# Patient Record
Sex: FEMALE | Race: WHITE | Employment: OTHER | URBAN - METROPOLITAN AREA
[De-identification: names, ages, dates, MRNs, and addresses within clinical notes are randomized per-mention and may not be internally consistent; named-entity substitution may affect disease eponyms.]

---

## 2017-01-23 DIAGNOSIS — E03.9 UNSPECIFIED HYPOTHYROIDISM: ICD-10-CM

## 2017-01-23 DIAGNOSIS — I10 UNSPECIFIED ESSENTIAL HYPERTENSION: ICD-10-CM

## 2017-01-23 RX ORDER — LISINOPRIL 10 MG/1
TABLET ORAL
Qty: 30 TABLET | Refills: 0 | Status: SHIPPED | OUTPATIENT
Start: 2017-01-23 | End: 2017-01-27 | Stop reason: SDUPTHER

## 2017-01-23 RX ORDER — LEVOTHYROXINE SODIUM 0.1 MG/1
100 TABLET ORAL DAILY
Qty: 30 TABLET | Refills: 0 | Status: SHIPPED | OUTPATIENT
Start: 2017-01-23 | End: 2017-01-27 | Stop reason: SDUPTHER

## 2017-01-24 ENCOUNTER — TELEPHONE (OUTPATIENT)
Dept: FAMILY MEDICINE CLINIC | Age: 77
End: 2017-01-24

## 2017-01-25 DIAGNOSIS — E03.9 HYPOTHYROIDISM, UNSPECIFIED TYPE: ICD-10-CM

## 2017-01-25 DIAGNOSIS — E78.2 MIXED HYPERLIPIDEMIA: ICD-10-CM

## 2017-01-25 DIAGNOSIS — E10.49 TYPE 1 DIABETES MELLITUS WITH OTHER NEUROLOGIC COMPLICATION (HCC): ICD-10-CM

## 2017-01-25 LAB
A/G RATIO: 1.8 (ref 1.1–2.2)
ALBUMIN SERPL-MCNC: 4 G/DL (ref 3.4–5)
ALP BLD-CCNC: 62 U/L (ref 40–129)
ALT SERPL-CCNC: 12 U/L (ref 10–40)
ANION GAP SERPL CALCULATED.3IONS-SCNC: 18 MMOL/L (ref 3–16)
AST SERPL-CCNC: 22 U/L (ref 15–37)
BILIRUB SERPL-MCNC: 0.4 MG/DL (ref 0–1)
BUN BLDV-MCNC: 24 MG/DL (ref 7–20)
CALCIUM SERPL-MCNC: 9.4 MG/DL (ref 8.3–10.6)
CHLORIDE BLD-SCNC: 96 MMOL/L (ref 99–110)
CHOLESTEROL, TOTAL: 143 MG/DL (ref 0–199)
CO2: 26 MMOL/L (ref 21–32)
CREAT SERPL-MCNC: 1.1 MG/DL (ref 0.6–1.2)
GFR AFRICAN AMERICAN: 58
GFR NON-AFRICAN AMERICAN: 48
GLOBULIN: 2.2 G/DL
GLUCOSE BLD-MCNC: 144 MG/DL (ref 70–99)
HDLC SERPL-MCNC: 69 MG/DL (ref 40–60)
LDL CHOLESTEROL CALCULATED: 60 MG/DL
POTASSIUM SERPL-SCNC: 5 MMOL/L (ref 3.5–5.1)
SODIUM BLD-SCNC: 140 MMOL/L (ref 136–145)
TOTAL PROTEIN: 6.2 G/DL (ref 6.4–8.2)
TRIGL SERPL-MCNC: 72 MG/DL (ref 0–150)
TSH SERPL DL<=0.05 MIU/L-ACNC: 2.07 UIU/ML (ref 0.27–4.2)
VLDLC SERPL CALC-MCNC: 14 MG/DL

## 2017-01-26 ENCOUNTER — PATIENT MESSAGE (OUTPATIENT)
Dept: FAMILY MEDICINE CLINIC | Age: 77
End: 2017-01-26

## 2017-01-26 LAB
ESTIMATED AVERAGE GLUCOSE: 211.6 MG/DL
HBA1C MFR BLD: 9 %

## 2017-01-27 ENCOUNTER — OFFICE VISIT (OUTPATIENT)
Dept: FAMILY MEDICINE CLINIC | Age: 77
End: 2017-01-27

## 2017-01-27 VITALS
WEIGHT: 136 LBS | SYSTOLIC BLOOD PRESSURE: 114 MMHG | BODY MASS INDEX: 25.03 KG/M2 | OXYGEN SATURATION: 98 % | DIASTOLIC BLOOD PRESSURE: 66 MMHG | HEIGHT: 62 IN | RESPIRATION RATE: 14 BRPM | HEART RATE: 74 BPM

## 2017-01-27 DIAGNOSIS — E03.9 HYPOTHYROIDISM, UNSPECIFIED TYPE: ICD-10-CM

## 2017-01-27 DIAGNOSIS — E10.42 DIABETIC POLYNEUROPATHY ASSOCIATED WITH TYPE 1 DIABETES MELLITUS (HCC): ICD-10-CM

## 2017-01-27 DIAGNOSIS — I10 UNSPECIFIED ESSENTIAL HYPERTENSION: ICD-10-CM

## 2017-01-27 DIAGNOSIS — M81.0 OSTEOPOROSIS: ICD-10-CM

## 2017-01-27 DIAGNOSIS — N28.9 RENAL INSUFFICIENCY: ICD-10-CM

## 2017-01-27 DIAGNOSIS — E03.9 UNSPECIFIED HYPOTHYROIDISM: ICD-10-CM

## 2017-01-27 DIAGNOSIS — E10.49 TYPE 1 DIABETES MELLITUS WITH OTHER NEUROLOGIC COMPLICATION (HCC): ICD-10-CM

## 2017-01-27 DIAGNOSIS — I25.10 CORONARY ARTERY DISEASE INVOLVING NATIVE CORONARY ARTERY OF NATIVE HEART WITHOUT ANGINA PECTORIS: Primary | ICD-10-CM

## 2017-01-27 PROCEDURE — G0439 PPPS, SUBSEQ VISIT: HCPCS | Performed by: FAMILY MEDICINE

## 2017-01-27 RX ORDER — LEVOTHYROXINE SODIUM 0.1 MG/1
100 TABLET ORAL DAILY
Qty: 90 TABLET | Refills: 3 | Status: SHIPPED | OUTPATIENT
Start: 2017-01-27 | End: 2019-01-31 | Stop reason: SDUPTHER

## 2017-01-27 RX ORDER — LISINOPRIL 10 MG/1
TABLET ORAL
Qty: 90 TABLET | Refills: 3 | Status: SHIPPED | OUTPATIENT
Start: 2017-01-27 | End: 2018-06-18

## 2017-01-27 RX ORDER — SIMVASTATIN 20 MG
TABLET ORAL
Qty: 90 TABLET | Refills: 3 | Status: SHIPPED | OUTPATIENT
Start: 2017-01-27 | End: 2017-05-19

## 2017-01-27 ASSESSMENT — LIFESTYLE VARIABLES
HOW OFTEN DO YOU HAVE SIX OR MORE DRINKS ON ONE OCCASION: 0
HOW OFTEN DO YOU HAVE A DRINK CONTAINING ALCOHOL: 3

## 2017-01-27 ASSESSMENT — ANXIETY QUESTIONNAIRES
GAD7 TOTAL SCORE: 1
GAD7 TOTAL SCORE: 2

## 2017-01-27 ASSESSMENT — PATIENT HEALTH QUESTIONNAIRE - PHQ9: SUM OF ALL RESPONSES TO PHQ QUESTIONS 1-9: 0

## 2017-02-02 ENCOUNTER — HOSPITAL ENCOUNTER (OUTPATIENT)
Dept: GENERAL RADIOLOGY | Age: 77
Discharge: HOME | End: 2017-02-02
Attending: INTERNAL MEDICINE | Admitting: INTERNAL MEDICINE

## 2017-02-02 DIAGNOSIS — M81.0 AGE-RELATED OSTEOPOROSIS WITHOUT CURRENT PATHOLOGICAL FRACTURE: ICD-10-CM

## 2017-02-02 DIAGNOSIS — M81.0 OSTEOPOROSIS: ICD-10-CM

## 2017-02-27 ENCOUNTER — OFFICE VISIT (OUTPATIENT)
Dept: FAMILY MEDICINE CLINIC | Age: 77
End: 2017-02-27

## 2017-02-27 VITALS
HEART RATE: 70 BPM | OXYGEN SATURATION: 99 % | DIASTOLIC BLOOD PRESSURE: 70 MMHG | BODY MASS INDEX: 24.51 KG/M2 | SYSTOLIC BLOOD PRESSURE: 130 MMHG | RESPIRATION RATE: 14 BRPM | WEIGHT: 133.2 LBS | TEMPERATURE: 97.8 F | HEIGHT: 62 IN

## 2017-02-27 DIAGNOSIS — R05.3 CHRONIC COUGH: Primary | ICD-10-CM

## 2017-02-27 PROCEDURE — G8484 FLU IMMUNIZE NO ADMIN: HCPCS | Performed by: FAMILY MEDICINE

## 2017-02-27 PROCEDURE — G8598 ASA/ANTIPLAT THER USED: HCPCS | Performed by: FAMILY MEDICINE

## 2017-02-27 PROCEDURE — 4040F PNEUMOC VAC/ADMIN/RCVD: CPT | Performed by: FAMILY MEDICINE

## 2017-02-27 PROCEDURE — 1036F TOBACCO NON-USER: CPT | Performed by: FAMILY MEDICINE

## 2017-02-27 PROCEDURE — G8420 CALC BMI NORM PARAMETERS: HCPCS | Performed by: FAMILY MEDICINE

## 2017-02-27 PROCEDURE — G8427 DOCREV CUR MEDS BY ELIG CLIN: HCPCS | Performed by: FAMILY MEDICINE

## 2017-02-27 PROCEDURE — 1090F PRES/ABSN URINE INCON ASSESS: CPT | Performed by: FAMILY MEDICINE

## 2017-02-27 PROCEDURE — 99213 OFFICE O/P EST LOW 20 MIN: CPT | Performed by: FAMILY MEDICINE

## 2017-02-27 PROCEDURE — 1123F ACP DISCUSS/DSCN MKR DOCD: CPT | Performed by: FAMILY MEDICINE

## 2017-02-27 PROCEDURE — G8399 PT W/DXA RESULTS DOCUMENT: HCPCS | Performed by: FAMILY MEDICINE

## 2017-02-27 RX ORDER — DOXYCYCLINE HYCLATE 100 MG
100 TABLET ORAL 2 TIMES DAILY
Qty: 20 TABLET | Refills: 0 | Status: SHIPPED | OUTPATIENT
Start: 2017-02-27 | End: 2017-03-09

## 2017-04-13 ENCOUNTER — OFFICE VISIT (OUTPATIENT)
Dept: ENDOCRINOLOGY | Age: 77
End: 2017-04-13

## 2017-04-13 VITALS
SYSTOLIC BLOOD PRESSURE: 120 MMHG | RESPIRATION RATE: 12 BRPM | OXYGEN SATURATION: 96 % | WEIGHT: 134.2 LBS | HEIGHT: 63 IN | BODY MASS INDEX: 23.78 KG/M2 | DIASTOLIC BLOOD PRESSURE: 67 MMHG | HEART RATE: 68 BPM

## 2017-04-13 DIAGNOSIS — E10.49 TYPE 1 DIABETES MELLITUS WITH OTHER NEUROLOGIC COMPLICATION (HCC): Primary | ICD-10-CM

## 2017-04-13 DIAGNOSIS — E03.9 HYPOTHYROIDISM, UNSPECIFIED TYPE: ICD-10-CM

## 2017-04-13 DIAGNOSIS — M81.0 OSTEOPOROSIS: ICD-10-CM

## 2017-04-13 LAB — HBA1C MFR BLD: 7.1 %

## 2017-04-13 PROCEDURE — 1090F PRES/ABSN URINE INCON ASSESS: CPT | Performed by: INTERNAL MEDICINE

## 2017-04-13 PROCEDURE — G8428 CUR MEDS NOT DOCUMENT: HCPCS | Performed by: INTERNAL MEDICINE

## 2017-04-13 PROCEDURE — G8420 CALC BMI NORM PARAMETERS: HCPCS | Performed by: INTERNAL MEDICINE

## 2017-04-13 PROCEDURE — 99215 OFFICE O/P EST HI 40 MIN: CPT | Performed by: INTERNAL MEDICINE

## 2017-04-13 PROCEDURE — 1123F ACP DISCUSS/DSCN MKR DOCD: CPT | Performed by: INTERNAL MEDICINE

## 2017-04-13 PROCEDURE — 4040F PNEUMOC VAC/ADMIN/RCVD: CPT | Performed by: INTERNAL MEDICINE

## 2017-04-13 PROCEDURE — 1036F TOBACCO NON-USER: CPT | Performed by: INTERNAL MEDICINE

## 2017-04-13 PROCEDURE — G8399 PT W/DXA RESULTS DOCUMENT: HCPCS | Performed by: INTERNAL MEDICINE

## 2017-04-13 PROCEDURE — G8598 ASA/ANTIPLAT THER USED: HCPCS | Performed by: INTERNAL MEDICINE

## 2017-04-13 PROCEDURE — 83036 HEMOGLOBIN GLYCOSYLATED A1C: CPT | Performed by: INTERNAL MEDICINE

## 2017-04-13 PROCEDURE — 4005F PHARM THX FOR OP RXD: CPT | Performed by: INTERNAL MEDICINE

## 2017-05-19 RX ORDER — SIMVASTATIN 20 MG
TABLET ORAL
Qty: 30 TABLET | Refills: 0 | Status: SHIPPED | OUTPATIENT
Start: 2017-05-19 | End: 2017-05-25 | Stop reason: SDUPTHER

## 2017-05-25 ENCOUNTER — OFFICE VISIT (OUTPATIENT)
Dept: CARDIOLOGY CLINIC | Age: 77
End: 2017-05-25

## 2017-05-25 VITALS
WEIGHT: 134.3 LBS | BODY MASS INDEX: 24.71 KG/M2 | DIASTOLIC BLOOD PRESSURE: 62 MMHG | HEIGHT: 62 IN | SYSTOLIC BLOOD PRESSURE: 116 MMHG | HEART RATE: 64 BPM

## 2017-05-25 DIAGNOSIS — I25.10 ATHEROSCLEROSIS OF NATIVE CORONARY ARTERY OF NATIVE HEART WITHOUT ANGINA PECTORIS: Primary | ICD-10-CM

## 2017-05-25 DIAGNOSIS — R00.2 PALPITATIONS: ICD-10-CM

## 2017-05-25 DIAGNOSIS — E78.2 MIXED HYPERLIPIDEMIA: ICD-10-CM

## 2017-05-25 PROCEDURE — G8598 ASA/ANTIPLAT THER USED: HCPCS | Performed by: NURSE PRACTITIONER

## 2017-05-25 PROCEDURE — G8399 PT W/DXA RESULTS DOCUMENT: HCPCS | Performed by: NURSE PRACTITIONER

## 2017-05-25 PROCEDURE — 1090F PRES/ABSN URINE INCON ASSESS: CPT | Performed by: NURSE PRACTITIONER

## 2017-05-25 PROCEDURE — 99214 OFFICE O/P EST MOD 30 MIN: CPT | Performed by: NURSE PRACTITIONER

## 2017-05-25 PROCEDURE — G8427 DOCREV CUR MEDS BY ELIG CLIN: HCPCS | Performed by: NURSE PRACTITIONER

## 2017-05-25 PROCEDURE — 4040F PNEUMOC VAC/ADMIN/RCVD: CPT | Performed by: NURSE PRACTITIONER

## 2017-05-25 PROCEDURE — 1123F ACP DISCUSS/DSCN MKR DOCD: CPT | Performed by: NURSE PRACTITIONER

## 2017-05-25 PROCEDURE — 1036F TOBACCO NON-USER: CPT | Performed by: NURSE PRACTITIONER

## 2017-05-25 PROCEDURE — G8420 CALC BMI NORM PARAMETERS: HCPCS | Performed by: NURSE PRACTITIONER

## 2017-05-25 RX ORDER — SIMVASTATIN 20 MG
TABLET ORAL
Qty: 30 TABLET | Refills: 11 | Status: SHIPPED | OUTPATIENT
Start: 2017-05-25 | End: 2017-08-21

## 2017-05-25 RX ORDER — SIMVASTATIN 20 MG
TABLET ORAL
Qty: 30 TABLET | Refills: 0 | Status: CANCELLED | OUTPATIENT
Start: 2017-05-25

## 2017-05-25 RX ORDER — ACETAMINOPHEN 160 MG
1 TABLET,DISINTEGRATING ORAL DAILY
COMMUNITY

## 2017-06-26 ENCOUNTER — TELEPHONE (OUTPATIENT)
Dept: FAMILY MEDICINE CLINIC | Age: 77
End: 2017-06-26

## 2017-06-27 ENCOUNTER — OFFICE VISIT (OUTPATIENT)
Dept: DERMATOLOGY | Age: 77
End: 2017-06-27

## 2017-06-27 DIAGNOSIS — Z12.83 SCREENING EXAM FOR SKIN CANCER: ICD-10-CM

## 2017-06-27 DIAGNOSIS — L82.1 SEBORRHEIC KERATOSES: Primary | ICD-10-CM

## 2017-06-27 DIAGNOSIS — L81.4 SOLAR LENTIGINOSIS: ICD-10-CM

## 2017-06-27 PROCEDURE — G8598 ASA/ANTIPLAT THER USED: HCPCS | Performed by: DERMATOLOGY

## 2017-06-27 PROCEDURE — G8420 CALC BMI NORM PARAMETERS: HCPCS | Performed by: DERMATOLOGY

## 2017-06-27 PROCEDURE — G8399 PT W/DXA RESULTS DOCUMENT: HCPCS | Performed by: DERMATOLOGY

## 2017-06-27 PROCEDURE — 4040F PNEUMOC VAC/ADMIN/RCVD: CPT | Performed by: DERMATOLOGY

## 2017-06-27 PROCEDURE — G8427 DOCREV CUR MEDS BY ELIG CLIN: HCPCS | Performed by: DERMATOLOGY

## 2017-06-27 PROCEDURE — 1123F ACP DISCUSS/DSCN MKR DOCD: CPT | Performed by: DERMATOLOGY

## 2017-06-27 PROCEDURE — 1036F TOBACCO NON-USER: CPT | Performed by: DERMATOLOGY

## 2017-06-27 PROCEDURE — 99213 OFFICE O/P EST LOW 20 MIN: CPT | Performed by: DERMATOLOGY

## 2017-06-27 PROCEDURE — 1090F PRES/ABSN URINE INCON ASSESS: CPT | Performed by: DERMATOLOGY

## 2017-07-05 ENCOUNTER — OFFICE VISIT (OUTPATIENT)
Dept: FAMILY MEDICINE CLINIC | Age: 77
End: 2017-07-05

## 2017-07-05 VITALS
HEART RATE: 73 BPM | OXYGEN SATURATION: 96 % | SYSTOLIC BLOOD PRESSURE: 114 MMHG | HEIGHT: 62 IN | BODY MASS INDEX: 24.66 KG/M2 | DIASTOLIC BLOOD PRESSURE: 62 MMHG | WEIGHT: 134 LBS | RESPIRATION RATE: 16 BRPM

## 2017-07-05 DIAGNOSIS — E03.9 HYPOTHYROIDISM, UNSPECIFIED TYPE: ICD-10-CM

## 2017-07-05 DIAGNOSIS — I25.10 CORONARY ARTERY DISEASE INVOLVING NATIVE CORONARY ARTERY OF NATIVE HEART WITHOUT ANGINA PECTORIS: ICD-10-CM

## 2017-07-05 DIAGNOSIS — E10.49 TYPE 1 DIABETES MELLITUS WITH OTHER NEUROLOGIC COMPLICATION (HCC): ICD-10-CM

## 2017-07-05 DIAGNOSIS — L08.9 WOUND INFECTION: ICD-10-CM

## 2017-07-05 DIAGNOSIS — T14.8XXA WOUND INFECTION: ICD-10-CM

## 2017-07-05 DIAGNOSIS — S81.812D LEG LACERATION, LEFT, SUBSEQUENT ENCOUNTER: Primary | ICD-10-CM

## 2017-07-05 PROCEDURE — G8427 DOCREV CUR MEDS BY ELIG CLIN: HCPCS | Performed by: FAMILY MEDICINE

## 2017-07-05 PROCEDURE — 1123F ACP DISCUSS/DSCN MKR DOCD: CPT | Performed by: FAMILY MEDICINE

## 2017-07-05 PROCEDURE — G8598 ASA/ANTIPLAT THER USED: HCPCS | Performed by: FAMILY MEDICINE

## 2017-07-05 PROCEDURE — 4040F PNEUMOC VAC/ADMIN/RCVD: CPT | Performed by: FAMILY MEDICINE

## 2017-07-05 PROCEDURE — 1090F PRES/ABSN URINE INCON ASSESS: CPT | Performed by: FAMILY MEDICINE

## 2017-07-05 PROCEDURE — 1036F TOBACCO NON-USER: CPT | Performed by: FAMILY MEDICINE

## 2017-07-05 PROCEDURE — 99213 OFFICE O/P EST LOW 20 MIN: CPT | Performed by: FAMILY MEDICINE

## 2017-07-05 PROCEDURE — G8420 CALC BMI NORM PARAMETERS: HCPCS | Performed by: FAMILY MEDICINE

## 2017-07-05 PROCEDURE — G8399 PT W/DXA RESULTS DOCUMENT: HCPCS | Performed by: FAMILY MEDICINE

## 2017-07-05 RX ORDER — CEPHALEXIN 500 MG/1
500 CAPSULE ORAL 3 TIMES DAILY
Qty: 21 CAPSULE | Refills: 0 | Status: SHIPPED | OUTPATIENT
Start: 2017-07-05 | End: 2017-12-06 | Stop reason: ALTCHOICE

## 2017-07-12 ENCOUNTER — OFFICE VISIT (OUTPATIENT)
Dept: FAMILY MEDICINE CLINIC | Age: 77
End: 2017-07-12

## 2017-07-12 VITALS
BODY MASS INDEX: 24.51 KG/M2 | HEIGHT: 62 IN | DIASTOLIC BLOOD PRESSURE: 78 MMHG | HEART RATE: 58 BPM | WEIGHT: 133.2 LBS | OXYGEN SATURATION: 99 % | RESPIRATION RATE: 16 BRPM | SYSTOLIC BLOOD PRESSURE: 114 MMHG

## 2017-07-12 LAB
CREATININE URINE POCT: 200
MICROALBUMIN/CREAT 24H UR: 10 MG/G{CREAT}
MICROALBUMIN/CREAT UR-RTO: NORMAL

## 2017-07-12 PROCEDURE — 1123F ACP DISCUSS/DSCN MKR DOCD: CPT | Performed by: FAMILY MEDICINE

## 2017-07-12 PROCEDURE — G8598 ASA/ANTIPLAT THER USED: HCPCS | Performed by: FAMILY MEDICINE

## 2017-07-12 PROCEDURE — G8427 DOCREV CUR MEDS BY ELIG CLIN: HCPCS | Performed by: FAMILY MEDICINE

## 2017-07-12 PROCEDURE — G8420 CALC BMI NORM PARAMETERS: HCPCS | Performed by: FAMILY MEDICINE

## 2017-07-12 PROCEDURE — 4040F PNEUMOC VAC/ADMIN/RCVD: CPT | Performed by: FAMILY MEDICINE

## 2017-07-12 PROCEDURE — 1090F PRES/ABSN URINE INCON ASSESS: CPT | Performed by: FAMILY MEDICINE

## 2017-07-12 PROCEDURE — 99213 OFFICE O/P EST LOW 20 MIN: CPT | Performed by: FAMILY MEDICINE

## 2017-07-12 PROCEDURE — 82044 UR ALBUMIN SEMIQUANTITATIVE: CPT | Performed by: FAMILY MEDICINE

## 2017-07-12 PROCEDURE — 1036F TOBACCO NON-USER: CPT | Performed by: FAMILY MEDICINE

## 2017-07-12 PROCEDURE — G8399 PT W/DXA RESULTS DOCUMENT: HCPCS | Performed by: FAMILY MEDICINE

## 2017-07-18 ENCOUNTER — TELEPHONE (OUTPATIENT)
Dept: FAMILY MEDICINE CLINIC | Age: 77
End: 2017-07-18

## 2017-07-18 ENCOUNTER — OFFICE VISIT (OUTPATIENT)
Dept: FAMILY MEDICINE CLINIC | Age: 77
End: 2017-07-18

## 2017-07-18 VITALS
WEIGHT: 131 LBS | RESPIRATION RATE: 14 BRPM | SYSTOLIC BLOOD PRESSURE: 110 MMHG | HEART RATE: 64 BPM | OXYGEN SATURATION: 99 % | HEIGHT: 62 IN | DIASTOLIC BLOOD PRESSURE: 60 MMHG | BODY MASS INDEX: 24.11 KG/M2

## 2017-07-18 PROCEDURE — G8420 CALC BMI NORM PARAMETERS: HCPCS | Performed by: FAMILY MEDICINE

## 2017-07-18 PROCEDURE — 4040F PNEUMOC VAC/ADMIN/RCVD: CPT | Performed by: FAMILY MEDICINE

## 2017-07-18 PROCEDURE — G8427 DOCREV CUR MEDS BY ELIG CLIN: HCPCS | Performed by: FAMILY MEDICINE

## 2017-07-18 PROCEDURE — 99211 OFF/OP EST MAY X REQ PHY/QHP: CPT | Performed by: FAMILY MEDICINE

## 2017-07-18 PROCEDURE — G8598 ASA/ANTIPLAT THER USED: HCPCS | Performed by: FAMILY MEDICINE

## 2017-07-18 NOTE — TELEPHONE ENCOUNTER
Pt  informed, pt not at home right now.  She may come in at the end of the day or somewhere this evening

## 2017-07-18 NOTE — TELEPHONE ENCOUNTER
Patient was told to call if she found the other stitch that Dr. Luis Armando Baird could not see when he removed the other stiches on 7/12/17. She is requesting to come in tomorrow as she is leaving on vacation Thursday 7/20. Please call patient and advise asap.

## 2017-07-19 ENCOUNTER — HOSPITAL ENCOUNTER (OUTPATIENT)
Dept: NON INVASIVE DIAGNOSTICS | Age: 77
Discharge: HOME | End: 2017-07-19
Attending: NURSE PRACTITIONER | Admitting: NURSE PRACTITIONER

## 2017-07-19 DIAGNOSIS — I25.10 ATHEROSCLEROTIC HEART DISEASE OF NATIVE CORONARY ARTERY WITHOUT ANGINA PECTORIS: ICD-10-CM

## 2017-07-19 LAB
LV EF: 60 %
LVEF MODALITY: NORMAL

## 2017-07-28 ENCOUNTER — TELEPHONE (OUTPATIENT)
Dept: CARDIOLOGY CLINIC | Age: 77
End: 2017-07-28

## 2017-08-17 ENCOUNTER — OFFICE VISIT (OUTPATIENT)
Dept: ENDOCRINOLOGY | Age: 77
End: 2017-08-17

## 2017-08-17 VITALS
HEART RATE: 71 BPM | DIASTOLIC BLOOD PRESSURE: 64 MMHG | WEIGHT: 131 LBS | SYSTOLIC BLOOD PRESSURE: 108 MMHG | RESPIRATION RATE: 14 BRPM | HEIGHT: 62 IN | OXYGEN SATURATION: 97 % | BODY MASS INDEX: 24.11 KG/M2

## 2017-08-17 DIAGNOSIS — E06.3 HASHIMOTO'S DISEASE: ICD-10-CM

## 2017-08-17 DIAGNOSIS — I10 ESSENTIAL HYPERTENSION: ICD-10-CM

## 2017-08-17 DIAGNOSIS — E78.2 MIXED HYPERLIPIDEMIA: ICD-10-CM

## 2017-08-17 DIAGNOSIS — N28.9 RENAL INSUFFICIENCY: ICD-10-CM

## 2017-08-17 DIAGNOSIS — E03.9 HYPOTHYROIDISM, UNSPECIFIED TYPE: ICD-10-CM

## 2017-08-17 DIAGNOSIS — M81.0 OSTEOPOROSIS, UNSPECIFIED OSTEOPOROSIS TYPE, UNSPECIFIED PATHOLOGICAL FRACTURE PRESENCE: ICD-10-CM

## 2017-08-17 DIAGNOSIS — E10.40 TYPE 1 DIABETES MELLITUS WITH DIABETIC NEUROPATHY (HCC): Primary | ICD-10-CM

## 2017-08-17 LAB — HBA1C MFR BLD: 7.5 %

## 2017-08-17 PROCEDURE — 83036 HEMOGLOBIN GLYCOSYLATED A1C: CPT | Performed by: INTERNAL MEDICINE

## 2017-08-17 PROCEDURE — 99215 OFFICE O/P EST HI 40 MIN: CPT | Performed by: INTERNAL MEDICINE

## 2017-08-17 PROCEDURE — 1123F ACP DISCUSS/DSCN MKR DOCD: CPT | Performed by: INTERNAL MEDICINE

## 2017-08-17 PROCEDURE — 1090F PRES/ABSN URINE INCON ASSESS: CPT | Performed by: INTERNAL MEDICINE

## 2017-08-17 PROCEDURE — G8420 CALC BMI NORM PARAMETERS: HCPCS | Performed by: INTERNAL MEDICINE

## 2017-08-17 PROCEDURE — G8399 PT W/DXA RESULTS DOCUMENT: HCPCS | Performed by: INTERNAL MEDICINE

## 2017-08-17 PROCEDURE — G8598 ASA/ANTIPLAT THER USED: HCPCS | Performed by: INTERNAL MEDICINE

## 2017-08-17 PROCEDURE — 4040F PNEUMOC VAC/ADMIN/RCVD: CPT | Performed by: INTERNAL MEDICINE

## 2017-08-17 PROCEDURE — 1036F TOBACCO NON-USER: CPT | Performed by: INTERNAL MEDICINE

## 2017-08-17 PROCEDURE — G8427 DOCREV CUR MEDS BY ELIG CLIN: HCPCS | Performed by: INTERNAL MEDICINE

## 2017-08-17 PROCEDURE — 4005F PHARM THX FOR OP RXD: CPT | Performed by: INTERNAL MEDICINE

## 2017-08-21 RX ORDER — SIMVASTATIN 20 MG
TABLET ORAL
Qty: 90 TABLET | Refills: 1 | Status: SHIPPED | OUTPATIENT
Start: 2017-08-21 | End: 2018-06-18 | Stop reason: SDUPTHER

## 2017-10-26 NOTE — TELEPHONE ENCOUNTER
Pt called back also would like a script sent to [facility] sent for 1 vial of the novalog

## 2017-11-08 DIAGNOSIS — I25.10 ATHEROSCLEROSIS OF NATIVE CORONARY ARTERY WITHOUT ANGINA PECTORIS, UNSPECIFIED WHETHER NATIVE OR TRANSPLANTED HEART: ICD-10-CM

## 2017-11-08 RX ORDER — CLOPIDOGREL BISULFATE 75 MG/1
75 TABLET ORAL DAILY
Qty: 90 TABLET | Refills: 3 | Status: SHIPPED | OUTPATIENT
Start: 2017-11-08 | End: 2018-04-19 | Stop reason: ALTCHOICE

## 2017-11-08 NOTE — TELEPHONE ENCOUNTER
Medication and Quantity requested: Plavix Tab 75 MG Qty 90    Last Visit  7/18/17    Pharmacy and phone number updated in [facility]:  yes

## 2017-11-10 ENCOUNTER — PATIENT MESSAGE (OUTPATIENT)
Dept: FAMILY MEDICINE CLINIC | Age: 77
End: 2017-11-10

## 2017-11-10 NOTE — TELEPHONE ENCOUNTER
From: Marley Langford  To: Olvin Phelps MD  Sent: 11/10/2017 12:34 PM EST  Subject: Non-Urgent Medical Question    This is NOT a question. Just wanted you to know that I got my flu shot at [facility] on 2017. Please have someone update my records. Thank you.     Marley Langford   : [birth year]

## 2017-12-01 DIAGNOSIS — E10.40 TYPE 1 DIABETES MELLITUS WITH DIABETIC NEUROPATHY (HCC): ICD-10-CM

## 2017-12-01 DIAGNOSIS — E78.2 MIXED HYPERLIPIDEMIA: ICD-10-CM

## 2017-12-01 DIAGNOSIS — E03.9 HYPOTHYROIDISM, UNSPECIFIED TYPE: ICD-10-CM

## 2017-12-01 LAB
A/G RATIO: 1.8 (ref 1.1–2.2)
ALBUMIN SERPL-MCNC: 4.2 G/DL (ref 3.4–5)
ALP BLD-CCNC: 55 U/L (ref 40–129)
ALT SERPL-CCNC: 15 U/L (ref 10–40)
ANION GAP SERPL CALCULATED.3IONS-SCNC: 13 MMOL/L (ref 3–16)
AST SERPL-CCNC: 24 U/L (ref 15–37)
BILIRUB SERPL-MCNC: 0.6 MG/DL (ref 0–1)
BUN BLDV-MCNC: 20 MG/DL (ref 7–20)
CALCIUM SERPL-MCNC: 9.5 MG/DL (ref 8.3–10.6)
CHLORIDE BLD-SCNC: 100 MMOL/L (ref 99–110)
CHOLESTEROL, TOTAL: 154 MG/DL (ref 0–199)
CO2: 28 MMOL/L (ref 21–32)
CREAT SERPL-MCNC: 1 MG/DL (ref 0.6–1.2)
GFR AFRICAN AMERICAN: >60
GFR NON-AFRICAN AMERICAN: 54
GLOBULIN: 2.3 G/DL
GLUCOSE BLD-MCNC: 130 MG/DL (ref 70–99)
HDLC SERPL-MCNC: 80 MG/DL (ref 40–60)
LDL CHOLESTEROL CALCULATED: 59 MG/DL
POTASSIUM SERPL-SCNC: 4.5 MMOL/L (ref 3.5–5.1)
SODIUM BLD-SCNC: 141 MMOL/L (ref 136–145)
TOTAL PROTEIN: 6.5 G/DL (ref 6.4–8.2)
TRIGL SERPL-MCNC: 77 MG/DL (ref 0–150)
TSH SERPL DL<=0.05 MIU/L-ACNC: 3.3 UIU/ML (ref 0.27–4.2)
VLDLC SERPL CALC-MCNC: 15 MG/DL

## 2017-12-02 LAB
ESTIMATED AVERAGE GLUCOSE: 171.4 MG/DL
HBA1C MFR BLD: 7.6 %

## 2017-12-06 ENCOUNTER — OFFICE VISIT (OUTPATIENT)
Dept: CARDIOLOGY CLINIC | Age: 77
End: 2017-12-06

## 2017-12-06 VITALS
HEIGHT: 63 IN | SYSTOLIC BLOOD PRESSURE: 120 MMHG | WEIGHT: 128 LBS | HEART RATE: 72 BPM | DIASTOLIC BLOOD PRESSURE: 60 MMHG | BODY MASS INDEX: 22.68 KG/M2

## 2017-12-06 DIAGNOSIS — I25.10 CORONARY ARTERY DISEASE INVOLVING NATIVE CORONARY ARTERY OF NATIVE HEART WITHOUT ANGINA PECTORIS: Primary | ICD-10-CM

## 2017-12-06 DIAGNOSIS — E78.49 OTHER HYPERLIPIDEMIA: ICD-10-CM

## 2017-12-06 PROCEDURE — G8420 CALC BMI NORM PARAMETERS: HCPCS | Performed by: INTERNAL MEDICINE

## 2017-12-06 PROCEDURE — 1123F ACP DISCUSS/DSCN MKR DOCD: CPT | Performed by: INTERNAL MEDICINE

## 2017-12-06 PROCEDURE — G8598 ASA/ANTIPLAT THER USED: HCPCS | Performed by: INTERNAL MEDICINE

## 2017-12-06 PROCEDURE — 1036F TOBACCO NON-USER: CPT | Performed by: INTERNAL MEDICINE

## 2017-12-06 PROCEDURE — 4040F PNEUMOC VAC/ADMIN/RCVD: CPT | Performed by: INTERNAL MEDICINE

## 2017-12-06 PROCEDURE — 99214 OFFICE O/P EST MOD 30 MIN: CPT | Performed by: INTERNAL MEDICINE

## 2017-12-06 PROCEDURE — G8427 DOCREV CUR MEDS BY ELIG CLIN: HCPCS | Performed by: INTERNAL MEDICINE

## 2017-12-06 PROCEDURE — G8484 FLU IMMUNIZE NO ADMIN: HCPCS | Performed by: INTERNAL MEDICINE

## 2017-12-06 PROCEDURE — G8399 PT W/DXA RESULTS DOCUMENT: HCPCS | Performed by: INTERNAL MEDICINE

## 2017-12-06 PROCEDURE — 1090F PRES/ABSN URINE INCON ASSESS: CPT | Performed by: INTERNAL MEDICINE

## 2017-12-06 NOTE — LETTER
drinks about 0.6 oz of alcohol per week . She reports that she does not use drugs. Family History:  family history includes Cancer in her father and another family member; Heart Disease in her brother, father, and mother. Home Medications:  Prior to Admission medications    Medication Sig Start Date End Date Taking? Authorizing Provider   clopidogrel (PLAVIX) 75 MG tablet Take 1 tablet by mouth daily 11/8/17 11/8/18 Yes Summer Stokes MD   insulin aspart (NOVOLOG) 100 UNIT/ML injection vial INJECT TOTAL DAILY DOSE 25 TO 30 UNITS DAILY WITH INSULIN PUMP Dx ode E 10.49 10/30/17  Yes Neeru Irwin MD   simvastatin (ZOCOR) 20 MG tablet TAKE 1 TABLET NIGHTLY 8/21/17  Yes Phil Cuevas NP   Cholecalciferol (VITAMIN D3) 2000 UNITS CAPS Take 1 capsule by mouth daily   Yes Historical Provider, MD   levothyroxine (SYNTHROID) 100 MCG tablet Take 1 tablet by mouth daily 1/27/17  Yes Summer Stokes MD   lisinopril (ZESTRIL) 10 MG tablet TAKE ONE TABLET BY MOUTH EVERY DAY  Patient taking differently: 5 mg TAKE ONE TABLET BY MOUTH EVERY DAY 1/27/17  Yes Summer Stokes MD   metoprolol tartrate (LOPRESSOR) 25 MG tablet Take 0.5 tablets by mouth 2 times daily 1/27/17  Yes Summer Stokes MD   glucose blood VI test strips (TIANA CONTOUR NEXT TEST) strip Dx Code E10.49 & E 10.65. Patient is testing 6-7 times a day due to retinopathy, neuropathy, hypoglycemia, hyperglycemia.  12/8/16  Yes Neeru Irwin MD   nitroGLYCERIN (NITROSTAT) 0.4 MG SL tablet Place 1 tablet under the tongue every 5 minutes as needed for Chest pain 6/14/16  Yes Phil Cuevas NP   TIANA MICROLET LANCETS MISC 1 each by Does not apply route 8 times daily Brittle Diabetes Dx Code 250.61  Patient taking differently: 1 each by Does not apply route 8 times daily Retinopathy DX Code E10.49 & Peripheal Neuropathy DX Code E10.65 8/19/15  Yes Neeru Irwin MD pramipexole (MIRAPEX) 0.25 MG tablet Take 0.5 mg by mouth 3 times daily    Yes Historical Provider, MD   rasagiline (AZILECT) 0.5 MG TABS Take 0.5 mg by mouth daily. Yes Historical Provider, MD   carbidopa-levodopa (SINEMET)  MG per tablet Take 1 tablet by mouth 4 times daily. Yes Historical Provider, MD   Insulin Infusion Pump (PARADIGM INSULIN PUMP) by Does not apply route. Yes Historical Provider, MD   aspirin 81 MG EC tablet Take 1 tablet by mouth daily. 4/19/10  Yes Jeb Whittaker MD   Calcium Carbonate (CALTRATE 600 PO) Take 600 mg by mouth daily. 9/1/10  Yes Historical Provider, MD   cephALEXin (KEFLEX) 500 MG capsule Take 1 capsule by mouth 3 times daily 7/5/17   Alex Munoz MD   Multiple Vitamins-Minerals (CENTRUM SILVER PO) Take 1 tablet by mouth daily. 4/13/10   Historical Provider, MD        Allergies:  Review of patient's allergies indicates no known allergies. Review of Systems:   · Constitutional: there has been no unanticipated weight loss. There's been no change in energy level, sleep pattern, or activity level. · Eyes: No visual changes or diplopia. No scleral icterus. · ENT: No Headaches, hearing loss or vertigo. No mouth sores or sore throat. · Cardiovascular: Reviewed in HPI  · Respiratory: No cough or wheezing, no sputum production. No hematemesis. · Gastrointestinal: No abdominal pain, appetite loss, blood in stools. No change in bowel or bladder habits. · Genitourinary: No dysuria, trouble voiding, or hematuria. · Musculoskeletal:  No gait disturbance, weakness or joint complaints. · Integumentary: No rash or pruritis. · Neurological: No headache, diplopia, change in muscle strength, numbness or tingling. No change in gait, balance, coordination, mood, affect, memory, mentation, behavior. · Psychiatric: No anxiety, no depression. · Endocrine: No malaise, fatigue or temperature intolerance. No excessive thirst, fluid intake, or urination. No tremor.

## 2017-12-06 NOTE — PROGRESS NOTES
Take 1 tablet by mouth daily. 4/19/10  Yes Tonya Ibrahim MD   Calcium Carbonate (CALTRATE 600 PO) Take 600 mg by mouth daily. 9/1/10  Yes Historical Provider, MD   cephALEXin (KEFLEX) 500 MG capsule Take 1 capsule by mouth 3 times daily 7/5/17   Carlee Hill MD   Multiple Vitamins-Minerals (CENTRUM SILVER PO) Take 1 tablet by mouth daily. 4/13/10   Historical Provider, MD        Allergies:  Review of patient's allergies indicates no known allergies. Review of Systems:   · Constitutional: there has been no unanticipated weight loss. There's been no change in energy level, sleep pattern, or activity level. · Eyes: No visual changes or diplopia. No scleral icterus. · ENT: No Headaches, hearing loss or vertigo. No mouth sores or sore throat. · Cardiovascular: Reviewed in HPI  · Respiratory: No cough or wheezing, no sputum production. No hematemesis. · Gastrointestinal: No abdominal pain, appetite loss, blood in stools. No change in bowel or bladder habits. · Genitourinary: No dysuria, trouble voiding, or hematuria. · Musculoskeletal:  No gait disturbance, weakness or joint complaints. · Integumentary: No rash or pruritis. · Neurological: No headache, diplopia, change in muscle strength, numbness or tingling. No change in gait, balance, coordination, mood, affect, memory, mentation, behavior. · Psychiatric: No anxiety, no depression. · Endocrine: No malaise, fatigue or temperature intolerance. No excessive thirst, fluid intake, or urination. No tremor. · Hematologic/Lymphatic: No abnormal bruising or bleeding, blood clots or swollen lymph nodes. · Allergic/Immunologic: No nasal congestion or hives.     Physical Examination:    Vitals:    12/06/17 0923   BP: 120/60   Pulse: 72        Constitutional and General Appearance: No acute distress  Skin:good turgor,intact without lesions  HEENT: EOMI ,normal  Neck:no JVD  Respiratory:  · Normal excursion and expansion without use of accessory There is mild aortic insufficiency. There is mild  tricuspid regurgitation. The right ventricular systolic pressure is  estimated to be 30 mmHg. No significant pericardial effusion is  present.     CONCLUSION-  1. Normal left ventricular systolic function. 2. Mild aortic insufficiency. 3. Mild tricuspid regurgitation. Assessment/Plan:    1. Coronary artery disease involving native coronary artery of native heart without angina pectoris - No anginal complaints. Non-obst CAD 40% LAD & OM-1 lesions by cath '12   2. Other hyperlipidemia - 12/1/17  HDL 80 LDL 59 Tg 77, taking Zocor 20 mg         Plan:  Take 1/2 a pill of Metoprolol once a day and then stop after 2 weeks  Discuss with Dr Nena Charles about possibly coming off of Plavix  Follow up in 6 months with me or sooner if needed      I appreciate the opportunity of cooperating in the care of this individual.    Donna Espinoza M.D., [facility]

## 2017-12-06 NOTE — PATIENT INSTRUCTIONS
Take 1/2 a pill of Metoprolol once a day and then stop after 2 weeks  Discuss with Dr Cortez Roles about possibly coming off of Plavix  Follow up in 6 months with me or sooner if needed

## 2017-12-12 NOTE — COMMUNICATION BODY
tablet Take 1 tablet by mouth daily 11/8/17 11/8/18 Yes Gallito Tapia MD   insulin aspart (NOVOLOG) 100 UNIT/ML injection vial INJECT TOTAL DAILY DOSE 25 TO 30 UNITS DAILY WITH INSULIN PUMP Dx ode E 10.49 10/30/17  Yes Aruna Lee MD   simvastatin (ZOCOR) 20 MG tablet TAKE 1 TABLET NIGHTLY 8/21/17  Yes Zabrina Nash NP   Cholecalciferol (VITAMIN D3) 2000 UNITS CAPS Take 1 capsule by mouth daily   Yes Historical Provider, MD   levothyroxine (SYNTHROID) 100 MCG tablet Take 1 tablet by mouth daily 1/27/17  Yes Gallito Tapia MD   lisinopril (ZESTRIL) 10 MG tablet TAKE ONE TABLET BY MOUTH EVERY DAY  Patient taking differently: 5 mg TAKE ONE TABLET BY MOUTH EVERY DAY 1/27/17  Yes Gallito Tapia MD   metoprolol tartrate (LOPRESSOR) 25 MG tablet Take 0.5 tablets by mouth 2 times daily 1/27/17  Yes Gallito Tapia MD   glucose blood VI test strips (TIANA CONTOUR NEXT TEST) strip Dx Code E10.49 & E 10.65. Patient is testing 6-7 times a day due to retinopathy, neuropathy, hypoglycemia, hyperglycemia. 12/8/16  Yes Aruna Lee MD   nitroGLYCERIN (NITROSTAT) 0.4 MG SL tablet Place 1 tablet under the tongue every 5 minutes as needed for Chest pain 6/14/16  Yes Zabrina Nash NP   TIANA MICROLET LANCETS MISC 1 each by Does not apply route 8 times daily Brittle Diabetes Dx Code 250.61  Patient taking differently: 1 each by Does not apply route 8 times daily Retinopathy DX Code E10.49 & Peripheal Neuropathy DX Code E10.65 8/19/15  Yes Aruna Lee MD   pramipexole (MIRAPEX) 0.25 MG tablet Take 0.5 mg by mouth 3 times daily    Yes Historical Provider, MD   rasagiline (AZILECT) 0.5 MG TABS Take 0.5 mg by mouth daily. Yes Historical Provider, MD   carbidopa-levodopa (SINEMET)  MG per tablet Take 1 tablet by mouth 4 times daily. Yes Historical Provider, MD   Insulin Infusion Pump (PARADIGM INSULIN PUMP) by Does not apply route.    Yes Historical Provider, MD   aspirin 81 MG EC tablet Take 1 tablet by mouth daily. 4/19/10  Yes Sushil Marroquin MD   Calcium Carbonate (CALTRATE 600 PO) Take 600 mg by mouth daily. 9/1/10  Yes Historical Provider, MD   cephALEXin (KEFLEX) 500 MG capsule Take 1 capsule by mouth 3 times daily 7/5/17   Summer Stokes MD   Multiple Vitamins-Minerals (CENTRUM SILVER PO) Take 1 tablet by mouth daily. 4/13/10   Historical Provider, MD        Allergies:  Review of patient's allergies indicates no known allergies. Review of Systems:   · Constitutional: there has been no unanticipated weight loss. There's been no change in energy level, sleep pattern, or activity level. · Eyes: No visual changes or diplopia. No scleral icterus. · ENT: No Headaches, hearing loss or vertigo. No mouth sores or sore throat. · Cardiovascular: Reviewed in HPI  · Respiratory: No cough or wheezing, no sputum production. No hematemesis. · Gastrointestinal: No abdominal pain, appetite loss, blood in stools. No change in bowel or bladder habits. · Genitourinary: No dysuria, trouble voiding, or hematuria. · Musculoskeletal:  No gait disturbance, weakness or joint complaints. · Integumentary: No rash or pruritis. · Neurological: No headache, diplopia, change in muscle strength, numbness or tingling. No change in gait, balance, coordination, mood, affect, memory, mentation, behavior. · Psychiatric: No anxiety, no depression. · Endocrine: No malaise, fatigue or temperature intolerance. No excessive thirst, fluid intake, or urination. No tremor. · Hematologic/Lymphatic: No abnormal bruising or bleeding, blood clots or swollen lymph nodes. · Allergic/Immunologic: No nasal congestion or hives.     Physical Examination:    Vitals:    12/06/17 0923   BP: 120/60   Pulse: 72        Constitutional and General Appearance: No acute distress  Skin:good turgor,intact without lesions  HEENT: EOMI ,normal  Neck:no JVD  Respiratory:  · Normal excursion and expansion without use of accessory muscles  · Resp Auscultation: Normal breath sounds without dullness  Cardiovascular:  · The apical impulses not displaced  · Heart tones are crisp and normal  · Cervical veins are not engorged  · The carotid upstroke is normal in amplitude and contour without delay or bruit  · Peripheral pulses are symmetrical and full  · There is no clubbing, cyanosis of the extremities. · No edema  · Femoral Arteries: 2+ and equal  · Pedal Pulses: 2+ and equal   Abdomen:  · No masses or tenderness  · Liver/Spleen: No Abnormalities Noted  Neurological/Psychiatric:  · Alert and oriented in all spheres  · Moves all extremities well  · Exhibits normal gait balance and coordination  · No abnormalities of mood, affect, memory, mentation, or behavior are noted    Last Holter: 2/9/12: NSR, brief episode of supraventricular ectopy, PVCs, PACs     Last  Angiogram: 1/23/12: Nonobstructive CAD , Normal LVEF >> Med mgmt    Last echo 7/19/17:   Normal left ventricle size, wall thickness and systolic function with an   estimated ejection fraction of 60%. No regional wall motion abnormalities   are seen.   E/e\"= 8.   Aortic valve appears sclerotic but opens adequately. No stenosis.   Mild aortic regurgitation is present. Pressure half-time is calculated at   656 msec.   Mild-moderate tricuspid regurgitation with RVSP estimated at 33 mmHg.   Mild-to-moderate pulmonic regurgitation.     Echo: 1/23/12:  Right Ventricular Dimension-  Left Ventricular Dimension- 4.5/2.0  Posterior Wall Thickness- 0.8  Septal Wall Thickness- 0.8  Aortic Root- 3.3  Aortic Cusps Separation- 2.3  Left Atrial Dimension- 2.7     FINDINGS-  Left ventricle is normal in size and systolic function. Ejection  fraction is estimated to be 60%. The left atrium is normal. The  right atrium is normal. The right ventricle is normal. The aortic  root is normal in size. The mitral valve is normal. The aortic  valve is mildly thickened but without significant gradient across  the valve.

## 2017-12-14 ENCOUNTER — OFFICE VISIT (OUTPATIENT)
Dept: ENDOCRINOLOGY | Age: 77
End: 2017-12-14

## 2017-12-14 VITALS
WEIGHT: 127.4 LBS | RESPIRATION RATE: 14 BRPM | DIASTOLIC BLOOD PRESSURE: 70 MMHG | HEIGHT: 62 IN | OXYGEN SATURATION: 99 % | SYSTOLIC BLOOD PRESSURE: 106 MMHG | BODY MASS INDEX: 23.45 KG/M2 | HEART RATE: 77 BPM

## 2017-12-14 DIAGNOSIS — E10.40 TYPE 1 DIABETES MELLITUS WITH DIABETIC NEUROPATHY (HCC): Primary | ICD-10-CM

## 2017-12-14 DIAGNOSIS — M81.0 OSTEOPOROSIS, UNSPECIFIED OSTEOPOROSIS TYPE, UNSPECIFIED PATHOLOGICAL FRACTURE PRESENCE: ICD-10-CM

## 2017-12-14 DIAGNOSIS — E78.49 OTHER HYPERLIPIDEMIA: ICD-10-CM

## 2017-12-14 DIAGNOSIS — E03.9 ACQUIRED HYPOTHYROIDISM: Chronic | ICD-10-CM

## 2017-12-14 PROCEDURE — G8484 FLU IMMUNIZE NO ADMIN: HCPCS | Performed by: INTERNAL MEDICINE

## 2017-12-14 PROCEDURE — 1123F ACP DISCUSS/DSCN MKR DOCD: CPT | Performed by: INTERNAL MEDICINE

## 2017-12-14 PROCEDURE — G8420 CALC BMI NORM PARAMETERS: HCPCS | Performed by: INTERNAL MEDICINE

## 2017-12-14 PROCEDURE — 1090F PRES/ABSN URINE INCON ASSESS: CPT | Performed by: INTERNAL MEDICINE

## 2017-12-14 PROCEDURE — G8399 PT W/DXA RESULTS DOCUMENT: HCPCS | Performed by: INTERNAL MEDICINE

## 2017-12-14 PROCEDURE — 1036F TOBACCO NON-USER: CPT | Performed by: INTERNAL MEDICINE

## 2017-12-14 PROCEDURE — 4040F PNEUMOC VAC/ADMIN/RCVD: CPT | Performed by: INTERNAL MEDICINE

## 2017-12-14 PROCEDURE — G8598 ASA/ANTIPLAT THER USED: HCPCS | Performed by: INTERNAL MEDICINE

## 2017-12-14 PROCEDURE — 4005F PHARM THX FOR OP RXD: CPT | Performed by: INTERNAL MEDICINE

## 2017-12-14 PROCEDURE — G8427 DOCREV CUR MEDS BY ELIG CLIN: HCPCS | Performed by: INTERNAL MEDICINE

## 2017-12-14 PROCEDURE — 99215 OFFICE O/P EST HI 40 MIN: CPT | Performed by: INTERNAL MEDICINE

## 2017-12-14 NOTE — PROGRESS NOTES
08/17), No nephropathy . Has  Peripheral neuropathy    Patient on Insulin Pump since 2011. Using novolog insulin in [employer]-523 pump. Basal Rates  0.00     0.30  4.00     0.40  9           0.35  11.00   0.325  17.00 0.500    carb ratio 0.00 15  12.00 15  17.00 15    Sensitivity : 60  Target 0.00 100-120         6.00     Active insulin time: 3 hrs. Blood glucose trend. Checks BS 7-8 times a day. fasting    Post breakfast    pre-lunch 100-200  Post lunch   pre-supper 100-150  post supper 100-200  bedtime 100-200    Hypoglycemia : in afternoon and evening. Diet: Eats 3 meals/day. Had nutrition education. Exercise: No planned exercise. Hyperlipidemia: Simvastatin 20 mg daily. Hypertension: Lisinopril and metoprolol. Hypothyroidism:  levothyroxine 100 mcg daily. Review of Systems   Constitutional: Positive for malaise/fatigue. Negative for weight loss. HENT: Negative for sore throat. Eyes: Negative for blurred vision. Respiratory: Negative for cough and shortness of breath. Cardiovascular: Negative for chest pain and palpitations. Gastrointestinal: Negative for heartburn, nausea, vomiting and abdominal pain. Genitourinary: Negative for urgency and frequency. Musculoskeletal: Positive for joint pain. Negative for myalgias and back pain. Skin: Negative for rash. Neurological: Positive for tingling and sensory change. Negative for headaches. Endo/Heme/Allergies: Negative for polydipsia. Psychiatric/Behavioral: Negative for depression. The patient is not nervous/anxious. Physical Exam   Constitutional: She is oriented to person, place, and time. She appears well-developed and well-nourished. HENT:   Head: Normocephalic. Mouth/Throat: Oropharynx is clear and moist.   Eyes: EOM are normal. Right eye exhibits no discharge. Neck: No thyromegaly present. Cardiovascular: Normal rate and normal heart sounds. Pulmonary/Chest: Effort normal and breath sounds normal.   Abdominal: Soft. She exhibits no distension. There is no tenderness. Musculoskeletal: She exhibits no tenderness. Neurological: She is alert and oriented to person, place, and time. Skin: Skin is warm. No rash noted. She is not diaphoretic. Psychiatric: Her behavior is normal.        Visual inspection:  Deformity/amputation: absent  Skin lesions/pre-ulcerative calluses: absent  Edema: right- negative, left- negative    Sensory exam:  Monofilament sensation: normal  (minimum of 5 random plantar locations tested, avoiding callused areas - > 1 area with absence of sensation is + for neuropathy)  Pulses: normal,         Orders Only on 12/01/2017   Component Date Value Ref Range Status    Hemoglobin A1C 12/02/2017 7.6  See comment % Final    Comment: Comment:  Diagnosis of Diabetes: > or = 6.5%  Increased risk of diabetes (Prediabetes): 5.7-6.4%  Glycemic Control: Nonpregnant Adults: <7.0%                    Pregnant: <6.0%        eAG 12/02/2017 171.4  mg/dL Final    Cholesterol, Total 12/01/2017 154  0 - 199 mg/dL Final    Triglycerides 12/01/2017 77  0 - 150 mg/dL Final    HDL 12/01/2017 80* 40 - 60 mg/dL Final    LDL Calculated 12/01/2017 59  <100 mg/dL Final    VLDL CHOLESTEROL CALCULATED 12/01/2017 15  Not Established mg/dL Final    Sodium 12/01/2017 141  136 - 145 mmol/L Final    Potassium 12/01/2017 4.5  3.5 - 5.1 mmol/L Final    Chloride 12/01/2017 100  99 - 110 mmol/L Final    CO2 12/01/2017 28  21 - 32 mmol/L Final    Anion Gap 12/01/2017 13  3 - 16 Final    Glucose 12/01/2017 130* 70 - 99 mg/dL Final    BUN 12/01/2017 20  7 - 20 mg/dL Final    CREATININE 12/01/2017 1.0  0.6 - 1.2 mg/dL Final    GFR Non- 12/01/2017 54* >60 Final    Comment: >60 mL/min/1.73m2 EGFR, calc. for ages 25 and older using the  MDRD formula (not corrected for weight), is valid for stable  renal function.       GFR

## 2017-12-20 ENCOUNTER — OFFICE VISIT (OUTPATIENT)
Dept: FAMILY MEDICINE CLINIC | Age: 77
End: 2017-12-20

## 2017-12-20 VITALS
WEIGHT: 126 LBS | DIASTOLIC BLOOD PRESSURE: 64 MMHG | SYSTOLIC BLOOD PRESSURE: 110 MMHG | HEIGHT: 62 IN | OXYGEN SATURATION: 98 % | HEART RATE: 75 BPM | BODY MASS INDEX: 23.19 KG/M2

## 2017-12-20 DIAGNOSIS — M79.604 RIGHT LEG PAIN: ICD-10-CM

## 2017-12-20 DIAGNOSIS — M54.50 CHRONIC LOW BACK PAIN WITHOUT SCIATICA, UNSPECIFIED BACK PAIN LATERALITY: Primary | ICD-10-CM

## 2017-12-20 DIAGNOSIS — I25.10 CORONARY ARTERY DISEASE INVOLVING NATIVE CORONARY ARTERY OF NATIVE HEART WITHOUT ANGINA PECTORIS: ICD-10-CM

## 2017-12-20 DIAGNOSIS — G89.29 CHRONIC LOW BACK PAIN WITHOUT SCIATICA, UNSPECIFIED BACK PAIN LATERALITY: Primary | ICD-10-CM

## 2017-12-20 PROCEDURE — G8427 DOCREV CUR MEDS BY ELIG CLIN: HCPCS | Performed by: FAMILY MEDICINE

## 2017-12-20 PROCEDURE — G8484 FLU IMMUNIZE NO ADMIN: HCPCS | Performed by: FAMILY MEDICINE

## 2017-12-20 PROCEDURE — 1123F ACP DISCUSS/DSCN MKR DOCD: CPT | Performed by: FAMILY MEDICINE

## 2017-12-20 PROCEDURE — 99213 OFFICE O/P EST LOW 20 MIN: CPT | Performed by: FAMILY MEDICINE

## 2017-12-20 PROCEDURE — 4040F PNEUMOC VAC/ADMIN/RCVD: CPT | Performed by: FAMILY MEDICINE

## 2017-12-20 PROCEDURE — G8420 CALC BMI NORM PARAMETERS: HCPCS | Performed by: FAMILY MEDICINE

## 2017-12-20 PROCEDURE — G8598 ASA/ANTIPLAT THER USED: HCPCS | Performed by: FAMILY MEDICINE

## 2017-12-20 PROCEDURE — G8399 PT W/DXA RESULTS DOCUMENT: HCPCS | Performed by: FAMILY MEDICINE

## 2017-12-20 PROCEDURE — 1036F TOBACCO NON-USER: CPT | Performed by: FAMILY MEDICINE

## 2017-12-20 PROCEDURE — 1090F PRES/ABSN URINE INCON ASSESS: CPT | Performed by: FAMILY MEDICINE

## 2017-12-20 RX ORDER — IBUPROFEN 400 MG/1
TABLET ORAL
Qty: 30 TABLET | Refills: 3 | Status: SHIPPED | OUTPATIENT
Start: 2017-12-20 | End: 2019-08-02

## 2017-12-20 NOTE — PROGRESS NOTES
Nhung Villavicencio is a 68 y.o. female. HPI: Here for proximal right posterior leg/hip/buttock pain after falling 4 weeks ago on a hard surface  No limp no bruising did not go to emergency room did not get an x-ray  Pain has been persistent X a getting a little worse so that it hurts when she sits down regular she has to lean to her left and raise up her right hip so as to not have any pain  No sciatic nerve irritation no radiating down the leg no numbness of the leg no bladder incontinence  Saw cardiologist recently he suggested stopping her Plavix because cardiac-wise she is stable and she has not had any type of TIA or stroke in years  She sees neurologist or for possible Parkinson's as well  Meds, vitamins and allergies reviewed with pt    ROS: No TIA's or unusual headaches, no dysphagia. No prolonged cough. No dyspnea or chest pain on exertion. No abdominal pain, change in bowel habits, black or bloody stools. No urinary tract symptoms. No new or unusual musculoskeletal symptoms. Prior to Visit Medications    Medication Sig Taking?  Authorizing Provider   ibuprofen (ADVIL;MOTRIN) 400 MG tablet 1 po tid with food Yes Arti Mabry MD   clopidogrel (PLAVIX) 75 MG tablet Take 1 tablet by mouth daily Yes Arti Mabry MD   insulin aspart (NOVOLOG) 100 UNIT/ML injection vial INJECT TOTAL DAILY DOSE 25 TO 30 UNITS DAILY WITH INSULIN PUMP Dx ode E 10.49 Yes Malik Rojas MD   simvastatin (ZOCOR) 20 MG tablet TAKE 1 TABLET NIGHTLY Yes Cal Knutson NP   Cholecalciferol (VITAMIN D3) 2000 UNITS CAPS Take 1 capsule by mouth daily Yes Historical Provider, MD   levothyroxine (SYNTHROID) 100 MCG tablet Take 1 tablet by mouth daily Yes Arti Mabry MD   lisinopril (ZESTRIL) 10 MG tablet TAKE ONE TABLET BY MOUTH EVERY DAY  Patient taking differently: 5 mg TAKE ONE TABLET BY MOUTH EVERY DAY Yes Arti Mabry MD   glucose blood VI test strips (TIANA CONTOUR NEXT TEST) strip Dx Code E10.49 & E 10.65. Patient is testing 6-7 times a day due to retinopathy, neuropathy, hypoglycemia, hyperglycemia. Yes Juan Carlos Broderick MD   nitroGLYCERIN (NITROSTAT) 0.4 MG SL tablet Place 1 tablet under the tongue every 5 minutes as needed for Chest pain Yes Trini Winn NP   TIANA MICROLET LANCETS MISC 1 each by Does not apply route 8 times daily Brittle Diabetes Dx Code 250.61  Patient taking differently: 1 each by Does not apply route 8 times daily Retinopathy DX Code E10.49 & Peripheal Neuropathy DX Code E10.65 Yes Juan Carlos Broderick MD   pramipexole (MIRAPEX) 0.25 MG tablet Take 0.5 mg by mouth 2 times daily Takes 2 dinner and 2 at bedtime Yes Historical Provider, MD   rasagiline (AZILECT) 0.5 MG TABS Take 0.5 mg by mouth daily. Yes Historical Provider, MD   carbidopa-levodopa (SINEMET)  MG per tablet Take 1 tablet by mouth 4 times daily. Yes Historical Provider, MD   Insulin Infusion Pump (PARADIGM INSULIN PUMP) by Does not apply route. Yes Historical Provider, MD   aspirin 81 MG EC tablet Take 1 tablet by mouth daily. Yes Sharlene Spence MD   Calcium Carbonate (CALTRATE 600 PO) Take 600 mg by mouth daily.  Yes Historical Provider, MD       Past Medical History:   Diagnosis Date    CAD (coronary artery disease) 1/2012    non obstructive    Diabetes mellitus with neurological manifestation ([facility] [address]     Hyperlipidemia     Hypertension     Hypothyroidism (acquired)     Insomnia     Neuropathy (HCC)     Parkinson disease ([facility] [address]     Renal insufficiency     Restless legs syndrome     Type I (juvenile type) diabetes mellitus without mention of complication, not stated as uncontrolled        Social History   Substance Use Topics    Smoking status: Former Smoker     Years: 4.00     Quit date: 1/1/1966    Smokeless tobacco: Never Used    Alcohol use 0.6 oz/week     1 Glasses of wine per week      Comment: social       Family History   Problem Relation Age of Onset    Cancer Father     Heart Disease Father

## 2018-01-23 ENCOUNTER — HOSPITAL ENCOUNTER (OUTPATIENT)
Dept: PHYSICAL THERAPY | Age: 78
Discharge: HOME | End: 2018-01-31
Admitting: FAMILY MEDICINE

## 2018-01-23 ASSESSMENT — PAIN DESCRIPTION - PAIN TYPE: TYPE: ACUTE PAIN

## 2018-01-23 ASSESSMENT — PAIN DESCRIPTION - LOCATION: LOCATION: BUTTOCKS

## 2018-01-23 ASSESSMENT — PAIN SCALES - GENERAL: PAINLEVEL_OUTOF10: 8

## 2018-01-23 ASSESSMENT — PAIN DESCRIPTION - ORIENTATION: ORIENTATION: RIGHT

## 2018-01-23 ASSESSMENT — PAIN DESCRIPTION - DESCRIPTORS: DESCRIPTORS: ACHING;DULL

## 2018-01-23 ASSESSMENT — PAIN DESCRIPTION - FREQUENCY: FREQUENCY: INTERMITTENT

## 2018-01-23 ASSESSMENT — PAIN DESCRIPTION - PROGRESSION: CLINICAL_PROGRESSION: NOT CHANGED

## 2018-01-23 NOTE — PLAN OF CARE
Outpatient Physical Therapy     Phone: [phone] Fax: [phone]     To: Referring Practitioner: Viral Eric MD      Patient: Rc Cespedes   : 1940   MRN: [id number]  Evaluation Date: 2018      Diagnosis Information:  · Diagnosis: Strain of right buttock (S76.011D)   · Treatment Diagnosis: Decreased R HS flexibility, decreased hip strength, R proximal HS tendon pain      Physical Therapy Certification/Re-Certification Form  Dear Dr. Abeba López  The following patient has been evaluated for physical therapy services. Please review the attached evaluation and/or summary of the patient's plan of care, and verify that you agree therapy should continue by signing the attached document and sending it back to our office. Plan of Care/Treatment to date:  [x] Therapeutic Exercise      [x] Modalities:  [x] Therapeutic Activity        [x] Ultrasound    [] Gait Training        [] Cervical Traction   [x] Neuromuscular Re-education      [x] Cold/hotpack    [x] Instruction in HEP        [] Lumbar Traction  [x] Manual Therapy        [x] Electrical Stimulation            [] Aquatic Therapy        [] Iontophoresis        ? [] Lymphedema management  [] Women's Health     Other:  [] Vestibular Rehab        []    []  Needed     Frequency/Duration:  # Days per week: [] 1 day # Weeks: [] 1 week [] 5 weeks     [x] 2 days? [] 2 weeks [] 6 weeks     [] 3 days   [x] 3 weeks [] 7 weeks     [] 4 days   [] 4 weeks [] 8 weeks    Rehab Potential: [] Excellent [x] Good [] Fair  [] Poor     Electronically signed by:  Jeremiah Stewart PT DPT    If you have any questions or concerns, please don't hesitate to call.   Thank you for your referral.      Physician Signature:________________________________Date:__________________  By signing above, therapists plan is approved by physician

## 2018-01-23 NOTE — FLOWSHEET NOTE
Physical Therapy Daily Treatment Note  Date:  2018    Patient Name:  Delta Shafer    :  1940  MRN: [id number]  Restrictions/Precautions:    Medical/Treatment Diagnosis Information:   · Diagnosis: Strain of right buttock (S76.011D)  · Treatment Diagnosis: Decreased R HS flexibility, decreased hip strength, R proximal HS tendon pain     Tracking Information:  Physician Information Referring Practitioner: Ryan Mujica MD     Plan of Care Sent Date:  Signed Received:    Visit Count / Total Visits      Insurance Approved Visits  /  Approved Dates:     Insurance Information PT Insurance Information: Medicare; Secondary [facility] AAR     Progress Note/G-codes   [x]  Yes  []  No Next Due: Visit 6     Pain level: 8/10 when sitting     Subjective:  SEE EVAL    Objective: SEE EVAL  Observation:   Test measurements:      Exercises:  Exercise/Equipment Resistance/Repetitions Other comments   TM      Stairs HS stretch    Strength Cables or TB                                                              Other Therapeutic Activities:  18 Pt was educated on PT POC, Diagnosis, Prognosis, pathomechanics as well as frequency and duration of scheduling future physical therapy appointments. Time was also taken on this day to answer all patient questions and participation in PT. Patient also educated on difference of using MHP vs CP. Patient educated on importance of sitting on pillow to allow pt to sit with equal weight bearing to avoid causing low back/pelvic malalignments. Home Exercise Program:  18 Patient was educated on home exercise program including distrubution of handout describing exercises, sets, repetitions, frequency and intensity.  Exercises/activities include: HS stretch, standing hip ext and abd      Manual Treatments:  : Cross friction to R proximal HS tendon with pt in supine and hip in 90 deg flex     Modalities:  : Sitting on pillow on CP x 10 min    Timed Code Treatment Minutes:  20    Total Treatment Minutes:  46    Treatment/Activity Tolerance:  [x] Patient tolerated treatment well [] Patient limited by fatigue  [] Patient limited by pain  [] Patient limited by other medical complications  [] Other:     Prognosis: [x] Good [] Fair  [] Poor    Patient Requires Follow-up: [x] Yes  [] No    Plan:   [] Continue per plan of care [] Alter current plan (see comments)  [x] Plan of care initiated [] Hold pending MD visit [] Discharge  Plan for Next Session: Manual stretching for HS, hip strength     Electronically signed by:  Jenelle Doll PT DPT

## 2018-02-01 ENCOUNTER — HOSPITAL ENCOUNTER (OUTPATIENT)
Dept: OTHER | Age: 78
Discharge: HOME | End: 2018-02-28
Attending: FAMILY MEDICINE | Admitting: FAMILY MEDICINE

## 2018-03-01 ENCOUNTER — HOSPITAL ENCOUNTER (OUTPATIENT)
Dept: OTHER | Age: 78
Discharge: HOME | End: 2018-03-31
Attending: FAMILY MEDICINE | Admitting: FAMILY MEDICINE

## 2018-04-10 ENCOUNTER — TELEPHONE (OUTPATIENT)
Dept: CARDIOLOGY CLINIC | Age: 78
End: 2018-04-10

## 2018-04-19 ENCOUNTER — OFFICE VISIT (OUTPATIENT)
Dept: ENDOCRINOLOGY | Age: 78
End: 2018-04-19

## 2018-04-19 VITALS
OXYGEN SATURATION: 99 % | WEIGHT: 129.4 LBS | BODY MASS INDEX: 23.81 KG/M2 | HEIGHT: 62 IN | SYSTOLIC BLOOD PRESSURE: 131 MMHG | DIASTOLIC BLOOD PRESSURE: 62 MMHG | RESPIRATION RATE: 14 BRPM | HEART RATE: 71 BPM

## 2018-04-19 DIAGNOSIS — M81.0 OSTEOPOROSIS, UNSPECIFIED OSTEOPOROSIS TYPE, UNSPECIFIED PATHOLOGICAL FRACTURE PRESENCE: ICD-10-CM

## 2018-04-19 DIAGNOSIS — E78.49 OTHER HYPERLIPIDEMIA: ICD-10-CM

## 2018-04-19 DIAGNOSIS — E03.9 ACQUIRED HYPOTHYROIDISM: Chronic | ICD-10-CM

## 2018-04-19 LAB — HBA1C MFR BLD: 7.1 %

## 2018-04-19 PROCEDURE — 83036 HEMOGLOBIN GLYCOSYLATED A1C: CPT | Performed by: INTERNAL MEDICINE

## 2018-04-19 PROCEDURE — G8420 CALC BMI NORM PARAMETERS: HCPCS | Performed by: INTERNAL MEDICINE

## 2018-04-19 PROCEDURE — 1090F PRES/ABSN URINE INCON ASSESS: CPT | Performed by: INTERNAL MEDICINE

## 2018-04-19 PROCEDURE — G8598 ASA/ANTIPLAT THER USED: HCPCS | Performed by: INTERNAL MEDICINE

## 2018-04-19 PROCEDURE — 1123F ACP DISCUSS/DSCN MKR DOCD: CPT | Performed by: INTERNAL MEDICINE

## 2018-04-19 PROCEDURE — G8399 PT W/DXA RESULTS DOCUMENT: HCPCS | Performed by: INTERNAL MEDICINE

## 2018-04-19 PROCEDURE — 4040F PNEUMOC VAC/ADMIN/RCVD: CPT | Performed by: INTERNAL MEDICINE

## 2018-04-19 PROCEDURE — G8427 DOCREV CUR MEDS BY ELIG CLIN: HCPCS | Performed by: INTERNAL MEDICINE

## 2018-04-19 PROCEDURE — 99215 OFFICE O/P EST HI 40 MIN: CPT | Performed by: INTERNAL MEDICINE

## 2018-04-19 PROCEDURE — 1036F TOBACCO NON-USER: CPT | Performed by: INTERNAL MEDICINE

## 2018-04-19 RX ORDER — SELEGILINE HYDROCHLORIDE 5 MG/1
5 TABLET ORAL 2 TIMES DAILY WITH MEALS
COMMUNITY
End: 2018-10-30 | Stop reason: ALTCHOICE

## 2018-05-10 ENCOUNTER — TELEPHONE (OUTPATIENT)
Dept: ENDOCRINOLOGY | Age: 78
End: 2018-05-10

## 2018-05-17 ENCOUNTER — TELEPHONE (OUTPATIENT)
Dept: ENDOCRINOLOGY | Age: 78
End: 2018-05-17

## 2018-06-18 ENCOUNTER — OFFICE VISIT (OUTPATIENT)
Dept: CARDIOLOGY CLINIC | Age: 78
End: 2018-06-18

## 2018-06-18 VITALS
BODY MASS INDEX: 22.34 KG/M2 | SYSTOLIC BLOOD PRESSURE: 106 MMHG | HEIGHT: 62 IN | DIASTOLIC BLOOD PRESSURE: 60 MMHG | OXYGEN SATURATION: 96 % | HEART RATE: 80 BPM | WEIGHT: 121.4 LBS

## 2018-06-18 DIAGNOSIS — E78.49 OTHER HYPERLIPIDEMIA: ICD-10-CM

## 2018-06-18 DIAGNOSIS — I25.10 CORONARY ARTERY DISEASE INVOLVING NATIVE CORONARY ARTERY OF NATIVE HEART WITHOUT ANGINA PECTORIS: Primary | ICD-10-CM

## 2018-06-18 DIAGNOSIS — N28.9 RENAL INSUFFICIENCY: ICD-10-CM

## 2018-06-18 PROCEDURE — G8420 CALC BMI NORM PARAMETERS: HCPCS | Performed by: INTERNAL MEDICINE

## 2018-06-18 PROCEDURE — 1036F TOBACCO NON-USER: CPT | Performed by: INTERNAL MEDICINE

## 2018-06-18 PROCEDURE — 1123F ACP DISCUSS/DSCN MKR DOCD: CPT | Performed by: INTERNAL MEDICINE

## 2018-06-18 PROCEDURE — G8399 PT W/DXA RESULTS DOCUMENT: HCPCS | Performed by: INTERNAL MEDICINE

## 2018-06-18 PROCEDURE — 4040F PNEUMOC VAC/ADMIN/RCVD: CPT | Performed by: INTERNAL MEDICINE

## 2018-06-18 PROCEDURE — 1090F PRES/ABSN URINE INCON ASSESS: CPT | Performed by: INTERNAL MEDICINE

## 2018-06-18 PROCEDURE — 99213 OFFICE O/P EST LOW 20 MIN: CPT | Performed by: INTERNAL MEDICINE

## 2018-06-18 PROCEDURE — G8598 ASA/ANTIPLAT THER USED: HCPCS | Performed by: INTERNAL MEDICINE

## 2018-06-18 PROCEDURE — G8427 DOCREV CUR MEDS BY ELIG CLIN: HCPCS | Performed by: INTERNAL MEDICINE

## 2018-06-18 RX ORDER — MULTIVIT WITH MINERALS/LUTEIN
1 TABLET ORAL DAILY
COMMUNITY

## 2018-06-20 RX ORDER — SIMVASTATIN 20 MG
TABLET ORAL
Qty: 90 TABLET | Refills: 1 | Status: SHIPPED | OUTPATIENT
Start: 2018-06-20 | End: 2018-07-05 | Stop reason: SDUPTHER

## 2018-07-03 ENCOUNTER — TELEPHONE (OUTPATIENT)
Dept: ENDOCRINOLOGY | Age: 78
End: 2018-07-03

## 2018-07-03 NOTE — TELEPHONE ENCOUNTER
Pt called with questions regarding getting her pump supplies when she is due for them and how to go about doing that. Was instructed to call doctor office first when she spoke to Diabetes management and supplies. Please call pt back.

## 2018-07-05 RX ORDER — SIMVASTATIN 20 MG
TABLET ORAL
Qty: 90 TABLET | Refills: 3 | Status: SHIPPED | OUTPATIENT
Start: 2018-07-05 | End: 2018-10-30 | Stop reason: SDUPTHER

## 2018-07-05 NOTE — TELEPHONE ENCOUNTER
Last refill: 06/20/2018, #90, 1 refills.      Lab Results   Component Value Date    CHOL 154 12/01/2017    CHOL 143 01/25/2017    CHOL 143 05/27/2016     Lab Results   Component Value Date    TRIG 77 12/01/2017    TRIG 72 01/25/2017    TRIG 57 05/27/2016     Lab Results   Component Value Date    HDL 80 (H) 12/01/2017    HDL 69 (H) 01/25/2017    HDL 81 (H) 05/27/2016     Lab Results   Component Value Date    LDLCALC 59 12/01/2017    LDLCALC 60 01/25/2017    LDLCALC 51 05/27/2016     Lab Results   Component Value Date    LABVLDL 15 12/01/2017    LABVLDL 14 01/25/2017    LABVLDL 11 05/27/2016     Lab Results   Component Value Date    CHOLHDLRATIO 2.1 09/12/2013    CHOLHDLRATIO 1.9 04/18/2012    CHOLHDLRATIO 2.0 02/22/2012

## 2018-07-05 NOTE — TELEPHONE ENCOUNTER
Medication and Quantity requested:  SIMVASTATIN TAB 20     Last Visit 02/01/2018    Pharmacy and phone number updated in EPIC:  yes

## 2018-07-23 ENCOUNTER — OFFICE VISIT (OUTPATIENT)
Dept: ENDOCRINOLOGY | Age: 78
End: 2018-07-23

## 2018-07-23 VITALS
BODY MASS INDEX: 22.01 KG/M2 | RESPIRATION RATE: 14 BRPM | SYSTOLIC BLOOD PRESSURE: 137 MMHG | DIASTOLIC BLOOD PRESSURE: 72 MMHG | HEART RATE: 71 BPM | OXYGEN SATURATION: 100 % | HEIGHT: 62 IN | WEIGHT: 119.6 LBS

## 2018-07-23 DIAGNOSIS — E03.9 ACQUIRED HYPOTHYROIDISM: Chronic | ICD-10-CM

## 2018-07-23 DIAGNOSIS — E10.40 TYPE 1 DIABETES MELLITUS WITH DIABETIC NEUROPATHY (HCC): Primary | ICD-10-CM

## 2018-07-23 DIAGNOSIS — M81.0 OSTEOPOROSIS, UNSPECIFIED OSTEOPOROSIS TYPE, UNSPECIFIED PATHOLOGICAL FRACTURE PRESENCE: ICD-10-CM

## 2018-07-23 DIAGNOSIS — E78.49 OTHER HYPERLIPIDEMIA: ICD-10-CM

## 2018-07-23 LAB — HBA1C MFR BLD: 7.2 %

## 2018-07-23 PROCEDURE — G8427 DOCREV CUR MEDS BY ELIG CLIN: HCPCS | Performed by: INTERNAL MEDICINE

## 2018-07-23 PROCEDURE — G8420 CALC BMI NORM PARAMETERS: HCPCS | Performed by: INTERNAL MEDICINE

## 2018-07-23 PROCEDURE — 1123F ACP DISCUSS/DSCN MKR DOCD: CPT | Performed by: INTERNAL MEDICINE

## 2018-07-23 PROCEDURE — 1036F TOBACCO NON-USER: CPT | Performed by: INTERNAL MEDICINE

## 2018-07-23 PROCEDURE — G8598 ASA/ANTIPLAT THER USED: HCPCS | Performed by: INTERNAL MEDICINE

## 2018-07-23 PROCEDURE — 1090F PRES/ABSN URINE INCON ASSESS: CPT | Performed by: INTERNAL MEDICINE

## 2018-07-23 PROCEDURE — 83036 HEMOGLOBIN GLYCOSYLATED A1C: CPT | Performed by: INTERNAL MEDICINE

## 2018-07-23 PROCEDURE — G8399 PT W/DXA RESULTS DOCUMENT: HCPCS | Performed by: INTERNAL MEDICINE

## 2018-07-23 PROCEDURE — 1101F PT FALLS ASSESS-DOCD LE1/YR: CPT | Performed by: INTERNAL MEDICINE

## 2018-07-23 PROCEDURE — 4040F PNEUMOC VAC/ADMIN/RCVD: CPT | Performed by: INTERNAL MEDICINE

## 2018-07-23 PROCEDURE — 99215 OFFICE O/P EST HI 40 MIN: CPT | Performed by: INTERNAL MEDICINE

## 2018-07-23 NOTE — PATIENT INSTRUCTIONS
Lab Results   Component Value Date    LABA1C 7.1 04/19/2018     Lab Results   Component Value Date    .4 12/01/2017     Lab Results   Component Value Date    LABA1C 7.2 07/23/2018     Lab Results   Component Value Date    .4 12/01/2017

## 2018-07-23 NOTE — PROGRESS NOTES
Endocrinology  Natalee Mosqueda M.D. Phone: [phone]   FAX: [phone]       Rasheed Rosenbaum   YOB: 1940    Date of Visit:  7/23/2018    No Known Allergies  Outpatient Prescriptions Marked as Taking for the 7/23/18 encounter (Office Visit) with Екатерина Tam MD   Medication Sig Dispense Refill    simvastatin (ZOCOR) 20 MG tablet TAKE 1 TABLET NIGHTLY 90 tablet 3    insulin aspart (NOVOLOG) 100 UNIT/ML injection vial INJECT TOTAL DAILY DOSE 25 TO 30 UNITS SUBCUTANEOUSLY WITH INSULIN PUMP. 90 mL 3    Multiple Vitamins-Minerals (CENTRUM SILVER) TABS Take by mouth daily      selegiline (ELDEPRYL) 5 MG tablet Take 5 mg by mouth 2 times daily (with meals)      Cholecalciferol (VITAMIN D3) 2000 UNITS CAPS Take 1 capsule by mouth daily      levothyroxine (SYNTHROID) 100 MCG tablet Take 1 tablet by mouth daily 90 tablet 3    glucose blood VI test strips (TIANA CONTOUR NEXT TEST) strip Dx Code E10.49 & E 10.65. Patient is testing 6-7 times a day due to retinopathy, neuropathy, hypoglycemia, hyperglycemia. 650 each 3    TIANA MICROLET LANCETS MISC 1 each by Does not apply route 8 times daily Brittle Diabetes Dx Code 250.61 (Patient taking differently: 1 each by Does not apply route 8 times daily Retinopathy DX Code E10.49 & Peripheal Neuropathy DX Code E10.65) 240 each 3    pramipexole (MIRAPEX) 0.25 MG tablet Take 0.5 mg by mouth 2 times daily Takes 2 dinner and 2 at bedtime      carbidopa-levodopa (SINEMET)  MG per tablet Take 1 tablet by mouth 4 times daily.  Insulin Infusion Pump (PARADIGM INSULIN PUMP) by Does not apply route.  aspirin 81 MG EC tablet Take 1 tablet by mouth daily.  Calcium Carbonate (CALTRATE 600 PO) Take 600 mg by mouth daily.            Vitals:    07/23/18 0830   BP: 137/72   Site: Left Arm   Position: Sitting   Cuff Size: Medium Adult   Pulse: 71   Resp: 14   SpO2: 100%   Weight: 119 lb 9.6 oz (54.3 kg)   Height: 5' 2.25\" (1.581 m)     Body normal and breath sounds normal.   Abdominal: Soft. She exhibits no distension. There is no tenderness. Musculoskeletal: She exhibits no tenderness. Neurological: She is alert and oriented to person, place, and time. Skin: Skin is warm. No rash noted. She is not diaphoretic. Psychiatric: Her behavior is normal.        Visual inspection:  Deformity/amputation: absent  Skin lesions/pre-ulcerative calluses: absent  Edema: right- negative, left- negative    Sensory exam:  Monofilament sensation: normal  (minimum of 5 random plantar locations tested, avoiding callused areas - > 1 area with absence of sensation is + for neuropathy)  Pulses: normal,         No visits with results within 3 Month(s) from this visit. Latest known visit with results is:   Office Visit on 04/19/2018   Component Date Value Ref Range Status    Hemoglobin A1C 04/19/2018 7.1  % Final        BONE DENSITOMETRY       T score of the lumbar spine is - 2.4 which is within the range of   osteopenia.           T score of the left femoral neck is - 1.7 which is within the   range of osteopenia.     T score of the left hip is - 1.5 which is within the range of   osteopenia.            T score of the right femoral neck is - 1.7 which is within the   range of osteopenia.     T score of the right hip is - 1.7 which is within the range of   osteopenia.                        Assessment/Plan       1. Type 1 DM     Destiney Pantoja is a 66 y.o. female who has Type 1 DM since 1998. Uncontrolled. Complicated by retinopathy, neuropathy and hypoglycemia. C-peptide < 0.1 with fasting glucose of 71 in 08/15. A1c 7.3 %--->7.6 %---> 6.9 % --->7.8 %---> 7.5 % ---> 7.6 %--->7.4 % ---> 7.9 % --->9 % --->  7.1 % ---> 7.5 % --> 7.6 % ---> 7.1 %     Reviewed her pump downloads in detail. Given brittle DM, needs to continue to monitor BS 7-8 times a day.      Has been experiencing low sugars in fasting      Also gets low in  the afternoon and

## 2018-09-14 ENCOUNTER — HOSPITAL ENCOUNTER (OUTPATIENT)
Dept: GENERAL RADIOLOGY | Age: 78
Discharge: HOME | End: 2018-09-14
Attending: INTERNAL MEDICINE | Admitting: INTERNAL MEDICINE

## 2018-09-14 ENCOUNTER — OFFICE VISIT (OUTPATIENT)
Dept: FAMILY MEDICINE CLINIC | Age: 78
End: 2018-09-14

## 2018-09-14 VITALS
OXYGEN SATURATION: 98 % | HEART RATE: 71 BPM | BODY MASS INDEX: 21.63 KG/M2 | DIASTOLIC BLOOD PRESSURE: 70 MMHG | SYSTOLIC BLOOD PRESSURE: 126 MMHG | WEIGHT: 119.2 LBS

## 2018-09-14 DIAGNOSIS — E78.49 OTHER HYPERLIPIDEMIA: ICD-10-CM

## 2018-09-14 DIAGNOSIS — N28.9 RENAL INSUFFICIENCY: ICD-10-CM

## 2018-09-14 DIAGNOSIS — E03.9 ACQUIRED HYPOTHYROIDISM: Chronic | ICD-10-CM

## 2018-09-14 DIAGNOSIS — E10.40 TYPE 1 DIABETES MELLITUS WITH DIABETIC NEUROPATHY (HCC): ICD-10-CM

## 2018-09-14 DIAGNOSIS — I25.10 CORONARY ARTERY DISEASE INVOLVING NATIVE CORONARY ARTERY OF NATIVE HEART WITHOUT ANGINA PECTORIS: ICD-10-CM

## 2018-09-14 DIAGNOSIS — M79.18 RIGHT BUTTOCK PAIN: Primary | ICD-10-CM

## 2018-09-14 DIAGNOSIS — M79.18 RIGHT BUTTOCK PAIN: ICD-10-CM

## 2018-09-14 DIAGNOSIS — R63.4 WEIGHT LOSS: ICD-10-CM

## 2018-09-14 LAB
A/G RATIO: 1.8 (ref 1.1–2.2)
ALBUMIN SERPL-MCNC: 4.4 G/DL (ref 3.4–5)
ALP BLD-CCNC: 60 U/L (ref 40–129)
ALT SERPL-CCNC: 8 U/L (ref 10–40)
ANION GAP SERPL CALCULATED.3IONS-SCNC: 15 MMOL/L (ref 3–16)
AST SERPL-CCNC: 24 U/L (ref 15–37)
BILIRUB SERPL-MCNC: 0.4 MG/DL (ref 0–1)
BUN BLDV-MCNC: 21 MG/DL (ref 7–20)
CALCIUM SERPL-MCNC: 10 MG/DL (ref 8.3–10.6)
CHLORIDE BLD-SCNC: 98 MMOL/L (ref 99–110)
CHOLESTEROL, TOTAL: 151 MG/DL (ref 0–199)
CO2: 28 MMOL/L (ref 21–32)
CREAT SERPL-MCNC: 1 MG/DL (ref 0.6–1.2)
CREATININE URINE: 137.4 MG/DL (ref 28–259)
GFR AFRICAN AMERICAN: >60
GFR NON-AFRICAN AMERICAN: 54
GLOBULIN: 2.4 G/DL
GLUCOSE BLD-MCNC: 85 MG/DL (ref 70–99)
HDLC SERPL-MCNC: 88 MG/DL (ref 40–60)
LDL CHOLESTEROL CALCULATED: 49 MG/DL
MICROALBUMIN UR-MCNC: 2.1 MG/DL
MICROALBUMIN/CREAT UR-RTO: 15.3 MG/G (ref 0–30)
POTASSIUM SERPL-SCNC: 4.6 MMOL/L (ref 3.5–5.1)
SODIUM BLD-SCNC: 141 MMOL/L (ref 136–145)
TOTAL PROTEIN: 6.8 G/DL (ref 6.4–8.2)
TRIGL SERPL-MCNC: 68 MG/DL (ref 0–150)
TSH SERPL DL<=0.05 MIU/L-ACNC: 3.89 UIU/ML (ref 0.27–4.2)
VLDLC SERPL CALC-MCNC: 14 MG/DL

## 2018-09-14 PROCEDURE — 3288F FALL RISK ASSESSMENT DOCD: CPT | Performed by: FAMILY MEDICINE

## 2018-09-14 PROCEDURE — G8420 CALC BMI NORM PARAMETERS: HCPCS | Performed by: FAMILY MEDICINE

## 2018-09-14 PROCEDURE — G8598 ASA/ANTIPLAT THER USED: HCPCS | Performed by: FAMILY MEDICINE

## 2018-09-14 PROCEDURE — 1090F PRES/ABSN URINE INCON ASSESS: CPT | Performed by: FAMILY MEDICINE

## 2018-09-14 PROCEDURE — 1123F ACP DISCUSS/DSCN MKR DOCD: CPT | Performed by: FAMILY MEDICINE

## 2018-09-14 PROCEDURE — 1101F PT FALLS ASSESS-DOCD LE1/YR: CPT | Performed by: FAMILY MEDICINE

## 2018-09-14 PROCEDURE — 1036F TOBACCO NON-USER: CPT | Performed by: FAMILY MEDICINE

## 2018-09-14 PROCEDURE — 99214 OFFICE O/P EST MOD 30 MIN: CPT | Performed by: FAMILY MEDICINE

## 2018-09-14 PROCEDURE — G8427 DOCREV CUR MEDS BY ELIG CLIN: HCPCS | Performed by: FAMILY MEDICINE

## 2018-09-14 PROCEDURE — G8510 SCR DEP NEG, NO PLAN REQD: HCPCS | Performed by: FAMILY MEDICINE

## 2018-09-14 PROCEDURE — G8399 PT W/DXA RESULTS DOCUMENT: HCPCS | Performed by: FAMILY MEDICINE

## 2018-09-14 PROCEDURE — 4040F PNEUMOC VAC/ADMIN/RCVD: CPT | Performed by: FAMILY MEDICINE

## 2018-09-14 ASSESSMENT — PATIENT HEALTH QUESTIONNAIRE - PHQ9
SUM OF ALL RESPONSES TO PHQ QUESTIONS 1-9: 0
1. LITTLE INTEREST OR PLEASURE IN DOING THINGS: 0
SUM OF ALL RESPONSES TO PHQ QUESTIONS 1-9: 0
2. FEELING DOWN, DEPRESSED OR HOPELESS: 0
SUM OF ALL RESPONSES TO PHQ9 QUESTIONS 1 & 2: 0

## 2018-09-14 NOTE — PROGRESS NOTES
S2 normal, no murmurs, clicks  PULM:  Chest is clear, no wheezing ,  symmetric air entry throughout both lung fields. ABD: Soft, NT  EXT: No rash or edema right hip slightly tender over the buttock area and not over the greater trochanteric bursa or anterior superior iliac spine full range of motion no pain with rotation  NEURO: nonfocal  Lab Results   Component Value Date     12/01/2017    K 4.5 12/01/2017     12/01/2017    CO2 28 12/01/2017    BUN 20 12/01/2017    CREATININE 1.0 12/01/2017    GLUCOSE 130 (H) 12/01/2017    CALCIUM 9.5 12/01/2017    PROT 6.5 12/01/2017    LABALBU 4.2 12/01/2017    BILITOT 0.6 12/01/2017    ALKPHOS 55 12/01/2017    AST 24 12/01/2017    ALT 15 12/01/2017    LABGLOM 54 (A) 12/01/2017    GFRAA >60 12/01/2017    AGRATIO 1.8 12/01/2017    GLOB 2.3 12/01/2017     Lab Results   Component Value Date    LABA1C 7.2 07/23/2018     Lab Results   Component Value Date    .4 12/01/2017     Lab Results   Component Value Date    CHOL 154 12/01/2017    CHOL 143 01/25/2017    CHOL 143 05/27/2016     Lab Results   Component Value Date    TRIG 77 12/01/2017    TRIG 72 01/25/2017    TRIG 57 05/27/2016     Lab Results   Component Value Date    HDL 80 (H) 12/01/2017    HDL 69 (H) 01/25/2017    HDL 81 (H) 05/27/2016     Lab Results   Component Value Date    LDLCALC 59 12/01/2017    LDLCALC 60 01/25/2017    LDLCALC 51 05/27/2016     Lab Results   Component Value Date    LABVLDL 15 12/01/2017    LABVLDL 14 01/25/2017    LABVLDL 11 05/27/2016     Lab Results   Component Value Date    CHOLHDLRATIO 2.1 09/12/2013    CHOLHDLRATIO 1.9 04/18/2012    CHOLHDLRATIO 2.0 02/22/2012     ASSESSMENT/PLAN:  1. 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 Type 1 diabetes mellitus with diabetic neuropathy (HCC)  Stable on insulin pump followed by endocrinology  Check A1c  See eye doctor yearly    3.

## 2018-09-15 LAB
ESTIMATED AVERAGE GLUCOSE: 174.3 MG/DL
HBA1C MFR BLD: 7.7 %

## 2018-09-18 RX ORDER — SIMVASTATIN 20 MG
TABLET ORAL
Qty: 90 TABLET | Refills: 3 | Status: SHIPPED | OUTPATIENT
Start: 2018-09-18 | End: 2018-12-20

## 2018-10-30 ENCOUNTER — OFFICE VISIT (OUTPATIENT)
Dept: ENDOCRINOLOGY | Age: 78
End: 2018-10-30
Payer: MEDICARE

## 2018-10-30 VITALS
HEIGHT: 62 IN | BODY MASS INDEX: 21.94 KG/M2 | SYSTOLIC BLOOD PRESSURE: 139 MMHG | OXYGEN SATURATION: 100 % | WEIGHT: 119.2 LBS | RESPIRATION RATE: 16 BRPM | DIASTOLIC BLOOD PRESSURE: 73 MMHG | HEART RATE: 84 BPM

## 2018-10-30 DIAGNOSIS — Z46.81 INSULIN PUMP TITRATION: ICD-10-CM

## 2018-10-30 DIAGNOSIS — M81.0 OSTEOPOROSIS, UNSPECIFIED OSTEOPOROSIS TYPE, UNSPECIFIED PATHOLOGICAL FRACTURE PRESENCE: ICD-10-CM

## 2018-10-30 DIAGNOSIS — E78.00 PURE HYPERCHOLESTEROLEMIA: ICD-10-CM

## 2018-10-30 DIAGNOSIS — E06.3 HASHIMOTO'S DISEASE: ICD-10-CM

## 2018-10-30 DIAGNOSIS — E03.9 ACQUIRED HYPOTHYROIDISM: Chronic | ICD-10-CM

## 2018-10-30 DIAGNOSIS — E10.40 TYPE 1 DIABETES MELLITUS WITH DIABETIC NEUROPATHY (HCC): Primary | ICD-10-CM

## 2018-10-30 PROCEDURE — 4040F PNEUMOC VAC/ADMIN/RCVD: CPT | Performed by: INTERNAL MEDICINE

## 2018-10-30 PROCEDURE — G8427 DOCREV CUR MEDS BY ELIG CLIN: HCPCS | Performed by: INTERNAL MEDICINE

## 2018-10-30 PROCEDURE — 1090F PRES/ABSN URINE INCON ASSESS: CPT | Performed by: INTERNAL MEDICINE

## 2018-10-30 PROCEDURE — 99215 OFFICE O/P EST HI 40 MIN: CPT | Performed by: INTERNAL MEDICINE

## 2018-10-30 PROCEDURE — G8399 PT W/DXA RESULTS DOCUMENT: HCPCS | Performed by: INTERNAL MEDICINE

## 2018-10-30 PROCEDURE — G8598 ASA/ANTIPLAT THER USED: HCPCS | Performed by: INTERNAL MEDICINE

## 2018-10-30 PROCEDURE — 1036F TOBACCO NON-USER: CPT | Performed by: INTERNAL MEDICINE

## 2018-10-30 PROCEDURE — 1123F ACP DISCUSS/DSCN MKR DOCD: CPT | Performed by: INTERNAL MEDICINE

## 2018-10-30 PROCEDURE — G8420 CALC BMI NORM PARAMETERS: HCPCS | Performed by: INTERNAL MEDICINE

## 2018-10-30 PROCEDURE — 1101F PT FALLS ASSESS-DOCD LE1/YR: CPT | Performed by: INTERNAL MEDICINE

## 2018-10-30 PROCEDURE — G8484 FLU IMMUNIZE NO ADMIN: HCPCS | Performed by: INTERNAL MEDICINE

## 2018-10-30 RX ORDER — PRAMIPEXOLE DIHYDROCHLORIDE 0.5 MG/1
0.5 TABLET ORAL 2 TIMES DAILY
COMMUNITY
End: 2022-01-10 | Stop reason: SDUPTHER

## 2018-11-19 ENCOUNTER — TELEPHONE (OUTPATIENT)
Dept: ENDOCRINOLOGY | Age: 78
End: 2018-11-19

## 2018-11-19 NOTE — TELEPHONE ENCOUNTER
I left a message that I mailed blood sugar log back to her last week and she should have already received it or should receive it soon.

## 2018-11-19 NOTE — TELEPHONE ENCOUNTER
Patient called and said that she sent in a 30 day file of her sugar numbers and was supposed to receive a copy back. Patient is just checking on when she will be receiving her copy of these files.

## 2018-11-30 ENCOUNTER — TELEPHONE (OUTPATIENT)
Dept: ENDOCRINOLOGY | Age: 78
End: 2018-11-30

## 2018-12-03 ENCOUNTER — TELEPHONE (OUTPATIENT)
Dept: ENDOCRINOLOGY | Age: 78
End: 2018-12-03

## 2018-12-04 ENCOUNTER — TELEPHONE (OUTPATIENT)
Dept: ENDOCRINOLOGY | Age: 78
End: 2018-12-04

## 2018-12-04 NOTE — TELEPHONE ENCOUNTER
Pt called because she wanted to schedule an appointment with Dr. Christine Weston but she has already established with Dr. Zehra Dickerson. Pt states that she is unhappy because forms aren't being completed and she never hears back from the [place] office. Pt states that she saw Dr. Christine Weston before she went to [place]. I informed the pt to reach out to the [place] office/Dr. Zehra Dickerson and see if it would be okay to switch. Pt agreed and will call back if she's been released.

## 2018-12-05 ENCOUNTER — TELEPHONE (OUTPATIENT)
Dept: ENDOCRINOLOGY | Age: 78
End: 2018-12-05

## 2018-12-05 NOTE — TELEPHONE ENCOUNTER
Pharmacy received scripts for her test strips but they were not signed or dated. She needs a script resent that is signed and dated between Oct 30 - Nov 9 or they wont take it.

## 2018-12-06 ENCOUNTER — TELEPHONE (OUTPATIENT)
Dept: ENDOCRINOLOGY | Age: 78
End: 2018-12-06

## 2018-12-13 ENCOUNTER — TELEPHONE (OUTPATIENT)
Dept: ENDOCRINOLOGY | Age: 78
End: 2018-12-13

## 2019-01-31 ENCOUNTER — OFFICE VISIT (OUTPATIENT)
Dept: ENDOCRINOLOGY | Age: 79
End: 2019-01-31
Payer: MEDICARE

## 2019-01-31 VITALS
HEART RATE: 80 BPM | DIASTOLIC BLOOD PRESSURE: 71 MMHG | WEIGHT: 115.4 LBS | SYSTOLIC BLOOD PRESSURE: 132 MMHG | BODY MASS INDEX: 21.23 KG/M2 | HEIGHT: 62 IN

## 2019-01-31 DIAGNOSIS — N18.30 CKD (CHRONIC KIDNEY DISEASE) STAGE 3, GFR 30-59 ML/MIN (HCC): ICD-10-CM

## 2019-01-31 DIAGNOSIS — I25.10 CORONARY ARTERY DISEASE INVOLVING NATIVE CORONARY ARTERY OF NATIVE HEART WITHOUT ANGINA PECTORIS: ICD-10-CM

## 2019-01-31 DIAGNOSIS — E78.2 MIXED HYPERLIPIDEMIA: ICD-10-CM

## 2019-01-31 DIAGNOSIS — E06.3 HASHIMOTO'S THYROIDITIS: ICD-10-CM

## 2019-01-31 DIAGNOSIS — M81.0 OSTEOPOROSIS, UNSPECIFIED OSTEOPOROSIS TYPE, UNSPECIFIED PATHOLOGICAL FRACTURE PRESENCE: ICD-10-CM

## 2019-01-31 DIAGNOSIS — R63.4 WEIGHT LOSS, ABNORMAL: ICD-10-CM

## 2019-01-31 DIAGNOSIS — E03.9 ACQUIRED HYPOTHYROIDISM: ICD-10-CM

## 2019-01-31 PROCEDURE — 1036F TOBACCO NON-USER: CPT | Performed by: INTERNAL MEDICINE

## 2019-01-31 PROCEDURE — G8399 PT W/DXA RESULTS DOCUMENT: HCPCS | Performed by: INTERNAL MEDICINE

## 2019-01-31 PROCEDURE — 99215 OFFICE O/P EST HI 40 MIN: CPT | Performed by: INTERNAL MEDICINE

## 2019-01-31 PROCEDURE — G8484 FLU IMMUNIZE NO ADMIN: HCPCS | Performed by: INTERNAL MEDICINE

## 2019-01-31 PROCEDURE — G8427 DOCREV CUR MEDS BY ELIG CLIN: HCPCS | Performed by: INTERNAL MEDICINE

## 2019-01-31 PROCEDURE — 1090F PRES/ABSN URINE INCON ASSESS: CPT | Performed by: INTERNAL MEDICINE

## 2019-01-31 PROCEDURE — 1101F PT FALLS ASSESS-DOCD LE1/YR: CPT | Performed by: INTERNAL MEDICINE

## 2019-01-31 PROCEDURE — 4040F PNEUMOC VAC/ADMIN/RCVD: CPT | Performed by: INTERNAL MEDICINE

## 2019-01-31 PROCEDURE — 1123F ACP DISCUSS/DSCN MKR DOCD: CPT | Performed by: INTERNAL MEDICINE

## 2019-01-31 PROCEDURE — G8598 ASA/ANTIPLAT THER USED: HCPCS | Performed by: INTERNAL MEDICINE

## 2019-01-31 PROCEDURE — G8420 CALC BMI NORM PARAMETERS: HCPCS | Performed by: INTERNAL MEDICINE

## 2019-01-31 RX ORDER — SIMVASTATIN 20 MG
TABLET ORAL
Qty: 90 TABLET | Refills: 1 | Status: SHIPPED | OUTPATIENT
Start: 2019-01-31 | End: 2019-10-16 | Stop reason: SDUPTHER

## 2019-01-31 RX ORDER — LEVOTHYROXINE SODIUM 0.1 MG/1
100 TABLET ORAL DAILY
Qty: 90 TABLET | Refills: 1 | Status: SHIPPED | OUTPATIENT
Start: 2019-01-31 | End: 2019-08-22 | Stop reason: SDUPTHER

## 2019-02-05 DIAGNOSIS — E06.3 HASHIMOTO'S THYROIDITIS: ICD-10-CM

## 2019-02-05 DIAGNOSIS — M81.0 OSTEOPOROSIS, UNSPECIFIED OSTEOPOROSIS TYPE, UNSPECIFIED PATHOLOGICAL FRACTURE PRESENCE: ICD-10-CM

## 2019-02-05 DIAGNOSIS — R63.4 WEIGHT LOSS, ABNORMAL: ICD-10-CM

## 2019-02-05 LAB
A/G RATIO: 2 (ref 1.1–2.2)
ALBUMIN SERPL-MCNC: 4.1 G/DL (ref 3.4–5)
ALP BLD-CCNC: 53 U/L (ref 40–129)
ALT SERPL-CCNC: 11 U/L (ref 10–40)
ANION GAP SERPL CALCULATED.3IONS-SCNC: 13 MMOL/L (ref 3–16)
AST SERPL-CCNC: 25 U/L (ref 15–37)
BILIRUB SERPL-MCNC: 0.9 MG/DL (ref 0–1)
BUN BLDV-MCNC: 14 MG/DL (ref 7–20)
CALCIUM SERPL-MCNC: 9.4 MG/DL (ref 8.3–10.6)
CHLORIDE BLD-SCNC: 100 MMOL/L (ref 99–110)
CHOLESTEROL, TOTAL: 138 MG/DL (ref 0–199)
CO2: 29 MMOL/L (ref 21–32)
CORTISOL - AM: 16.9 UG/DL (ref 4.3–22.4)
CREAT SERPL-MCNC: 0.8 MG/DL (ref 0.6–1.2)
ESTIMATED AVERAGE GLUCOSE: 171.4 MG/DL
GFR AFRICAN AMERICAN: >60
GFR NON-AFRICAN AMERICAN: >60
GLOBULIN: 2.1 G/DL
GLUCOSE BLD-MCNC: 67 MG/DL (ref 70–99)
HBA1C MFR BLD: 7.6 %
HDLC SERPL-MCNC: 84 MG/DL (ref 40–60)
LDL CHOLESTEROL CALCULATED: 41 MG/DL
POTASSIUM SERPL-SCNC: 4 MMOL/L (ref 3.5–5.1)
SODIUM BLD-SCNC: 142 MMOL/L (ref 136–145)
T4 FREE: 1.3 NG/DL (ref 0.9–1.8)
TOTAL PROTEIN: 6.2 G/DL (ref 6.4–8.2)
TRIGL SERPL-MCNC: 66 MG/DL (ref 0–150)
TSH SERPL DL<=0.05 MIU/L-ACNC: 4.52 UIU/ML (ref 0.27–4.2)
VITAMIN D 25-HYDROXY: 43.7 NG/ML
VLDLC SERPL CALC-MCNC: 13 MG/DL

## 2019-02-07 LAB — ADRENOCORTICOTROPIC HORMONE: 23 PG/ML (ref 6–58)

## 2019-02-15 ENCOUNTER — TELEPHONE (OUTPATIENT)
Dept: ENDOCRINOLOGY | Age: 79
End: 2019-02-15

## 2019-02-18 ENCOUNTER — OFFICE VISIT (OUTPATIENT)
Dept: FAMILY MEDICINE CLINIC | Age: 79
End: 2019-02-18
Payer: MEDICARE

## 2019-02-18 VITALS
OXYGEN SATURATION: 100 % | DIASTOLIC BLOOD PRESSURE: 72 MMHG | HEIGHT: 62 IN | TEMPERATURE: 97.3 F | SYSTOLIC BLOOD PRESSURE: 132 MMHG | BODY MASS INDEX: 21.05 KG/M2 | WEIGHT: 114.4 LBS | HEART RATE: 70 BPM

## 2019-02-18 DIAGNOSIS — N18.30 CKD (CHRONIC KIDNEY DISEASE) STAGE 3, GFR 30-59 ML/MIN (HCC): ICD-10-CM

## 2019-02-18 DIAGNOSIS — E03.9 ACQUIRED HYPOTHYROIDISM: ICD-10-CM

## 2019-02-18 DIAGNOSIS — M81.0 OSTEOPOROSIS, UNSPECIFIED OSTEOPOROSIS TYPE, UNSPECIFIED PATHOLOGICAL FRACTURE PRESENCE: ICD-10-CM

## 2019-02-18 DIAGNOSIS — R60.0 LOCALIZED EDEMA: ICD-10-CM

## 2019-02-18 DIAGNOSIS — R63.4 WEIGHT LOSS, ABNORMAL: ICD-10-CM

## 2019-02-18 DIAGNOSIS — E78.2 MIXED HYPERLIPIDEMIA: ICD-10-CM

## 2019-02-18 DIAGNOSIS — I25.10 CORONARY ARTERY DISEASE INVOLVING NATIVE CORONARY ARTERY OF NATIVE HEART WITHOUT ANGINA PECTORIS: ICD-10-CM

## 2019-02-18 PROCEDURE — 1123F ACP DISCUSS/DSCN MKR DOCD: CPT | Performed by: FAMILY MEDICINE

## 2019-02-18 PROCEDURE — G8427 DOCREV CUR MEDS BY ELIG CLIN: HCPCS | Performed by: FAMILY MEDICINE

## 2019-02-18 PROCEDURE — 99214 OFFICE O/P EST MOD 30 MIN: CPT | Performed by: FAMILY MEDICINE

## 2019-02-18 PROCEDURE — G8399 PT W/DXA RESULTS DOCUMENT: HCPCS | Performed by: FAMILY MEDICINE

## 2019-02-18 PROCEDURE — 1036F TOBACCO NON-USER: CPT | Performed by: FAMILY MEDICINE

## 2019-02-18 PROCEDURE — G8484 FLU IMMUNIZE NO ADMIN: HCPCS | Performed by: FAMILY MEDICINE

## 2019-02-18 PROCEDURE — 4040F PNEUMOC VAC/ADMIN/RCVD: CPT | Performed by: FAMILY MEDICINE

## 2019-02-18 PROCEDURE — 1101F PT FALLS ASSESS-DOCD LE1/YR: CPT | Performed by: FAMILY MEDICINE

## 2019-02-18 PROCEDURE — G8598 ASA/ANTIPLAT THER USED: HCPCS | Performed by: FAMILY MEDICINE

## 2019-02-18 PROCEDURE — 1090F PRES/ABSN URINE INCON ASSESS: CPT | Performed by: FAMILY MEDICINE

## 2019-02-18 PROCEDURE — G8420 CALC BMI NORM PARAMETERS: HCPCS | Performed by: FAMILY MEDICINE

## 2019-02-18 RX ORDER — FUROSEMIDE 20 MG/1
20 TABLET ORAL DAILY
Qty: 30 TABLET | Refills: 3 | Status: SHIPPED | OUTPATIENT
Start: 2019-02-18 | End: 2019-08-02

## 2019-02-18 ASSESSMENT — PATIENT HEALTH QUESTIONNAIRE - PHQ9
1. LITTLE INTEREST OR PLEASURE IN DOING THINGS: 0
SUM OF ALL RESPONSES TO PHQ QUESTIONS 1-9: 0
SUM OF ALL RESPONSES TO PHQ9 QUESTIONS 1 & 2: 0
SUM OF ALL RESPONSES TO PHQ QUESTIONS 1-9: 0
2. FEELING DOWN, DEPRESSED OR HOPELESS: 0

## 2019-04-24 ENCOUNTER — OFFICE VISIT (OUTPATIENT)
Dept: CARDIOLOGY CLINIC | Age: 79
End: 2019-04-24
Payer: MEDICARE

## 2019-04-24 VITALS
SYSTOLIC BLOOD PRESSURE: 138 MMHG | HEART RATE: 68 BPM | DIASTOLIC BLOOD PRESSURE: 78 MMHG | HEIGHT: 62 IN | BODY MASS INDEX: 21 KG/M2 | WEIGHT: 114.12 LBS

## 2019-04-24 DIAGNOSIS — E78.2 MIXED HYPERLIPIDEMIA: Primary | ICD-10-CM

## 2019-04-24 DIAGNOSIS — I36.1 NON-RHEUMATIC TRICUSPID VALVE INSUFFICIENCY: ICD-10-CM

## 2019-04-24 DIAGNOSIS — I25.10 CORONARY ARTERY DISEASE INVOLVING NATIVE CORONARY ARTERY OF NATIVE HEART WITHOUT ANGINA PECTORIS: ICD-10-CM

## 2019-04-24 PROCEDURE — 1036F TOBACCO NON-USER: CPT | Performed by: INTERNAL MEDICINE

## 2019-04-24 PROCEDURE — 4040F PNEUMOC VAC/ADMIN/RCVD: CPT | Performed by: INTERNAL MEDICINE

## 2019-04-24 PROCEDURE — 99212 OFFICE O/P EST SF 10 MIN: CPT | Performed by: INTERNAL MEDICINE

## 2019-04-24 PROCEDURE — G8427 DOCREV CUR MEDS BY ELIG CLIN: HCPCS | Performed by: INTERNAL MEDICINE

## 2019-04-24 PROCEDURE — G8420 CALC BMI NORM PARAMETERS: HCPCS | Performed by: INTERNAL MEDICINE

## 2019-04-24 PROCEDURE — 1090F PRES/ABSN URINE INCON ASSESS: CPT | Performed by: INTERNAL MEDICINE

## 2019-04-24 PROCEDURE — G8598 ASA/ANTIPLAT THER USED: HCPCS | Performed by: INTERNAL MEDICINE

## 2019-04-24 PROCEDURE — 1123F ACP DISCUSS/DSCN MKR DOCD: CPT | Performed by: INTERNAL MEDICINE

## 2019-04-24 PROCEDURE — G8399 PT W/DXA RESULTS DOCUMENT: HCPCS | Performed by: INTERNAL MEDICINE

## 2019-04-26 ENCOUNTER — TELEPHONE (OUTPATIENT)
Dept: ENDOCRINOLOGY | Age: 79
End: 2019-04-26

## 2019-04-29 NOTE — TELEPHONE ENCOUNTER
Pt is calling to see if the CMN form has been faxed yet. Spoke to [place] and informed pt that we are waiting for Dr. Rupert Ling to sign the form so we can fax it back.

## 2019-05-02 DIAGNOSIS — M81.0 OSTEOPOROSIS, UNSPECIFIED OSTEOPOROSIS TYPE, UNSPECIFIED PATHOLOGICAL FRACTURE PRESENCE: ICD-10-CM

## 2019-05-02 DIAGNOSIS — E06.3 HASHIMOTO'S THYROIDITIS: ICD-10-CM

## 2019-05-02 LAB
A/G RATIO: 1.7 (ref 1.1–2.2)
ALBUMIN SERPL-MCNC: 4 G/DL (ref 3.4–5)
ALP BLD-CCNC: 66 U/L (ref 40–129)
ALT SERPL-CCNC: 19 U/L (ref 10–40)
ANION GAP SERPL CALCULATED.3IONS-SCNC: 12 MMOL/L (ref 3–16)
AST SERPL-CCNC: 28 U/L (ref 15–37)
BILIRUB SERPL-MCNC: 0.6 MG/DL (ref 0–1)
BUN BLDV-MCNC: 20 MG/DL (ref 7–20)
CALCIUM SERPL-MCNC: 9.9 MG/DL (ref 8.3–10.6)
CHLORIDE BLD-SCNC: 101 MMOL/L (ref 99–110)
CHOLESTEROL, TOTAL: 142 MG/DL (ref 0–199)
CO2: 28 MMOL/L (ref 21–32)
CREAT SERPL-MCNC: 1.1 MG/DL (ref 0.6–1.2)
GFR AFRICAN AMERICAN: 58
GFR NON-AFRICAN AMERICAN: 48
GLOBULIN: 2.4 G/DL
GLUCOSE BLD-MCNC: 122 MG/DL (ref 70–99)
HDLC SERPL-MCNC: 86 MG/DL (ref 40–60)
LDL CHOLESTEROL CALCULATED: 45 MG/DL
POTASSIUM SERPL-SCNC: 4.8 MMOL/L (ref 3.5–5.1)
SODIUM BLD-SCNC: 141 MMOL/L (ref 136–145)
T4 FREE: 1.5 NG/DL (ref 0.9–1.8)
TOTAL PROTEIN: 6.4 G/DL (ref 6.4–8.2)
TRIGL SERPL-MCNC: 55 MG/DL (ref 0–150)
TSH SERPL DL<=0.05 MIU/L-ACNC: 2.53 UIU/ML (ref 0.27–4.2)
VITAMIN D 25-HYDROXY: 50.3 NG/ML
VLDLC SERPL CALC-MCNC: 11 MG/DL

## 2019-05-03 LAB
ESTIMATED AVERAGE GLUCOSE: 177.2 MG/DL
HBA1C MFR BLD: 7.8 %

## 2019-05-16 ENCOUNTER — OFFICE VISIT (OUTPATIENT)
Dept: ENDOCRINOLOGY | Age: 79
End: 2019-05-16
Payer: MEDICARE

## 2019-05-16 VITALS
BODY MASS INDEX: 20.76 KG/M2 | HEART RATE: 72 BPM | SYSTOLIC BLOOD PRESSURE: 130 MMHG | DIASTOLIC BLOOD PRESSURE: 67 MMHG | WEIGHT: 112.8 LBS | HEIGHT: 62 IN | OXYGEN SATURATION: 100 %

## 2019-05-16 DIAGNOSIS — M81.0 OSTEOPOROSIS, UNSPECIFIED OSTEOPOROSIS TYPE, UNSPECIFIED PATHOLOGICAL FRACTURE PRESENCE: ICD-10-CM

## 2019-05-16 DIAGNOSIS — E06.3 HASHIMOTO'S THYROIDITIS: ICD-10-CM

## 2019-05-16 DIAGNOSIS — I25.10 CORONARY ARTERY DISEASE INVOLVING NATIVE CORONARY ARTERY OF NATIVE HEART WITHOUT ANGINA PECTORIS: ICD-10-CM

## 2019-05-16 DIAGNOSIS — E03.9 ACQUIRED HYPOTHYROIDISM: ICD-10-CM

## 2019-05-16 DIAGNOSIS — N18.30 CKD (CHRONIC KIDNEY DISEASE) STAGE 3, GFR 30-59 ML/MIN (HCC): ICD-10-CM

## 2019-05-16 DIAGNOSIS — R63.4 WEIGHT LOSS, ABNORMAL: ICD-10-CM

## 2019-05-16 DIAGNOSIS — E78.2 MIXED HYPERLIPIDEMIA: ICD-10-CM

## 2019-05-16 PROCEDURE — G8420 CALC BMI NORM PARAMETERS: HCPCS | Performed by: INTERNAL MEDICINE

## 2019-05-16 PROCEDURE — 1036F TOBACCO NON-USER: CPT | Performed by: INTERNAL MEDICINE

## 2019-05-16 PROCEDURE — G8598 ASA/ANTIPLAT THER USED: HCPCS | Performed by: INTERNAL MEDICINE

## 2019-05-16 PROCEDURE — G8399 PT W/DXA RESULTS DOCUMENT: HCPCS | Performed by: INTERNAL MEDICINE

## 2019-05-16 PROCEDURE — G8427 DOCREV CUR MEDS BY ELIG CLIN: HCPCS | Performed by: INTERNAL MEDICINE

## 2019-05-16 PROCEDURE — 1123F ACP DISCUSS/DSCN MKR DOCD: CPT | Performed by: INTERNAL MEDICINE

## 2019-05-16 PROCEDURE — 99215 OFFICE O/P EST HI 40 MIN: CPT | Performed by: INTERNAL MEDICINE

## 2019-05-16 PROCEDURE — 4040F PNEUMOC VAC/ADMIN/RCVD: CPT | Performed by: INTERNAL MEDICINE

## 2019-05-16 PROCEDURE — 1090F PRES/ABSN URINE INCON ASSESS: CPT | Performed by: INTERNAL MEDICINE

## 2019-05-16 NOTE — PROGRESS NOTES
Richard Simmons is a 78 y.o. female who presents for Type 1 diabetes mellitus. Current symptoms/problems include fluctuating BG levels and show no change. 1.  Type 1 diabetes, uncontrolled, with neuropathy ([facility]) [E10.40, E10.65]    Diagnosed with Type 1 diabetes mellitus in 1999. Low BG at night. Comorbid conditions: hyperlipidemia, Neuropathy, Retinopathy, Chronic Kidney Disease and Coronary Artery Disease    Current diabetic medications include: Novolog    Intolerance to diabetes medications: No     Weight trend: stable  Prior visit with dietician: yes  Current diet: on average, 3 meals per day  Current exercise: walks     Current monitoring regimen: home blood tests - 8 times daily  Has brought blood glucose log/meter:  Yes  Home blood sugar records: fasting range:  and postprandial range:    Any episodes of hypoglycemia? Yes  Hypoglycemia frequency and time(s):  3-4 times weekly, last severe episode during the summer  Does patient have Glucagon emergency kit? No  Does patient have rapid acting carbohydrate? Yes  Does patient wear a medic alert bracelet or necklace? No    2. Osteoporosis, unspecified osteoporosis type, unspecified pathological fracture presence  Reclast 5 years. Not on any meds now. On vitamin D 2000 IU, calcium in food    3. Hashimoto's thyroiditis  Has fatigue. 4. Acquired hypothyroidism  On levothyroxine 0.1 mg qd. Has fatigue. 5. Coronary artery disease involving native coronary artery of native heart without angina pectoris  No chest pain. 6. Type 1 diabetes, uncontrolled, with retinopathy ([facility]  No recent vision changes. 7. Weight loss, abnormal  Lost 31 lbs not intentionally. No nausea, vomiting.     8. CKD, stage 3  No urination problems    Past Medical History:   Diagnosis Date    CAD (coronary artery disease) 1/2012    non obstructive    Diabetes mellitus with neurological manifestation ([facility])     Hyperlipidemia     Hypertension     Hypothyroidism (acquired)     Insomnia     Neuropathy     Parkinson disease ([facility]     Renal insufficiency     Restless legs syndrome     Type I (juvenile type) diabetes mellitus without mention of complication, not stated as uncontrolled       Patient Active Problem List   Diagnosis    Diabetic polyneuropathy ([facility]    Mixed hyperlipidemia    Hashimoto's thyroiditis    Acquired hypothyroidism    Type [facility] diabetes, uncontrolled, [facility]    Chest pain    CAD (coronary artery disease)    CKD (chronic kidney disease) stage 3, GFR 30-59 ml/min ([facility])    Rotator cuff tendonitis    Osteoporosis    Type 1 diabetes, uncontrolled, with retinopathy ([facility]    Weight loss, abnormal     Past Surgical History:   Procedure Laterality Date    CARDIAC CATHETERIZATION  2012    non obstructive CAD    COLONOSCOPY      DENTAL SURGERY      TONSILLECTOMY       Social History     Socioeconomic History    Marital status:      Spouse name: Not on file    Number of children: Not on file    Years of education: Not on file    Highest education level: Not on file   Occupational History    Not on file   Social Needs    Financial resource strain: Not on file    Food insecurity:     Worry: Not on file     Inability: Not on file    Transportation needs:     Medical: Not on file     Non-medical: Not on file   Tobacco Use    Smoking status: Former Smoker     Packs/day: 0.00     Years: 4.00     Pack years: 0.00     Last attempt to quit: 1966     Years since quittin.4    Smokeless tobacco: Never Used   Substance and Sexual Activity    Alcohol use:  Yes     Alcohol/week: 0.6 oz     Types: 1 Glasses of wine per week     Comment: social    Drug use: No    Sexual activity: Never     Partners: Male   Lifestyle    Physical activity:     Days per week: Not on file     Minutes per session: Not on file    Stress: Not on file   Relationships    Social connections:     Talks on phone: Not on file Gets together: Not on file     Attends Jain service: Not on file     Active member of club or organization: Not on file     Attends meetings of clubs or organizations: Not on file     Relationship status: Not on file    Intimate partner violence:     Fear of current or ex partner: Not on file     Emotionally abused: Not on file     Physically abused: Not on file     Forced sexual activity: Not on file   Other Topics Concern    Not on file   Social History Narrative    Not on file     Family History   Problem Relation Age of Onset    Cancer Father     Heart Disease Father     Heart Disease Mother     Cancer Other         sister's daughter - Melanoma    Heart Disease Brother     Arthritis Sister     Diabetes Neg Hx     Stroke Neg Hx     Osteoporosis Neg Hx     Thyroid Disease Neg Hx      Current Outpatient Medications   Medication Sig Dispense Refill    furosemide (LASIX) 20 MG tablet Take 1 tablet by mouth daily (Patient taking differently: Take 10 mg by mouth daily as needed ) 30 tablet 3    simvastatin (ZOCOR) 20 MG tablet TAKE 1 TABLET NIGHTLY 90 tablet 1    levothyroxine (SYNTHROID) 100 MCG tablet Take 1 tablet by mouth daily 90 tablet 1    pramipexole (MIRAPEX) 0.5 MG tablet Take 0.5 mg by mouth 2 times daily       insulin aspart (NOVOLOG) 100 UNIT/ML injection vial INJECT TOTAL DAILY DOSE 25 TO 30 UNITS SUBCUTANEOUSLY WITH INSULIN PUMP. 90 mL 3    Multiple Vitamins-Minerals (CENTRUM SILVER) TABS Take by mouth daily      ibuprofen (ADVIL;MOTRIN) 400 MG tablet 1 po tid with food 30 tablet 3    Cholecalciferol (VITAMIN D3) 2000 UNITS CAPS Take 1 capsule by mouth daily      glucose blood VI test strips (TIANA CONTOUR NEXT TEST) strip Dx Code E10.49 & E 10.65. Patient is testing 6-7 times a day due to retinopathy, neuropathy, hypoglycemia, hyperglycemia.  650 each 3    TIANA MICROLET LANCETS MISC 1 each by Does not apply route 8 times daily Brittle Diabetes Dx Code 250.61 (Patient taking Lab Results   Component Value Date    LABMICR 2.10 09/14/2018     Lab Results   Component Value Date    VITD25 50.3 05/02/2019        Review of Systems:  Constitutional: has fatigue, no fever, no recent weight gain, has recent weight loss, has changes in appetite  Eyes: no eye pain, no change in vision, no eye redness, no eye irritation, no double vision  Ears, nose, throat: no nasal congestion, no sore throat, no earache, no decrease in hearing, no hoarseness, no dry mouth, no sinus problems, no difficulty swallowing, no neck lumps, no dental problems, no mouth sores, no ringing in ears  Pulmonary: sometimes has shortness of breath, no wheezing, has dyspnea on exertion, no cough  Cardiovascular: no chest pain, has lower extremity edema, no orthopnea, no intermittent leg claudication, no palpitations  Gastrointestinal: no abdominal pain, no nausea, no vomiting, no diarrhea, no constipation, no dysphagia, no heartburn, no bloating  Genitourinary: no dysuria, has sometimes urinary incontinence, no urinary hesitancy, no urinary frequency, no feelings of urinary urgency, no nocturia  Musculoskeletal: no joint swelling, no joint stiffness, no joint pain, no muscle cramps, no muscle pain, no bone pain  Integument/Breast: no hair loss, no skin rashes, no skin lesions, no itching, no dry skin  Neurological: no numbness, no tingling, no weakness, no confusion, no headaches, no dizziness, no fainting, no tremors, no decrease in memory, has balance problems  Psychiatric: no anxiety, no depression, no insomnia  Hematologic/Lymphatic: no tendency for easy bleeding, no swollen lymph nodes, no tendency for easy bruising  Immunology: no seasonal allergies, no frequent infections, no frequent illnesses  Endocrine: no temperature intolerance    /67 (Site: Left Upper Arm, Position: Sitting, Cuff Size: Medium Adult)   Pulse 72   Ht 5' 2\" (1.575 m)   Wt 112 lb 12.8 oz (51.2 kg)   LMP  (Exact Date)   SpO2 100%   BMI 20.63 kg/m²    Wt Readings from Last 3 Encounters:   05/16/19 112 lb 12.8 oz (51.2 kg)   04/24/19 114 lb 1.9 oz (51.8 kg)   02/18/19 114 lb 6.4 oz (51.9 kg)     Body mass index is 20.63 kg/m². OBJECTIVE:  Constitutional: no acute distress, well appearing and well nourished  Psychiatric: oriented to person, place and time, judgement and insight and normal, recent and remote memory and intact and mood and affect are normal  Skin: skin and subcutaneous tissue is normal without mass, normal turgor  Head and Face: examination of head and face revealed no abnormalities  Eyes: no lid or conjunctival swelling, erythema or discharge, pupils are normal, equal, round, reactive to light  Ears/Nose: external inspection of ears and nose revealed no abnormalities, hearing is grossly normal  Oropharynx/Mouth/Face: lips, tongue and gums are normal with no lesions, the voice quality was normal  Neck: neck is supple and symmetric, with midline trachea and no masses, thyroid is normal  Lymphatics: normal cervical lymph nodes, normal supraclavicular nodes  Pulmonary: no increased work of breathing or signs of respiratory distress, lungs are clear to auscultation  Cardiovascular: normal heart rate and rhythm, normal S1 and S2, no murmurs and pedal pulses and 2+ bilaterally, 1+ edema  Abdomen: abdomen is soft, non-tender with no masses  Musculoskeletal: normal gait and station and exam of the digits and nails are normal  Neurological: normal coordination and normal general cortical function    Visual inspection:  Deformity/amputation: absent  Skin lesions/pre-ulcerative calluses: absent  Edema: right- 1+, left- 1+    Sensory exam:  Monofilament sensation: normal  (minimum of 5 random plantar locations tested, avoiding callused areas - > 1 area with absence of sensation is + for neuropathy)    Plus at least one of the following:  Pulses: normal  Proprioception: Intact  Vibration (128 Hz): Impaired    ASSESSMENT/PLAN:  1.  Type 1 diabetes, uncontrolled, with neuropathy ([facility]  Hemoglobin A1c 7.8  - Hemoglobin A1C; Future  - Comprehensive Metabolic Panel; Future  - Lipid Panel; Future  - Microalbumin / Creatinine Urine Ratio; Future    2. Osteoporosis, unspecified osteoporosis type, unspecified pathological fracture presence  DXA  25 hydroxy vitamin D 50  - Vitamin D 25 Hydroxy; Future    3. Hashimoto's thyroiditis    - T4, Free; Future  - TSH without Reflex; Future  - T4, Free; Future  - TSH without Reflex; Future    4. Acquired hypothyroidism  TSH 2.5  - levothyroxine (SYNTHROID) 100 MCG tablet; Take 1 tablet by mouth daily  Dispense: 90 tablet; Refill: 1    5. Coronary artery disease involving native coronary artery of native heart without angina pectoris  Follow with Cardiology    6. Type 1 diabetes, uncontrolled, with retinopathy ([facility]    - Hemoglobin A1C; Future  - Comprehensive Metabolic Panel; Future  - Lipid Panel; Future  - Microalbumin / Creatinine Urine Ratio; Future    7. Weight loss, abnormal  - ACTH; Future  - Cortisol AM, Total; Future    8. Mixed hyperlipidemia  LDL 45  Lipid panel  Simvastatin    8. CKD, stage 3  Follow GFR  GFR 48  Reviewed and/or ordered clinical lab results Yes  Reviewed and/or ordered radiology tests Yes  Reviewed and/or ordered other diagnostic tests No  Discussed test results with performing physician No  Independently reviewed image, tracing, or specimen Yes, insulin pump data and glucose tracing, total 6 pages. Same ratios. Decrease the basal rates: 00:00 AM 0.125, 4:00AM 0.200 u/h, continue Dexcom, adjusted alarm setting.   Made a decision to obtain old records No  Reviewed and summarized old records Yes  TSH 3.89, HbA1C 7.7, LDL 49  Obtained history from other than patient No    Selestino Moritz was counseled regarding symptoms of diabetes diagnosis, course and complications of disease if inadequately treated, side effects of medications, diagnosis, treatment options, and prognosis, risks, benefits, complications, and alternatives of treatment, labs, imaging and other studies and treatment targets and goals, insulin pump adjustments, hypoglycemia management. .  She understands instructions and counseling. Total visit time 40 min, >50% was spent in counseling    Return in about 3 months (around 8/16/2019) for diabetes, 40 min.

## 2019-05-24 ENCOUNTER — TELEPHONE (OUTPATIENT)
Dept: ENDOCRINOLOGY | Age: 79
End: 2019-05-24

## 2019-06-25 ENCOUNTER — PATIENT MESSAGE (OUTPATIENT)
Dept: FAMILY MEDICINE CLINIC | Age: 79
End: 2019-06-25

## 2019-06-25 DIAGNOSIS — M25.561 ACUTE PAIN OF RIGHT KNEE: Primary | ICD-10-CM

## 2019-06-26 ENCOUNTER — HOSPITAL ENCOUNTER (OUTPATIENT)
Dept: GENERAL RADIOLOGY | Age: 79
Discharge: HOME | End: 2019-06-26
Payer: MEDICARE

## 2019-06-26 ENCOUNTER — HOSPITAL ENCOUNTER (OUTPATIENT)
Age: 79
Discharge: HOME | End: 2019-06-26
Payer: MEDICARE

## 2019-06-26 DIAGNOSIS — M25.561 ACUTE PAIN OF RIGHT KNEE: ICD-10-CM

## 2019-06-26 PROCEDURE — 73560 X-RAY EXAM OF KNEE 1 OR 2: CPT

## 2019-08-02 ENCOUNTER — HOSPITAL ENCOUNTER (OUTPATIENT)
Dept: GENERAL RADIOLOGY | Age: 79
Discharge: HOME | End: 2019-08-02
Payer: MEDICARE

## 2019-08-02 ENCOUNTER — HOSPITAL ENCOUNTER (OUTPATIENT)
Age: 79
Discharge: HOME | End: 2019-08-02
Payer: MEDICARE

## 2019-08-02 ENCOUNTER — OFFICE VISIT (OUTPATIENT)
Dept: FAMILY MEDICINE CLINIC | Age: 79
End: 2019-08-02
Payer: MEDICARE

## 2019-08-02 VITALS
WEIGHT: 112 LBS | HEART RATE: 80 BPM | OXYGEN SATURATION: 98 % | SYSTOLIC BLOOD PRESSURE: 118 MMHG | BODY MASS INDEX: 20.49 KG/M2 | DIASTOLIC BLOOD PRESSURE: 78 MMHG

## 2019-08-02 DIAGNOSIS — S30.0XXA: ICD-10-CM

## 2019-08-02 DIAGNOSIS — S09.90XA MINOR HEAD INJURY, INITIAL ENCOUNTER: ICD-10-CM

## 2019-08-02 DIAGNOSIS — S30.0XXA: Primary | ICD-10-CM

## 2019-08-02 PROCEDURE — G8427 DOCREV CUR MEDS BY ELIG CLIN: HCPCS | Performed by: FAMILY MEDICINE

## 2019-08-02 PROCEDURE — 99213 OFFICE O/P EST LOW 20 MIN: CPT | Performed by: FAMILY MEDICINE

## 2019-08-02 PROCEDURE — 72220 X-RAY EXAM SACRUM TAILBONE: CPT

## 2019-08-02 PROCEDURE — G8420 CALC BMI NORM PARAMETERS: HCPCS | Performed by: FAMILY MEDICINE

## 2019-08-02 PROCEDURE — 4040F PNEUMOC VAC/ADMIN/RCVD: CPT | Performed by: FAMILY MEDICINE

## 2019-08-02 PROCEDURE — G8598 ASA/ANTIPLAT THER USED: HCPCS | Performed by: FAMILY MEDICINE

## 2019-08-02 PROCEDURE — 1090F PRES/ABSN URINE INCON ASSESS: CPT | Performed by: FAMILY MEDICINE

## 2019-08-02 PROCEDURE — 1036F TOBACCO NON-USER: CPT | Performed by: FAMILY MEDICINE

## 2019-08-02 PROCEDURE — G8399 PT W/DXA RESULTS DOCUMENT: HCPCS | Performed by: FAMILY MEDICINE

## 2019-08-02 PROCEDURE — 1123F ACP DISCUSS/DSCN MKR DOCD: CPT | Performed by: FAMILY MEDICINE

## 2019-08-15 ENCOUNTER — NURSE ONLY (OUTPATIENT)
Dept: FAMILY MEDICINE CLINIC | Age: 79
End: 2019-08-15
Payer: MEDICARE

## 2019-08-15 DIAGNOSIS — N18.30 CKD (CHRONIC KIDNEY DISEASE) STAGE 3, GFR 30-59 ML/MIN (HCC): ICD-10-CM

## 2019-08-15 DIAGNOSIS — Z01.89 ROUTINE LAB DRAW: Primary | ICD-10-CM

## 2019-08-15 DIAGNOSIS — E78.2 MIXED HYPERLIPIDEMIA: ICD-10-CM

## 2019-08-15 DIAGNOSIS — M81.0 OSTEOPOROSIS, UNSPECIFIED OSTEOPOROSIS TYPE, UNSPECIFIED PATHOLOGICAL FRACTURE PRESENCE: ICD-10-CM

## 2019-08-15 DIAGNOSIS — E03.9 ACQUIRED HYPOTHYROIDISM: ICD-10-CM

## 2019-08-15 LAB
A/G RATIO: 2 (ref 1.1–2.2)
ALBUMIN SERPL-MCNC: 4.4 G/DL (ref 3.4–5)
ALP BLD-CCNC: 82 U/L (ref 40–129)
ALT SERPL-CCNC: 20 U/L (ref 10–40)
ANION GAP SERPL CALCULATED.3IONS-SCNC: 13 MMOL/L (ref 3–16)
AST SERPL-CCNC: 31 U/L (ref 15–37)
BILIRUB SERPL-MCNC: 0.7 MG/DL (ref 0–1)
BUN BLDV-MCNC: 21 MG/DL (ref 7–20)
CALCIUM SERPL-MCNC: 10.1 MG/DL (ref 8.3–10.6)
CHLORIDE BLD-SCNC: 98 MMOL/L (ref 99–110)
CHOLESTEROL, TOTAL: 149 MG/DL (ref 0–199)
CO2: 30 MMOL/L (ref 21–32)
CREAT SERPL-MCNC: 1 MG/DL (ref 0.6–1.2)
CREATININE URINE: 78.3 MG/DL (ref 28–259)
GFR AFRICAN AMERICAN: >60
GFR NON-AFRICAN AMERICAN: 53
GLOBULIN: 2.2 G/DL
GLUCOSE BLD-MCNC: 118 MG/DL (ref 70–99)
HDLC SERPL-MCNC: 97 MG/DL (ref 40–60)
LDL CHOLESTEROL CALCULATED: 37 MG/DL
MICROALBUMIN UR-MCNC: <1.2 MG/DL
MICROALBUMIN/CREAT UR-RTO: NORMAL MG/G (ref 0–30)
POTASSIUM SERPL-SCNC: 4.2 MMOL/L (ref 3.5–5.1)
SODIUM BLD-SCNC: 141 MMOL/L (ref 136–145)
TOTAL PROTEIN: 6.6 G/DL (ref 6.4–8.2)
TRIGL SERPL-MCNC: 74 MG/DL (ref 0–150)
VITAMIN D 25-HYDROXY: 49.3 NG/ML
VLDLC SERPL CALC-MCNC: 15 MG/DL

## 2019-08-15 PROCEDURE — 36415 COLL VENOUS BLD VENIPUNCTURE: CPT | Performed by: FAMILY MEDICINE

## 2019-08-16 LAB
ESTIMATED AVERAGE GLUCOSE: 177.2 MG/DL
HBA1C MFR BLD: 7.8 %

## 2019-08-22 ENCOUNTER — OFFICE VISIT (OUTPATIENT)
Dept: ENDOCRINOLOGY | Age: 79
End: 2019-08-22
Payer: MEDICARE

## 2019-08-22 VITALS
OXYGEN SATURATION: 99 % | DIASTOLIC BLOOD PRESSURE: 74 MMHG | HEIGHT: 62 IN | BODY MASS INDEX: 19.91 KG/M2 | HEART RATE: 81 BPM | SYSTOLIC BLOOD PRESSURE: 121 MMHG | WEIGHT: 108.2 LBS

## 2019-08-22 DIAGNOSIS — N18.30 CKD (CHRONIC KIDNEY DISEASE) STAGE 3, GFR 30-59 ML/MIN (HCC): ICD-10-CM

## 2019-08-22 DIAGNOSIS — M81.0 OSTEOPOROSIS, UNSPECIFIED OSTEOPOROSIS TYPE, UNSPECIFIED PATHOLOGICAL FRACTURE PRESENCE: ICD-10-CM

## 2019-08-22 DIAGNOSIS — E06.3 HASHIMOTO'S THYROIDITIS: ICD-10-CM

## 2019-08-22 DIAGNOSIS — E78.2 MIXED HYPERLIPIDEMIA: ICD-10-CM

## 2019-08-22 DIAGNOSIS — I25.10 CORONARY ARTERY DISEASE INVOLVING NATIVE CORONARY ARTERY OF NATIVE HEART WITHOUT ANGINA PECTORIS: ICD-10-CM

## 2019-08-22 DIAGNOSIS — E03.9 ACQUIRED HYPOTHYROIDISM: ICD-10-CM

## 2019-08-22 DIAGNOSIS — R63.4 WEIGHT LOSS, ABNORMAL: ICD-10-CM

## 2019-08-22 PROCEDURE — 4040F PNEUMOC VAC/ADMIN/RCVD: CPT | Performed by: INTERNAL MEDICINE

## 2019-08-22 PROCEDURE — 1036F TOBACCO NON-USER: CPT | Performed by: INTERNAL MEDICINE

## 2019-08-22 PROCEDURE — G8427 DOCREV CUR MEDS BY ELIG CLIN: HCPCS | Performed by: INTERNAL MEDICINE

## 2019-08-22 PROCEDURE — G8598 ASA/ANTIPLAT THER USED: HCPCS | Performed by: INTERNAL MEDICINE

## 2019-08-22 PROCEDURE — 1090F PRES/ABSN URINE INCON ASSESS: CPT | Performed by: INTERNAL MEDICINE

## 2019-08-22 PROCEDURE — 99215 OFFICE O/P EST HI 40 MIN: CPT | Performed by: INTERNAL MEDICINE

## 2019-08-22 PROCEDURE — G8420 CALC BMI NORM PARAMETERS: HCPCS | Performed by: INTERNAL MEDICINE

## 2019-08-22 PROCEDURE — 1123F ACP DISCUSS/DSCN MKR DOCD: CPT | Performed by: INTERNAL MEDICINE

## 2019-08-22 PROCEDURE — G8399 PT W/DXA RESULTS DOCUMENT: HCPCS | Performed by: INTERNAL MEDICINE

## 2019-08-22 RX ORDER — LEVOTHYROXINE SODIUM 0.1 MG/1
100 TABLET ORAL DAILY
Qty: 90 TABLET | Refills: 1 | Status: SHIPPED | OUTPATIENT
Start: 2019-08-22 | End: 2020-02-27 | Stop reason: SDUPTHER

## 2019-08-22 NOTE — PROGRESS NOTES
Hypothyroidism (acquired)     Insomnia     Neuropathy     [facility] ([facility]     Renal insufficiency     Restless legs syndrome     Type I (juvenile type) diabetes mellitus without mention of complication, not stated as uncontrolled       Patient Active Problem List   Diagnosis    [facility]athy ([facility]    Mixed hyperlipidemia    Hashimoto's thyroiditis    Acquired hypothyroidism    Type 1 diabetes, uncontrolled, with neuropathy ([facility]    Chest pain    CAD (coronary artery disease)    CKD (chronic kidney disease) stage 3, GFR 30-59 ml/min ([facility])    Rotator cuff tendonitis    Osteoporosis    Type 1 diabetes, uncontrolled, [facility] retinopathy ([facility]    Weight loss, abnormal     Past Surgical History:   Procedure Laterality Date    CARDIAC CATHETERIZATION  2012    non obstructive CAD    COLONOSCOPY      DENTAL SURGERY      TONSILLECTOMY       Social History     Socioeconomic History    Marital status:      Spouse name: Not on file    Number of children: Not on file    Years of education: Not on file    Highest education level: Not on file   Occupational History    Not on file   Social Needs    Financial resource strain: Not on file    Food insecurity:     Worry: Not on file     Inability: Not on file    Transportation needs:     Medical: Not on file     Non-medical: Not on file   Tobacco Use    Smoking status: Former Smoker     Packs/day: 0.00     Years: 4.00     Pack years: 0.00     Last attempt to quit: 1966     Years since quittin.6    Smokeless tobacco: Never Used   Substance and Sexual Activity    Alcohol use:  Yes     Alcohol/week: 1.0 standard drinks     Types: 1 Glasses of wine per week     Comment: social    Drug use: No    Sexual activity: Never     Partners: Male   Lifestyle    Physical activity:     Days per week: Not on file     Minutes per session: Not on file    Stress: Not on file   Relationships    Social connections:     Talks on phone: tablets by mouth 4 times daily       Insulin Infusion Pump (PARADIGM INSULIN PUMP) by Does not apply route.  aspirin 81 MG EC tablet Take 1 tablet by mouth daily. No current facility-administered medications for this visit.       No Known Allergies  Family Status   Relation Name Status    Father      Mother  Alive    Other niece Alive   [facility] Brother  [de-identified]    Sister  Alive    Neg Hx  (Not Specified)       Lab Review:    Lab Results   Component Value Date    WBC 7.1 2014    HGB 12.3 2014    HCT 37.9 2014    MCV 93.5 2014     2014     Lab Results   Component Value Date     08/15/2019    K 4.2 08/15/2019    CL 98 08/15/2019    CO2 30 08/15/2019    BUN 21 08/15/2019    CREATININE 1.0 08/15/2019    GLUCOSE 118 08/15/2019    GLUCOSE 152 2012    CALCIUM 10.1 08/15/2019    PROT 6.6 08/15/2019    PROT 6.9 2012    LABALBU 4.4 08/15/2019    BILITOT 0.7 08/15/2019    ALKPHOS 82 08/15/2019    AST 31 08/15/2019    ALT 20 08/15/2019    LABGLOM 53 08/15/2019    LABGLOM 54 2014    GFRAA >60 08/15/2019    GFRAA >60 2012    AGRATIO 2.0 08/15/2019    GLOB 2.2 08/15/2019     Lab Results   Component Value Date    TSH 2.53 2019     Lab Results   Component Value Date    LABA1C 7.8 08/15/2019     Lab Results   Component Value Date    .2 08/15/2019     Lab Results   Component Value Date    CHOL 149 08/15/2019     Lab Results   Component Value Date    TRIG 74 08/15/2019     Lab Results   Component Value Date    HDL 97 08/15/2019    HDL 88 2012     Lab Results   Component Value Date    LDLCALC 37 08/15/2019     Lab Results   Component Value Date    LABVLDL 15 08/15/2019     Lab Results   Component Value Date    CHOLHDLRATIO 2.1 2013     Lab Results   Component Value Date    LABMICR <1.20 08/15/2019     Lab Results   Component Value Date    VITD25 49.3 08/15/2019        Review of Systems:  Constitutional: has fatigue, no fever, no

## 2019-08-23 ENCOUNTER — TELEPHONE (OUTPATIENT)
Dept: ENDOCRINOLOGY | Age: 79
End: 2019-08-23

## 2019-08-23 NOTE — TELEPHONE ENCOUNTER
Pt called and states she scheduled her Dexa and was told to call her endo Dr about her CGM having to be removed for test

## 2019-08-28 ENCOUNTER — HOSPITAL ENCOUNTER (OUTPATIENT)
Dept: GENERAL RADIOLOGY | Age: 79
Discharge: HOME | End: 2019-08-28
Payer: MEDICARE

## 2019-08-28 DIAGNOSIS — M81.0 OSTEOPOROSIS, UNSPECIFIED OSTEOPOROSIS TYPE, UNSPECIFIED PATHOLOGICAL FRACTURE PRESENCE: ICD-10-CM

## 2019-08-28 PROCEDURE — 77080 DXA BONE DENSITY AXIAL: CPT

## 2019-09-09 NOTE — PROGRESS NOTES
0.5 mg by mouth 2 times daily       insulin aspart (NOVOLOG) 100 UNIT/ML injection vial INJECT TOTAL DAILY DOSE 25 TO 30 UNITS SUBCUTANEOUSLY WITH INSULIN PUMP. 90 mL 3    Multiple Vitamins-Minerals (CENTRUM SILVER) TABS Take by mouth daily      Cholecalciferol (VITAMIN D3) 2000 UNITS CAPS Take 1 capsule by mouth daily      glucose blood VI test strips (TIANA CONTOUR NEXT TEST) strip Dx Code E10.49 & E 10.65. Patient is testing 6-7 times a day due to retinopathy, neuropathy, hypoglycemia, hyperglycemia. 650 each 3    TIANA MICROLET LANCETS MISC 1 each by Does not apply route 8 times daily Brittle Diabetes Dx Code 250.61 (Patient taking differently: 1 each by Does not apply route 8 times daily Retinopathy DX Code E10.49 & Peripheal Neuropathy DX Code E10.65) 240 each 3    carbidopa-levodopa (SINEMET)  MG per tablet Take 0.5 tablets by mouth 4 times daily       Insulin Infusion Pump (PARADIGM INSULIN PUMP) by Does not apply route.  aspirin 81 MG EC tablet Take 1 tablet by mouth daily. No current facility-administered medications for this visit.       No Known Allergies  Family Status   Relation Name Status    Father      Mother  Alive    Other niece Alive    Brother  [de-identified]    Sister  Alive    Neg Hx  (Not Specified)       Lab Review:    Lab Results   Component Value Date    WBC 7.1 2014    HGB 12.3 2014    HCT 37.9 2014    MCV 93.5 2014     2014     Lab Results   Component Value Date     08/15/2019    K 4.2 08/15/2019    CL 98 08/15/2019    CO2 30 08/15/2019    BUN 21 08/15/2019    CREATININE 1.0 08/15/2019    GLUCOSE 118 08/15/2019    GLUCOSE 152 2012    CALCIUM 10.1 08/15/2019    PROT 6.6 08/15/2019    PROT 6.9 2012    LABALBU 4.4 08/15/2019    BILITOT 0.7 08/15/2019    ALKPHOS 82 08/15/2019    AST 31 08/15/2019    ALT 20 08/15/2019    LABGLOM 53 08/15/2019    LABGLOM 54 2014    GFRAA >60 08/15/2019    GFRAA >60 no dry skin  Neurological: no numbness, no tingling, no weakness, no confusion, no headaches, no dizziness, no fainting, no tremors, no decrease in memory, has balance problems  Psychiatric: no anxiety, no depression, no insomnia  Hematologic/Lymphatic: no tendency for easy bleeding, no swollen lymph nodes, no tendency for easy bruising  Immunology: no seasonal allergies, no frequent infections, no frequent illnesses  Endocrine: no temperature intolerance    /80 (Site: Left Upper Arm, Position: Sitting, Cuff Size: Medium Adult)   Pulse 77   Ht 5' 2\" (1.575 m)   Wt 107 lb 9.6 oz (48.8 kg)   LMP  (Exact Date)   SpO2 100%   BMI 19.68 kg/m²    Wt Readings from Last 3 Encounters:   09/10/19 107 lb 9.6 oz (48.8 kg)   08/22/19 108 lb 3.2 oz (49.1 kg)   08/02/19 112 lb (50.8 kg)     Body mass index is 19.68 kg/m².       OBJECTIVE:  Constitutional: no acute distress, well appearing and well nourished  Psychiatric: oriented to person, place and time, judgement and insight and normal, recent and remote memory and intact and mood and affect are normal  Skin: skin and subcutaneous tissue is normal without mass, normal turgor  Head and Face: examination of head and face revealed no abnormalities  Eyes: no lid or conjunctival swelling, erythema or discharge, pupils are normal, equal, round, reactive to light  Ears/Nose: external inspection of ears and nose revealed no abnormalities, hearing is grossly normal  Oropharynx/Mouth/Face: lips, tongue and gums are normal with no lesions, the voice quality was normal  Neck: neck is supple and symmetric, with midline trachea and no masses, thyroid is normal  Lymphatics: normal cervical lymph nodes, normal supraclavicular nodes  Pulmonary: no increased work of breathing or signs of respiratory distress, lungs are clear to auscultation  Cardiovascular: normal heart rate and rhythm, normal S1 and S2, no murmurs and pedal pulses and 2+ bilaterally, 1+ edema  Abdomen: abdomen is soft, non-tender with no masses  Musculoskeletal: normal gait and station and exam of the digits and nails are normal  Neurological: normal coordination and normal general cortical function    ASSESSMENT/PLAN:    1. Osteoporosis, unspecified osteoporosis type, unspecified pathological fracture presence  DXA follow up  25 hydroxy vitamin D 50-49  Calcium 10.1  - Vitamin D 25 Hydroxy; Future    2. CKD  Follow GFR. Reviewed and/or ordered clinical lab results Yes  Reviewed and/or ordered radiology tests Yes  Reviewed and/or ordered other diagnostic tests No  Discussed test results with performing physician No  Independently reviewed image, tracing, or specimen No  Made a decision to obtain old records No  Reviewed and summarized old records Yes  Obtained history from other than patient No    Lena Arellano was counseled regarding symptoms of osteoporosis diagnosis, course and complications of disease if inadequately treated, side effects of medications, diagnosis, treatment options, and prognosis, risks, benefits, complications, and alternatives of treatment, labs, imaging and other studies and treatment targets and goals, fracture risk, calcium intake, testing for osteoporosis. She understands instructions and counseling. Total visit time 25 min, >50% was spent in counseling    Return for as  scheduled.

## 2019-09-10 ENCOUNTER — OFFICE VISIT (OUTPATIENT)
Dept: ENDOCRINOLOGY | Age: 79
End: 2019-09-10
Payer: MEDICARE

## 2019-09-10 VITALS
WEIGHT: 107.6 LBS | HEIGHT: 62 IN | HEART RATE: 77 BPM | DIASTOLIC BLOOD PRESSURE: 80 MMHG | OXYGEN SATURATION: 100 % | BODY MASS INDEX: 19.8 KG/M2 | SYSTOLIC BLOOD PRESSURE: 130 MMHG

## 2019-09-10 DIAGNOSIS — N18.30 CKD (CHRONIC KIDNEY DISEASE) STAGE 3, GFR 30-59 ML/MIN (HCC): ICD-10-CM

## 2019-09-10 DIAGNOSIS — M81.0 OSTEOPOROSIS, UNSPECIFIED OSTEOPOROSIS TYPE, UNSPECIFIED PATHOLOGICAL FRACTURE PRESENCE: Primary | ICD-10-CM

## 2019-09-10 PROCEDURE — 1123F ACP DISCUSS/DSCN MKR DOCD: CPT | Performed by: INTERNAL MEDICINE

## 2019-09-10 PROCEDURE — 4040F PNEUMOC VAC/ADMIN/RCVD: CPT | Performed by: INTERNAL MEDICINE

## 2019-09-10 PROCEDURE — 99214 OFFICE O/P EST MOD 30 MIN: CPT | Performed by: INTERNAL MEDICINE

## 2019-09-10 PROCEDURE — 1036F TOBACCO NON-USER: CPT | Performed by: INTERNAL MEDICINE

## 2019-09-10 PROCEDURE — G8399 PT W/DXA RESULTS DOCUMENT: HCPCS | Performed by: INTERNAL MEDICINE

## 2019-09-10 PROCEDURE — G8420 CALC BMI NORM PARAMETERS: HCPCS | Performed by: INTERNAL MEDICINE

## 2019-09-10 PROCEDURE — G8427 DOCREV CUR MEDS BY ELIG CLIN: HCPCS | Performed by: INTERNAL MEDICINE

## 2019-09-10 PROCEDURE — 1090F PRES/ABSN URINE INCON ASSESS: CPT | Performed by: INTERNAL MEDICINE

## 2019-09-10 PROCEDURE — G8598 ASA/ANTIPLAT THER USED: HCPCS | Performed by: INTERNAL MEDICINE

## 2019-09-19 ENCOUNTER — PATIENT MESSAGE (OUTPATIENT)
Dept: FAMILY MEDICINE CLINIC | Age: 79
End: 2019-09-19

## 2019-10-16 ENCOUNTER — OFFICE VISIT (OUTPATIENT)
Dept: FAMILY MEDICINE CLINIC | Age: 79
End: 2019-10-16
Payer: MEDICARE

## 2019-10-16 VITALS
HEART RATE: 75 BPM | DIASTOLIC BLOOD PRESSURE: 74 MMHG | OXYGEN SATURATION: 99 % | SYSTOLIC BLOOD PRESSURE: 118 MMHG | BODY MASS INDEX: 20.19 KG/M2 | WEIGHT: 110.4 LBS

## 2019-10-16 DIAGNOSIS — H61.23 BILATERAL HEARING LOSS DUE TO CERUMEN IMPACTION: Primary | ICD-10-CM

## 2019-10-16 PROCEDURE — G8427 DOCREV CUR MEDS BY ELIG CLIN: HCPCS | Performed by: FAMILY MEDICINE

## 2019-10-16 PROCEDURE — G8482 FLU IMMUNIZE ORDER/ADMIN: HCPCS | Performed by: FAMILY MEDICINE

## 2019-10-16 PROCEDURE — 1036F TOBACCO NON-USER: CPT | Performed by: FAMILY MEDICINE

## 2019-10-16 PROCEDURE — G8420 CALC BMI NORM PARAMETERS: HCPCS | Performed by: FAMILY MEDICINE

## 2019-10-16 PROCEDURE — 99212 OFFICE O/P EST SF 10 MIN: CPT | Performed by: FAMILY MEDICINE

## 2019-10-16 PROCEDURE — 4040F PNEUMOC VAC/ADMIN/RCVD: CPT | Performed by: FAMILY MEDICINE

## 2019-10-16 PROCEDURE — 1123F ACP DISCUSS/DSCN MKR DOCD: CPT | Performed by: FAMILY MEDICINE

## 2019-10-16 PROCEDURE — 1090F PRES/ABSN URINE INCON ASSESS: CPT | Performed by: FAMILY MEDICINE

## 2019-10-16 PROCEDURE — G8598 ASA/ANTIPLAT THER USED: HCPCS | Performed by: FAMILY MEDICINE

## 2019-10-16 PROCEDURE — G8399 PT W/DXA RESULTS DOCUMENT: HCPCS | Performed by: FAMILY MEDICINE

## 2019-10-16 RX ORDER — SIMVASTATIN 20 MG
TABLET ORAL
Qty: 90 TABLET | Refills: 1 | Status: SHIPPED | OUTPATIENT
Start: 2019-10-16 | End: 2020-03-25

## 2019-10-22 ENCOUNTER — TELEPHONE (OUTPATIENT)
Dept: FAMILY MEDICINE CLINIC | Age: 79
End: 2019-10-22

## 2019-11-08 DIAGNOSIS — E03.9 ACQUIRED HYPOTHYROIDISM: ICD-10-CM

## 2019-11-08 DIAGNOSIS — N18.30 CKD (CHRONIC KIDNEY DISEASE) STAGE 3, GFR 30-59 ML/MIN (HCC): ICD-10-CM

## 2019-11-08 DIAGNOSIS — M81.0 OSTEOPOROSIS, UNSPECIFIED OSTEOPOROSIS TYPE, UNSPECIFIED PATHOLOGICAL FRACTURE PRESENCE: ICD-10-CM

## 2019-11-08 DIAGNOSIS — I25.10 CORONARY ARTERY DISEASE INVOLVING NATIVE CORONARY ARTERY OF NATIVE HEART WITHOUT ANGINA PECTORIS: ICD-10-CM

## 2019-11-08 DIAGNOSIS — E78.2 MIXED HYPERLIPIDEMIA: ICD-10-CM

## 2019-11-08 LAB
A/G RATIO: 1.9 (ref 1.1–2.2)
ALBUMIN SERPL-MCNC: 4.4 G/DL (ref 3.4–5)
ALP BLD-CCNC: 71 U/L (ref 40–129)
ALT SERPL-CCNC: 26 U/L (ref 10–40)
ANION GAP SERPL CALCULATED.3IONS-SCNC: 15 MMOL/L (ref 3–16)
AST SERPL-CCNC: 31 U/L (ref 15–37)
BILIRUB SERPL-MCNC: 0.7 MG/DL (ref 0–1)
BUN BLDV-MCNC: 21 MG/DL (ref 7–20)
CALCIUM SERPL-MCNC: 10 MG/DL (ref 8.3–10.6)
CHLORIDE BLD-SCNC: 101 MMOL/L (ref 99–110)
CHOLESTEROL, TOTAL: 149 MG/DL (ref 0–199)
CO2: 29 MMOL/L (ref 21–32)
CREAT SERPL-MCNC: 0.9 MG/DL (ref 0.6–1.2)
CREATININE URINE: 87.5 MG/DL (ref 28–259)
ESTIMATED AVERAGE GLUCOSE: 157.1 MG/DL
GFR AFRICAN AMERICAN: >60
GFR NON-AFRICAN AMERICAN: >60
GLOBULIN: 2.3 G/DL
GLUCOSE BLD-MCNC: 127 MG/DL (ref 70–99)
HBA1C MFR BLD: 7.1 %
HDLC SERPL-MCNC: 93 MG/DL (ref 40–60)
LDL CHOLESTEROL CALCULATED: 45 MG/DL
MICROALBUMIN UR-MCNC: 2.7 MG/DL
MICROALBUMIN/CREAT UR-RTO: 30.9 MG/G (ref 0–30)
PARATHYROID HORMONE INTACT: 31.9 PG/ML (ref 14–72)
PHOSPHORUS: 3.8 MG/DL (ref 2.5–4.9)
POTASSIUM SERPL-SCNC: 4.2 MMOL/L (ref 3.5–5.1)
SODIUM BLD-SCNC: 145 MMOL/L (ref 136–145)
T4 FREE: 1.6 NG/DL (ref 0.9–1.8)
TOTAL PROTEIN: 6.7 G/DL (ref 6.4–8.2)
TRIGL SERPL-MCNC: 56 MG/DL (ref 0–150)
TSH SERPL DL<=0.05 MIU/L-ACNC: 3.08 UIU/ML (ref 0.27–4.2)
VITAMIN D 25-HYDROXY: 47.5 NG/ML
VLDLC SERPL CALC-MCNC: 11 MG/DL

## 2019-11-11 LAB
24HR URINE VOLUME (ML): 925 ML
ALBUMIN SERPL-MCNC: 3.6 G/DL (ref 3.1–4.9)
ALPHA-1-GLOBULIN: 0.3 G/DL (ref 0.2–0.4)
ALPHA-2-GLOBULIN: 0.8 G/DL (ref 0.4–1.1)
BETA GLOBULIN: 1.1 G/DL (ref 0.9–1.6)
CALCIUM 24 HOUR URINE: 141 MG/24 HR (ref 42–353)
CREAT SERPL-MCNC: 0.9 MG/DL (ref 0.6–1.2)
CREATININE 24 HOUR URINE: 0.8 G/24HR (ref 0.6–1.5)
CREATININE CLEARANCE: 77 ML/MIN (ref 88–128)
GAMMA GLOBULIN: 1 G/DL (ref 0.6–1.8)
Lab: 24 HOURS
Lab: 24 HR
SODIUM 24 HOUR URINE: 91 MMOL/24 HR (ref 40–220)
SPE/IFE INTERPRETATION: NORMAL

## 2019-11-26 ENCOUNTER — OFFICE VISIT (OUTPATIENT)
Dept: ENDOCRINOLOGY | Age: 79
End: 2019-11-26
Payer: MEDICARE

## 2019-11-26 VITALS
OXYGEN SATURATION: 99 % | HEART RATE: 85 BPM | BODY MASS INDEX: 19.58 KG/M2 | HEIGHT: 62 IN | WEIGHT: 106.4 LBS | SYSTOLIC BLOOD PRESSURE: 133 MMHG | DIASTOLIC BLOOD PRESSURE: 72 MMHG

## 2019-11-26 DIAGNOSIS — M81.0 OSTEOPOROSIS, UNSPECIFIED OSTEOPOROSIS TYPE, UNSPECIFIED PATHOLOGICAL FRACTURE PRESENCE: ICD-10-CM

## 2019-11-26 DIAGNOSIS — E78.2 MIXED HYPERLIPIDEMIA: ICD-10-CM

## 2019-11-26 DIAGNOSIS — E03.9 ACQUIRED HYPOTHYROIDISM: ICD-10-CM

## 2019-11-26 DIAGNOSIS — E06.3 HASHIMOTO'S THYROIDITIS: ICD-10-CM

## 2019-11-26 DIAGNOSIS — I25.10 CORONARY ARTERY DISEASE INVOLVING NATIVE CORONARY ARTERY OF NATIVE HEART WITHOUT ANGINA PECTORIS: ICD-10-CM

## 2019-11-26 DIAGNOSIS — N18.30 CKD (CHRONIC KIDNEY DISEASE) STAGE 3, GFR 30-59 ML/MIN (HCC): ICD-10-CM

## 2019-11-26 DIAGNOSIS — R63.4 WEIGHT LOSS, ABNORMAL: ICD-10-CM

## 2019-11-26 PROCEDURE — G8482 FLU IMMUNIZE ORDER/ADMIN: HCPCS | Performed by: INTERNAL MEDICINE

## 2019-11-26 PROCEDURE — 1090F PRES/ABSN URINE INCON ASSESS: CPT | Performed by: INTERNAL MEDICINE

## 2019-11-26 PROCEDURE — G8399 PT W/DXA RESULTS DOCUMENT: HCPCS | Performed by: INTERNAL MEDICINE

## 2019-11-26 PROCEDURE — 4040F PNEUMOC VAC/ADMIN/RCVD: CPT | Performed by: INTERNAL MEDICINE

## 2019-11-26 PROCEDURE — 1036F TOBACCO NON-USER: CPT | Performed by: INTERNAL MEDICINE

## 2019-11-26 PROCEDURE — 1123F ACP DISCUSS/DSCN MKR DOCD: CPT | Performed by: INTERNAL MEDICINE

## 2019-11-26 PROCEDURE — G8598 ASA/ANTIPLAT THER USED: HCPCS | Performed by: INTERNAL MEDICINE

## 2019-11-26 PROCEDURE — 99215 OFFICE O/P EST HI 40 MIN: CPT | Performed by: INTERNAL MEDICINE

## 2019-11-26 PROCEDURE — G8427 DOCREV CUR MEDS BY ELIG CLIN: HCPCS | Performed by: INTERNAL MEDICINE

## 2019-11-26 PROCEDURE — G8420 CALC BMI NORM PARAMETERS: HCPCS | Performed by: INTERNAL MEDICINE

## 2019-11-26 RX ORDER — PHENOL 1.4 %
1 AEROSOL, SPRAY (ML) MUCOUS MEMBRANE 2 TIMES DAILY PRN
COMMUNITY
End: 2020-07-30

## 2019-12-10 ENCOUNTER — OFFICE VISIT (OUTPATIENT)
Dept: FAMILY MEDICINE CLINIC | Age: 79
End: 2019-12-10
Payer: MEDICARE

## 2019-12-10 VITALS
HEART RATE: 83 BPM | OXYGEN SATURATION: 98 % | SYSTOLIC BLOOD PRESSURE: 114 MMHG | DIASTOLIC BLOOD PRESSURE: 80 MMHG | TEMPERATURE: 95.9 F | BODY MASS INDEX: 19.41 KG/M2 | WEIGHT: 105.5 LBS | HEIGHT: 62 IN

## 2019-12-10 DIAGNOSIS — Z00.00 ROUTINE GENERAL MEDICAL EXAMINATION AT A HEALTH CARE FACILITY: Primary | ICD-10-CM

## 2019-12-10 DIAGNOSIS — E03.9 ACQUIRED HYPOTHYROIDISM: ICD-10-CM

## 2019-12-10 DIAGNOSIS — I25.10 CORONARY ARTERY DISEASE INVOLVING NATIVE CORONARY ARTERY OF NATIVE HEART WITHOUT ANGINA PECTORIS: ICD-10-CM

## 2019-12-10 DIAGNOSIS — M81.0 OSTEOPOROSIS, UNSPECIFIED OSTEOPOROSIS TYPE, UNSPECIFIED PATHOLOGICAL FRACTURE PRESENCE: ICD-10-CM

## 2019-12-10 DIAGNOSIS — N18.30 CKD (CHRONIC KIDNEY DISEASE) STAGE 3, GFR 30-59 ML/MIN (HCC): ICD-10-CM

## 2019-12-10 PROCEDURE — 4040F PNEUMOC VAC/ADMIN/RCVD: CPT | Performed by: FAMILY MEDICINE

## 2019-12-10 PROCEDURE — G8598 ASA/ANTIPLAT THER USED: HCPCS | Performed by: FAMILY MEDICINE

## 2019-12-10 PROCEDURE — G8482 FLU IMMUNIZE ORDER/ADMIN: HCPCS | Performed by: FAMILY MEDICINE

## 2019-12-10 PROCEDURE — 1123F ACP DISCUSS/DSCN MKR DOCD: CPT | Performed by: FAMILY MEDICINE

## 2019-12-10 PROCEDURE — G0438 PPPS, INITIAL VISIT: HCPCS | Performed by: FAMILY MEDICINE

## 2019-12-10 ASSESSMENT — PATIENT HEALTH QUESTIONNAIRE - PHQ9
SUM OF ALL RESPONSES TO PHQ QUESTIONS 1-9: 0

## 2020-02-03 NOTE — TELEPHONE ENCOUNTER
Pt calling to request refill for her Novolog send to [facility] on [address] thru her Medicare Part B      LOV   11-26-19  FOV   2-27-20

## 2020-02-11 DIAGNOSIS — E03.9 ACQUIRED HYPOTHYROIDISM: ICD-10-CM

## 2020-02-11 DIAGNOSIS — E06.3 HASHIMOTO'S THYROIDITIS: ICD-10-CM

## 2020-02-11 DIAGNOSIS — E78.2 MIXED HYPERLIPIDEMIA: ICD-10-CM

## 2020-02-11 DIAGNOSIS — M81.0 OSTEOPOROSIS, UNSPECIFIED OSTEOPOROSIS TYPE, UNSPECIFIED PATHOLOGICAL FRACTURE PRESENCE: ICD-10-CM

## 2020-02-11 LAB
A/G RATIO: 1.7 (ref 1.1–2.2)
ALBUMIN SERPL-MCNC: 3.9 G/DL (ref 3.4–5)
ALP BLD-CCNC: 61 U/L (ref 40–129)
ALT SERPL-CCNC: 18 U/L (ref 10–40)
ANION GAP SERPL CALCULATED.3IONS-SCNC: 12 MMOL/L (ref 3–16)
AST SERPL-CCNC: 30 U/L (ref 15–37)
BILIRUB SERPL-MCNC: 0.7 MG/DL (ref 0–1)
BUN BLDV-MCNC: 14 MG/DL (ref 7–20)
CALCIUM SERPL-MCNC: 9.4 MG/DL (ref 8.3–10.6)
CHLORIDE BLD-SCNC: 102 MMOL/L (ref 99–110)
CHOLESTEROL, TOTAL: 141 MG/DL (ref 0–199)
CO2: 30 MMOL/L (ref 21–32)
CREAT SERPL-MCNC: 0.8 MG/DL (ref 0.6–1.2)
CREATININE URINE: 70.7 MG/DL (ref 28–259)
GFR AFRICAN AMERICAN: >60
GFR NON-AFRICAN AMERICAN: >60
GLOBULIN: 2.3 G/DL
GLUCOSE BLD-MCNC: 154 MG/DL (ref 70–99)
HDLC SERPL-MCNC: 86 MG/DL (ref 40–60)
LDL CHOLESTEROL CALCULATED: 42 MG/DL
MICROALBUMIN UR-MCNC: <1.2 MG/DL
MICROALBUMIN/CREAT UR-RTO: NORMAL MG/G (ref 0–30)
POTASSIUM SERPL-SCNC: 4.2 MMOL/L (ref 3.5–5.1)
SODIUM BLD-SCNC: 144 MMOL/L (ref 136–145)
T4 FREE: 1.5 NG/DL (ref 0.9–1.8)
TOTAL PROTEIN: 6.2 G/DL (ref 6.4–8.2)
TRIGL SERPL-MCNC: 64 MG/DL (ref 0–150)
TSH SERPL DL<=0.05 MIU/L-ACNC: 2.38 UIU/ML (ref 0.27–4.2)
VLDLC SERPL CALC-MCNC: 13 MG/DL

## 2020-02-12 LAB
ESTIMATED AVERAGE GLUCOSE: 171.4 MG/DL
HBA1C MFR BLD: 7.6 %

## 2020-02-27 ENCOUNTER — OFFICE VISIT (OUTPATIENT)
Dept: ENDOCRINOLOGY | Age: 80
End: 2020-02-27
Payer: MEDICARE

## 2020-02-27 VITALS
SYSTOLIC BLOOD PRESSURE: 132 MMHG | WEIGHT: 105.6 LBS | OXYGEN SATURATION: 99 % | HEIGHT: 62 IN | BODY MASS INDEX: 19.43 KG/M2 | HEART RATE: 73 BPM | DIASTOLIC BLOOD PRESSURE: 78 MMHG

## 2020-02-27 PROCEDURE — 1123F ACP DISCUSS/DSCN MKR DOCD: CPT | Performed by: INTERNAL MEDICINE

## 2020-02-27 PROCEDURE — G8427 DOCREV CUR MEDS BY ELIG CLIN: HCPCS | Performed by: INTERNAL MEDICINE

## 2020-02-27 PROCEDURE — 99214 OFFICE O/P EST MOD 30 MIN: CPT | Performed by: INTERNAL MEDICINE

## 2020-02-27 PROCEDURE — 1036F TOBACCO NON-USER: CPT | Performed by: INTERNAL MEDICINE

## 2020-02-27 PROCEDURE — 3051F HG A1C>EQUAL 7.0%<8.0%: CPT | Performed by: INTERNAL MEDICINE

## 2020-02-27 PROCEDURE — G8420 CALC BMI NORM PARAMETERS: HCPCS | Performed by: INTERNAL MEDICINE

## 2020-02-27 PROCEDURE — 1090F PRES/ABSN URINE INCON ASSESS: CPT | Performed by: INTERNAL MEDICINE

## 2020-02-27 PROCEDURE — G8482 FLU IMMUNIZE ORDER/ADMIN: HCPCS | Performed by: INTERNAL MEDICINE

## 2020-02-27 PROCEDURE — 4040F PNEUMOC VAC/ADMIN/RCVD: CPT | Performed by: INTERNAL MEDICINE

## 2020-02-27 PROCEDURE — G8399 PT W/DXA RESULTS DOCUMENT: HCPCS | Performed by: INTERNAL MEDICINE

## 2020-02-27 RX ORDER — LEVOTHYROXINE SODIUM 0.1 MG/1
100 TABLET ORAL DAILY
Qty: 90 TABLET | Refills: 1 | Status: SHIPPED | OUTPATIENT
Start: 2020-02-27 | End: 2020-11-05

## 2020-02-27 NOTE — PROGRESS NOTES
Michael Lloyd is a [de-identified] y.o. female who presents for Type 1 diabetes mellitus. Current symptoms/problems include fluctuating BG levels and show no change. 1.  Type 1 diabetes, uncontrolled, with neuropathy (HCC) [E10.40, E10.65]    Diagnosed with Type 1 diabetes mellitus in 1999. Low BG at night. Comorbid conditions: hyperlipidemia, Neuropathy, Retinopathy, Chronic Kidney Disease and Coronary Artery Disease    Current diabetic medications include: Novolog    Intolerance to diabetes medications: No     Weight trend: stable  Prior visit with dietician: yes  Current diet: on average, 3 meals per day  Current exercise: walks     Current monitoring regimen: home blood tests - 8 times daily 60 days  Has brought blood glucose log/meter:  Yes  Home blood sugar records: fasting range:  and postprandial range:   Any episodes of hypoglycemia? Yes  Hypoglycemia frequency and time(s):  3-4 times weekly, last severe episode during the summer  Does patient have Glucagon emergency kit? No  Does patient have rapid acting carbohydrate? Yes  Does patient wear a medic alert bracelet or necklace? No    2. Osteoporosis, unspecified osteoporosis type, unspecified pathological fracture presence  Reclast 5 years. Not on any meds now. On vitamin D 2000 IU, calcium in food    3. Hashimoto's thyroiditis  Has fatigue. 4. Acquired hypothyroidism  On levothyroxine 0.1 mg qd. Has fatigue. 5. Coronary artery disease involving native coronary artery of native heart without angina pectoris  No chest pain. 6. Type 1 diabetes, uncontrolled, with retinopathy ([facility]  No recent vision changes. 7. Weight loss, abnormal  Lost 42 lbs not intentionally. No nausea, vomiting.     8. CKD, stage 3  No urination problems    Past Medical History:   Diagnosis Date    CAD (coronary artery disease) 1/2012    non obstructive    [facility]     Hyperlipidemia     Hypertension     Hypothyroidism (acquired)     Insomnia     Neuropathy     Parkinson disease ([facility]     Renal insufficiency     Restless legs syndrome     Type I (juvenile type) diabetes mellitus without mention of complication, not stated as uncontrolled       Patient Active Problem List   Diagnosis    Diabetic polyneuropathy ([facility]    Mixed hyperlipidemia    Hashimoto's thyroiditis    Acquired hypothyroidism    [facility], [facility], [facility]    Chest pain    CAD (coronary artery disease)    CKD (chronic kidney disease) stage 3, GFR 30-59 ml/min ([facility]    Rotator cuff tendonitis    Osteoporosis    Type 1 diabetes, uncontrolled, with retinopathy ([facility]    Weight loss, abnormal     Past Surgical History:   Procedure Laterality Date    CARDIAC CATHETERIZATION  2012    non obstructive CAD    COLONOSCOPY      DENTAL SURGERY      TONSILLECTOMY       Social History     Socioeconomic History    Marital status:      Spouse name: Not on file    Number of children: Not on file    Years of education: Not on file    Highest education level: Not on file   Occupational History    Not on file   Social Needs    Financial resource strain: Not on file    Food insecurity:     Worry: Not on file     Inability: Not on file    Transportation needs:     Medical: Not on file     Non-medical: Not on file   Tobacco Use    Smoking status: Former Smoker     Packs/day: 0.00     Years: 4.00     Pack years: 0.00     Last attempt to quit: 1966     Years since quittin.1    Smokeless tobacco: Never Used   Substance and Sexual Activity    Alcohol use:  Yes     Alcohol/week: 1.0 standard drinks     Types: 1 Glasses of wine per week     Comment: social    Drug use: No    Sexual activity: Never     Partners: Male   Lifestyle    Physical activity:     Days per week: Not on file     Minutes per session: Not on file    Stress: Not on file   Relationships    Social connections:     Talks on phone: Not on file     Gets together: Not on file     Attends [employer] service: Not on file     Active member of club or organization: Not on file     Attends meetings of clubs or organizations: Not on file     Relationship status: Not on file    Intimate partner violence:     Fear of current or ex partner: Not on file     Emotionally abused: Not on file     Physically abused: Not on file     Forced sexual activity: Not on file   Other Topics Concern    Not on file   Social History Narrative    Not on file     Family History   Problem Relation Age of Onset    Cancer Father     Heart Disease Father     Heart Disease Mother     Cancer Other         sister's daughter - Melanoma    Heart Disease Brother     Arthritis Sister     Diabetes Neg Hx     Stroke Neg Hx     Osteoporosis Neg Hx     Thyroid Disease Neg Hx      Current Outpatient Medications   Medication Sig Dispense Refill    levothyroxine (SYNTHROID) 100 MCG tablet Take 1 tablet by mouth daily 90 tablet 1    insulin aspart (NOVOLOG) 100 UNIT/ML injection vial INJECT TOTAL DAILY DOSE 30 UNITS SUBCUTANEOUSLY WITH INSULIN PUMP. 90 mL 3    calcium carbonate 600 MG TABS tablet Take 1 tablet by mouth 2 times daily as needed      simvastatin (ZOCOR) 20 MG tablet TAKE 1 TABLET NIGHTLY 90 tablet 1    pramipexole (MIRAPEX) 0.5 MG tablet Take 0.5 mg by mouth 2 times daily       Multiple Vitamins-Minerals (CENTRUM SILVER) TABS Take by mouth daily      Cholecalciferol (VITAMIN D3) 2000 UNITS CAPS Take 1 capsule by mouth daily      glucose blood VI test strips (TIANA CONTOUR NEXT TEST) strip Dx Code E10.49 & E 10.65. Patient is testing 6-7 times a day due to retinopathy, neuropathy, hypoglycemia, hyperglycemia.  650 each 3    TIANA MICROLET LANCETS MISC 1 each by Does not apply route 8 times daily Brittle Diabetes Dx Code 250.61 (Patient taking differently: 1 each by Does not apply route 8 times daily Retinopathy DX Code E10.49 & Peripheal Neuropathy DX Code 11/08/2019        Review of Systems:  Constitutional: has fatigue, no fever, no recent weight gain, has recent weight loss, has changes in appetite  Eyes: no eye pain, no change in vision, no eye redness, no eye irritation, no double vision  Ears, nose, throat: no nasal congestion, no sore throat, no earache, no decrease in hearing, no hoarseness, no dry mouth, no sinus problems, no difficulty swallowing, no neck lumps, no dental problems, no mouth sores, no ringing in ears  Pulmonary: sometimes has shortness of breath, no wheezing, has dyspnea on exertion, no cough  Cardiovascular: no chest pain, has lower extremity edema, no orthopnea, no intermittent leg claudication, no palpitations  Gastrointestinal: no abdominal pain, no nausea, no vomiting, no diarrhea, no constipation, no dysphagia, no heartburn, no bloating  Genitourinary: no dysuria, has sometimes urinary incontinence, no urinary hesitancy, no urinary frequency, no feelings of urinary urgency, no nocturia  Musculoskeletal: no joint swelling, no joint stiffness, no joint pain, no muscle cramps, no muscle pain, no bone pain  Integument/Breast: no hair loss, no skin rashes, no skin lesions, no itching, no dry skin  Neurological: no numbness, no tingling, no weakness, no confusion, no headaches, no dizziness, no fainting, no tremors, no decrease in memory, has balance problems  Psychiatric: no anxiety, no depression, no insomnia  Hematologic/Lymphatic: no tendency for easy bleeding, no swollen lymph nodes, no tendency for easy bruising  Immunology: no seasonal allergies, no frequent infections, no frequent illnesses  Endocrine: no temperature intolerance    /78 (Site: Left Upper Arm, Position: Sitting, Cuff Size: Medium Adult)   Pulse 73   Ht 5' 2.01\" (1.575 m)   Wt 105 lb 9.6 oz (47.9 kg)   LMP  (Exact Date)   SpO2 99%   BMI 19.31 kg/m²    Wt Readings from Last 3 Encounters:   02/27/20 105 lb 9.6 oz (47.9 kg)   12/10/19 105 lb 8 oz (47.9 kg) unawareness unless it is very low. Hemoglobin A1c 7.8-7.1-7.6  Same ratios. Hypoglycemia at 4:000PM, 6:00PM, 9:00PM.  Decrease basal rate 5:00PM to 0.375 u/h  Meet with educator.  - Hemoglobin A1C; Future  - Comprehensive Metabolic Panel; Future  - Lipid Panel; Future  - Microalbumin / Creatinine Urine Ratio; Future    2. Osteoporosis, unspecified osteoporosis type, unspecified pathological fracture presence  T-score -2.4  DXA 2007 Osteoporosis  Worsening of bone density in a hip  Recommended Prolia, but too expensive  Patient does not want it now. Understands risks  25 hydroxy vitamin D 50-49-47  - Vitamin D 25 Hydroxy; Future    3. Hashimoto's thyroiditis    - T4, Free; Future  - TSH without Reflex; Future  - T4, Free; Future  - TSH without Reflex; Future    4. Acquired hypothyroidism  TSH 2.5-3.0-2.38  - levothyroxine (SYNTHROID) 100 MCG tablet; Take 1 tablet by mouth daily  Dispense: 90 tablet; Refill: 1    5. Coronary artery disease involving native coronary artery of native heart without angina pectoris  Follow with Cardiology    6. Type 1 diabetes, uncontrolled, with retinopathy ([facility]    - Hemoglobin A1C; Future  - Comprehensive Metabolic Panel; Future  - Lipid Panel; Future  - Microalbumin / Creatinine Urine Ratio; Future    7. Weight loss, abnormal  - ACTH; Future  - Cortisol AM, Total; Future    8. Mixed hyperlipidemia  LDL [id number]  Lipid panel  Simvastatin    9. CKD, stage 3  Follow GFR  GFR 48-53      Reviewed and/or ordered clinical lab results Yes  Reviewed and/or ordered radiology tests Yes  Reviewed and/or ordered other diagnostic tests No  Discussed test results with performing physician No  Independently reviewed image, tracing, or specimen Yes, insulin pump data and glucose tracing, total 6 pages.    Made a decision to obtain old records No  Reviewed and summarized old records Yes  TSH 3.89, HbA1C 7.7, LDL 49  Obtained history from other than patient No    Clive Esparza was counseled regarding symptoms of diabetes diagnosis, course and complications of disease if inadequately treated, side effects of medications, diagnosis, treatment options, and prognosis, risks, benefits, complications, and alternatives of treatment, labs, imaging and other studies and treatment targets and goals, insulin pump adjustments, hypoglycemia management. .  She understands instructions and counseling. Return in about 3 months (around 5/27/2020) for diabetes.

## 2020-03-25 RX ORDER — SIMVASTATIN 20 MG
TABLET ORAL
Qty: 90 TABLET | Refills: 0 | Status: SHIPPED | OUTPATIENT
Start: 2020-03-25 | End: 2020-06-22

## 2020-05-19 ENCOUNTER — HOSPITAL ENCOUNTER (OUTPATIENT)
Age: 80
Discharge: HOME | End: 2020-05-19
Payer: MEDICARE

## 2020-05-19 LAB
A/G RATIO: 1.6 (ref 1.1–2.2)
ALBUMIN SERPL-MCNC: 4 G/DL (ref 3.4–5)
ALP BLD-CCNC: 54 U/L (ref 40–129)
ALT SERPL-CCNC: 13 U/L (ref 10–40)
ANION GAP SERPL CALCULATED.3IONS-SCNC: 10 MMOL/L (ref 3–16)
AST SERPL-CCNC: 28 U/L (ref 15–37)
BILIRUB SERPL-MCNC: 0.5 MG/DL (ref 0–1)
BUN BLDV-MCNC: 21 MG/DL (ref 7–20)
CALCIUM SERPL-MCNC: 9.8 MG/DL (ref 8.3–10.6)
CHLORIDE BLD-SCNC: 101 MMOL/L (ref 99–110)
CHOLESTEROL, TOTAL: 133 MG/DL (ref 0–199)
CO2: 30 MMOL/L (ref 21–32)
CREAT SERPL-MCNC: 1 MG/DL (ref 0.6–1.2)
ESTIMATED AVERAGE GLUCOSE: 168.6 MG/DL
GFR AFRICAN AMERICAN: >60
GFR NON-AFRICAN AMERICAN: 53
GLOBULIN: 2.5 G/DL
GLUCOSE BLD-MCNC: 151 MG/DL (ref 70–99)
HBA1C MFR BLD: 7.5 %
HDLC SERPL-MCNC: 85 MG/DL (ref 40–60)
LDL CHOLESTEROL CALCULATED: 40 MG/DL
POTASSIUM SERPL-SCNC: 4.2 MMOL/L (ref 3.5–5.1)
SODIUM BLD-SCNC: 141 MMOL/L (ref 136–145)
T4 FREE: 1.6 NG/DL (ref 0.9–1.8)
TOTAL PROTEIN: 6.5 G/DL (ref 6.4–8.2)
TRIGL SERPL-MCNC: 42 MG/DL (ref 0–150)
TSH SERPL DL<=0.05 MIU/L-ACNC: 1.12 UIU/ML (ref 0.27–4.2)
VITAMIN D 25-HYDROXY: 50.6 NG/ML
VLDLC SERPL CALC-MCNC: 8 MG/DL

## 2020-05-19 PROCEDURE — 36415 COLL VENOUS BLD VENIPUNCTURE: CPT

## 2020-05-19 PROCEDURE — 84439 ASSAY OF FREE THYROXINE: CPT

## 2020-05-19 PROCEDURE — 80053 COMPREHEN METABOLIC PANEL: CPT

## 2020-05-19 PROCEDURE — 83036 HEMOGLOBIN GLYCOSYLATED A1C: CPT

## 2020-05-19 PROCEDURE — 82306 VITAMIN D 25 HYDROXY: CPT

## 2020-05-19 PROCEDURE — 84443 ASSAY THYROID STIM HORMONE: CPT

## 2020-05-19 PROCEDURE — 80061 LIPID PANEL: CPT

## 2020-05-28 ENCOUNTER — TELEPHONE (OUTPATIENT)
Dept: ENDOCRINOLOGY | Age: 80
End: 2020-05-28

## 2020-05-28 ENCOUNTER — OFFICE VISIT (OUTPATIENT)
Dept: ENDOCRINOLOGY | Age: 80
End: 2020-05-28
Payer: MEDICARE

## 2020-05-28 VITALS
WEIGHT: 103.6 LBS | DIASTOLIC BLOOD PRESSURE: 85 MMHG | TEMPERATURE: 97.8 F | SYSTOLIC BLOOD PRESSURE: 131 MMHG | BODY MASS INDEX: 19.06 KG/M2 | OXYGEN SATURATION: 99 % | HEART RATE: 82 BPM | HEIGHT: 62 IN

## 2020-05-28 PROCEDURE — 3051F HG A1C>EQUAL 7.0%<8.0%: CPT | Performed by: INTERNAL MEDICINE

## 2020-05-28 PROCEDURE — 1090F PRES/ABSN URINE INCON ASSESS: CPT | Performed by: INTERNAL MEDICINE

## 2020-05-28 PROCEDURE — 99215 OFFICE O/P EST HI 40 MIN: CPT | Performed by: INTERNAL MEDICINE

## 2020-05-28 PROCEDURE — 4040F PNEUMOC VAC/ADMIN/RCVD: CPT | Performed by: INTERNAL MEDICINE

## 2020-05-28 PROCEDURE — G8427 DOCREV CUR MEDS BY ELIG CLIN: HCPCS | Performed by: INTERNAL MEDICINE

## 2020-05-28 PROCEDURE — 1123F ACP DISCUSS/DSCN MKR DOCD: CPT | Performed by: INTERNAL MEDICINE

## 2020-05-28 PROCEDURE — G8399 PT W/DXA RESULTS DOCUMENT: HCPCS | Performed by: INTERNAL MEDICINE

## 2020-05-28 PROCEDURE — G8420 CALC BMI NORM PARAMETERS: HCPCS | Performed by: INTERNAL MEDICINE

## 2020-05-28 PROCEDURE — 1036F TOBACCO NON-USER: CPT | Performed by: INTERNAL MEDICINE

## 2020-05-28 NOTE — PROGRESS NOTES
Oleh Seip is a [de-identified] y.o. female who presents for Type 1 diabetes mellitus. Current symptoms/problems include fluctuating BG levels and show no change. 1.  Type 1 diabetes, uncontrolled, with neuropathy (HCC) [E10.40, E10.65]    Diagnosed with Type 1 diabetes mellitus in 1999. Low BG at night. Comorbid conditions: hyperlipidemia, Neuropathy, Retinopathy, Chronic Kidney Disease and Coronary Artery Disease    Current diabetic medications include: Novolog    On Medtronic Minimed insulin pump and Dexcom CGM    Intolerance to diabetes medications: No     Weight trend: stable  Prior visit with dietician: yes  Current diet: on average, 3 meals per day  Current exercise: walks     Current monitoring regimen: home blood tests - 8 times daily  Has brought blood glucose log/meter:  Yes  Home blood sugar records: fasting range:  and postprandial range:   Any episodes of hypoglycemia? Yes  Hypoglycemia frequency and time(s):  4 times weekly  Does patient have Glucagon emergency kit? No  Does patient have rapid acting carbohydrate? Yes  Does patient wear a medic alert bracelet or necklace? No    2. Osteoporosis, unspecified osteoporosis type, unspecified pathological fracture presence  Reclast 5 years. Not on any meds now. On vitamin D 2000 IU, calcium in food    3. Hashimoto's thyroiditis  Has fatigue. 4. Acquired hypothyroidism  On levothyroxine 0.1 mg qd. Has fatigue. 5. Coronary artery disease involving native coronary artery of native heart without angina pectoris  No chest pain. 6. Type 1 diabetes, uncontrolled, with retinopathy ([address]  No recent vision changes. 7. Weight loss, abnormal  Lost 42 lbs not intentionally. No nausea, vomiting.     8. CKD, stage 3  No urination problems    Past Medical History:   Diagnosis Date    CAD (coronary artery disease) 1/2012    non obstructive    Diabetes mellitus with neurological manifestation (HCC)     Hyperlipidemia     Hypertension Not on file     Gets together: Not on file     Attends Rastafari service: Not on file     Active member of club or organization: Not on file     Attends meetings of clubs or organizations: Not on file     Relationship status: Not on file    Intimate partner violence     Fear of current or ex partner: Not on file     Emotionally abused: Not on file     Physically abused: Not on file     Forced sexual activity: Not on file   Other Topics Concern    Not on file   Social History Narrative    Not on file     Family History   Problem Relation Age of Onset    Cancer Father     Heart Disease Father     Heart Disease Mother     Cancer Other         sister's daughter - Melanoma    Heart Disease Brother     Arthritis Sister     Diabetes Neg Hx     Stroke Neg Hx     Osteoporosis Neg Hx     Thyroid Disease Neg Hx      Current Outpatient Medications   Medication Sig Dispense Refill    simvastatin (ZOCOR) 20 MG tablet TAKE 1 TABLET NIGHTLY 90 tablet 0    levothyroxine (SYNTHROID) 100 MCG tablet Take 1 tablet by mouth daily 90 tablet 1    insulin aspart (NOVOLOG) 100 UNIT/ML injection vial INJECT TOTAL DAILY DOSE 30 UNITS SUBCUTANEOUSLY WITH INSULIN PUMP. 90 mL 3    calcium carbonate 600 MG TABS tablet Take 1 tablet by mouth 2 times daily as needed      pramipexole (MIRAPEX) 0.5 MG tablet Take 0.5 mg by mouth 2 times daily       Multiple Vitamins-Minerals (CENTRUM SILVER) TABS Take by mouth daily      Cholecalciferol (VITAMIN D3) 2000 UNITS CAPS Take 1 capsule by mouth daily      glucose blood VI test strips (TIANA CONTOUR NEXT TEST) strip Dx Code E10.49 & E 10.65. Patient is testing 6-7 times a day due to retinopathy, neuropathy, hypoglycemia, hyperglycemia.  650 each 3    TIANA MICROLET LANCETS MISC 1 each by Does not apply route 8 times daily Brittle Diabetes Dx Code 250.61 (Patient taking differently: 1 each by Does not apply route 8 times daily Retinopathy DX Code E10.49 & Peripheal Neuropathy DX Code E10.65) 240 each 3    carbidopa-levodopa (SINEMET)  MG per tablet Take 0.5 tablets by mouth 4 times daily       Insulin Infusion Pump (PARADIGM INSULIN PUMP) by Does not apply route.  aspirin 81 MG EC tablet Take 1 tablet by mouth daily. No current facility-administered medications for this visit.       No Known Allergies  Family Status   Relation Name Status    Father      Mother  Alive    Other niece Alive   Ramos Brother  Alive    Sister  Alive    Neg Hx  (Not Specified)       Lab Review:    Lab Results   Component Value Date    WBC 5.8 2019    HGB 13.3 2019    HCT 40.0 2019    MCV 95.3 2019     2019     Lab Results   Component Value Date     2020    K 4.2 2020     2020    CO2 30 2020    BUN 21 2020    CREATININE 1.0 2020    GLUCOSE 151 2020    GLUCOSE 152 2012    CALCIUM 9.8 2020    PROT 6.5 2020    PROT 6.9 2012    LABALBU 4.0 2020    BILITOT 0.5 2020    ALKPHOS 54 2020    AST 28 2020    ALT 13 2020    LABGLOM 53 2020    LABGLOM 54 2014    GFRAA >60 2020    GFRAA >60 2012    AGRATIO 1.6 2020    GLOB 2.5 2020     Lab Results   Component Value Date    TSH 1.12 2020     Lab Results   Component Value Date    LABA1C 7.5 2020     Lab Results   Component Value Date    .6 2020     Lab Results   Component Value Date    CHOL 133 2020     Lab Results   Component Value Date    TRIG 42 2020     Lab Results   Component Value Date    HDL 85 2020    HDL 88 2012     Lab Results   Component Value Date    LDLCALC 40 2020     Lab Results   Component Value Date    LABVLDL 8 2020     Lab Results   Component Value Date    CHOLHDLRATIO 2.1 2013     Lab Results   Component Value Date    LABMICR <1.20 2020     Lab Results   Component Value Date    VITD25 50.6

## 2020-06-09 ENCOUNTER — OFFICE VISIT (OUTPATIENT)
Dept: FAMILY MEDICINE CLINIC | Age: 80
End: 2020-06-09
Payer: MEDICARE

## 2020-06-09 ENCOUNTER — TELEPHONE (OUTPATIENT)
Dept: FAMILY MEDICINE CLINIC | Age: 80
End: 2020-06-09

## 2020-06-09 ENCOUNTER — TELEPHONE (OUTPATIENT)
Dept: DERMATOLOGY | Age: 80
End: 2020-06-09

## 2020-06-09 VITALS
BODY MASS INDEX: 18.84 KG/M2 | OXYGEN SATURATION: 98 % | TEMPERATURE: 96.4 F | WEIGHT: 102.4 LBS | HEIGHT: 62 IN | DIASTOLIC BLOOD PRESSURE: 74 MMHG | HEART RATE: 83 BPM | SYSTOLIC BLOOD PRESSURE: 112 MMHG

## 2020-06-09 PROCEDURE — G8399 PT W/DXA RESULTS DOCUMENT: HCPCS | Performed by: FAMILY MEDICINE

## 2020-06-09 PROCEDURE — 1090F PRES/ABSN URINE INCON ASSESS: CPT | Performed by: FAMILY MEDICINE

## 2020-06-09 PROCEDURE — G8427 DOCREV CUR MEDS BY ELIG CLIN: HCPCS | Performed by: FAMILY MEDICINE

## 2020-06-09 PROCEDURE — G8420 CALC BMI NORM PARAMETERS: HCPCS | Performed by: FAMILY MEDICINE

## 2020-06-09 PROCEDURE — 4040F PNEUMOC VAC/ADMIN/RCVD: CPT | Performed by: FAMILY MEDICINE

## 2020-06-09 PROCEDURE — 1036F TOBACCO NON-USER: CPT | Performed by: FAMILY MEDICINE

## 2020-06-09 PROCEDURE — 99213 OFFICE O/P EST LOW 20 MIN: CPT | Performed by: FAMILY MEDICINE

## 2020-06-09 PROCEDURE — 1123F ACP DISCUSS/DSCN MKR DOCD: CPT | Performed by: FAMILY MEDICINE

## 2020-06-09 RX ORDER — CEPHALEXIN 500 MG/1
500 CAPSULE ORAL 2 TIMES DAILY
Qty: 14 CAPSULE | Refills: 0 | Status: SHIPPED | OUTPATIENT
Start: 2020-06-09 | End: 2020-07-30 | Stop reason: ALTCHOICE

## 2020-06-09 ASSESSMENT — PATIENT HEALTH QUESTIONNAIRE - PHQ9
SUM OF ALL RESPONSES TO PHQ QUESTIONS 1-9: 0
SUM OF ALL RESPONSES TO PHQ QUESTIONS 1-9: 0
2. FEELING DOWN, DEPRESSED OR HOPELESS: 0
1. LITTLE INTEREST OR PLEASURE IN DOING THINGS: 0
SUM OF ALL RESPONSES TO PHQ9 QUESTIONS 1 & 2: 0

## 2020-06-09 NOTE — PROGRESS NOTES
102 lb 6.4 oz (46.4 kg)   SpO2 98%   BMI 18.72 kg/m²   GEN:  in NAD  HEENT:  NCAT, TMs:normal and throat: clear  NECK:  Supple without adenopathy. CV:  Regular rate and rhythm, S1 and S2 normal, no murmurs, clicks  PULM:  Chest is clear, no wheezing ,  symmetric air entry throughout both lung fields. ABD: Soft, NT  Back of right calf: butterfly shaped scab with some surr redness and mildly tender   NEURO: Alert oriented ×3, nonfocal     ASSESSMENT/PLAN:  1. Cellulitis of leg, right  - mupirocin (BACTROBAN) 2 % ointment; Apply 3 times daily. Dispense: 30 g; Refill: 1  - cephALEXin (KEFLEX) 500 MG capsule; Take 1 capsule by mouth 2 times daily  Dispense: 14 capsule; Refill: 0    Follow up if sxs persist or worsen and for routine care with PCP    2.  Scab  Monitor  Have derm check when scab falls off to make sure no underlying skin cancer

## 2020-06-09 NOTE — TELEPHONE ENCOUNTER
Pt is asking to be squeezed in this morning around 11 if possible for a lesion on right leg. Dr. Marcell Lantigua told pt to try to see Dr. Marino Calderón today after pt sent him a picture of lesion.  Pt mother had a stroke and pt will be going out of town this afternoon

## 2020-07-30 ENCOUNTER — OFFICE VISIT (OUTPATIENT)
Dept: CARDIOLOGY CLINIC | Age: 80
End: 2020-07-30
Payer: MEDICARE

## 2020-07-30 VITALS
SYSTOLIC BLOOD PRESSURE: 110 MMHG | DIASTOLIC BLOOD PRESSURE: 70 MMHG | BODY MASS INDEX: 18.4 KG/M2 | OXYGEN SATURATION: 98 % | HEIGHT: 62 IN | WEIGHT: 100 LBS | HEART RATE: 82 BPM

## 2020-07-30 PROCEDURE — 99214 OFFICE O/P EST MOD 30 MIN: CPT | Performed by: NURSE PRACTITIONER

## 2020-07-30 PROCEDURE — G8399 PT W/DXA RESULTS DOCUMENT: HCPCS | Performed by: NURSE PRACTITIONER

## 2020-07-30 PROCEDURE — 1123F ACP DISCUSS/DSCN MKR DOCD: CPT | Performed by: NURSE PRACTITIONER

## 2020-07-30 PROCEDURE — 1036F TOBACCO NON-USER: CPT | Performed by: NURSE PRACTITIONER

## 2020-07-30 PROCEDURE — 4040F PNEUMOC VAC/ADMIN/RCVD: CPT | Performed by: NURSE PRACTITIONER

## 2020-07-30 PROCEDURE — G8419 CALC BMI OUT NRM PARAM NOF/U: HCPCS | Performed by: NURSE PRACTITIONER

## 2020-07-30 PROCEDURE — G8427 DOCREV CUR MEDS BY ELIG CLIN: HCPCS | Performed by: NURSE PRACTITIONER

## 2020-07-30 PROCEDURE — 1090F PRES/ABSN URINE INCON ASSESS: CPT | Performed by: NURSE PRACTITIONER

## 2020-07-30 RX ORDER — SIMVASTATIN 20 MG
20 TABLET ORAL NIGHTLY
Qty: 90 TABLET | Refills: 3 | Status: SHIPPED | OUTPATIENT
Start: 2020-07-30 | End: 2021-03-05 | Stop reason: SDUPTHER

## 2020-07-30 NOTE — PROGRESS NOTES
[address]     Outpatient Follow Up Note    Alfredo Celestin is 76 y.o. 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 She continues to see Dr. Bhanu Smith for Parkinson's. CHIEF COMPLAINT / HPI:  Follow Up secondary to CAD. Subjective:   She denies significant chest pain/tightness. Sometimes she can't take a deep breath described as twice a day. She can get a full breath in. She's notice it over the past few months but has also had a lot of stress. She breaths ok when walking. The patient denies orthopnea/PND. The patient has swelling in her feet and lower legs. She sees an indentation after wearing socks. Her Lt is > Rt. She was given a water pill until her wt went down (despite already loosing). She had wt 148# at one time, ~ 4 yrs ago. She has no sense of smell or taste making it difficult to eat. The patient is not experiencing palpitations or dizziness. These symptoms are stable since the  last  OV. Her home BP runs ~ 112/64  With regard to medication therapy the patient has been compliant with prescribed regimen. They have tolerated therapy to date.      Past Medical History:   Diagnosis Date    CAD (coronary artery disease) 2012    non obstructive    Diabetes mellitus with neurological manifestation (HCC)     Hyperlipidemia     Hypertension     Hypothyroidism (acquired)     Insomnia     Neuropathy     Parkinson disease ([facility] [address]     Renal insufficiency     Restless legs syndrome     Type I (juvenile type) diabetes mellitus without mention of complication, not stated as uncontrolled      Social History:    Social History     Tobacco Use   Smoking Status Former Smoker    Packs/day: 0.00    Years: 4.00    Pack years: 0.00    Last attempt to quit: 1966    Years since quittin.6   Smokeless Tobacco Never Used     Current Outpatient Medications   Medication Sig Dispense Refill    simvastatin (ZOCOR) 20 MG tablet TAKE 1 TABLET NIGHTLY 90 tablet 0    levothyroxine (SYNTHROID) 100 MCG tablet Take 1 tablet by mouth daily 90 tablet 1    insulin aspart (NOVOLOG) 100 UNIT/ML injection vial INJECT TOTAL DAILY DOSE 30 UNITS SUBCUTANEOUSLY WITH INSULIN PUMP. 90 mL 3    pramipexole (MIRAPEX) 0.5 MG tablet Take 0.5 mg by mouth 2 times daily       Multiple Vitamins-Minerals (CENTRUM SILVER) TABS Take by mouth daily      Cholecalciferol (VITAMIN D3) 2000 UNITS CAPS Take 1 capsule by mouth daily      carbidopa-levodopa (SINEMET)  MG per tablet Take 0.5 tablets by mouth 4 times daily       Insulin Infusion Pump (PARADIGM INSULIN PUMP) by Does not apply route.  aspirin 81 MG EC tablet Take 1 tablet by mouth daily.  TIANA MICROLET LANCETS MISC 1 each by Does not apply route 8 times daily Brittle Diabetes Dx Code 250.61 (Patient taking differently: 1 each by Does not apply route 8 times daily Retinopathy DX Code E10.49 & Peripheal Neuropathy DX Code E10.65) 240 each 3     No current facility-administered medications for this visit. REVIEW OF SYSTEMS:    CONSTITUTIONAL: + weight  Loss, - fatigue, weakness, night sweats or fever. HEENT: No new vision difficulties or ringing in the ears. RESPIRATORY: No new SOB, PND, orthopnea or cough. CARDIOVASCULAR: See HPI  GI: No nausea, vomiting, diarrhea, constipation, abdominal pain or changes in bowel habits. : No urinary frequency, urgency, incontinence hematuria or dysuria. SKIN: bothersome bruising on hands/arms. MUSCULOSKELETAL: No new muscle or joint pain; her RLS has gotten worse; and cannot walk straight. She follows with Dr. Deandre Pro . NEUROLOGICAL: No syncope or TIA-like symptoms.   PSYCHIATRIC: No anxiety, pain, insomnia or depression    Objective:   PHYSICAL EXAM:       Vitals:    07/30/20 1459 07/30/20 1521   BP: 128/62 110/70   Site: Left Upper Arm Left Upper Arm   Position: Sitting    Cuff Size: Medium Adult Medium ~stable: denies angina  ~has atypical complaints   ~continues to stay active in yard  ~non-obst disease by cath '12  ~mild-mod TR & AZ on echo '17  ~statin /  BB / DAPT   2. Mixed hyperlipidemia   ~well controlled with high HDL  ~ low dose simvastatin    3. Palpitations   ~offers no c/o  ~tolerating low dose metoprolol    4. TIA (transient ischemic attack)   ~stable  ~follows with Dr. Staci Silverman (sees for Parkinson's)  ~DAPT / statin   ~last carotid US: EULALIO 40% '10      I had the opportunity to review the clinical symptoms and presentation of Kely Spear. Plan:     1. Continue present management   2. F/U in 12 months     Overall the patient is stable from CV standpoint    I have addresed the patient's cardiac risk factors and adjusted pharmacologic treatment as needed. In addition, I have reinforced the need for patient directed risk factor modification. Further evaluation will be based upon the patient's clinical course and testing results. All questions and concerns were addressed to the patient. Alternatives to my treatment were discussed. The patient is not currently smoking. The risks related to smoking were reviewed with the patient. Recommend maintaining a smoke-free lifestyle. Patient is not on a beta-blocker : neg MI   Patient is not on an ace-I : neg CHF  Patient is on a statin     Antiplatelet therapy has been recommended / prescribed for this patient. Education conducted on adverse reactions including bleeding was discussed. The patient verbalizes understanding not to stop medications without discussing with us. Discussed exercise: 30-60 minutes 7 days/week. Walks in the evening and does a lot of yard work  Discussed Low saturated fat/NCC diet. Last A1c 7.3% ; SMBG 182    Thank you for allowing to us to participate in the care of Kely Spear.       Documentation of today's visit sent to PCP

## 2020-08-21 ENCOUNTER — HOSPITAL ENCOUNTER (OUTPATIENT)
Age: 80
Discharge: HOME | End: 2020-08-21
Payer: MEDICARE

## 2020-08-21 LAB
A/G RATIO: 1.8 (ref 1.1–2.2)
ALBUMIN SERPL-MCNC: 4.3 G/DL (ref 3.4–5)
ALP BLD-CCNC: 55 U/L (ref 40–129)
ALT SERPL-CCNC: 23 U/L (ref 10–40)
ANION GAP SERPL CALCULATED.3IONS-SCNC: 11 MMOL/L (ref 3–16)
AST SERPL-CCNC: 35 U/L (ref 15–37)
BILIRUB SERPL-MCNC: 0.7 MG/DL (ref 0–1)
BUN BLDV-MCNC: 28 MG/DL (ref 7–20)
CALCIUM SERPL-MCNC: 9.3 MG/DL (ref 8.3–10.6)
CHLORIDE BLD-SCNC: 100 MMOL/L (ref 99–110)
CHOLESTEROL, TOTAL: 139 MG/DL (ref 0–199)
CO2: 28 MMOL/L (ref 21–32)
CREAT SERPL-MCNC: 0.9 MG/DL (ref 0.6–1.2)
CREATININE URINE: 156.8 MG/DL (ref 28–259)
GFR AFRICAN AMERICAN: >60
GFR NON-AFRICAN AMERICAN: >60
GLOBULIN: 2.4 G/DL
GLUCOSE BLD-MCNC: 172 MG/DL (ref 70–99)
HDLC SERPL-MCNC: 90 MG/DL (ref 40–60)
LDL CHOLESTEROL CALCULATED: 40 MG/DL
MICROALBUMIN UR-MCNC: 3.4 MG/DL
MICROALBUMIN/CREAT UR-RTO: 21.7 MG/G (ref 0–30)
POTASSIUM SERPL-SCNC: 4.6 MMOL/L (ref 3.5–5.1)
SODIUM BLD-SCNC: 139 MMOL/L (ref 136–145)
T4 FREE: 1.7 NG/DL (ref 0.9–1.8)
TOTAL PROTEIN: 6.7 G/DL (ref 6.4–8.2)
TRIGL SERPL-MCNC: 46 MG/DL (ref 0–150)
TSH SERPL DL<=0.05 MIU/L-ACNC: 2.85 UIU/ML (ref 0.27–4.2)
VITAMIN D 25-HYDROXY: 52.2 NG/ML
VLDLC SERPL CALC-MCNC: 9 MG/DL

## 2020-08-21 PROCEDURE — 82043 UR ALBUMIN QUANTITATIVE: CPT

## 2020-08-21 PROCEDURE — 80053 COMPREHEN METABOLIC PANEL: CPT

## 2020-08-21 PROCEDURE — 84443 ASSAY THYROID STIM HORMONE: CPT

## 2020-08-21 PROCEDURE — 82306 VITAMIN D 25 HYDROXY: CPT

## 2020-08-21 PROCEDURE — 82570 ASSAY OF URINE CREATININE: CPT

## 2020-08-21 PROCEDURE — 83036 HEMOGLOBIN GLYCOSYLATED A1C: CPT

## 2020-08-21 PROCEDURE — 36415 COLL VENOUS BLD VENIPUNCTURE: CPT

## 2020-08-21 PROCEDURE — 84439 ASSAY OF FREE THYROXINE: CPT

## 2020-08-21 PROCEDURE — 80061 LIPID PANEL: CPT

## 2020-08-22 LAB
ESTIMATED AVERAGE GLUCOSE: 171.4 MG/DL
HBA1C MFR BLD: 7.6 %

## 2020-08-28 ENCOUNTER — OFFICE VISIT (OUTPATIENT)
Dept: ENDOCRINOLOGY | Age: 80
End: 2020-08-28
Payer: MEDICARE

## 2020-08-28 VITALS
RESPIRATION RATE: 14 BRPM | BODY MASS INDEX: 18 KG/M2 | DIASTOLIC BLOOD PRESSURE: 72 MMHG | WEIGHT: 97.8 LBS | HEART RATE: 87 BPM | TEMPERATURE: 97.5 F | HEIGHT: 62 IN | SYSTOLIC BLOOD PRESSURE: 137 MMHG | OXYGEN SATURATION: 99 %

## 2020-08-28 PROCEDURE — 1123F ACP DISCUSS/DSCN MKR DOCD: CPT | Performed by: INTERNAL MEDICINE

## 2020-08-28 PROCEDURE — 1036F TOBACCO NON-USER: CPT | Performed by: INTERNAL MEDICINE

## 2020-08-28 PROCEDURE — G8427 DOCREV CUR MEDS BY ELIG CLIN: HCPCS | Performed by: INTERNAL MEDICINE

## 2020-08-28 PROCEDURE — 1090F PRES/ABSN URINE INCON ASSESS: CPT | Performed by: INTERNAL MEDICINE

## 2020-08-28 PROCEDURE — 3051F HG A1C>EQUAL 7.0%<8.0%: CPT | Performed by: INTERNAL MEDICINE

## 2020-08-28 PROCEDURE — 4040F PNEUMOC VAC/ADMIN/RCVD: CPT | Performed by: INTERNAL MEDICINE

## 2020-08-28 PROCEDURE — 99215 OFFICE O/P EST HI 40 MIN: CPT | Performed by: INTERNAL MEDICINE

## 2020-08-28 PROCEDURE — G8419 CALC BMI OUT NRM PARAM NOF/U: HCPCS | Performed by: INTERNAL MEDICINE

## 2020-08-28 PROCEDURE — G8399 PT W/DXA RESULTS DOCUMENT: HCPCS | Performed by: INTERNAL MEDICINE

## 2020-08-28 NOTE — PROGRESS NOTES
Elma Vitale is a [de-identified] y.o. female who presents for Type 1 diabetes mellitus. Current symptoms/problems include fluctuating BG levels and show no change. 1.  Type 1 diabetes, uncontrolled, with neuropathy ([facility]) [E10.40, E10.65]    Diagnosed with Type 1 diabetes mellitus in 1999. Low BG at night. Comorbid conditions: hyperlipidemia, Neuropathy, Retinopathy, Chronic Kidney Disease and Coronary Artery Disease    Current diabetic medications include: Novolog    On Medtronic Minimed insulin pump and Dexcom CGM    Intolerance to diabetes medications: No     Weight trend: stable  Prior visit with dietician: yes  Current diet: on average, 3 meals per day  Current exercise: walks     Current monitoring regimen: home blood tests - 8 times daily  Has brought blood glucose log/meter:  Yes  Home blood sugar records: fasting range:  and postprandial range:   Any episodes of hypoglycemia? Yes  Hypoglycemia frequency and time(s):  4 times weekly  Does patient have Glucagon emergency kit? No  Does patient have rapid acting carbohydrate? Yes  Does patient wear a medic alert bracelet or necklace? No    2. Osteoporosis, unspecified osteoporosis type, unspecified pathological fracture presence  Reclast 5 years. Not on any meds now. On vitamin D 2000 IU, calcium in food    3. Hashimoto's thyroiditis  Has fatigue. 4. Acquired hypothyroidism  On levothyroxine 0.1 mg qd. Has fatigue. 5. Coronary artery disease involving native coronary artery of native heart without angina pectoris  No chest pain. 6. Type 1 diabetes, uncontrolled, with retinopathy ([address]  No recent vision changes. 7. Weight loss, abnormal  Lost 42 lbs not intentionally. No nausea, vomiting.     8. CKD, stage 3  No urination problems    Past Medical History:   Diagnosis Date    CAD (coronary artery disease) 1/2012    non obstructive    Diabetes mellitus with neurological manifestation ([facility])     Hyperlipidemia     Hypertension  Hypothyroidism (acquired)     Insomnia     Neuropathy     Parkinson disease ([facility]     Renal insufficiency     Restless legs syndrome     Type I (juvenile type) diabetes mellitus without mention of complication, not stated as uncontrolled       Patient Active Problem List   Diagnosis    Diabetic polyneuropathy ([facility]    Mixed hyperlipidemia    Hashimoto's thyroiditis    Acquired hypothyroidism    Type 1 diabetes, uncontrolled, with neuropathy ([facility]    Chest pain    CAD (coronary artery disease)    CKD (chronic kidney disease) stage 3, GFR 30-59 ml/min ([facility])    Rotator cuff tendonitis    Osteoporosis    Type 1 diabetes, uncontrolled, with retinopathy ([facility]    Weight loss, abnormal     Past Surgical History:   Procedure Laterality Date    CARDIAC CATHETERIZATION  2012    non obstructive CAD    COLONOSCOPY      DENTAL SURGERY      TONSILLECTOMY       Social History     Socioeconomic History    Marital status:      Spouse name: Not on file    Number of children: Not on file    Years of education: Not on file    Highest education level: Not on file   Occupational History    Not on file   Social Needs    Financial resource strain: Not on file    Food insecurity     Worry: Not on file     Inability: Not on file    Transportation needs     Medical: Not on file     Non-medical: Not on file   Tobacco Use    Smoking status: Former Smoker     Packs/day: 0.25     Years: 4.00     Pack years: 1.00     Types: Cigarettes     Last attempt to quit: 1966     Years since quittin.5    Smokeless tobacco: Never Used   Substance and Sexual Activity    Alcohol use:  Yes     Alcohol/week: 1.0 standard drinks     Types: 1 Glasses of wine per week     Comment: social    Drug use: No    Sexual activity: Never     Partners: Male   Lifestyle    Physical activity     Days per week: Not on file     Minutes per session: Not on file    Stress: Not on file   Relationships    Social connections Talks on phone: Not on file     Gets together: Not on file     Attends Sabianism service: Not on file     Active member of club or organization: Not on file     Attends meetings of clubs or organizations: Not on file     Relationship status: Not on file    Intimate partner violence     Fear of current or ex partner: Not on file     Emotionally abused: Not on file     Physically abused: Not on file     Forced sexual activity: Not on file   Other Topics Concern    Not on file   Social History Narrative    Not on file     Family History   Problem Relation Age of Onset    Cancer Father     Heart Disease Father     Heart Disease Mother     Cancer Other         sister's daughter - Melanoma    Heart Disease Brother     Arthritis Sister     Diabetes Neg Hx     Stroke Neg Hx     Osteoporosis Neg Hx     Thyroid Disease Neg Hx      Current Outpatient Medications   Medication Sig Dispense Refill    simvastatin (ZOCOR) 20 MG tablet Take 1 tablet by mouth nightly 90 tablet 3    levothyroxine (SYNTHROID) 100 MCG tablet Take 1 tablet by mouth daily 90 tablet 1    insulin aspart (NOVOLOG) 100 UNIT/ML injection vial INJECT TOTAL DAILY DOSE 30 UNITS SUBCUTANEOUSLY WITH INSULIN PUMP. 90 mL 3    pramipexole (MIRAPEX) 0.5 MG tablet Take 0.5 mg by mouth 2 times daily       Multiple Vitamins-Minerals (CENTRUM SILVER) TABS Take by mouth daily      Cholecalciferol (VITAMIN D3) 2000 UNITS CAPS Take 1 capsule by mouth daily      TIANA MICROLET LANCETS MISC 1 each by Does not apply route 8 times daily Brittle Diabetes Dx Code 250.61 (Patient taking differently: 1 each by Does not apply route 8 times daily Retinopathy DX Code E10.49 & Peripheal Neuropathy DX Code E10.65) 240 each 3    carbidopa-levodopa (SINEMET)  MG per tablet Take 0.5 tablets by mouth 4 times daily       Insulin Infusion Pump (PARADIGM INSULIN PUMP) by Does not apply route.  aspirin 81 MG EC tablet Take 1 tablet by mouth daily.        No current facility-administered medications for this visit.       No Known Allergies  Family Status   Relation Name Status    Father      Mother  Alive    Other niece Alive   [place] Brother  [de-identified]    Sister  Alive    Neg Hx  (Not Specified)       Lab Review:    Lab Results   Component Value Date    WBC 5.8 2019    HGB 13.3 2019    HCT 40.0 2019    MCV 95.3 2019     2019     Lab Results   Component Value Date     2020    K 4.6 2020     2020    CO2 28 2020    BUN 28 2020    CREATININE 0.9 2020    GLUCOSE 172 2020    GLUCOSE 152 2012    CALCIUM 9.3 2020    PROT 6.7 2020    PROT 6.9 2012    LABALBU 4.3 2020    BILITOT 0.7 2020    ALKPHOS 55 2020    AST 35 2020    ALT 23 2020    LABGLOM >60 2020    LABGLOM 54 2014    GFRAA >60 2020    GFRAA >60 2012    AGRATIO 1.8 2020    GLOB 2.4 2020     Lab Results   Component Value Date    TSH 2.85 2020     Lab Results   Component Value Date    LABA1C 7.6 2020     Lab Results   Component Value Date    .4 2020     Lab Results   Component Value Date    CHOL 139 2020     Lab Results   Component Value Date    TRIG 46 2020     Lab Results   Component Value Date    HDL 90 2020    HDL 88 2012     Lab Results   Component Value Date    LDLCALC 40 2020     Lab Results   Component Value Date    LABVLDL 9 2020     Lab Results   Component Value Date    CHOLHDLRATIO 2.1 2013     Lab Results   Component Value Date    LABMICR 3.40 2020     Lab Results   Component Value Date    VITD25 52.2 2020        Review of Systems:  Constitutional: has fatigue, no fever, no recent weight gain, has recent weight loss, has changes in appetite  Eyes: no eye pain, no change in vision, no eye redness, no eye irritation, no double vision  Ears, nose, throat: no nasal congestion, no sore throat, no earache, no decrease in hearing, no hoarseness, no dry mouth, no sinus problems, no difficulty swallowing, no neck lumps, no dental problems, no mouth sores, no ringing in ears  Pulmonary: sometimes has shortness of breath, no wheezing, has dyspnea on exertion, no cough  Cardiovascular: no chest pain, has lower extremity edema, no orthopnea, no intermittent leg claudication, no palpitations  Gastrointestinal: no abdominal pain, no nausea, no vomiting, no diarrhea, no constipation, no dysphagia, no heartburn, no bloating  Genitourinary: no dysuria, has sometimes urinary incontinence, no urinary hesitancy, no urinary frequency, no feelings of urinary urgency, no nocturia  Musculoskeletal: no joint swelling, no joint stiffness, no joint pain, no muscle cramps, no muscle pain, no bone pain  Integument/Breast: no hair loss, no skin rashes, no skin lesions, no itching, no dry skin  Neurological: no numbness, no tingling, no weakness, no confusion, no headaches, no dizziness, no fainting, no tremors, no decrease in memory, has balance problems  Psychiatric: no anxiety, no depression, no insomnia  Hematologic/Lymphatic: no tendency for easy bleeding, no swollen lymph nodes, no tendency for easy bruising  Immunology: no seasonal allergies, no frequent infections, no frequent illnesses  Endocrine: no temperature intolerance    /72   Pulse 87   Temp 97.5 °F (36.4 °C)   Resp 14   Ht 5' 2\" (1.575 m)   Wt 97 lb 12.8 oz (44.4 kg)   SpO2 99%   BMI 17.89 kg/m²    Wt Readings from Last 3 Encounters:   08/28/20 97 lb 12.8 oz (44.4 kg)   07/30/20 100 lb (45.4 kg)   06/09/20 102 lb 6.4 oz (46.4 kg)     Body mass index is 17.89 kg/m².       OBJECTIVE:  Constitutional: no acute distress, well appearing and well nourished  Psychiatric: oriented to person, place and time, judgement and insight and normal, recent and remote memory and intact and mood and affect are normal  Skin: skin and subcutaneous tissue is normal without mass, normal turgor  Head and Face: examination of head and face revealed no abnormalities  Eyes: no lid or conjunctival swelling, erythema or discharge, pupils are normal, equal, round, reactive to light  Ears/Nose: external inspection of ears and nose revealed no abnormalities, hearing is grossly normal  Oropharynx/Mouth/Face: lips, tongue and gums are normal with no lesions, the voice quality was normal  Neck: neck is supple and symmetric, with midline trachea and no masses, thyroid is normal  Lymphatics: normal cervical lymph nodes, normal supraclavicular nodes  Pulmonary: no increased work of breathing or signs of respiratory distress, lungs are clear to auscultation  Cardiovascular: normal heart rate and rhythm, normal S1 and S2, no murmurs and pedal pulses and 2+ bilaterally, 1+ edema  Abdomen: abdomen is soft, non-tender with no masses  Musculoskeletal: normal gait and station and exam of the digits and nails are normal  Neurological: normal coordination and normal general cortical function    Visual inspection:  Deformity/amputation: absent  Skin lesions/pre-ulcerative calluses: absent on the feet, present right posterior lesion in the calf area, dry, not infected, approximately 1-1/2 cm in size  Edema: right- 1+, left- 1+    Sensory exam:  Monofilament sensation: normal  (minimum of 5 random plantar locations tested, avoiding callused areas - > 1 area with absence of sensation is + for neuropathy)    Plus at least one of the following:  Pulses: normal  Proprioception: Intact  Vibration (128 Hz): Impaired    ASSESSMENT/PLAN:  1. Type 1 diabetes, uncontrolled, with neuropathy (HCC)  Do boluses 15 min before meal  Diabetes educator consult  Insulin pump data was reviewed. Very fluctuating BG, hypoglycemia 4 days a week, has hypoglycemia unawareness unless it is very low, has very brittle diabetes.   On Medtronic Minimed insulin pump and Dexcom CGM  Hemoglobin A1c

## 2020-09-29 ENCOUNTER — VIRTUAL VISIT (OUTPATIENT)
Dept: ENDOCRINOLOGY | Age: 80
End: 2020-09-29
Payer: MEDICARE

## 2020-09-29 PROCEDURE — 97802 MEDICAL NUTRITION INDIV IN: CPT | Performed by: DIETITIAN, REGISTERED

## 2020-09-29 NOTE — PROGRESS NOTES
Medical Nutrition Therapy for Diabetes    Ashley Gilliam  September 29, 2020      Patient Care Team:  Rebecca Navarrete MD as PCP - Sadaf Toribio MD as PCP - [facility]  VON Kinsey CNP as Nurse Practitioner (Nurse Practitioner)    Reason for visit: Type 1 Diabetes    ASSESSMENT/PLAN:   NUTRITION DIAGNOSIS   Identified patient by name and date of birth. Patient is meeting this telephone appointment at home. I am at [facility] office. Patient is the sole participant on the phone call. Pursuant to the emergency declaration under the [facility], [facility] waiver authority and the "[employer] and [employer] Act this Phone Visit was insisted, with patient's consent, to reduce the patient's risk of exposure to COVID-19 and provide continuity of care for an established patient. #1 Problem: Altered Nutrition-Related Laboratory Values (NC-2.2)  Related to: Endocrine/Diabetes   As Evidenced by: Elevated Plasma glucose and/or HgbA1c levels         #2 Problem: Food-Medication Interaction (NC-2.3)  Related to: Diabetes medications  As evidenced by: Hypoglycemia episodes    #3 Problem: Fluids-NI-1.1                   Inadequate fluid intake    NUTRITION INTERVENTION  Nutrition Prescription: 30 grams carbohydrate per meal with protein and non-starch vegetables  15 gram carbohydrate snacks    Diabetes Education/Counseling included:  Carbohydrate Control, Activity/exercise, Monitoring, Hypoyglycemia prevention/treatment and other: benefit of adequate fluid intake. Interventions:  Recommended 30 gram carbohydrate meals w/protein and 15 gram snacks w/protein. Encouraged treating hypoglycemia very soon after dexcom sencor indicates the low is occurring. Advised to carry glucose tablets and take 4 tablets to correct the low.   After blood sugar is corrected  Eat a meal or snack to Insulin Infusion Pump (PARADIGM INSULIN PUMP) by Does not apply route.  aspirin 81 MG EC tablet Take 1 tablet by mouth daily. No current facility-administered medications for this visit. NUTRITION ASSESSMENT    Biochemical Data:    Lab Results   Component Value Date    LABA1C 7.6 08/21/2020     Lab Results   Component Value Date    .4 08/21/2020       Lab Results   Component Value Date    CHOL 139 08/21/2020    CHOL 133 05/19/2020    CHOL 141 02/11/2020     Lab Results   Component Value Date    TRIG 46 08/21/2020    TRIG 42 05/19/2020    TRIG 64 02/11/2020     Lab Results   Component Value Date    HDL 90 (H) 08/21/2020    HDL 85 (H) 05/19/2020    HDL 86 (H) 02/11/2020     Lab Results   Component Value Date    LDLCALC 40 08/21/2020    LDLCALC 40 05/19/2020    LDLCALC 42 02/11/2020     Lab Results   Component Value Date    LABVLDL 9 08/21/2020    LABVLDL 8 05/19/2020    LABVLDL 13 02/11/2020     Lab Results   Component Value Date    CHOLHDLRATIO 2.1 09/12/2013    CHOLHDLRATIO 1.9 04/18/2012    CHOLHDLRATIO 2.0 02/22/2012       Lab Results   Component Value Date    WBC 5.8 11/08/2019    HGB 13.3 11/08/2019    HCT 40.0 11/08/2019    MCV 95.3 11/08/2019     11/08/2019       Lab Results   Component Value Date    CREATININE 0.9 08/21/2020    BUN 28 (H) 08/21/2020     08/21/2020    K 4.6 08/21/2020     08/21/2020    CO2 28 08/21/2020       Diabetes Medications: Yes  Knows name and dose of prescribed medications Yes  Knows prescribed schedule for medicationsYes  Recent change in medication type/dosage: No  Stores  medications properlyNo  Comments:     Monitoring:   Has BG meter: Yesdexcom  Testing frequency:   Recent results:   Hypoglycemia? Yes, 2-3 week    Anthropometric Measurements:   Wt:   Wt Readings from Last 3 Encounters:   08/28/20 97 lb 12.8 oz (44.4 kg)   07/30/20 100 lb (45.4 kg)   06/09/20 102 lb 6.4 oz (46.4 kg)      BMI:   BMI Readings from Last 3 Encounters:

## 2020-10-09 ENCOUNTER — OFFICE VISIT (OUTPATIENT)
Dept: ENDOCRINOLOGY | Age: 80
End: 2020-10-09
Payer: MEDICARE

## 2020-10-09 PROCEDURE — 97803 MED NUTRITION INDIV SUBSEQ: CPT | Performed by: DIETITIAN, REGISTERED

## 2020-10-09 NOTE — PROGRESS NOTES
Medical Nutrition Therapy for Diabetes    Clarence Bojorquez  October 9, 2020      Patient Care Team:  Jakob Presley MD as PCP - Krysta Salgado MD as PCP - [facility] Empaneled Provider  VON Euceda CNP as Nurse Practitioner (Nurse Practitioner)    Reason for visit: Type 1 Diabetes    ASSESSMENT/PLAN:   NUTRITION DIAGNOSIS   Identified patient by name and date of birth. Patient is meeting this telephone appointment at home. I am at [facility] office. Patient is the sole participant on the telephone call. Pursuant to the emergency declaration under the [facility] waiver authority and the [employer] and [employer] Act this phone Visit was insisted, with patient's consent, to reduce the patient's risk of exposure to COVID-19 and provide continuity of care for an established patient. #1 Problem: Altered Nutrition-Related Laboratory Values (NC-2.2)  Related to: Endocrine/Diabetes   As Evidenced by: Elevated Plasma glucose and/or HgbA1c levels         #2 Problem: Food-Medication Interaction (NC-2.3)  Related to: Diabetes medications  As evidenced by: Hypoglycemia episodes    #3 Problem: Fluids-NI 1.1                  Inadequate fluid intake    NUTRITION INTERVENTION  Nutrition Prescription: 30 grams carbohydrate per meal with protein and non-starch vegetables  15 gram carbohydrate snacks    Diabetes Education/Counseling included:  Carbohydrate Control, Activity/exercise, Monitoring, Hypoyglycemia prevention/treatment and other: adequate fluid intake. Interventions: Congratulated on reducing frequency of hypoglycemia events. Encouraged increase fluid-target 32 ounces, increase carbohydrate intake before and after activity,  Treat hypoglycemia with sucrose only.     NUTRITION MONITORING AND EVALUATION  Try to achieve 32 ounces Water /non-caffeine fluid daily  Have Additional 10-15 grams carbohydrate to meals before and after activty       Patient Active Problem List   Diagnosis    Diabetic polyneuropathy ([facility]    Mixed hyperlipidemia    Hashimoto's thyroiditis    Acquired hypothyroidism    Type 1 diabetes, uncontrolled, with neuropathy ([facility]    Chest pain    CAD (coronary artery disease)    CKD (chronic kidney disease) stage 3, GFR 30-59 ml/min ([facility])    Rotator cuff tendonitis    Osteoporosis    Type 1 diabetes, uncontrolled, with retinopathy ([facility])    Weight loss, abnormal       Current Outpatient Medications   Medication Sig Dispense Refill    simvastatin (ZOCOR) 20 MG tablet Take 1 tablet by mouth nightly 90 tablet 3    levothyroxine (SYNTHROID) 100 MCG tablet Take 1 tablet by mouth daily 90 tablet 1    insulin aspart (NOVOLOG) 100 UNIT/ML injection vial INJECT TOTAL DAILY DOSE 30 UNITS SUBCUTANEOUSLY WITH INSULIN PUMP. 90 mL 3    pramipexole (MIRAPEX) 0.5 MG tablet Take 0.5 mg by mouth 2 times daily       Multiple Vitamins-Minerals (CENTRUM SILVER) TABS Take by mouth daily      Cholecalciferol (VITAMIN D3) 2000 UNITS CAPS Take 1 capsule by mouth daily      TIANA MICROLET LANCETS MISC 1 each by Does not apply route 8 times daily Brittle Diabetes Dx Code 250.61 (Patient taking differently: 1 each by Does not apply route 8 times daily Retinopathy DX Code E10.49 & Peripheal Neuropathy DX Code E10.65) 240 each 3    carbidopa-levodopa (SINEMET)  MG per tablet Take 0.5 tablets by mouth 4 times daily       Insulin Infusion Pump (PARADIGM INSULIN PUMP) by Does not apply route.  aspirin 81 MG EC tablet Take 1 tablet by mouth daily. No current facility-administered medications for this visit.           NUTRITION ASSESSMENT    Biochemical Data:    Lab Results   Component Value Date    LABA1C 7.6 08/21/2020     Lab Results   Component Value Date    .4 08/21/2020       Lab Results   Component Value Date    CHOL 139 08/21/2020    CHOL 133 05/19/2020    CHOL 141 tea, Boost and coffee - 2 cups/day  Alcohol consumption: wine 3/week  Frequency of Meals Eaten away from home:seldom  Food Availability Problems  Within the past 12 months, have you worried that your food would run out before you got money to buy more? No  Within the past 12 months, has the food you bought not lasted till the end of the month and you didn't have money to get more? No  Number of people in household: 2  Usual Food consumption:   3 meals, 30-45 gram carbohydrate meals     Barriers:   -none          Follow Up Plan: 6 weeks  Contact information proved.     Referring Provider: Angi Nguyen MD  Time spent with patient: 15 minutes

## 2020-11-05 RX ORDER — LEVOTHYROXINE SODIUM 0.1 MG/1
TABLET ORAL
Qty: 90 TABLET | Refills: 1 | Status: SHIPPED | OUTPATIENT
Start: 2020-11-05 | End: 2021-03-05 | Stop reason: SDUPTHER

## 2020-11-24 ENCOUNTER — VIRTUAL VISIT (OUTPATIENT)
Dept: ENDOCRINOLOGY | Age: 80
End: 2020-11-24
Payer: MEDICARE

## 2020-11-24 PROCEDURE — 97803 MED NUTRITION INDIV SUBSEQ: CPT | Performed by: DIETITIAN, REGISTERED

## 2020-11-24 NOTE — PROGRESS NOTES
Medical Nutrition Therapy for Diabetes    Hellen Treviño  November 24, 2020      Patient Care Team:  Cindy Perez MD as PCP - Maday Taylor MD as PCP - [facility]  VON De Oliveira CNP as Nurse Practitioner (Nurse Practitioner)    Reason for visit: Type 1 Diabetes    ASSESSMENT/PLAN:   NUTRITION DIAGNOSIS   Identified patient by name and date of birth. Patient is meeting this telephone appointment at home. I am at [facility] Endocrinology office. Patient is the sole participant on the phone call. Pursuant to the emergency declaration under the [facility], [facility] waiver authority and the [employer] Act this Phone Visit was insisted, with patient's consent, to reduce the patient's risk of exposure to COVID-19 and provide continuity of care for an established patient. #1 Problem:  Inconsistent Carbohydrate and Fiber Intake  Related to: Food- and nutrition-related knowledge deficit concerning appropriate amount of carbohydrate and fiber intake  As evidenced by: patient food recall    #2 Problem: Altered Nutrition-Related Laboratory Values (NC-2.2)  Related to: Endocrine/Diabetes   As Evidenced by: Elevated Plasma glucose and/or HgbA1c levels    #3 Problem: Fluids -NI--1.1                 Inadequate fluid intake  NUTRITION INTERVENTION  Nutrition Prescription: 45 grams carbohydrate per meal with protein and non-starch vegetables      Diabetes Education/Counseling included:  Reviewed carbohydrate control, activity, monitoring, hypoglycemia prevention and treatment  Insulin prescription  Interventions:  Encouraged carbohydrate limit to 45 grams each meal.  Strongly recommended avoiding over estimating carbohydrate intake which then results in hypoglycemia or quick glucose drop. Suggested carrying glucose tabs at all times.     NUTRITION MONITORING AND EVALUATION  45 gram carbohydrate meals  Take insulin as prescribed         Patient Active Problem List   Diagnosis    Diabetic polyneurop[facility] ([facility]    Mixed hyperlipidemia    Hashimoto's thyroiditis    Acquired hypothyroidism    Type 1 diabetes, uncontrolled, with neuropathy ([facility]    Chest pain    CAD (coronary artery disease)    CKD (chronic kidney disease) stage 3, GFR 30-59 ml/min ([facility])    Rotator cuff tendonitis    Osteoporosis    Type 1 diabetes, uncontrolled, with retinopathy ([facility])    Weight loss, abnormal       Current Outpatient Medications   Medication Sig Dispense Refill    levothyroxine (SYNTHROID) 100 MCG tablet TAKE 1 TABLET DAILY 90 tablet 1    simvastatin (ZOCOR) 20 MG tablet Take 1 tablet by mouth nightly 90 tablet 3    insulin aspart (NOVOLOG) 100 UNIT/ML injection vial INJECT TOTAL DAILY DOSE 30 UNITS SUBCUTANEOUSLY WITH INSULIN PUMP. 90 mL 3    pramipexole (MIRAPEX) 0.5 MG tablet Take 0.5 mg by mouth 2 times daily       Multiple Vitamins-Minerals (CENTRUM SILVER) TABS Take by mouth daily      Cholecalciferol (VITAMIN D3) 2000 UNITS CAPS Take 1 capsule by mouth daily      TIANA MICROLET LANCETS MISC 1 each by Does not apply route 8 times daily Brittle Diabetes Dx Code 250.61 (Patient taking differently: 1 each by Does not apply route 8 times daily Retinopathy DX Code E10.49 & Peripheal Neuropathy DX Code E10.65) 240 each 3    carbidopa-levodopa (SINEMET)  MG per tablet Take 0.5 tablets by mouth 4 times daily       Insulin Infusion Pump (PARADIGM INSULIN PUMP) by Does not apply route.  aspirin 81 MG EC tablet Take 1 tablet by mouth daily. No current facility-administered medications for this visit.           NUTRITION ASSESSMENT    Biochemical Data:    Lab Results   Component Value Date    LABA1C 7.6 08/21/2020     Lab Results   Component Value Date    .4 08/21/2020       Lab Results   Component Value Date    CHOL 139 08/21/2020    CHOL 133 05/19/2020    CHOL 141 02/11/2020 Lab Results   Component Value Date    TRIG 46 08/21/2020    TRIG 42 05/19/2020    TRIG 64 02/11/2020     Lab Results   Component Value Date    HDL 90 (H) 08/21/2020    HDL 85 (H) 05/19/2020    HDL 86 (H) 02/11/2020     Lab Results   Component Value Date    LDLCALC 40 08/21/2020    LDLCALC 40 05/19/2020    LDLCALC 42 02/11/2020     Lab Results   Component Value Date    LABVLDL 9 08/21/2020    LABVLDL 8 05/19/2020    LABVLDL 13 02/11/2020     Lab Results   Component Value Date    CHOLHDLRATIO 2.1 09/12/2013    CHOLHDLRATIO 1.9 04/18/2012    CHOLHDLRATIO 2.0 02/22/2012       Lab Results   Component Value Date    WBC 5.8 11/08/2019    HGB 13.3 11/08/2019    HCT 40.0 11/08/2019    MCV 95.3 11/08/2019     11/08/2019       Lab Results   Component Value Date    CREATININE 0.9 08/21/2020    BUN 28 (H) 08/21/2020     08/21/2020    K 4.6 08/21/2020     08/21/2020    CO2 28 08/21/2020       Diabetes Medications: Yes  Knows name and dose of prescribed medications Yes  Knows prescribed schedule for medicationsYes  Recent change in medication type/dosage: No  Stores  medications properlyYes  Comments:     Monitoring:   Has BG meter: Yes- Dexcom  Testing frequency: multiple times  Recent results: random and after meals  Hypoglycemia? Yes    Anthropometric Measurements:   Wt:   Wt Readings from Last 3 Encounters:   08/28/20 97 lb 12.8 oz (44.4 kg)   07/30/20 100 lb (45.4 kg)   06/09/20 102 lb 6.4 oz (46.4 kg)      BMI:   BMI Readings from Last 3 Encounters:   08/28/20 17.89 kg/m²   07/30/20 18.29 kg/m²   06/09/20 18.72 kg/m²     Patient's stated goal weight:   7% Weight loss goal weight:     Physical Activity History:   Physical activity: yard and house work  Frequency of activity: most days  Duration of activity: irregular  Obstacles to activity: none    Sleep: good    Food and Nutrition History:   Nutrition Awareness/Previous DSMES: yes  Number of people in household: 2  Frequency of Meals Eaten away from home:seldom  Food Availability Problems  Within the past 12 months, have you worried that your food would run out before you got money to buy more? No  Within the past 12 months, has the food you bought not lasted till the end of the month and you didn't have money to get more?  No  Beverage consumption: water, crystal lite  Alcohol consumption: yes   Usual Food consumption:   3 meals, 45-70 grams carbohydrate meals     Barriers:   -none          Follow Up Plan: 4 weeks    Referring Provider: Aretha Coffman MD  Time spent with patient: 30 minutes

## 2020-12-03 ENCOUNTER — HOSPITAL ENCOUNTER (OUTPATIENT)
Age: 80
Discharge: HOME | End: 2020-12-03
Payer: COMMERCIAL

## 2020-12-03 LAB
A/G RATIO: 1.9 (ref 1.1–2.2)
ALBUMIN SERPL-MCNC: 4.2 G/DL (ref 3.4–5)
ALP BLD-CCNC: 62 U/L (ref 40–129)
ALT SERPL-CCNC: 8 U/L (ref 10–40)
ANION GAP SERPL CALCULATED.3IONS-SCNC: 11 MMOL/L (ref 3–16)
AST SERPL-CCNC: 31 U/L (ref 15–37)
BILIRUB SERPL-MCNC: 0.7 MG/DL (ref 0–1)
BUN BLDV-MCNC: 17 MG/DL (ref 7–20)
CALCIUM SERPL-MCNC: 9.5 MG/DL (ref 8.3–10.6)
CHLORIDE BLD-SCNC: 99 MMOL/L (ref 99–110)
CHOLESTEROL, TOTAL: 147 MG/DL (ref 0–199)
CO2: 30 MMOL/L (ref 21–32)
CREAT SERPL-MCNC: 0.8 MG/DL (ref 0.6–1.2)
ESTIMATED AVERAGE GLUCOSE: 157.1 MG/DL
GFR AFRICAN AMERICAN: >60
GFR NON-AFRICAN AMERICAN: >60
GLOBULIN: 2.2 G/DL
GLUCOSE BLD-MCNC: 142 MG/DL (ref 70–99)
HBA1C MFR BLD: 7.1 %
HDLC SERPL-MCNC: 88 MG/DL (ref 40–60)
LDL CHOLESTEROL CALCULATED: 48 MG/DL
POTASSIUM SERPL-SCNC: 3.7 MMOL/L (ref 3.5–5.1)
SODIUM BLD-SCNC: 140 MMOL/L (ref 136–145)
T4 FREE: 1.5 NG/DL (ref 0.9–1.8)
TOTAL PROTEIN: 6.4 G/DL (ref 6.4–8.2)
TRIGL SERPL-MCNC: 56 MG/DL (ref 0–150)
TSH SERPL DL<=0.05 MIU/L-ACNC: 2.08 UIU/ML (ref 0.27–4.2)
VITAMIN D 25-HYDROXY: 63.8 NG/ML
VLDLC SERPL CALC-MCNC: 11 MG/DL

## 2020-12-03 PROCEDURE — 84439 ASSAY OF FREE THYROXINE: CPT

## 2020-12-03 PROCEDURE — 80061 LIPID PANEL: CPT

## 2020-12-03 PROCEDURE — 84443 ASSAY THYROID STIM HORMONE: CPT

## 2020-12-03 PROCEDURE — 80053 COMPREHEN METABOLIC PANEL: CPT

## 2020-12-03 PROCEDURE — 36415 COLL VENOUS BLD VENIPUNCTURE: CPT

## 2020-12-03 PROCEDURE — 82306 VITAMIN D 25 HYDROXY: CPT

## 2020-12-03 PROCEDURE — 83036 HEMOGLOBIN GLYCOSYLATED A1C: CPT

## 2020-12-08 ENCOUNTER — VIRTUAL VISIT (OUTPATIENT)
Dept: FAMILY MEDICINE CLINIC | Age: 80
End: 2020-12-08
Payer: COMMERCIAL

## 2020-12-08 ENCOUNTER — OFFICE VISIT (OUTPATIENT)
Dept: ENDOCRINOLOGY | Age: 80
End: 2020-12-08
Payer: MEDICARE

## 2020-12-08 VITALS
WEIGHT: 96 LBS | TEMPERATURE: 97 F | RESPIRATION RATE: 14 BRPM | DIASTOLIC BLOOD PRESSURE: 64 MMHG | HEART RATE: 80 BPM | BODY MASS INDEX: 17.66 KG/M2 | HEIGHT: 62 IN | SYSTOLIC BLOOD PRESSURE: 128 MMHG

## 2020-12-08 PROCEDURE — G8399 PT W/DXA RESULTS DOCUMENT: HCPCS | Performed by: INTERNAL MEDICINE

## 2020-12-08 PROCEDURE — G8427 DOCREV CUR MEDS BY ELIG CLIN: HCPCS | Performed by: INTERNAL MEDICINE

## 2020-12-08 PROCEDURE — G8484 FLU IMMUNIZE NO ADMIN: HCPCS | Performed by: INTERNAL MEDICINE

## 2020-12-08 PROCEDURE — 1036F TOBACCO NON-USER: CPT | Performed by: INTERNAL MEDICINE

## 2020-12-08 PROCEDURE — 3051F HG A1C>EQUAL 7.0%<8.0%: CPT | Performed by: INTERNAL MEDICINE

## 2020-12-08 PROCEDURE — G8418 CALC BMI BLW LOW PARAM F/U: HCPCS | Performed by: INTERNAL MEDICINE

## 2020-12-08 PROCEDURE — 99214 OFFICE O/P EST MOD 30 MIN: CPT | Performed by: INTERNAL MEDICINE

## 2020-12-08 PROCEDURE — 95251 CONT GLUC MNTR ANALYSIS I&R: CPT | Performed by: INTERNAL MEDICINE

## 2020-12-08 PROCEDURE — G8484 FLU IMMUNIZE NO ADMIN: HCPCS | Performed by: FAMILY MEDICINE

## 2020-12-08 PROCEDURE — G0439 PPPS, SUBSEQ VISIT: HCPCS | Performed by: FAMILY MEDICINE

## 2020-12-08 PROCEDURE — 1090F PRES/ABSN URINE INCON ASSESS: CPT | Performed by: INTERNAL MEDICINE

## 2020-12-08 PROCEDURE — 3051F HG A1C>EQUAL 7.0%<8.0%: CPT | Performed by: FAMILY MEDICINE

## 2020-12-08 PROCEDURE — 1123F ACP DISCUSS/DSCN MKR DOCD: CPT | Performed by: INTERNAL MEDICINE

## 2020-12-08 PROCEDURE — 4040F PNEUMOC VAC/ADMIN/RCVD: CPT | Performed by: INTERNAL MEDICINE

## 2020-12-08 PROCEDURE — 4040F PNEUMOC VAC/ADMIN/RCVD: CPT | Performed by: FAMILY MEDICINE

## 2020-12-08 PROCEDURE — 1123F ACP DISCUSS/DSCN MKR DOCD: CPT | Performed by: FAMILY MEDICINE

## 2020-12-08 RX ORDER — GLUCAGON 3 MG/1
POWDER NASAL
Qty: 2 EACH | Refills: 3 | Status: SHIPPED | OUTPATIENT
Start: 2020-12-08 | End: 2021-09-10

## 2020-12-08 RX ORDER — IBUPROFEN 600 MG/1
1 TABLET ORAL ONCE
Qty: 1 MG | Refills: 3 | Status: SHIPPED | OUTPATIENT
Start: 2020-12-08 | End: 2020-12-18 | Stop reason: SDUPTHER

## 2020-12-08 ASSESSMENT — LIFESTYLE VARIABLES
AUDIT TOTAL SCORE: 0
HAVE YOU OR SOMEONE ELSE BEEN INJURED AS A RESULT OF YOUR DRINKING: NO
HOW OFTEN DURING THE LAST YEAR HAVE YOU NEEDED AN ALCOHOLIC DRINK FIRST THING IN THE MORNING TO GET YOURSELF GOING AFTER A NIGHT OF HEAVY DRINKING: NEVER
HOW MANY STANDARD DRINKS CONTAINING ALCOHOL DO YOU HAVE ON A TYPICAL DAY: ONE OR TWO
HOW OFTEN DO YOU HAVE A DRINK CONTAINING ALCOHOL: TWO TO THREE TIMES A WEEK
HOW OFTEN DURING THE LAST YEAR HAVE YOU HAD A FEELING OF GUILT OR REMORSE AFTER DRINKING: NEVER
HOW OFTEN DURING THE LAST YEAR HAVE YOU BEEN UNABLE TO REMEMBER WHAT HAPPENED THE NIGHT BEFORE BECAUSE YOU HAD BEEN DRINKING: NEVER
HAS A RELATIVE, FRIEND, DOCTOR, OR ANOTHER HEALTH PROFESSIONAL EXPRESSED CONCERN ABOUT YOUR DRINKING OR SUGGESTED YOU CUT DOWN: NO
HOW OFTEN DO YOU HAVE SIX OR MORE DRINKS ON ONE OCCASION: NEVER
HOW OFTEN DURING THE LAST YEAR HAVE YOU FAILED TO DO WHAT WAS NORMALLY EXPECTED FROM YOU BECAUSE OF DRINKING: NEVER
HOW OFTEN DURING THE LAST YEAR HAVE YOU FOUND THAT YOU WERE NOT ABLE TO STOP DRINKING ONCE YOU HAD STARTED: NEVER
AUDIT-C TOTAL SCORE: 0

## 2020-12-08 ASSESSMENT — PATIENT HEALTH QUESTIONNAIRE - PHQ9
SUM OF ALL RESPONSES TO PHQ QUESTIONS 1-9: 0
2. FEELING DOWN, DEPRESSED OR HOPELESS: 0
1. LITTLE INTEREST OR PLEASURE IN DOING THINGS: 0
SUM OF ALL RESPONSES TO PHQ QUESTIONS 1-9: 0
SUM OF ALL RESPONSES TO PHQ QUESTIONS 1-9: 0
SUM OF ALL RESPONSES TO PHQ9 QUESTIONS 1 & 2: 0

## 2020-12-08 NOTE — PATIENT INSTRUCTIONS
Personalized Preventive Plan for Vlad Pina - 12/8/2020  Medicare offers a range of preventive health benefits. Some of the tests and screenings are paid in full while other may be subject to a deductible, co-insurance, and/or copay. Some of these benefits include a comprehensive review of your medical history including lifestyle, illnesses that may run in your family, and various assessments and screenings as appropriate. After reviewing your medical record and screening and assessments performed today your provider may have ordered immunizations, labs, imaging, and/or referrals for you. A list of these orders (if applicable) as well as your Preventive Care list are included within your After Visit Summary for your review. Other Preventive Recommendations:    · A preventive eye exam performed by an eye specialist is recommended every 1-2 years to screen for glaucoma; cataracts, macular degeneration, and other eye disorders. · A preventive dental visit is recommended every 6 months. · Try to get at least 150 minutes of exercise per week or 10,000 steps per day on a pedometer . · Order or download the FREE \"Exercise & Physical Activity: Your Everyday Guide\" from [employer]. Call 3-558.728.7143 or search [employer] online. · You need 5229-8412 mg of calcium and 3857-9146 IU of vitamin D per day. It is possible to meet your calcium requirement with diet alone, but a vitamin D supplement is usually necessary to meet this goal.  · When exposed to the sun, use a sunscreen that protects against both UVA and UVB radiation with an SPF of 30 or greater. Reapply every 2 to 3 hours or after sweating, drying off with a towel, or swimming. · Always wear a seat belt when traveling in a car. Always wear a helmet when riding a bicycle or motorcycle.

## 2020-12-08 NOTE — PROGRESS NOTES
Hyperlipidemia     Hypertension     Hypothyroidism (acquired)     Insomnia     Neuropathy     [facility]     Renal insufficiency     Restless legs syndrome     Type I (juvenile type) diabetes mellitus without mention of complication, not stated as uncontrolled       Patient Active Problem List   Diagnosis    [facility]    Mixed hyperlipidemia    Hashimoto's thyroiditis    Acquired hypothyroidism    Type 1 diabetes, uncontrolled, with neuropathy ([facility]    Chest pain    CAD (coronary artery disease)    CKD (chronic kidney disease) stage 3, GFR 30-59 ml/min ([facility]    Rotator cuff tendonitis    Osteoporosis    Type 1 diabetes, uncontrolled, with retinopathy ([facility]    Weight loss, abnormal     Past Surgical History:   Procedure Laterality Date    CARDIAC CATHETERIZATION  2012    non obstructive CAD    COLONOSCOPY      DENTAL SURGERY      TONSILLECTOMY       Social History     Socioeconomic History    Marital status:      Spouse name: Not on file    Number of children: Not on file    Years of education: Not on file    Highest education level: Not on file   Occupational History    Not on file   Social Needs    Financial resource strain: Not on file    Food insecurity     Worry: Not on file     Inability: Not on file    Transportation needs     Medical: Not on file     Non-medical: Not on file   Tobacco Use    Smoking status: Former Smoker     Packs/day: 0.25     Years: 4.00     Pack years: 1.00     Types: Cigarettes     Last attempt to quit: 1966     Years since quittin.5    Smokeless tobacco: Never Used   Substance and Sexual Activity    Alcohol use:  Yes     Alcohol/week: 1.0 standard drinks     Types: 1 Glasses of wine per week     Comment: social    Drug use: No    Sexual activity: Never     Partners: Male   Lifestyle    Physical activity     Days per week: Not on file     Minutes per session: Not on file    Stress: Not on file Retinopathy DX Code E10.49 & Peripheal Neuropathy DX Code E10.65) 240 each 3    carbidopa-levodopa (SINEMET)  MG per tablet Take 0.5 tablets by mouth 4 times daily       Insulin Infusion Pump (PARADIGM INSULIN PUMP) by Does not apply route.  aspirin 81 MG EC tablet Take 1 tablet by mouth daily. No current facility-administered medications for this visit.       No Known Allergies  Family Status   Relation Name Status    Father      Mother  Alive    Other niece Alive   Rolo Loser Brother  [de-identified]    Sister  Alive    Neg Hx  (Not Specified)       Lab Review:    Lab Results   Component Value Date    WBC 5.8 2019    HGB 13.3 2019    HCT 40.0 2019    MCV 95.3 2019     2019     Lab Results   Component Value Date     2020    K 3.7 2020    CL 99 2020    CO2 30 2020    BUN 17 2020    CREATININE 0.8 2020    GLUCOSE 142 2020    GLUCOSE 152 2012    CALCIUM 9.5 2020    PROT 6.4 2020    PROT 6.9 2012    LABALBU 4.2 2020    BILITOT 0.7 2020    ALKPHOS 62 2020    AST 31 2020    ALT 8 2020    LABGLOM >60 2020    LABGLOM 54 2014    GFRAA >60 2020    GFRAA >60 2012    AGRATIO 1.9 2020    GLOB 2.2 2020     Lab Results   Component Value Date    TSH 2.08 2020     Lab Results   Component Value Date    LABA1C 7.1 2020     Lab Results   Component Value Date    .1 2020     Lab Results   Component Value Date    CHOL 147 2020     Lab Results   Component Value Date    TRIG 56 2020     Lab Results   Component Value Date    HDL 88 2020    HDL 88 2012     Lab Results   Component Value Date    LDLCALC 48 2020     Lab Results   Component Value Date    LABVLDL 11 2020     Lab Results   Component Value Date    CHOLHDLRATIO 2.1 2013     Lab Results   Component Value Date    LABMICR 3.40 08/21/2020     Lab Results   Component Value Date    VITD25 63.8 12/03/2020        Review of Systems:  Constitutional: has fatigue, no fever, no recent weight gain, has recent weight loss, has changes in appetite  Eyes: no eye pain, no change in vision, no eye redness, no eye irritation, no double vision  Ears, nose, throat: no nasal congestion, no sore throat, no earache, no decrease in hearing, no hoarseness, no dry mouth, no sinus problems, no difficulty swallowing, no neck lumps, no dental problems, no mouth sores, no ringing in ears  Pulmonary: sometimes has shortness of breath, no wheezing, has dyspnea on exertion, no cough  Cardiovascular: no chest pain, has lower extremity edema, no orthopnea, no intermittent leg claudication, no palpitations  Gastrointestinal: no abdominal pain, no nausea, no vomiting, no diarrhea, no constipation, no dysphagia, no heartburn, no bloating  Genitourinary: no dysuria, has sometimes urinary incontinence, no urinary hesitancy, no urinary frequency, no feelings of urinary urgency, no nocturia  Musculoskeletal: no joint swelling, no joint stiffness, no joint pain, no muscle cramps, no muscle pain, no bone pain  Integument/Breast: no hair loss, no skin rashes, no skin lesions, no itching, no dry skin  Neurological: no numbness, no tingling, no weakness, no confusion, no headaches, no dizziness, no fainting, no tremors, no decrease in memory, has balance problems  Psychiatric: no anxiety, no depression, no insomnia  Hematologic/Lymphatic: no tendency for easy bleeding, no swollen lymph nodes, no tendency for easy bruising  Immunology: no seasonal allergies, no frequent infections, no frequent illnesses  Endocrine: no temperature intolerance    /64   Pulse 80   Temp 97 °F (36.1 °C)   Resp 14   Ht 5' 2\" (1.575 m)   Wt 96 lb (43.5 kg)   BMI 17.56 kg/m²    Wt Readings from Last 3 Encounters:   12/08/20 96 lb (43.5 kg)   08/28/20 97 lb 12.8 oz (44.4 kg)   07/30/20 100 lb (45.4 kg)     Body mass index is 17.56 kg/m². OBJECTIVE:  Constitutional: no acute distress, well appearing and well nourished  Psychiatric: oriented to person, place and time, judgement and insight and normal, recent and remote memory and intact and mood and affect are normal  Skin: skin and subcutaneous tissue is normal without mass, normal turgor  Head and Face: examination of head and face revealed no abnormalities  Eyes: no lid or conjunctival swelling, erythema or discharge, pupils are normal, equal, round, reactive to light  Ears/Nose: external inspection of ears and nose revealed no abnormalities, hearing is grossly normal  Oropharynx/Mouth/Face: lips, tongue and gums are normal with no lesions, the voice quality was normal  Neck: neck is supple and symmetric, with midline trachea and no masses, thyroid is normal  Lymphatics: normal cervical lymph nodes, normal supraclavicular nodes  Pulmonary: no increased work of breathing or signs of respiratory distress, lungs are clear to auscultation  Cardiovascular: normal heart rate and rhythm, normal S1 and S2, no murmurs and pedal pulses and 2+ bilaterally, 1+ edema  Abdomen: abdomen is soft, non-tender with no masses  Musculoskeletal: normal gait and station and exam of the digits and nails are normal  Neurological: normal coordination and normal general cortical function    ASSESSMENT/PLAN:  1. Type 1 diabetes, uncontrolled, with neuropathy (HCC)  Change basal rates are as follows 12 AM to 4 AM 0.100, 5 PM to 10 PM 0.375, 10 PM to 12 AM 0.400 unit/h  Do boluses 15 min before meal  Diabetes educator follow up  Insulin pump data was reviewed. Very fluctuating BG, hypoglycemia 4 days a week, has hypoglycemia unawareness unless it is very low, has very brittle diabetes. On Medtronic Minimed insulin pump and Dexcom CGM  Hemoglobin A1c 7.8-7.1-7.6-7.5-7.6-7.1  Same ratios and ratios.  - Hemoglobin A1C; Future  - Comprehensive Metabolic Panel;  Future  - Lipid Panel; Future  - Microalbumin / Creatinine Urine Ratio; Future  See PCP for right calf lesion. 2. Osteoporosis, unspecified osteoporosis type, unspecified pathological fracture presence  T-score -2.4  DXA 2007 Osteoporosis  Worsening of bone density in a hip  Proceed with Prolia  25 hydroxy vitamin D 50-49-47-50.6-52.2-63.8  - Vitamin D 25 Hydroxy; Future    3. Hashimoto's thyroiditis  TSH 2.0  - T4, Free; Future  - TSH without Reflex; Future    4. Acquired hypothyroidism  TSH 2.5-3.0-2.38-1.12-2.85  - levothyroxine (SYNTHROID) 100 MCG tablet; Take 1 tablet by mouth daily  Dispense: 90 tablet; Refill: 1    5. Coronary artery disease involving native coronary artery of native heart without angina pectoris  Follow with Cardiology    6. [facility] diabetes, uncontrolled, [facility]  - Hemoglobin A1C; Future  - Comprehensive Metabolic Panel; Future  - Lipid Panel; Future  - Microalbumin / Creatinine Urine Ratio; Future    7. Weight loss, abnormal  Diabetes educator  Supplements with protein, adequate caloric intake is recommended  Still losing weight  - ACTH; Future  - Cortisol AM, Total; Future    8. Mixed hyperlipidemia  LDL 38-88-34-42-40-40-48  Lipid panel  Simvastatin 20 mg    9.  CKD, stage 3  Follow GFR  GFR 48-53->60      Reviewed and/or ordered clinical lab results Yes  Reviewed and/or ordered radiology tests Yes  Reviewed and/or ordered other diagnostic tests No  Discussed test results with performing physician No  Independently reviewed image, tracing, or specimen   Made a decision to obtain old records No  Reviewed and summarized old records Yes  TSH 3.89, HbA1C 7.7, LDL 49  Obtained history from other than patient No    Artem Jimenez was counseled regarding symptoms of diabetes diagnosis, course and complications of disease if inadequately treated, side effects of medications, diagnosis, treatment options, and prognosis, risks, benefits, complications, and alternatives of treatment, labs, imaging and other studies and treatment targets and goals, insulin pump adjustments, hypoglycemia management, brittle diabetes, fluctuations of blood glucose, osteoporosis treatment, Prolia, calf lesion, referral to PCP. She understands instructions and counseling. CGMS Download Review and Recommendations  See scanned document for glucose tracing record  Dexcom personal CGMS data downloaded and reviewed. This was separate service provided for CGM interpretation    Average glucose 172± 74 SD  Glucose management indicator 7.4  Time in range: 56%  Time above 180: 38%  Time under 70: 6%     Basal pattern review: Reasonable basal rates with low basal blood glucose levels from 6 PM -9 PM, high blood sugar between 9 AM and 12 PM  Postprandial pattern review: Postprandial hyperglycemia, mostly after breakfast  Hypoglycemia review: Hypoglycemia between 6 PM to 9 PM, 12 AM to 3 AM  Activity related review: Not recorded      Based on the data, I recommend:    1. Do boluses 15 minutes before meals    2. Do not underestimate carbs, especially at breakfast    3. Follow closely activity level in the afternoon. Consider snack to avoid hypoglycemia between 6 and 9 PM    4. Insulin pump adjustments to avoid hypoglycemia    5. Close monitoring        Return in about 3 months (around 3/8/2021) for diabetes.

## 2020-12-08 NOTE — PROGRESS NOTES
Due to the current coronavirus pandemic, this telephone/video visit was insisted, with patient's consent, to reduce the patient's risk of exposure to COVID-19 and provide continuity of care for an established patient. The patient was at home while the provider was either at home or at the clinic. Services were provided through a synchronous discussion over the telephone and/or video chat to substitute for in person clinic visit, and coded as such. Medicare Annual Wellness Visit  Name: Trae Levin Date: 2020   MRN: [id number] Sex: Female   Age: [de-identified] y.o. Ethnicity: Non-/Non    : 1940 Race: Gold Cowart is here for Medicare AWV    Screenings for behavioral, psychosocial and functional/safety risks, and cognitive dysfunction are all negative except as indicated below. These results, as well as other patient data from the [address] [facility] form, are documented in Flowsheets linked to this Encounter. No Known Allergies    Prior to Visit Medications    Medication Sig Taking? Authorizing Provider   glucagon (GLUCAGON EMERGENCY) 1 MG injection Inject 1 mg into the muscle once for 1 dose Yes Carlyon Denver, MD   BAQSIMI TWO PACK 3 MG/DOSE POWD 1 spray into nostril for emergencies Yes Carlyon Denver, MD   denosumab (PROLIA) 60 MG/ML SOSY SC injection Inject 1 mL into the skin once for 1 dose Yes Carlyon Denver, MD   levothyroxine (SYNTHROID) 100 MCG tablet TAKE 1 TABLET DAILY Yes Carlyon Denver, MD   simvastatin (ZOCOR) 20 MG tablet Take 1 tablet by mouth nightly Yes Nathan Recinos APRN - CNP   insulin aspart (NOVOLOG) 100 UNIT/ML injection vial INJECT TOTAL DAILY DOSE 30 UNITS SUBCUTANEOUSLY WITH INSULIN PUMP.  Yes Carlyon Denver, MD   pramipexole (MIRAPEX) 0.5 MG tablet Take 0.5 mg by mouth 2 times daily  Yes Historical Provider, MD   Multiple Vitamins-Minerals (CENTRUM SILVER) TABS Take by mouth daily Yes Historical Provider, MD   Cholecalciferol (VITAMIN D3)  UNITS CAPS Take 1 capsule by mouth daily Yes Historical Provider, MD   TIANA MICROLET LANCETS MISC 1 each by Does not apply route 8 times daily Brittle Diabetes Dx Code 250.61  Patient taking differently: 1 each by Does not apply route 8 times daily Retinopathy DX Code E10.49 & Peripheal Neuropathy DX Code E10.65 Yes Benito Kidney, MD   carbidopa-levodopa (SINEMET)  MG per tablet Take 0.5 tablets by mouth 4 times daily  Yes Historical Provider, MD   Insulin Infusion Pump (PARADIGM INSULIN PUMP) by Does not apply route. Yes Historical Provider, MD   aspirin 81 MG EC tablet Take 1 tablet by mouth daily.  Yes Eron Pineda MD       Past Medical History:   Diagnosis Date    CAD (coronary artery disease) 1/2012    non obstructive    Diabetes mellitus [facility] neurological manifestation [facility]     Hyperlipidemia     Hypertension     Hypothyroidism (acquired)     Insomnia     Neuropathy     Parkinson disease ([facility]     Renal insufficiency     Restless legs syndrome     Type I (juvenile type) diabetes mellitus without mention of complication, not stated as uncontrolled        Past Surgical History:   Procedure Laterality Date    CARDIAC CATHETERIZATION  1/24/2012    non obstructive CAD    COLONOSCOPY      DENTAL SURGERY      TONSILLECTOMY         Family History   Problem Relation Age of Onset    Cancer Father     Heart Disease Father     Heart Disease Mother     Cancer Other         sister's daughter - Melanoma    Heart Disease Brother     Arthritis Sister     Diabetes Neg Hx     Stroke Neg Hx     Osteoporosis Neg Hx     Thyroid Disease Neg Hx        CareTeam (Including outside providers/suppliers regularly involved in providing care):   Patient Care Team:  Cindy Perez MD as PCP - Maday Taylor MD as PCP - [facility] Empaneled Provider  VON De Oliveira - CNP as Nurse Practitioner (Nurse Practitioner)  Sheyla Phelps - cardio  Dr. Brandyn Delarosa - pod  Dr.D. Phong White - Neuro   - opthal  Dr. Tammie Larsen - derm  Wt Readings from Last 3 Encounters:   12/08/20 96 lb (43.5 kg)   08/28/20 97 lb 12.8 oz (44.4 kg)   07/30/20 100 lb (45.4 kg)     Patient-Reported Vitals 12/8/2020   Patient-Reported Weight 96   Patient-Reported Height -   Patient-Reported Systolic 855   Patient-Reported Diastolic 68   Patient-Reported Pulse 80   Patient-Reported Temperature 97   Patient-Reported SpO2 -      There is no height or weight on file to calculate BMI. Based upon direct observation of the patient, evaluation of cognition reveals recent and remote memory intact. General Appearance: alert and oriented to person, place and time, well-developed and well-nourished, in no acute distress    Patient's complete Health Risk Assessment and screening values have been reviewed and are found in Flowsheets. The following problems were reviewed today and where indicated follow up appointments were made and/or referrals ordered. Positive Risk Factor Screenings with Interventions:     General Health and ACP:  General  In general, how would you say your health is?: Good  In the past 7 days, have you experienced any of the following?  New or Increased Pain, New or Increased Fatigue, Loneliness, Social Isolation, Stress or Anger?: (!) Stress  Do you get the social and emotional support that you need?: Yes  Do you have a Living Will?: (!) No  Advance Directives     Power of [address] [place] [address] Will ACP-Advance Directive ACP-Power of     Not on File Not on File Not on File Not on File      General Health Risk Interventions:  · Pain issues: home exercises provided    Health Habits/Nutrition:  Health Habits/Nutrition  Do you exercise for at least 20 minutes 2-3 times per week?: (!) No  Have you lost any weight without trying in the past 3 months?: (!) Yes  Do you eat fewer than 2 meals per day?: No  Have you seen a dentist within the past year?: Yes     Health Habits/Nutrition Interventions:  · Inadequate physical activity:  patient is not ready to increase his/her physical activity level at this time    Safety:  Safety  Do you have working smoke detectors?: Yes  Have all throw rugs been removed or fastened?: (!) No  Do you have non-slip mats or surfaces in all bathtubs/showers?: (!) No  Do all of your stairways have a railing or banister?: Yes  Are your doorways, halls and stairs free of clutter?: Yes  Do you always fasten your seatbelt when you are in a car?: Yes  Safety Interventions:  · Home safety tips provided    Personalized Preventive Plan   Current Health Maintenance Status  Immunization History   Administered Date(s) Administered    Influenza A ([id number]) Vaccine PF IM 01/22/2010    Influenza Vaccine, unspecified formulation 10/24/2016    Influenza Virus Vaccine 09/17/2013    Influenza Whole 09/17/2013    Influenza, High Dose (Fluzone 65 yrs and older) 11/25/2014, 10/30/2017, 09/19/2019    Influenza, Quadv, adjuvanted, 65 yrs +, IM, PF (Fluad) 10/05/2020    Pneumococcal Conjugate 13-valent (Scweedb76) 10/24/2016    Pneumococcal Conjugate 7-valent (Prevnar7) 09/19/2019    Pneumococcal Polysaccharide ([id number]) 11/12/2008, 09/19/2019    Td, unspecified formulation 06/04/1998, 04/23/2012    Tdap (Boostrix, Adacel) 06/26/2017    Zoster Live (Zostavax) 04/17/2009, 01/17/2013    Zoster Recombinant (Shingrix) 09/19/2019, 01/14/2020        Health Maintenance   Topic Date Due    Lipid screen  12/03/2021    TSH testing  12/03/2021    Potassium monitoring  12/03/2021    Creatinine monitoring  12/03/2021    DTaP/Tdap/Td vaccine (2 - Td) 06/26/2027    DEXA (modify frequency per FRAX score)  Completed    Flu vaccine  Completed    Shingles Vaccine  Completed    Pneumococcal 65+ years Vaccine  Completed    Hepatitis A vaccine  Aged Out    Hib vaccine  Aged Out    Meningococcal (ACWY) vaccine  Aged Out     Recommendations for [employer] Due: see orders and patient instructions/AVS.  . Recommended screening schedule for the next 5-10 years is provided to the patient in written form: see Patient Thierry Baltazar was seen today for medicare awv. Diagnoses and all orders for this visit:    Routine general medical examination at a health care facility    Encounter Diagnoses   Name Primary?     Routine general medical examination at a health care facility Yes    Coronary artery disease involving native coronary artery of native heart without angina pectoris     Essential hypertension     Mixed hyperlipidemia     Osteoporosis, unspecified osteoporosis type, unspecified pathological fracture presence Hoping to get prolia approved    Parkinson disease (HCC)RLS mirapex higher dose is working well    Acquired hypothyroidism     Type 1 diabetes, uncontrolled, with retinopathy ([facility] [address]     Stage 3 chronic kidney disease, unspecified whether stage 3a or 3b CKD    IMM UTD  HM UTD  Wt loss  Lab Results   Component Value Date     12/03/2020    K 3.7 12/03/2020    CL 99 12/03/2020    CO2 30 12/03/2020    BUN 17 12/03/2020    CREATININE 0.8 12/03/2020    GLUCOSE 142 (H) 12/03/2020    CALCIUM 9.5 12/03/2020    PROT 6.4 12/03/2020    LABALBU 4.2 12/03/2020    BILITOT 0.7 12/03/2020    ALKPHOS 62 12/03/2020    AST 31 12/03/2020    ALT 8 (L) 12/03/2020    LABGLOM >60 12/03/2020    GFRAA >60 12/03/2020    AGRATIO 1.9 12/03/2020    GLOB 2.2 12/03/2020     Lab Results   Component Value Date    LABA1C 7.1 12/03/2020     Lab Results   Component Value Date    .1 12/03/2020     Lab Results   Component Value Date    WBC 5.8 11/08/2019    HGB 13.3 11/08/2019    HCT 40.0 11/08/2019    MCV 95.3 11/08/2019     11/08/2019     Lab Results   Component Value Date    CHOL 147 12/03/2020    CHOL 139 08/21/2020    CHOL 133 05/19/2020     Lab Results   Component Value Date    TRIG 56 12/03/2020    TRIG 46 08/21/2020    TRIG 42 05/19/2020     Lab Results   Component Value Date HDL 88 (H) 12/03/2020    HDL 90 (H) 08/21/2020    HDL 85 (H) 05/19/2020     Lab Results   Component Value Date    LDLCALC 48 12/03/2020    LDLCALC 40 08/21/2020    LDLCALC 40 05/19/2020     Lab Results   Component Value Date    LABVLDL 11 12/03/2020    LABVLDL 9 08/21/2020    LABVLDL 8 05/19/2020     Lab Results   Component Value Date    CHOLHDLRATIO 2.1 09/12/2013    CHOLHDLRATIO 1.9 04/18/2012    CHOLHDLRATIO 2.0 02/22/2012     Wt   Down 1`4 # in 14 mo  [employer] is a [de-identified] y.o. female being evaluated by a Virtual Visit (video and audio) encounter to address concerns as mentioned above. A caregiver was present when appropriate. Due to this being a TeleHealth encounter (During [place] [address] public [facility] emergency), evaluation of the following organ systems was limited: Vitals/Constitutional/EENT/Resp/CV/GI//MS/Neuro/Skin/Heme-Lymph-Imm. Pursuant to the emergency declaration under the [address] [place] [address], [address] [place] [address] authority and the [employer] Act, this Virtual Visit was conducted with patient's (and/or legal guardian's) consent, to reduce the patient's risk of exposure to COVID-19 and provide necessary medical care. The patient (and/or legal guardian) has also been advised to contact this office for worsening conditions or problems, and seek emergency medical treatment and/or call 911 if deemed necessary. Patient identification was verified at the start of the visit: Yes    Services were provided through a video synchronous discussion virtually to substitute for in-person clinic visit. Patient and provider were located at their individual homes. --Kadi Monk MD on 12/8/2020 at 1:41 PM    An electronic signature was used to authenticate this note.

## 2020-12-18 ENCOUNTER — TELEPHONE (OUTPATIENT)
Dept: ENDOCRINOLOGY | Age: 80
End: 2020-12-18

## 2020-12-18 NOTE — TELEPHONE ENCOUNTER
Requested Prescriptions     Pending Prescriptions Disp Refills    Glucagon, rDNA, (GLUCAGON EMERGENCY) 1 MG KIT       Sig: Inject 1 mg into the muscle once for 1 dose     LAST REFILL:12/08/2020  LAST OV: 12/08/2020  NEXT OV:12/29/2020

## 2020-12-18 NOTE — TELEPHONE ENCOUNTER
PT lvm to have a script sent to [address] [place] [address] for [employer] Glucagon injection pen.  PT wants this specific pen Only

## 2020-12-20 RX ORDER — IBUPROFEN 600 MG/1
1 TABLET ORAL ONCE
Qty: 1 KIT | Refills: 3 | Status: SHIPPED | OUTPATIENT
Start: 2020-12-20 | End: 2020-12-21 | Stop reason: ALTCHOICE

## 2020-12-21 RX ORDER — GLUCAGON INJECTION, SOLUTION 1 MG/.2ML
INJECTION, SOLUTION SUBCUTANEOUS
Qty: 2 SYRINGE | Refills: 3 | Status: SHIPPED | OUTPATIENT
Start: 2020-12-21 | End: 2022-02-14

## 2020-12-21 NOTE — TELEPHONE ENCOUNTER
PT called back stated she asked for [employer] Glucagon pen Not the glucagon kit that was sent.  She would like the [employer] glucagon pen and she states it's a Hypo pen and she would like it sent to pharmacy today

## 2020-12-29 ENCOUNTER — OFFICE VISIT (OUTPATIENT)
Dept: ENDOCRINOLOGY | Age: 80
End: 2020-12-29
Payer: MEDICARE

## 2020-12-29 PROCEDURE — 97803 MED NUTRITION INDIV SUBSEQ: CPT | Performed by: DIETITIAN, REGISTERED

## 2020-12-29 NOTE — PROGRESS NOTES
Medical Nutrition Therapy for Diabetes    Cecil Julien  December 29, 2020      Patient Care Team:  Perla Castillo MD as PCP - Leopold Phenix, MD as PCP - [facility]  VON Bernard CNP as Nurse Practitioner (Nurse Practitioner)    Reason for visit: Type 1 Diabetes    ASSESSMENT/PLAN:   NUTRITION DIAGNOSIS    Patient reports 3 hypoglycemia episodes last week. Identified patient by name and date of birth. Patient is meeting this telephone appointment at home. I am at [facility] Endocrinology office. Patient is the sole participant on the phone call. Pursuant to the emergency declaration under the [facility], [facility], [facility] waiver authority and the [employer] Act this phone Visit was insisted, with patient's consent, to reduce the patient's risk of exposure to COVID-19 and provide continuity of care for an established patient. #1 Problem: Altered Nutrition-Related Laboratory Values (NC-2.2)  Related to: Endocrine/Diabetes   As Evidenced by: Elevated Plasma glucose and/or HgbA1c levels         #2 Problem:  Inconsistent Carbohydrate and Fiber Intake  Related to: Food- and nutrition-related knowledge deficit concerning appropriate amount of carbohydrate and fiber intake  As evidenced by: patient food recall    #3 Problem: Fluids-NI-3.1                     Inadequate fluid intake  NUTRITION INTERVENTION  Nutrition Prescription: 45 grams carbohydrate per meal with protein and non-starch vegetables  15 gram carbohydrate snacks    Diabetes Education/Counseling included:  Reviewed benefits of eating enough carbohydrate to balance activity and insulin provided by pump. Reinforced treatment of hypoglycemia. Interventions:  Recommended having 15 gram carbohydrate and protein before bedtime if glucose is   not 130-150.   Encouraged to use 3 glucose tabs to treat low blood sugar.    NUTRITION MONITORING AND EVALUATION  45 gram carbohydrate meals or 30 gram carbohydrate meals and 15 gram carbohydrate snacks  Attempt to have 2 pm snack with protein  Increase water intake       Patient Active Problem List   Diagnosis    Diabetic polyneuropathy ([facility]    Mixed hyperlipidemia    Hashimoto's thyroiditis    Acquired hypothyroidism    Type 1 diabetes, uncontrolled, with neuropathy ([facility] [facility]    Chest pain    CAD (coronary artery disease)    CKD (chronic kidney disease) stage 3, GFR 30-59 ml/min ([facility])    Rotator cuff tendonitis    Osteoporosis    Type 1 diabetes, uncontrolled, with retinopathy ([facility] [facility]    Weight loss, abnormal    Hypertension    Parkinson disease ([facility]       Current Outpatient Medications   Medication Sig Dispense Refill    GVOKE HYPOPEN 2-PACK 1 MG/0.2ML SOAJ Use for low glucose emergencies, may repeat in 20 min 2 Syringe 3    BAQSIMI TWO PACK 3 MG/DOSE POWD 1 spray into nostril for emergencies 2 each 3    denosumab (PROLIA) 60 MG/ML SOSY SC injection Inject 1 mL into the skin once for 1 dose 1 mL 0    levothyroxine (SYNTHROID) 100 MCG tablet TAKE 1 TABLET DAILY 90 tablet 1    simvastatin (ZOCOR) 20 MG tablet Take 1 tablet by mouth nightly 90 tablet 3    insulin aspart (NOVOLOG) 100 UNIT/ML injection vial INJECT TOTAL DAILY DOSE 30 UNITS SUBCUTANEOUSLY WITH INSULIN PUMP.  90 mL 3    pramipexole (MIRAPEX) 0.5 MG tablet Take 0.5 mg by mouth 2 times daily       Multiple Vitamins-Minerals (CENTRUM SILVER) TABS Take by mouth daily      Cholecalciferol (VITAMIN D3) 2000 UNITS CAPS Take 1 capsule by mouth daily      TIANA MICROLET LANCETS MISC 1 each by Does not apply route 8 times daily Brittle Diabetes Dx Code 250.61 (Patient taking differently: 1 each by Does not apply route 8 times daily Retinopathy DX Code E10.49 & Peripheal Neuropathy DX Code E10.65) 240 each 3    carbidopa-levodopa (SINEMET)  MG per tablet Take 0.5 tablets by mouth 4 times daily       Insulin Infusion Pump (PARADIGM INSULIN PUMP) by Does not apply route.  aspirin 81 MG EC tablet Take 1 tablet by mouth daily. No current facility-administered medications for this visit. NUTRITION ASSESSMENT    Biochemical Data:    Lab Results   Component Value Date    LABA1C 7.1 12/03/2020     Lab Results   Component Value Date    .1 12/03/2020       Lab Results   Component Value Date    CHOL 147 12/03/2020    CHOL 139 08/21/2020    CHOL 133 05/19/2020     Lab Results   Component Value Date    TRIG 56 12/03/2020    TRIG 46 08/21/2020    TRIG 42 05/19/2020     Lab Results   Component Value Date    HDL 88 (H) 12/03/2020    HDL 90 (H) 08/21/2020    HDL 85 (H) 05/19/2020     Lab Results   Component Value Date    LDLCALC 48 12/03/2020    LDLCALC 40 08/21/2020    LDLCALC 40 05/19/2020     Lab Results   Component Value Date    LABVLDL 11 12/03/2020    LABVLDL 9 08/21/2020    LABVLDL 8 05/19/2020     Lab Results   Component Value Date    CHOLHDLRATIO 2.1 09/12/2013    CHOLHDLRATIO 1.9 04/18/2012    CHOLHDLRATIO 2.0 02/22/2012       Lab Results   Component Value Date    WBC 5.8 11/08/2019    HGB 13.3 11/08/2019    HCT 40.0 11/08/2019    MCV 95.3 11/08/2019     11/08/2019       Lab Results   Component Value Date    CREATININE 0.8 12/03/2020    BUN 17 12/03/2020     12/03/2020    K 3.7 12/03/2020    CL 99 12/03/2020    CO2 30 12/03/2020       Diabetes Medications: Yes  Knows name and dose of prescribed medications Yes  Knows prescribed schedule for medicationsYes  Recent change in medication type/dosage: No  Stores  medications properlyYes  Comments:     Monitoring:   Has BG meter: Yes-Dexcom  Testing frequency: multiple daily  Recent results: random and after meals  Hypoglycemia? Yes    Anthropometric Measurements:   Wt:   Wt Readings from Last 3 Encounters:   12/08/20 96 lb (43.5 kg)   08/28/20 97 lb 12.8 oz (44.4 kg)   07/30/20 100 lb (45.4 kg)      BMI:   BMI Readings from Last 3 Encounters:   12/08/20 17.56 kg/m²   08/28/20 17.89 kg/m²   07/30/20 18.29 kg/m²     Patient's stated goal weight:   7% Weight loss goal weight:     Physical Activity History:   Physical activity: yard and house work  Frequency of activity: most days  Duration of activity: irregular  Obstacles to activity: none    Sleep: not answered    Food and Nutrition History:   Nutrition Awareness/Previous DSMES: yes  Number of people in household: 2  Frequency of Meals Eaten away from home:cooks at home most of the time  Food Availability Problems  Within the past 12 months, have you worried that your food would run out before you got money to buy more? No  Within the past 12 months, has the food you bought not lasted till the end of the month and you didn't have money to get more? No  Beverage consumption: water, crystal lite  Alcohol consumption: yes  Usual Food consumption:   3 meals, 30-45 grams carbohydrate meals      Barriers:   -none          Follow Up Plan: 3 months  Contact information provided.     Referring Provider: Alice Dickey MD  Time spent with patient: 15 minutes

## 2021-01-25 ENCOUNTER — TELEPHONE (OUTPATIENT)
Dept: ENDOCRINOLOGY | Age: 81
End: 2021-01-25

## 2021-01-26 NOTE — TELEPHONE ENCOUNTER
T-score -2.4  DXA 2007 Osteoporosis -2.9 Later improved on reclast.  Recently worsening in a hip  Proceed with Prolia    Please fill paperwork for Prolia. Patient will call with her new insurance information. Make sure to fill the worst T-score as -2.9 dated 2007, that this the original date when patient was diagnosed with osteoporosis prior to treatment. I printed it and it is on your desk if you need documentation.

## 2021-03-02 ENCOUNTER — HOSPITAL ENCOUNTER (OUTPATIENT)
Age: 81
Discharge: HOME | End: 2021-03-02
Payer: MEDICARE

## 2021-03-02 DIAGNOSIS — M81.0 OSTEOPOROSIS, UNSPECIFIED OSTEOPOROSIS TYPE, UNSPECIFIED PATHOLOGICAL FRACTURE PRESENCE: ICD-10-CM

## 2021-03-02 DIAGNOSIS — E03.9 ACQUIRED HYPOTHYROIDISM: ICD-10-CM

## 2021-03-02 DIAGNOSIS — E78.2 MIXED HYPERLIPIDEMIA: ICD-10-CM

## 2021-03-02 DIAGNOSIS — I25.10 CORONARY ARTERY DISEASE INVOLVING NATIVE CORONARY ARTERY OF NATIVE HEART WITHOUT ANGINA PECTORIS: ICD-10-CM

## 2021-03-02 DIAGNOSIS — R63.4 WEIGHT LOSS, ABNORMAL: ICD-10-CM

## 2021-03-02 LAB
A/G RATIO: 1.4 (ref 1.1–2.2)
ALBUMIN SERPL-MCNC: 4.2 G/DL (ref 3.4–5)
ALP BLD-CCNC: 78 U/L (ref 40–129)
ALT SERPL-CCNC: 24 U/L (ref 10–40)
ANION GAP SERPL CALCULATED.3IONS-SCNC: 8 MMOL/L (ref 3–16)
AST SERPL-CCNC: 26 U/L (ref 15–37)
BILIRUB SERPL-MCNC: 0.6 MG/DL (ref 0–1)
BUN BLDV-MCNC: 21 MG/DL (ref 7–20)
CALCIUM SERPL-MCNC: 9.9 MG/DL (ref 8.3–10.6)
CHLORIDE BLD-SCNC: 99 MMOL/L (ref 99–110)
CHOLESTEROL, TOTAL: 133 MG/DL (ref 0–199)
CO2: 32 MMOL/L (ref 21–32)
CREAT SERPL-MCNC: 0.9 MG/DL (ref 0.6–1.2)
CREATININE URINE: 94.9 MG/DL (ref 28–259)
ESTIMATED AVERAGE GLUCOSE: 159.9 MG/DL
GFR AFRICAN AMERICAN: >60
GFR NON-AFRICAN AMERICAN: >60
GLOBULIN: 2.9 G/DL
GLUCOSE BLD-MCNC: 177 MG/DL (ref 70–99)
HBA1C MFR BLD: 7.2 %
HDLC SERPL-MCNC: 82 MG/DL (ref 40–60)
LDL CHOLESTEROL CALCULATED: 42 MG/DL
MICROALBUMIN UR-MCNC: <1.2 MG/DL
MICROALBUMIN/CREAT UR-RTO: NORMAL MG/G (ref 0–30)
POTASSIUM SERPL-SCNC: 4 MMOL/L (ref 3.5–5.1)
SODIUM BLD-SCNC: 139 MMOL/L (ref 136–145)
T4 FREE: 1.4 NG/DL (ref 0.9–1.8)
TOTAL PROTEIN: 7.1 G/DL (ref 6.4–8.2)
TRIGL SERPL-MCNC: 43 MG/DL (ref 0–150)
TSH SERPL DL<=0.05 MIU/L-ACNC: 7.29 UIU/ML (ref 0.27–4.2)
VITAMIN D 25-HYDROXY: 55.4 NG/ML
VLDLC SERPL CALC-MCNC: 9 MG/DL

## 2021-03-02 PROCEDURE — 82570 ASSAY OF URINE CREATININE: CPT

## 2021-03-02 PROCEDURE — 82043 UR ALBUMIN QUANTITATIVE: CPT

## 2021-03-02 PROCEDURE — 80061 LIPID PANEL: CPT

## 2021-03-02 PROCEDURE — 83036 HEMOGLOBIN GLYCOSYLATED A1C: CPT

## 2021-03-02 PROCEDURE — 36415 COLL VENOUS BLD VENIPUNCTURE: CPT

## 2021-03-02 PROCEDURE — 84439 ASSAY OF FREE THYROXINE: CPT

## 2021-03-02 PROCEDURE — 80053 COMPREHEN METABOLIC PANEL: CPT

## 2021-03-02 PROCEDURE — 82306 VITAMIN D 25 HYDROXY: CPT

## 2021-03-02 PROCEDURE — 84443 ASSAY THYROID STIM HORMONE: CPT

## 2021-03-05 ENCOUNTER — OFFICE VISIT (OUTPATIENT)
Dept: ENDOCRINOLOGY | Age: 81
End: 2021-03-05
Payer: MEDICARE

## 2021-03-05 VITALS
OXYGEN SATURATION: 100 % | BODY MASS INDEX: 17.96 KG/M2 | HEART RATE: 80 BPM | DIASTOLIC BLOOD PRESSURE: 80 MMHG | WEIGHT: 97.6 LBS | HEIGHT: 62 IN | SYSTOLIC BLOOD PRESSURE: 130 MMHG

## 2021-03-05 DIAGNOSIS — E06.3 HASHIMOTO'S THYROIDITIS: ICD-10-CM

## 2021-03-05 DIAGNOSIS — E78.2 MIXED HYPERLIPIDEMIA: ICD-10-CM

## 2021-03-05 DIAGNOSIS — E03.9 ACQUIRED HYPOTHYROIDISM: ICD-10-CM

## 2021-03-05 DIAGNOSIS — N18.30 STAGE 3 CHRONIC KIDNEY DISEASE, UNSPECIFIED WHETHER STAGE 3A OR 3B CKD (HCC): ICD-10-CM

## 2021-03-05 DIAGNOSIS — I10 ESSENTIAL HYPERTENSION: ICD-10-CM

## 2021-03-05 DIAGNOSIS — M81.0 OSTEOPOROSIS, UNSPECIFIED OSTEOPOROSIS TYPE, UNSPECIFIED PATHOLOGICAL FRACTURE PRESENCE: ICD-10-CM

## 2021-03-05 DIAGNOSIS — R63.4 WEIGHT LOSS, ABNORMAL: ICD-10-CM

## 2021-03-05 DIAGNOSIS — I25.10 CORONARY ARTERY DISEASE INVOLVING NATIVE CORONARY ARTERY OF NATIVE HEART WITHOUT ANGINA PECTORIS: ICD-10-CM

## 2021-03-05 PROCEDURE — G8418 CALC BMI BLW LOW PARAM F/U: HCPCS | Performed by: INTERNAL MEDICINE

## 2021-03-05 PROCEDURE — 1036F TOBACCO NON-USER: CPT | Performed by: INTERNAL MEDICINE

## 2021-03-05 PROCEDURE — G8484 FLU IMMUNIZE NO ADMIN: HCPCS | Performed by: INTERNAL MEDICINE

## 2021-03-05 PROCEDURE — 99214 OFFICE O/P EST MOD 30 MIN: CPT | Performed by: INTERNAL MEDICINE

## 2021-03-05 PROCEDURE — 4040F PNEUMOC VAC/ADMIN/RCVD: CPT | Performed by: INTERNAL MEDICINE

## 2021-03-05 PROCEDURE — 1123F ACP DISCUSS/DSCN MKR DOCD: CPT | Performed by: INTERNAL MEDICINE

## 2021-03-05 PROCEDURE — G8399 PT W/DXA RESULTS DOCUMENT: HCPCS | Performed by: INTERNAL MEDICINE

## 2021-03-05 PROCEDURE — 95251 CONT GLUC MNTR ANALYSIS I&R: CPT | Performed by: INTERNAL MEDICINE

## 2021-03-05 PROCEDURE — G8427 DOCREV CUR MEDS BY ELIG CLIN: HCPCS | Performed by: INTERNAL MEDICINE

## 2021-03-05 PROCEDURE — 1090F PRES/ABSN URINE INCON ASSESS: CPT | Performed by: INTERNAL MEDICINE

## 2021-03-05 RX ORDER — LEVOTHYROXINE SODIUM 0.1 MG/1
TABLET ORAL
Qty: 90 TABLET | Refills: 3 | Status: SHIPPED | OUTPATIENT
Start: 2021-03-05 | End: 2021-12-20 | Stop reason: SDUPTHER

## 2021-03-05 RX ORDER — SIMVASTATIN 20 MG
20 TABLET ORAL NIGHTLY
Qty: 90 TABLET | Refills: 3 | Status: SHIPPED | OUTPATIENT
Start: 2021-03-05 | End: 2021-06-19 | Stop reason: SDUPTHER

## 2021-03-05 NOTE — PROGRESS NOTES
Jennifer Collazo is a 80 y.o. female who presents for Type 1 diabetes mellitus. Current symptoms/problems include fluctuating BG levels and show no change. 1.  Type 1 diabetes, uncontrolled, with neuropathy (HCC) [E10.40, E10.65]    Diagnosed with Type 1 diabetes mellitus in 1999. Low BG at night. Comorbid conditions: hyperlipidemia, Neuropathy, Retinopathy, Chronic Kidney Disease and Coronary Artery Disease    Current diabetic medications include: Novolog    On Medtronic Minimed insulin pump and Dexcom CGM    Intolerance to diabetes medications: No     Weight trend: stable  Prior visit with dietician: yes  Current diet: on average, 3 meals per day  Current exercise: walks     Current monitoring regimen: home blood tests - 8 times daily  Has brought blood glucose log/meter:  Yes  Home blood sugar records: fasting range: 150-250 and postprandial range:   Any episodes of hypoglycemia? Yes  Hypoglycemia frequency and time(s):  daily  Does patient have Glucagon emergency kit? No  Does patient have rapid acting carbohydrate? Yes  Does patient wear a medic alert bracelet or necklace? No    2. Osteoporosis, unspecified osteoporosis type, unspecified pathological fracture presence  Reclast 5 years. Not on any meds now. On vitamin D 2000 IU, calcium in food    3. Hashimoto's thyroiditis  Has fatigue. 4. Acquired hypothyroidism  On levothyroxine 0.1 mg qd. Has fatigue. 5. Coronary artery disease involving native coronary artery of native heart without angina pectoris  No chest pain. 6. Type 1 diabetes, uncontrolled, with retinopathy ([address]  No recent vision changes. 7. Weight loss, abnormal  Lost 42 lbs not intentionally. No nausea, vomiting. 8. CKD, stage 3  No urination problems    9.   Hyperlipidemia  No muscle pain    Past Medical History:   Diagnosis Date    CAD (coronary artery disease) 1/2012    non obstructive    Diabetes mellitus with neurological manifestation ([address]     Hyperlipidemia     Hyperlipidemia     Hypertension     Hypertension     Hypothyroidism (acquired)     Insomnia     Neuropathy     [facility]     [facility]     Renal insufficiency     Restless legs syndrome     Type I (juvenile type) diabetes mellitus without mention of complication, not stated as uncontrolled       Patient Active Problem List   Diagnosis    Diabetic polyneuropathy ([facility]    Mixed hyperlipidemia    Hashimoto's thyroiditis    Acquired hypothyroidism    Type 1 diabetes, uncontrolled, with neuropathy ([facility]    Chest pain    CAD (coronary artery disease)    CKD (chronic kidney disease) stage 3, GFR 30-59 ml/min ([facility])    Rotator cuff tendonitis    Osteoporosis    Type 1 diabetes, uncontrolled, with retinopathy ([facility]    Weight loss, abnormal    Hypertension    Parkinson disease ([facility]     Past Surgical History:   Procedure Laterality Date    CARDIAC CATHETERIZATION  2012    non obstructive CAD    COLONOSCOPY      DENTAL SURGERY      TONSILLECTOMY       Social History     Socioeconomic History    Marital status:      Spouse name: Not on file    Number of children: Not on file    Years of education: Not on file    Highest education level: Not on file   Occupational History    Not on file   Social Needs    Financial resource strain: Not on file    Food insecurity     Worry: Not on file     Inability: Not on file    Transportation needs     Medical: Not on file     Non-medical: Not on file   Tobacco Use    Smoking status: Former Smoker     Packs/day: 0.25     Years: 4.00     Pack years: 1.00     Types: Cigarettes     Quit date: 1966     Years since quittin.2    Smokeless tobacco: Never Used   Substance and Sexual Activity    Alcohol use:  Yes     Alcohol/week: 1.0 standard drinks     Types: 1 Glasses of wine per week     Comment: social    Drug use: No    Sexual activity: Never     Partners: Male   Lifestyle    Physical activity     Days per week: Not on file     Minutes per session: Not on file    Stress: Not on file   Relationships    Social connections     Talks on phone: Not on file     Gets together: Not on file     Attends Anabaptist service: Not on file     Active member of club or organization: Not on file     Attends meetings of clubs or organizations: Not on file     Relationship status: Not on file    Intimate partner violence     Fear of current or ex partner: Not on file     Emotionally abused: Not on file     Physically abused: Not on file     Forced sexual activity: Not on file   Other Topics Concern    Not on file   Social History Narrative    Not on file     Family History   Problem Relation Age of Onset    Cancer Father     Heart Disease Father     Heart Disease Mother     Cancer Other         sister's daughter - Melanoma    Heart Disease Brother     Arthritis Sister     Diabetes Neg Hx     Stroke Neg Hx     Osteoporosis Neg Hx     Thyroid Disease Neg Hx      Current Outpatient Medications   Medication Sig Dispense Refill    levothyroxine (SYNTHROID) 100 MCG tablet TAKE 1 TABLET DAILY 90 tablet 3    insulin aspart (NOVOLOG) 100 UNIT/ML injection vial INJECT TOTAL DAILY DOSE 30 UNITS SUBCUTANEOUSLY WITH INSULIN PUMP.  90 mL 3    simvastatin (ZOCOR) 20 MG tablet Take 1 tablet by mouth nightly 90 tablet 3    GVOKE HYPOPEN 2-PACK 1 MG/0.2ML SOAJ Use for low glucose emergencies, may repeat in 20 min 2 Syringe 3    BAQSIMI TWO PACK 3 MG/DOSE POWD 1 spray into nostril for emergencies 2 each 3    pramipexole (MIRAPEX) 0.5 MG tablet Take 0.5 mg by mouth 2 times daily       Multiple Vitamins-Minerals (CENTRUM SILVER) TABS Take by mouth daily      Cholecalciferol (VITAMIN D3) 2000 UNITS CAPS Take 1 capsule by mouth daily      TIANA MICROLET LANCETS MISC 1 each by Does not apply route 8 times daily Brittle Diabetes Dx Code 250.61 (Patient taking differently: 1 each by Does not apply route 8 times daily Retinopathy DX Code E10.49 & Peripheal Neuropathy DX Code E10.65) 240 each 3    carbidopa-levodopa (SINEMET)  MG per tablet Take 0.5 tablets by mouth 4 times daily       aspirin 81 MG EC tablet Take 1 tablet by mouth daily.  denosumab (PROLIA) 60 MG/ML SOSY SC injection Inject 1 mL into the skin once for 1 dose 1 mL 0    Insulin Infusion Pump (PARADIGM INSULIN PUMP) by Does not apply route. No current facility-administered medications for this visit.       No Known Allergies  Family Status   Relation Name Status    Father      Mother  Alive    Other niece Alive   Ramos Brother  [de-identified]    Sister  Alive    Neg Hx  (Not Specified)       Lab Review:    Lab Results   Component Value Date    WBC 5.8 2019    HGB 13.3 2019    HCT 40.0 2019    MCV 95.3 2019     2019     Lab Results   Component Value Date     2021    K 4.0 2021    CL 99 2021    CO2 32 2021    BUN 21 2021    CREATININE 0.9 2021    GLUCOSE 177 2021    GLUCOSE 152 2012    CALCIUM 9.9 2021    PROT 7.1 2021    PROT 6.9 2012    LABALBU 4.2 2021    BILITOT 0.6 2021    ALKPHOS 78 2021    AST 26 2021    ALT 24 2021    LABGLOM >60 2021    LABGLOM 54 2014    GFRAA >60 2021    GFRAA >60 2012    AGRATIO 1.4 2021    GLOB 2.9 2021     Lab Results   Component Value Date    TSH 7.29 2021     Lab Results   Component Value Date    LABA1C 7.2 2021     Lab Results   Component Value Date    .9 2021     Lab Results   Component Value Date    CHOL 133 2021     Lab Results   Component Value Date    TRIG 43 2021     Lab Results   Component Value Date    HDL 82 2021    HDL 88 2012     Lab Results   Component Value Date    LDLCALC 42 2021     Lab Results   Component Value Date    LABVLDL 9 2021     Lab Results   Component Value Date    CHOLHDLRATIO 2.1 09/12/2013     Lab Results   Component Value Date    LABMICR <1.20 03/02/2021     Lab Results   Component Value Date    VITD25 55.4 03/02/2021        Review of Systems:  Constitutional: has fatigue, no fever, no recent weight gain, has recent weight loss, has changes in appetite  Eyes: no eye pain, no change in vision, no eye redness, no eye irritation, no double vision  Ears, nose, throat: no nasal congestion, no sore throat, no earache, no decrease in hearing, no hoarseness, no dry mouth, no sinus problems, no difficulty swallowing, no neck lumps, no dental problems, no mouth sores, no ringing in ears  Pulmonary: sometimes has shortness of breath, no wheezing, has dyspnea on exertion, no cough  Cardiovascular: no chest pain, has lower extremity edema, no orthopnea, no intermittent leg claudication, no palpitations  Gastrointestinal: no abdominal pain, no nausea, no vomiting, no diarrhea, no constipation, no dysphagia, no heartburn, no bloating  Genitourinary: no dysuria, has sometimes urinary incontinence, no urinary hesitancy, no urinary frequency, no feelings of urinary urgency, no nocturia  Musculoskeletal: no joint swelling, no joint stiffness, no joint pain, no muscle cramps, no muscle pain, no bone pain  Integument/Breast: no hair loss, no skin rashes, no skin lesions, no itching, no dry skin  Neurological: no numbness, no tingling, no weakness, no confusion, no headaches, no dizziness, no fainting, no tremors, no decrease in memory, has balance problems  Psychiatric: no anxiety, no depression, no insomnia  Hematologic/Lymphatic: no tendency for easy bleeding, no swollen lymph nodes, no tendency for easy bruising  Immunology: no seasonal allergies, no frequent infections, no frequent illnesses  Endocrine: no temperature intolerance    /80   Pulse 80   Ht 5' 2\" (1.575 m)   Wt 97 lb 9.6 oz (44.3 kg)   SpO2 100%   BMI 17.85 kg/m²    Wt Readings from Last 3 Encounters: boluses 15 min before meal  Diabetes educator follow up  On Medtronic Minimed insulin pump and Dexcom CGM  Hemoglobin A1c 7.8-7.1-7.6-7.5-7.6-7.1-7.2  Same ratios and ratios.  - Hemoglobin A1C; Future  - Comprehensive Metabolic Panel; Future  - Lipid Panel; Future  - Microalbumin / Creatinine Urine Ratio; Future    2. Osteoporosis, unspecified osteoporosis type, unspecified pathological fracture presence  T-score -2.4  DXA 2007 Osteoporosis  Worsening of bone density in a hip  Prolia  25 hydroxy vitamin D 50-49-47-50.6-52.2-63.8-55.4  - Vitamin D 25 Hydroxy; Future    3. Hashimoto's thyroiditis  TSH 2.0  - T4, Free; Future  - TSH without Reflex; Future    4. Acquired hypothyroidism  Call for results  TSH 2.5-3.0-2.38-1.12-2.85-7.29  Worse, repeat in 1 month  No changes for now  - levothyroxine (SYNTHROID) 100 MCG tablet; Take 1 tablet by mouth daily      5. Coronary artery disease involving native coronary artery of native heart without angina pectoris  Follow with Cardiology    6. Type 1 diabetes, uncontrolled, with retinopathy ([address]  - Hemoglobin A1C; Future  - Comprehensive Metabolic Panel; Future  - Lipid Panel; Future  - Microalbumin / Creatinine Urine Ratio; Future    7. Weight loss, abnormal  Weight stable since last appointment  Diabetes educator  Supplements with protein, adequate caloric intake is recommended  - ACTH; Future  - Cortisol AM, Total; Future    8. Mixed hyperlipidemia  LDL 27-02-87-59-50-39-48-42  Lipid panel  Simvastatin 20 mg daily    9.  CKD, stage 3  Follow GFR  GFR 48-53->60      Reviewed and/or ordered clinical lab results Yes  Reviewed and/or ordered radiology tests Yes  Reviewed and/or ordered other diagnostic tests No  Discussed test results with performing physician No  Independently reviewed image, tracing, or specimen   Made a decision to obtain old records No  Reviewed and summarized old records Yes  TSH 3.89, HbA1C 7.7, LDL 49  Obtained history from other than patient

## 2021-04-09 NOTE — PROGRESS NOTES
Medical Nutrition Therapy for Diabetes    Semaj Ochoa  April 9, 2021      Patient Care Team:  Ever Matute MD as PCP - Hamilton Lamb MD as PCP - [facility]  VON Staton CNP as Nurse Practitioner (Nurse Practitioner)    Reason for visit: Type 1 Diabetes    ASSESSMENT/PLAN:   NUTRITION DIAGNOSIS   Identified patient by name and date of birth. Patient is meeting this telephone appointment at home. I am at [facility] office. Patient is the sole participant on the telephone call. Pursuant to the emergency declaration under the [facility] waiver authority and the [employer] General Act this phone Visit was insisted, with patient's consent, to reduce the patient's risk of exposure to COVID-19 and provide continuity of care for an established patient. #1 Problem: Altered Nutrition-Related Laboratory Values (NC-2.2)  Related to: Endocrine/Diabetes   As Evidenced by: Elevated Plasma glucose and/or HgbA1c levels         #2 Problem: Problem: Inconsistent Carbohydrate and Fiber Intake  Related to: Food- and nutrition-related knowledge deficit concerning appropriate amount of carbohydrate and fiber intake  As evidenced by: patient food recall      #3 Problem: Fluids-NI-3.1                    Inadequate fluid volume    NUTRITION INTERVENTION  Nutrition Prescription: 45 grams carbohydrate per meal with protein and non-starch vegetables  15 gram carbohydrate snacks    Diabetes Education/Counseling included:  Carbohydrate Control, Hypoyglycemia prevention/treatment and Insulin management  Less hypo, taking glucose tabs when CGM indicates low beginning. Eating small lunch. Interventions: Congratulated on improving less frequent low glucose levels. Control Carbohydrate Intake using Carb Counting  Encouraged 30 - 45 gram carbohydrate meals with 3 meals. aspirin 81 MG EC tablet Take 1 tablet by mouth daily. No current facility-administered medications for this visit. NUTRITION ASSESSMENT    Biochemical Data:    Lab Results   Component Value Date    LABA1C 7.2 03/02/2021     Lab Results   Component Value Date    .9 03/02/2021       Lab Results   Component Value Date    CHOL 133 03/02/2021    CHOL 147 12/03/2020    CHOL 139 08/21/2020     Lab Results   Component Value Date    TRIG 43 03/02/2021    TRIG 56 12/03/2020    TRIG 46 08/21/2020     Lab Results   Component Value Date    HDL 82 (H) 03/02/2021    HDL 88 (H) 12/03/2020    HDL 90 (H) 08/21/2020     Lab Results   Component Value Date    LDLCALC 42 03/02/2021    LDLCALC 48 12/03/2020    LDLCALC 40 08/21/2020     Lab Results   Component Value Date    LABVLDL 9 03/02/2021    LABVLDL 11 12/03/2020    LABVLDL 9 08/21/2020     Lab Results   Component Value Date    CHOLHDLRATIO 2.1 09/12/2013    CHOLHDLRATIO 1.9 04/18/2012    CHOLHDLRATIO 2.0 02/22/2012       Lab Results   Component Value Date    WBC 5.8 11/08/2019    HGB 13.3 11/08/2019    HCT 40.0 11/08/2019    MCV 95.3 11/08/2019     11/08/2019       Lab Results   Component Value Date    CREATININE 0.9 03/02/2021    BUN 21 (H) 03/02/2021     03/02/2021    K 4.0 03/02/2021    CL 99 03/02/2021    CO2 32 03/02/2021       Diabetes Medications: Yes  Knows name and dose of prescribed medications Yes  Knows prescribed schedule for medicationsYes  Recent change in medication type/dosage: No  Stores  medications properlyYes  Comments:     Monitoring:   Has BG meter: Yes  Testing frequency: multiple daily  Recent results: random and after meals  Hypoglycemia? Yes, reduced frequency                       Reacts to CGM indicating a start of low blood sugar. No longer waiting until hypoglycemia worsens. Anthropometric Measurements:   Wt:   Wt Readings from Last 3 Encounters:   03/05/21 97 lb 9.6 oz (44.3 kg)   12/08/20 96 lb (43.5 kg)   08/28/20 97 lb 12.8 oz (44.4 kg)      BMI:   BMI Readings from Last 3 Encounters:   03/05/21 17.85 kg/m²   12/08/20 17.56 kg/m²   08/28/20 17.89 kg/m²     Patient's stated goal weight:   7% Weight loss goal weight:   Stable weight    Physical Activity History:   Physical activity: yard and house work  Frequency of activity: daily  Duration of activity: irrgular  Obstacles to activity: none    Sleep: not answered    Food and Nutrition History:   Nutrition Awareness/Previous DSMES: yes  Number of people in household: 2  Frequency of Meals Eaten away from home:meals from home mostly  Food Availability Problems  Within the past 12 months, have you worried that your food would run out before you got money to buy more? No  Within the past 12 months, has the food you bought not lasted till the end of the month and you didn't have money to get more?  No  Beverage consumption: iced tea More than water  Alcohol consumption: yes  Usual Food consumption:   3 meals, lunch meal is small, 30-45 gram carbohydrate meals     Barriers:   -none          Follow Up Plan: 4 months     Referring Provider: Jodie Turner MD  Time spent with patient: 15 minutes Yes

## 2021-04-13 ENCOUNTER — OFFICE VISIT (OUTPATIENT)
Dept: ENDOCRINOLOGY | Age: 81
End: 2021-04-13
Payer: MEDICARE

## 2021-04-13 PROCEDURE — 97803 MED NUTRITION INDIV SUBSEQ: CPT | Performed by: DIETITIAN, REGISTERED

## 2021-04-28 ENCOUNTER — TELEPHONE (OUTPATIENT)
Dept: ENDOCRINOLOGY | Age: 81
End: 2021-04-28

## 2021-04-28 NOTE — TELEPHONE ENCOUNTER
Collette from  [employer] [phone] called to get a turnaround time on when we'll fax the CMN and Chart notes to them. The patient is out of medication and they need the items asap to get her supplies to her. They are asking for the fax or a call back.  The fax number is [phone]

## 2021-04-28 NOTE — TELEPHONE ENCOUNTER
Called Diabetes supply and request CMN be refaxed to [facility] office.

## 2021-05-21 ENCOUNTER — NURSE TRIAGE (OUTPATIENT)
Dept: OTHER | Facility: CLINIC | Age: 81
End: 2021-05-21

## 2021-05-21 NOTE — TELEPHONE ENCOUNTER
Reason for Disposition   Age over 72 years with and area of head swelling or bruise    Answer Assessment - Initial Assessment Questions  1. MECHANISM: \"How did the injury happen? \" For falls, ask: \"What height did you fall from? \" and \"What surface did you fall against?\"       Hit head yesterday. Meagan Theron out of bed and hit head on nightstand. 2. ONSET: \"When did the injury happen? \" (Minutes or hours ago)    occurred yesterday    3. NEUROLOGIC SYMPTOMS: \"Was there any loss of consciousness? \" \"Are there any other neurological symptoms? \"   No loss of consciousness    4. MENTAL STATUS: \"Does the person know who he is, who you are, and where he is?\"     5. LOCATION: \"What part of the head was hit? \"   Back of head    6. SCALP APPEARANCE: \"What does the scalp look like? Is it bleeding now? \" If so, ask: \"Is it difficult to stop? \"     7. SIZE: For cuts, bruises, or swelling, ask: \"How large is it? \" (e.g., inches or centimeters)    has a knot. Is noticeable, not as bad as it was yesterday  Did not break the skin    8. PAIN: \"Is there any pain? \" If so, ask: \"How bad is it? \"  (e.g., Scale 1-10; or mild, moderate, severe)    Pain on head is a 5/10    9. TETANUS: For any breaks in the skin, ask: \"When was the last tetanus booster? \"    10. OTHER SYMPTOMS: \"Do you have any other symptoms? \" (e.g., neck pain, vomiting)    No headache. No dizziness, no vomiting, no vision changes    11. PREGNANCY: \"Is there any chance you are pregnant? \" \"When was your last menstrual period? \"    Protocols used: HEAD INJURY-ADULT-[place]    Received call from [facility] at pre-service center [facility]) Wilma with Red Flag Complaint. Brief description of triage: Pt fell off the bed yesterday and hit the back of her head. Has a knot to the back of her head, not as big today, has been applying ice. Pain is a 5/10. Also hit her tailbone. No headache, no dizziness, no vomiting, on vision changes. No break in the skin. Did not lose consciousness.      Triage indicates for patient to go to [facility] [address] of PCP triage. Attempted to do a 2nd level triage with PCP, waited for 10 minutes, was not able to speak with provider. Spoke with pt, recommended pt go to [facility] or ED for further evaluation. Care advice provided, patient verbalizes understanding; denies any other questions or concerns; instructed to call back for any new or worsening symptoms. Attention Provider: Thank you for allowing me to participate in the care of your patient. The patient was connected to triage in response to information provided to the ECC. Please do not respond through this encounter as the response is not directed to a shared pool.

## 2021-06-02 ENCOUNTER — HOSPITAL ENCOUNTER (OUTPATIENT)
Age: 81
Discharge: HOME | End: 2021-06-02
Payer: MEDICARE

## 2021-06-02 DIAGNOSIS — R63.4 WEIGHT LOSS, ABNORMAL: ICD-10-CM

## 2021-06-02 DIAGNOSIS — I10 ESSENTIAL HYPERTENSION: ICD-10-CM

## 2021-06-02 DIAGNOSIS — E78.2 MIXED HYPERLIPIDEMIA: ICD-10-CM

## 2021-06-02 DIAGNOSIS — M81.0 OSTEOPOROSIS, UNSPECIFIED OSTEOPOROSIS TYPE, UNSPECIFIED PATHOLOGICAL FRACTURE PRESENCE: ICD-10-CM

## 2021-06-02 DIAGNOSIS — E03.9 ACQUIRED HYPOTHYROIDISM: ICD-10-CM

## 2021-06-02 LAB
A/G RATIO: 1.5 (ref 1.1–2.2)
ALBUMIN SERPL-MCNC: 4 G/DL (ref 3.4–5)
ALP BLD-CCNC: 66 U/L (ref 40–129)
ALT SERPL-CCNC: 8 U/L (ref 10–40)
ANION GAP SERPL CALCULATED.3IONS-SCNC: 9 MMOL/L (ref 3–16)
AST SERPL-CCNC: 27 U/L (ref 15–37)
BILIRUB SERPL-MCNC: 0.5 MG/DL (ref 0–1)
BUN BLDV-MCNC: 19 MG/DL (ref 7–20)
CALCIUM SERPL-MCNC: 9.4 MG/DL (ref 8.3–10.6)
CHLORIDE BLD-SCNC: 101 MMOL/L (ref 99–110)
CHOLESTEROL, TOTAL: 137 MG/DL (ref 0–199)
CO2: 31 MMOL/L (ref 21–32)
CREAT SERPL-MCNC: 0.9 MG/DL (ref 0.6–1.2)
CREATININE URINE: 184.9 MG/DL (ref 28–259)
ESTIMATED AVERAGE GLUCOSE: 154.2 MG/DL
GFR AFRICAN AMERICAN: >60
GFR NON-AFRICAN AMERICAN: >60
GLOBULIN: 2.6 G/DL
GLUCOSE BLD-MCNC: 158 MG/DL (ref 70–99)
HBA1C MFR BLD: 7 %
HDLC SERPL-MCNC: 83 MG/DL (ref 40–60)
LDL CHOLESTEROL CALCULATED: 45 MG/DL
MICROALBUMIN UR-MCNC: 1.8 MG/DL
MICROALBUMIN/CREAT UR-RTO: 9.7 MG/G (ref 0–30)
POTASSIUM SERPL-SCNC: 4.2 MMOL/L (ref 3.5–5.1)
SODIUM BLD-SCNC: 141 MMOL/L (ref 136–145)
T3 FREE: 2.1 PG/ML (ref 2.3–4.2)
T4 FREE: 1.4 NG/DL (ref 0.9–1.8)
TOTAL PROTEIN: 6.6 G/DL (ref 6.4–8.2)
TRIGL SERPL-MCNC: 45 MG/DL (ref 0–150)
TSH SERPL DL<=0.05 MIU/L-ACNC: 3.4 UIU/ML (ref 0.27–4.2)
VITAMIN D 25-HYDROXY: 53.4 NG/ML
VLDLC SERPL CALC-MCNC: 9 MG/DL

## 2021-06-02 PROCEDURE — 80061 LIPID PANEL: CPT

## 2021-06-02 PROCEDURE — 82306 VITAMIN D 25 HYDROXY: CPT

## 2021-06-02 PROCEDURE — 82570 ASSAY OF URINE CREATININE: CPT

## 2021-06-02 PROCEDURE — 84443 ASSAY THYROID STIM HORMONE: CPT

## 2021-06-02 PROCEDURE — 84481 FREE ASSAY (FT-3): CPT

## 2021-06-02 PROCEDURE — 83036 HEMOGLOBIN GLYCOSYLATED A1C: CPT

## 2021-06-02 PROCEDURE — 36415 COLL VENOUS BLD VENIPUNCTURE: CPT

## 2021-06-02 PROCEDURE — 84439 ASSAY OF FREE THYROXINE: CPT

## 2021-06-02 PROCEDURE — 82043 UR ALBUMIN QUANTITATIVE: CPT

## 2021-06-02 PROCEDURE — 80053 COMPREHEN METABOLIC PANEL: CPT

## 2021-06-07 ENCOUNTER — OFFICE VISIT (OUTPATIENT)
Dept: ENDOCRINOLOGY | Age: 81
End: 2021-06-07
Payer: MEDICARE

## 2021-06-07 ENCOUNTER — TELEPHONE (OUTPATIENT)
Dept: ENDOCRINOLOGY | Age: 81
End: 2021-06-07

## 2021-06-07 VITALS
WEIGHT: 96 LBS | SYSTOLIC BLOOD PRESSURE: 125 MMHG | HEART RATE: 85 BPM | BODY MASS INDEX: 17.66 KG/M2 | DIASTOLIC BLOOD PRESSURE: 72 MMHG | HEIGHT: 62 IN

## 2021-06-07 DIAGNOSIS — M81.0 OSTEOPOROSIS, UNSPECIFIED OSTEOPOROSIS TYPE, UNSPECIFIED PATHOLOGICAL FRACTURE PRESENCE: ICD-10-CM

## 2021-06-07 DIAGNOSIS — R63.6 UNDERWEIGHT: ICD-10-CM

## 2021-06-07 DIAGNOSIS — E06.3 HASHIMOTO'S THYROIDITIS: ICD-10-CM

## 2021-06-07 DIAGNOSIS — I25.10 CORONARY ARTERY DISEASE INVOLVING NATIVE CORONARY ARTERY OF NATIVE HEART WITHOUT ANGINA PECTORIS: ICD-10-CM

## 2021-06-07 DIAGNOSIS — N18.30 STAGE 3 CHRONIC KIDNEY DISEASE, UNSPECIFIED WHETHER STAGE 3A OR 3B CKD (HCC): ICD-10-CM

## 2021-06-07 DIAGNOSIS — E78.2 MIXED HYPERLIPIDEMIA: ICD-10-CM

## 2021-06-07 DIAGNOSIS — I10 ESSENTIAL HYPERTENSION: ICD-10-CM

## 2021-06-07 DIAGNOSIS — E03.9 ACQUIRED HYPOTHYROIDISM: ICD-10-CM

## 2021-06-07 PROCEDURE — G8427 DOCREV CUR MEDS BY ELIG CLIN: HCPCS | Performed by: INTERNAL MEDICINE

## 2021-06-07 PROCEDURE — G8418 CALC BMI BLW LOW PARAM F/U: HCPCS | Performed by: INTERNAL MEDICINE

## 2021-06-07 PROCEDURE — 3051F HG A1C>EQUAL 7.0%<8.0%: CPT | Performed by: INTERNAL MEDICINE

## 2021-06-07 PROCEDURE — 1090F PRES/ABSN URINE INCON ASSESS: CPT | Performed by: INTERNAL MEDICINE

## 2021-06-07 PROCEDURE — 1123F ACP DISCUSS/DSCN MKR DOCD: CPT | Performed by: INTERNAL MEDICINE

## 2021-06-07 PROCEDURE — 95251 CONT GLUC MNTR ANALYSIS I&R: CPT | Performed by: INTERNAL MEDICINE

## 2021-06-07 PROCEDURE — 1036F TOBACCO NON-USER: CPT | Performed by: INTERNAL MEDICINE

## 2021-06-07 PROCEDURE — 99214 OFFICE O/P EST MOD 30 MIN: CPT | Performed by: INTERNAL MEDICINE

## 2021-06-07 PROCEDURE — 4040F PNEUMOC VAC/ADMIN/RCVD: CPT | Performed by: INTERNAL MEDICINE

## 2021-06-07 PROCEDURE — G8399 PT W/DXA RESULTS DOCUMENT: HCPCS | Performed by: INTERNAL MEDICINE

## 2021-06-07 NOTE — PROGRESS NOTES
Payal Abernathy is a 80 y.o. female who presents for Type 1 diabetes mellitus. Current symptoms/problems include fluctuating BG levels and show no change. 1.  Type 1 diabetes, uncontrolled, with neuropathy (HCC) [E10.40, E10.65]    Diagnosed with Type 1 diabetes mellitus in 1999. Low BG at night. Comorbid conditions: hyperlipidemia, Neuropathy, Retinopathy, Chronic Kidney Disease and Coronary Artery Disease    Current diabetic medications include: Novolog    On Medtronic Minimed insulin pump and Dexcom CGM    Intolerance to diabetes medications: No     Weight trend: stable  Prior visit with dietician: yes  Current diet: on average, 3 meals per day  Current exercise: walks     Current monitoring regimen: home blood tests - 8 times daily  Has brought blood glucose log/meter:  Yes  Home blood sugar records: fasting range: 100-150 and postprandial range:   Any episodes of hypoglycemia? Yes  Hypoglycemia frequency and time(s):  daily  Does patient have Glucagon emergency kit? No  Does patient have rapid acting carbohydrate? Yes  Does patient wear a medic alert bracelet or necklace? No    2. Osteoporosis, unspecified osteoporosis type, unspecified pathological fracture presence  Reclast 5 years. Not on any meds now. On vitamin D 2000 IU, calcium in food    3. Hashimoto's thyroiditis  Has fatigue. 4. Acquired hypothyroidism  On levothyroxine 0.1 mg qd. Has fatigue. 5. Coronary artery disease involving native coronary artery of native heart without angina pectoris  No chest pain. 6. Type 1 diabetes, uncontrolled, with retinopathy ([address]  No recent vision changes. 7. Weight loss, abnormal  Lost 42 lbs not intentionally. No nausea, vomiting. 8. CKD, stage 3  No urination problems    9. Hyperlipidemia  No muscle pain    10.  Underweight  Works with dietician    Past Medical History:   Diagnosis Date    CAD (coronary artery disease) 1/2012    non obstructive    [facility]     Hyperlipidemia     Hyperlipidemia     Hypertension     Hypertension     Hypothyroidism (acquired)     Insomnia     Neuropathy     [facility] disease ([facility]     Parkinson disease [facility]     Renal insufficiency     Restless legs syndrome     Type I (juvenile type) diabetes mellitus without mention of complication, not stated as uncontrolled       Patient Active Problem List   Diagnosis    Diabetic polyneuropathy [facility]    Mixed hyperlipidemia    Hashimoto's thyroiditis    Acquired hypothyroidism    Type 1 diabetes, uncontrolled, with neuropathy ([facility]    Chest pain    CAD (coronary artery disease)    CKD (chronic kidney disease) stage 3, GFR 30-59 ml/min ([facility])    Rotator cuff tendonitis    Osteoporosis    Type 1 diabetes, uncontrolled, with retinopathy ([facility]    Weight loss, abnormal    Hypertension    Parkinson disease ([facility]     Past Surgical History:   Procedure Laterality Date    CARDIAC CATHETERIZATION  2012    non obstructive CAD    COLONOSCOPY      DENTAL SURGERY      TONSILLECTOMY       Social History     Socioeconomic History    Marital status:      Spouse name: Not on file    Number of children: Not on file    Years of education: Not on file    Highest education level: Not on file   Occupational History    Not on file   Tobacco Use    Smoking status: Former Smoker     Packs/day: 0.25     Years: 4.00     Pack years: 1.00     Types: Cigarettes     Quit date: 1966     Years since quittin.4    Smokeless tobacco: Never Used   Vaping Use    Vaping Use: Never used   Substance and Sexual Activity    Alcohol use:  Yes     Alcohol/week: 1.0 standard drinks     Types: 1 Glasses of wine per week     Comment: social    Drug use: No    Sexual activity: Never     Partners: Male   Other Topics Concern    Not on file   Social History Narrative    Not on file     Social Determinants of Health     Financial Resource Strain:    Nadine Mendoza Difficulty of Paying Living Expenses:    Food Insecurity:     Worried About Running Out of Food in the Last Year:     [address] in the Last Year:    Transportation Needs:     Lack of Transportation (Medical):  Lack of Transportation (Non-Medical):    Physical Activity:     Days of Exercise per Week:     Minutes of Exercise per Session:    Stress:     Feeling of Stress :    Social Connections:     Frequency of Communication with Friends and Family:     Frequency of Social Gatherings with Friends and Family:     Attends Confucianism Services:     Active Member of Clubs or Organizations:     Attends Club or Organization Meetings:     Marital Status:    Intimate Partner Violence:     Fear of Current or Ex-Partner:     Emotionally Abused:     Physically Abused:     Sexually Abused:      Family History   Problem Relation Age of Onset    Cancer Father     Heart Disease Father     Heart Disease Mother     Cancer Other         sister's daughter - Melanoma    Heart Disease Brother     Arthritis Sister     Diabetes Neg Hx     Stroke Neg Hx     Osteoporosis Neg Hx     Thyroid Disease Neg Hx      Current Outpatient Medications   Medication Sig Dispense Refill    levothyroxine (SYNTHROID) 100 MCG tablet TAKE 1 TABLET DAILY 90 tablet 3    insulin aspart (NOVOLOG) 100 UNIT/ML injection vial INJECT TOTAL DAILY DOSE 30 UNITS SUBCUTANEOUSLY WITH INSULIN PUMP.  90 mL 3    simvastatin (ZOCOR) 20 MG tablet Take 1 tablet by mouth nightly 90 tablet 3    GVOKE HYPOPEN 2-PACK 1 MG/0.2ML SOAJ Use for low glucose emergencies, may repeat in 20 min 2 Syringe 3    BAQSIMI TWO PACK 3 MG/DOSE POWD 1 spray into nostril for emergencies 2 each 3    pramipexole (MIRAPEX) 0.5 MG tablet Take 0.5 mg by mouth 2 times daily       Multiple Vitamins-Minerals (CENTRUM SILVER) TABS Take by mouth daily      Cholecalciferol (VITAMIN D3) 2000 UNITS CAPS Take 1 capsule by mouth daily      TIANA MICROLET LANCETS MISC 1 each by Does not apply route 8 times daily Brittle Diabetes Dx Code 250.61 (Patient taking differently: 1 each by Does not apply route 8 times daily Retinopathy DX Code E10.49 & Peripheal Neuropathy DX Code E10.65) 240 each 3    carbidopa-levodopa (SINEMET)  MG per tablet Take 0.5 tablets by mouth 4 times daily       Insulin Infusion Pump (PARADIGM INSULIN PUMP) by Does not apply route.  aspirin 81 MG EC tablet Take 1 tablet by mouth daily.  denosumab (PROLIA) 60 MG/ML SOSY SC injection Inject 1 mL into the skin once for 1 dose 1 mL 0     No current facility-administered medications for this visit.      No Known Allergies  Family Status   Relation Name Status    Father      Mother  Alive    Other niece Alive   [facility] Brother  [de-identified]    Sister  Alive    Neg Hx  (Not Specified)       Lab Review:    Lab Results   Component Value Date    WBC 5.8 2019    HGB 13.3 2019    HCT 40.0 2019    MCV 95.3 2019     2019     Lab Results   Component Value Date     2021    K 4.2 2021     2021    CO2 31 2021    BUN 19 2021    CREATININE 0.9 2021    GLUCOSE 158 2021    GLUCOSE 152 2012    CALCIUM 9.4 2021    PROT 6.6 2021    PROT 6.9 2012    LABALBU 4.0 2021    BILITOT 0.5 2021    ALKPHOS 66 2021    AST 27 2021    ALT 8 2021    LABGLOM >60 2021    LABGLOM 54 2014    GFRAA >60 2021    GFRAA >60 2012    AGRATIO 1.5 2021    GLOB 2.6 2021     Lab Results   Component Value Date    TSH 3.40 2021    FT3 2.1 2021     Lab Results   Component Value Date    LABA1C 7.0 2021     Lab Results   Component Value Date    .2 2021     Lab Results   Component Value Date    CHOL 137 2021     Lab Results   Component Value Date    TRIG 45 2021     Lab Results   Component Value Date    HDL 83 2021    HDL 88 01/22/2012     Lab Results   Component Value Date    LDLCALC 45 06/02/2021     Lab Results   Component Value Date    LABVLDL 9 06/02/2021     Lab Results   Component Value Date    CHOLHDLRATIO 2.1 09/12/2013     Lab Results   Component Value Date    LABMICR 1.80 06/02/2021     Lab Results   Component Value Date    VITD25 53.4 06/02/2021        Review of Systems:  Constitutional: has fatigue, no fever, no recent weight gain, has recent weight loss, has changes in appetite  Eyes: no eye pain, no change in vision, no eye redness, no eye irritation, no double vision  Ears, nose, throat: no nasal congestion, no sore throat, no earache, no decrease in hearing, no hoarseness, no dry mouth, no sinus problems, no difficulty swallowing, no neck lumps, no dental problems, no mouth sores, no ringing in ears  Pulmonary: sometimes has shortness of breath, no wheezing, has dyspnea on exertion, no cough  Cardiovascular: no chest pain, has lower extremity edema, no orthopnea, no intermittent leg claudication, no palpitations  Gastrointestinal: no abdominal pain, no nausea, no vomiting, no diarrhea, no constipation, no dysphagia, no heartburn, no bloating  Genitourinary: no dysuria, has sometimes urinary incontinence, no urinary hesitancy, no urinary frequency, no feelings of urinary urgency, no nocturia  Musculoskeletal: no joint swelling, no joint stiffness, no joint pain, no muscle cramps, no muscle pain, no bone pain  Integument/Breast: no hair loss, no skin rashes, no skin lesions, no itching, no dry skin  Neurological: no numbness, no tingling, no weakness, no confusion, no headaches, no dizziness, no fainting, no tremors, no decrease in memory, has balance problems  Psychiatric: no anxiety, no depression, no insomnia  Hematologic/Lymphatic: no tendency for easy bleeding, no swollen lymph nodes, no tendency for easy bruising  Immunology: no seasonal allergies, no frequent infections, no frequent illnesses  Endocrine: no temperature intolerance    /72   Pulse 85   Ht 5' 2\" (1.575 m)   Wt 96 lb (43.5 kg)   BMI 17.56 kg/m²    Wt Readings from Last 3 Encounters:   06/07/21 96 lb (43.5 kg)   03/05/21 97 lb 9.6 oz (44.3 kg)   12/08/20 96 lb (43.5 kg)     Body mass index is 17.56 kg/m². OBJECTIVE:  Constitutional: no acute distress, well appearing and well nourished  Psychiatric: oriented to person, place and time, judgement and insight and normal, recent and remote memory and intact and mood and affect are normal  Skin: skin and subcutaneous tissue is normal without mass, normal turgor  Head and Face: examination of head and face revealed no abnormalities  Eyes: no lid or conjunctival swelling, erythema or discharge, pupils are normal, equal, round, reactive to light  Ears/Nose: external inspection of ears and nose revealed no abnormalities, hearing is grossly normal  Oropharynx/Mouth/Face: lips, tongue and gums are normal with no lesions, the voice quality was normal  Neck: neck is supple and symmetric, with midline trachea and no masses, thyroid is normal  Lymphatics: normal cervical lymph nodes, normal supraclavicular nodes  Pulmonary: no increased work of breathing or signs of respiratory distress, lungs are clear to auscultation  Cardiovascular: normal heart rate and rhythm, normal S1 and S2, no murmurs and pedal pulses and 2+ bilaterally, No edema  Abdomen: abdomen is soft, non-tender with no masses  Musculoskeletal: normal gait and station and exam of the digits and nails are normal  Neurological: normal coordination and normal general cortical function    ASSESSMENT/PLAN:  1. Type 1 diabetes, uncontrolled, with neuropathy (HCC)  Do boluses 15 min before meal  Diabetes educator follow up  On Medtronic Minimed insulin pump and Dexcom CGM  Hemoglobin A1c 7.8-7.1-7.6-7.5-7.6-7.1-7.2-7.0  Same ratios and ratios.  - Hemoglobin A1C; Future  - Comprehensive Metabolic Panel; Future  - Lipid Panel;  Future  - Microalbumin / Creatinine Urine Ratio; Future    2. Osteoporosis, unspecified osteoporosis type, unspecified pathological fracture presence  T-score -2.9 in 2007  DXA 2007 Osteoporosis, had Reclast 5 years  No upset stomach  No GERD  Worsening of bone density in a hip  Submit for Prolia   25 hydroxy vitamin D 50-49-47-50.6-52.2-63.8-55.4-53.4  - Vitamin D 25 Hydroxy; Future    3. Hashimoto's thyroiditis  TSH 2.0  - T4, Free; Future  - TSH without Reflex; Future    4. Acquired hypothyroidism    TSH 2.5-3.0-2.38-1.12-2.85-7.29-3.4  - levothyroxine (SYNTHROID) 100 MCG tablet; Take 1 tablet by mouth daily      5. Coronary artery disease involving native coronary artery of native heart without angina pectoris  Follow with Cardiology    6. Type 1 diabetes, uncontrolled, with retinopathy ([facility]  - Hemoglobin A1C; Future  - Comprehensive Metabolic Panel; Future  - Lipid Panel; Future  - Microalbumin / Creatinine Urine Ratio; Future    7. Weight loss, abnormal  Weight stable since last appointment  Diabetes educator  Supplements with protein, adequate caloric intake is recommended  - ACTH; Future  - Cortisol AM, Total; Future    8. Mixed hyperlipidemia  LDL [id number]  Lipid panel  Simvastatin 20 mg daily    9. CKD, stage 3  Follow GFR  GFR 48-53->60    10.  Underweight  On Glucerna    Reviewed and/or ordered clinical lab results Yes  Reviewed and/or ordered radiology tests Yes  Reviewed and/or ordered other diagnostic tests No  Discussed test results with performing physician No  Independently reviewed image, tracing, or specimen   Made a decision to obtain old records No  Reviewed and summarized old records Yes  TSH 3.89, HbA1C 7.7, LDL 49  Obtained history from other than patient No    Zigmund Ada was counseled regarding symptoms of diabetes diagnosis, course and complications of disease if inadequately treated, side effects of medications, diagnosis, treatment options, and prognosis, risks, benefits, complications,

## 2021-06-08 ENCOUNTER — TELEPHONE (OUTPATIENT)
Dept: ENDOCRINOLOGY | Age: 81
End: 2021-06-08

## 2021-06-08 NOTE — TELEPHONE ENCOUNTER
Spoke with patient and was given her pharmacy benefits information. [address] [place] [address]  Member ID# [id number]  BIN # [id number]  RX PCN:  DARIAN  RX GRP.  # [id number]    [phone]

## 2021-06-18 NOTE — TELEPHONE ENCOUNTER
I would have scheduled this for me but I didn't know how your schedule works as far as scheduling injections.

## 2021-06-21 RX ORDER — SIMVASTATIN 20 MG
20 TABLET ORAL NIGHTLY
Qty: 90 TABLET | Refills: 3 | Status: SHIPPED | OUTPATIENT
Start: 2021-06-21 | End: 2022-07-27

## 2021-06-21 NOTE — TELEPHONE ENCOUNTER
Requested Prescriptions     Pending Prescriptions Disp Refills    simvastatin (ZOCOR) 20 MG tablet 90 tablet 3     Sig: Take 1 tablet by mouth nightly         LAST OV: 06/07/2021  NEXT OV: 8/10/2021

## 2021-06-24 ENCOUNTER — NURSE ONLY (OUTPATIENT)
Dept: ENDOCRINOLOGY | Age: 81
End: 2021-06-24
Payer: MEDICARE

## 2021-06-24 DIAGNOSIS — M81.0 OSTEOPOROSIS, UNSPECIFIED OSTEOPOROSIS TYPE, UNSPECIFIED PATHOLOGICAL FRACTURE PRESENCE: Primary | ICD-10-CM

## 2021-06-24 PROCEDURE — 96372 THER/PROPH/DIAG INJ SC/IM: CPT | Performed by: INTERNAL MEDICINE

## 2021-06-24 NOTE — PROGRESS NOTES
After obtaining consent, and per orders of Dr. Eduardo Burleson, injection of Prolia given in Left arm by Wale Mo MA. Patient instructed to remain in clinic for 20 minutes afterwards, and to report any adverse reaction to me immediately.

## 2021-08-10 ENCOUNTER — OFFICE VISIT (OUTPATIENT)
Dept: ENDOCRINOLOGY | Age: 81
End: 2021-08-10
Payer: MEDICARE

## 2021-08-10 PROCEDURE — 97803 MED NUTRITION INDIV SUBSEQ: CPT | Performed by: DIETITIAN, REGISTERED

## 2021-08-10 NOTE — PROGRESS NOTES
Medical Nutrition Therapy for Diabetes    Fercho Case  August 10, 2021      Patient Care Team:  Corina Kruger MD as PCP - Robert Velasco MD as PCP - [facility] Empaneled Provider  VON Hanson - CNP as Nurse Practitioner (Nurse Practitioner)    Reason for visit: Type 1 Diabetes    ASSESSMENT/PLAN:   NUTRITION DIAGNOSIS   Identified patient by name and date of birth. Patient is meeting this telephone/Virtual appointment at home. I am at [facility] Endocrinology office. Patient is the sole participant on the video call. Pursuant to the emergency declaration under the [facility] waiver authority and the [employer] Act this Virtual Visit was insisted, with patient's consent, to reduce the patient's risk of exposure to COVID-19 and provide continuity of care for an established patient. #1 Problem: Altered Nutrition-Related Laboratory Values (NC-2.2)  Related to: Endocrine/Diabetes   As Evidenced by: Elevated Plasma glucose and/or HgbA1c levels         #2 Problem: Inconsistent Carbohydrate and Fiber Intake  Related to: Food- and nutrition-related knowledge deficit concerning appropriate amount of carbohydrate and fiber intake  As evidenced by: patient food recall      #3 Problem: Fluids-NI-3.1                 Inadequate fluid volume    NUTRITION INTERVENTION  Nutrition Prescription: 45 grams carbohydrate per meal with protein and non-starch vegetables  15 gram carbohydrate snacks    Diabetes Education/Counseling included:  Carbohydrate Control, Activity/exercise, Hypoyglycemia prevention/treatment and other: target glucose levels identified as Low and High. Discussed resources for improved carbohydrate counting of homemade meals or restaurant foods.   Interventions:  Control Carbohydrate Intake using Carb Counting  Recommended Calorie [employer], [employer] or Book for carbohydrate counting. Encouraged 3:30 pm snack with carbohydrate and protein. Suggested changing target low to 80 and high to 150 on CGM. NUTRITION MONITORING AND EVALUATION  3 meals and 3:30 pm snack  Lightly Brew tea        Patient Active Problem List   Diagnosis    Diabetic polyneuropathy ([address]    Mixed hyperlipidemia    Hashimoto's thyroiditis    Acquired hypothyroidism    Type 1 diabetes, uncontrolled, with neuropathy ([address]    Chest pain    CAD (coronary artery disease)    CKD (chronic kidney disease) stage 3, GFR 30-59 ml/min ([facility]    Rotator cuff tendonitis    Osteoporosis    Type 1 diabetes, uncontrolled, with retinopathy ([address]    Weight loss, abnormal    Hypertension    Parkinson disease ([address]    Underweight    Essential hypertension, benign       Current Outpatient Medications   Medication Sig Dispense Refill    simvastatin (ZOCOR) 20 MG tablet Take 1 tablet by mouth nightly 90 tablet 3    levothyroxine (SYNTHROID) 100 MCG tablet TAKE 1 TABLET DAILY 90 tablet 3    insulin aspart (NOVOLOG) 100 UNIT/ML injection vial INJECT TOTAL DAILY DOSE 30 UNITS SUBCUTANEOUSLY WITH INSULIN PUMP.  90 mL 3    GVOKE HYPOPEN 2-PACK 1 MG/0.2ML SOAJ Use for low glucose emergencies, may repeat in 20 min 2 Syringe 3    BAQSIMI TWO PACK 3 MG/DOSE POWD 1 spray into nostril for emergencies 2 each 3    denosumab (PROLIA) 60 MG/ML SOSY SC injection Inject 1 mL into the skin once for 1 dose 1 mL 0    pramipexole (MIRAPEX) 0.5 MG tablet Take 0.5 mg by mouth 2 times daily       Multiple Vitamins-Minerals (CENTRUM SILVER) TABS Take by mouth daily      Cholecalciferol (VITAMIN D3) 2000 UNITS CAPS Take 1 capsule by mouth daily      TIANA MICROLET LANCETS MISC 1 each by Does not apply route 8 times daily Brittle Diabetes Dx Code 250.61 (Patient taking differently: 1 each by Does not apply route 8 times daily Retinopathy DX Code E10.49 & Peripheal Neuropathy DX Code E10.65) 240 each 3    carbidopa-levodopa (SINEMET)  MG per tablet Take 0.5 tablets by mouth 4 times daily       Insulin Infusion Pump (PARADIGM INSULIN PUMP) by Does not apply route.  aspirin 81 MG EC tablet Take 1 tablet by mouth daily. No current facility-administered medications for this visit. NUTRITION ASSESSMENT    Biochemical Data:    Lab Results   Component Value Date    LABA1C 7.0 06/02/2021     Lab Results   Component Value Date    .2 06/02/2021       Lab Results   Component Value Date    CHOL 137 06/02/2021    CHOL 133 03/02/2021    CHOL 147 12/03/2020     Lab Results   Component Value Date    TRIG 45 06/02/2021    TRIG 43 03/02/2021    TRIG 56 12/03/2020     Lab Results   Component Value Date    HDL 83 (H) 06/02/2021    HDL 82 (H) 03/02/2021    HDL 88 (H) 12/03/2020     Lab Results   Component Value Date    LDLCALC 45 06/02/2021    LDLCALC 42 03/02/2021    LDLCALC 48 12/03/2020     Lab Results   Component Value Date    LABVLDL 9 06/02/2021    LABVLDL 9 03/02/2021    LABVLDL 11 12/03/2020     Lab Results   Component Value Date    CHOLHDLRATIO 2.1 09/12/2013    CHOLHDLRATIO 1.9 04/18/2012    CHOLHDLRATIO 2.0 02/22/2012       Lab Results   Component Value Date    WBC 5.8 11/08/2019    HGB 13.3 11/08/2019    HCT 40.0 11/08/2019    MCV 95.3 11/08/2019     11/08/2019       Lab Results   Component Value Date    CREATININE 0.9 06/02/2021    BUN 19 06/02/2021     06/02/2021    K 4.2 06/02/2021     06/02/2021    CO2 31 06/02/2021       Diabetes Medications: Yes  Knows name and dose of prescribed medications Yes  Knows prescribed schedule for medicationsYes  Recent change in medication type/dosage: No  Stores  medications properlyYes  Comments:     Monitoring:   Has BG meter: Yes  Testing frequency: Dexcom  Recent results: download on 6/7/21  Hypoglycemia? Yes    Anthropometric Measurements:   Wt:   Wt Readings from Last 3 Encounters:   06/07/21 96 lb (43.5 kg)   03/05/21 97 lb 9.6 oz (44.3 kg)   12/08/20 96 lb (43.5 kg) BMI:   BMI Readings from Last 3 Encounters:   06/07/21 17.56 kg/m²   03/05/21 17.85 kg/m²   12/08/20 17.56 kg/m²     Patient's stated goal weight:   7% Weight loss goal weight:     Physical Activity History:   Physical activity: gardening and yardwork  Frequency of activity: most days  Duration of activity: irregular  Obstacles to activity: none    Sleep: not addressed    Food and Nutrition History:   Nutrition Awareness/Previous DSMES: yes  Number of people in household: 2  Frequency of Meals Eaten away from home:not often  Food Availability Problems  Within the past 12 months, have you worried that your food would run out before you got money to buy more? No  Within the past 12 months, has the food you bought not lasted till the end of the month and you didn't have money to get more?  No  Beverage consumption: iced tea mostly  Alcohol consumption: yes  Usual Food consumption:   3 meals, afternoon is Glucerna and yogurt, may have low blood sugar around 4 pm, having a snack in afternoon is not consistent    Barriers:   -none          Follow Up Plan: 4 months      Referring Provider: Rosalia Elkins MD  Time spent with patient: 15 minutes

## 2021-08-20 ENCOUNTER — NURSE TRIAGE (OUTPATIENT)
Dept: OTHER | Facility: CLINIC | Age: 81
End: 2021-08-20

## 2021-08-20 NOTE — TELEPHONE ENCOUNTER
Received call from [place] at [facility] with Red Flag Complaint. Brief description of triage: see below. Triage indicates for patient to go to the office now. Care advice provided, patient verbalizes understanding; denies any other questions or concerns; instructed to call back for any new or worsening symptoms. Writer provided warm transfer to [place] at [facility] for appointment scheduling. Attention Provider: Thank you for allowing me to participate in the care of your patient. The patient was connected to triage in response to information provided to the [facility]. Please do not respond through this encounter as the response is not directed to a shared pool. Reason for Disposition   MILD difficulty breathing (e.g., minimal/no SOB at rest, SOB with walking, pulse < 100) of new onset or worse than normal    Answer Assessment - Initial Assessment Questions  1. RESPIRATORY STATUS: \"Describe your breathing? \" (e.g., wheezing, shortness of breath, unable to speak, severe coughing)       Patient states that she is unable to make a deep breath. 2. ONSET: \"When did this breathing problem begin? \"       One-two months. 3. PATTERN \"Does the difficult breathing come and go, or has it been constant since it started? \"       Comes and goes. 4. SEVERITY: \"How bad is your breathing? \" (e.g., mild, moderate, severe)     - MILD: No SOB at rest, mild SOB with walking, speaks normally in sentences, can lay down, no retractions, pulse < 100.     - MODERATE: SOB at rest, SOB with minimal exertion and prefers to sit, cannot lie down flat, speaks in phrases, mild retractions, audible wheezing, pulse 100-120.     - SEVERE: Very SOB at rest, speaks in single words, struggling to breathe, sitting hunched forward, retractions, pulse > 120       Mild-moderate with walking. 5. RECURRENT SYMPTOM: \"Have you had difficulty breathing before? \" If so, ask: \"When was the last time? \" and \"What happened that time? \"       No    6. CARDIAC HISTORY: \"Do you have any history of heart disease? \" (e.g., heart attack, angina, bypass surgery, angioplasty)       Angina-2011    7. LUNG HISTORY: \"Do you have any history of lung disease? \"  (e.g., pulmonary embolus, asthma, emphysema)      No     8. CAUSE: \"What do you think is causing the breathing problem? \"       Possibly stress. 9. OTHER SYMPTOMS: \"Do you have any other symptoms? (e.g., dizziness, runny nose, cough, chest pain, fever)     None. 10. PREGNANCY: \"Is there any chance you are pregnant? \" \"When was your last menstrual period? \"        No menopause. 11. TRAVEL: \"Have you traveled out of the country in the last month? \" (e.g., travel history, exposures)        No    Protocols used: BREATHING DIFFICULTY-ADULT-[place]

## 2021-08-23 ENCOUNTER — OFFICE VISIT (OUTPATIENT)
Dept: FAMILY MEDICINE CLINIC | Age: 81
End: 2021-08-23
Payer: MEDICARE

## 2021-08-23 VITALS
HEART RATE: 82 BPM | SYSTOLIC BLOOD PRESSURE: 122 MMHG | DIASTOLIC BLOOD PRESSURE: 70 MMHG | BODY MASS INDEX: 17.39 KG/M2 | WEIGHT: 94.5 LBS | OXYGEN SATURATION: 100 % | HEIGHT: 62 IN

## 2021-08-23 DIAGNOSIS — N18.30 STAGE 3 CHRONIC KIDNEY DISEASE, UNSPECIFIED WHETHER STAGE 3A OR 3B CKD (HCC): ICD-10-CM

## 2021-08-23 DIAGNOSIS — M81.0 OSTEOPOROSIS, UNSPECIFIED OSTEOPOROSIS TYPE, UNSPECIFIED PATHOLOGICAL FRACTURE PRESENCE: ICD-10-CM

## 2021-08-23 DIAGNOSIS — I10 ESSENTIAL HYPERTENSION, BENIGN: ICD-10-CM

## 2021-08-23 DIAGNOSIS — E03.9 ACQUIRED HYPOTHYROIDISM: ICD-10-CM

## 2021-08-23 DIAGNOSIS — E78.2 MIXED HYPERLIPIDEMIA: ICD-10-CM

## 2021-08-23 DIAGNOSIS — G20 PARKINSON DISEASE (HCC): ICD-10-CM

## 2021-08-23 DIAGNOSIS — D64.9 ANEMIA, UNSPECIFIED TYPE: Primary | ICD-10-CM

## 2021-08-23 DIAGNOSIS — Z12.31 ENCOUNTER FOR SCREENING MAMMOGRAM FOR MALIGNANT NEOPLASM OF BREAST: ICD-10-CM

## 2021-08-23 LAB — HGB, POC: 9.6

## 2021-08-23 PROCEDURE — 99214 OFFICE O/P EST MOD 30 MIN: CPT | Performed by: FAMILY MEDICINE

## 2021-08-23 PROCEDURE — G8399 PT W/DXA RESULTS DOCUMENT: HCPCS | Performed by: FAMILY MEDICINE

## 2021-08-23 PROCEDURE — 1123F ACP DISCUSS/DSCN MKR DOCD: CPT | Performed by: FAMILY MEDICINE

## 2021-08-23 PROCEDURE — G8418 CALC BMI BLW LOW PARAM F/U: HCPCS | Performed by: FAMILY MEDICINE

## 2021-08-23 PROCEDURE — 1090F PRES/ABSN URINE INCON ASSESS: CPT | Performed by: FAMILY MEDICINE

## 2021-08-23 PROCEDURE — 1036F TOBACCO NON-USER: CPT | Performed by: FAMILY MEDICINE

## 2021-08-23 PROCEDURE — 3051F HG A1C>EQUAL 7.0%<8.0%: CPT | Performed by: FAMILY MEDICINE

## 2021-08-23 PROCEDURE — 85018 HEMOGLOBIN: CPT | Performed by: FAMILY MEDICINE

## 2021-08-23 PROCEDURE — G8427 DOCREV CUR MEDS BY ELIG CLIN: HCPCS | Performed by: FAMILY MEDICINE

## 2021-08-23 PROCEDURE — 4040F PNEUMOC VAC/ADMIN/RCVD: CPT | Performed by: FAMILY MEDICINE

## 2021-08-23 SDOH — ECONOMIC STABILITY: FOOD INSECURITY: WITHIN THE PAST 12 MONTHS, YOU WORRIED THAT YOUR FOOD WOULD RUN OUT BEFORE YOU GOT MONEY TO BUY MORE.: NEVER TRUE

## 2021-08-23 SDOH — ECONOMIC STABILITY: FOOD INSECURITY: WITHIN THE PAST 12 MONTHS, THE FOOD YOU BOUGHT JUST DIDN'T LAST AND YOU DIDN'T HAVE MONEY TO GET MORE.: NEVER TRUE

## 2021-08-23 ASSESSMENT — PATIENT HEALTH QUESTIONNAIRE - PHQ9
SUM OF ALL RESPONSES TO PHQ QUESTIONS 1-9: 0
1. LITTLE INTEREST OR PLEASURE IN DOING THINGS: 0
SUM OF ALL RESPONSES TO PHQ9 QUESTIONS 1 & 2: 0
SUM OF ALL RESPONSES TO PHQ QUESTIONS 1-9: 0
SUM OF ALL RESPONSES TO PHQ QUESTIONS 1-9: 0
2. FEELING DOWN, DEPRESSED OR HOPELESS: 0

## 2021-08-23 ASSESSMENT — SOCIAL DETERMINANTS OF HEALTH (SDOH): HOW HARD IS IT FOR YOU TO PAY FOR THE VERY BASICS LIKE FOOD, HOUSING, MEDICAL CARE, AND HEATING?: NOT HARD AT ALL

## 2021-08-23 NOTE — PROGRESS NOTES
Stephane Parra is a 80 y.o. female. HPI: There with  for complex medical visit  Sees Neuro ([place])  Cardio ([place])  Endo Mindy Caicedo) uses an insulin pump, last A1c 7.0  Also gets Prolia injections for her osteoporosis  opthal   Some sob for 6 weeks, intermittent, no chest pain, coming more frequently no cough hemoptysis edema  No melena, no bleeding  Meds, vitamins and allergies reviewed with pt    ROS: No TIA's or unusual headaches, no dysphagia. No prolonged cough. No dyspnea or chest pain on exertion. No abdominal pain, change in bowel habits, black or bloody stools. No urinary tract symptoms. No new or unusual musculoskeletal symptoms. Prior to Visit Medications    Medication Sig Taking? Authorizing Provider   simvastatin (ZOCOR) 20 MG tablet Take 1 tablet by mouth nightly Yes Rosemarie Ortiz MD   levothyroxine (SYNTHROID) 100 MCG tablet TAKE 1 TABLET DAILY Yes Rosemarie Ortiz MD   insulin aspart (NOVOLOG) 100 UNIT/ML injection vial INJECT TOTAL DAILY DOSE 30 UNITS SUBCUTANEOUSLY WITH INSULIN PUMP.  Yes MD Anais Bustamante Petamy 2-PACK 1 MG/0.2ML SOAJ Use for low glucose emergencies, may repeat in 20 min Yes Rosemarie Ortiz MD   BAQSIMI TWO PACK 3 MG/DOSE POWD 1 spray into nostril for emergencies Yes Rosemarie Ortiz MD   pramipexole (MIRAPEX) 0.5 MG tablet Take 0.5 mg by mouth 2 times daily  Yes Historical Provider, MD   Multiple Vitamins-Minerals (CENTRUM SILVER) TABS Take by mouth daily Yes Historical Provider, MD   Cholecalciferol (VITAMIN D3) 2000 UNITS CAPS Take 1 capsule by mouth daily Yes Historical Provider, MD   TIANA MICROLET LANCETS MISC 1 each by Does not apply route 8 times daily Brittle Diabetes Dx Code 250.61  Patient taking differently: 1 each by Does not apply route 8 times daily Retinopathy DX Code E10.49 & Peripheal Neuropathy DX Code E10.65 Yes Kem Guzman MD   carbidopa-levodopa (SINEMET)  MG per tablet Take 0.5 tablets by mouth 4 times daily  Yes Historical Provider, MD   Insulin Infusion Pump (PARADIGM INSULIN PUMP) by Does not apply route. Yes Historical Provider, MD   aspirin 81 MG EC tablet Take 1 tablet by mouth daily. Yes Angi Nguyen MD   denosumab (PROLIA) 60 MG/ML SOSY SC injection Inject 1 mL into the skin once for 1 dose  Angi Nguyen MD       Past Medical History:   Diagnosis Date    CAD (coronary artery disease) 2012    non obstructive    Diabetes mellitus with neurological manifestation (HCC)     Hyperlipidemia     Hyperlipidemia     Hypertension     Hypertension     Hypothyroidism (acquired)     Insomnia     Neuropathy     [facility]     [facility]     [facility]     Renal insufficiency     Restless legs syndrome     Type I (juvenile type) diabetes mellitus without mention of complication, not stated as uncontrolled        Social History     Tobacco Use    Smoking status: Former Smoker     Packs/day: 0.25     Years: 4.00     Pack years: 1.00     Types: Cigarettes     Quit date: 1966     Years since quittin.6    Smokeless tobacco: Never Used   Vaping Use    Vaping Use: Never used   Substance Use Topics    Alcohol use: Yes     Alcohol/week: 1.0 standard drinks     Types: 1 Glasses of wine per week     Comment: social    Drug use: No       Family History   Problem Relation Age of Onset    Cancer Father     Heart Disease Father     Heart Disease Mother     Cancer Other         sister's daughter - Melanoma    Heart Disease Brother     Arthritis Sister     Diabetes Neg Hx     Stroke Neg Hx     Osteoporosis Neg Hx     Thyroid Disease Neg Hx        No Known Allergies    OBJECTIVE:  BP (!) 146/79   Pulse 82   Ht 5' 2\" (1.575 m)   Wt 94 lb 8 oz (42.9 kg)   SpO2 100%   BMI 17.28 kg/m²   GEN:  in NAD, sli palor  HEENT:  NCAT, TMs:normal and throat: clear  NECK:  Supple without adenopathy.   No bruit  CV:  Regular rate and rhythm, S1 and S2 normal, no murmurs,

## 2021-09-08 ENCOUNTER — PATIENT MESSAGE (OUTPATIENT)
Dept: FAMILY MEDICINE CLINIC | Age: 81
End: 2021-09-08

## 2021-09-08 NOTE — TELEPHONE ENCOUNTER
From: Keith Hebert  To: Iram Neville MD  Sent: 2021 3:24 PM EDT  Subject: Non-Urgent Medical Question    This is to let you know that I received the flu shot this morning, , at [facility]. The drug shows \"fluzone HD Quad  PFS\" . Would you please add this to [facility] Chart? Thanks.     Keith Hebert  : 2

## 2021-09-10 ENCOUNTER — OFFICE VISIT (OUTPATIENT)
Dept: CARDIOLOGY CLINIC | Age: 81
End: 2021-09-10
Payer: MEDICARE

## 2021-09-10 VITALS
SYSTOLIC BLOOD PRESSURE: 132 MMHG | WEIGHT: 97.3 LBS | BODY MASS INDEX: 17.9 KG/M2 | HEIGHT: 62 IN | OXYGEN SATURATION: 99 % | HEART RATE: 82 BPM | DIASTOLIC BLOOD PRESSURE: 70 MMHG

## 2021-09-10 DIAGNOSIS — R00.2 PALPITATIONS: ICD-10-CM

## 2021-09-10 DIAGNOSIS — I25.10 CORONARY ARTERY DISEASE INVOLVING NATIVE CORONARY ARTERY OF NATIVE HEART WITHOUT ANGINA PECTORIS: Primary | ICD-10-CM

## 2021-09-10 DIAGNOSIS — E78.2 MIXED HYPERLIPIDEMIA: ICD-10-CM

## 2021-09-10 PROCEDURE — G8418 CALC BMI BLW LOW PARAM F/U: HCPCS | Performed by: NURSE PRACTITIONER

## 2021-09-10 PROCEDURE — G8399 PT W/DXA RESULTS DOCUMENT: HCPCS | Performed by: NURSE PRACTITIONER

## 2021-09-10 PROCEDURE — 1123F ACP DISCUSS/DSCN MKR DOCD: CPT | Performed by: NURSE PRACTITIONER

## 2021-09-10 PROCEDURE — 4040F PNEUMOC VAC/ADMIN/RCVD: CPT | Performed by: NURSE PRACTITIONER

## 2021-09-10 PROCEDURE — 1090F PRES/ABSN URINE INCON ASSESS: CPT | Performed by: NURSE PRACTITIONER

## 2021-09-10 PROCEDURE — G8427 DOCREV CUR MEDS BY ELIG CLIN: HCPCS | Performed by: NURSE PRACTITIONER

## 2021-09-10 PROCEDURE — 1036F TOBACCO NON-USER: CPT | Performed by: NURSE PRACTITIONER

## 2021-09-10 PROCEDURE — 99214 OFFICE O/P EST MOD 30 MIN: CPT | Performed by: NURSE PRACTITIONER

## 2021-09-10 PROCEDURE — 93000 ELECTROCARDIOGRAM COMPLETE: CPT | Performed by: NURSE PRACTITIONER

## 2021-09-10 NOTE — PROGRESS NOTES
tablet by mouth nightly 90 tablet 3    levothyroxine (SYNTHROID) 100 MCG tablet TAKE 1 TABLET DAILY 90 tablet 3    insulin aspart (NOVOLOG) 100 UNIT/ML injection vial INJECT TOTAL DAILY DOSE 30 UNITS SUBCUTANEOUSLY WITH INSULIN PUMP. 90 mL 3    GVOKE HYPOPEN 2-PACK 1 MG/0.2ML SOAJ Use for low glucose emergencies, may repeat in 20 min 2 Syringe 3    denosumab (PROLIA) 60 MG/ML SOSY SC injection Inject 1 mL into the skin once for 1 dose (Patient taking differently: Inject 60 mg into the skin every 6 months ) 1 mL 0    pramipexole (MIRAPEX) 0.5 MG tablet Take 0.5 mg by mouth 2 times daily 2 tabs with dinner, 1 tab at HS      Multiple Vitamins-Minerals (CENTRUM SILVER) TABS Take by mouth daily      Cholecalciferol (VITAMIN D3) 2000 UNITS CAPS Take 1 capsule by mouth daily      carbidopa-levodopa (SINEMET)  MG per tablet Take 1 tablet by mouth 4 times daily       TIANA MICROLET LANCETS MISC 1 each by Does not apply route 8 times daily Brittle Diabetes Dx Code 250.61 (Patient taking differently: 1 each by Does not apply route 8 times daily Retinopathy DX Code E10.49 & Peripheal Neuropathy DX Code E10.65) 240 each 3    Insulin Infusion Pump (PARADIGM INSULIN PUMP) by Does not apply route. No current facility-administered medications for this visit. REVIEW OF SYSTEMS:    CONSTITUTIONAL: + weight loss, - fatigue, weakness, night sweats or fever. HEENT: No new vision difficulties or ringing in the ears. RESPIRATORY: No new SOB, PND, orthopnea or cough. CARDIOVASCULAR: See HPI  GI: No nausea, vomiting, diarrhea, constipation, abdominal pain or changes in bowel habits. : No urinary frequency, urgency, incontinence hematuria or dysuria. SKIN: bothersome bruising on hands/arms. MUSCULOSKELETAL: No new muscle or joint pain; her RLS has gotten worse; and cannot walk straight. She follows with Dr. Humberto Eddy; sinemet dose increased  . NEUROLOGICAL: No syncope or TIA-like symptoms.   PSYCHIATRIC: No anxiety, pain, insomnia or depression    Objective:   PHYSICAL EXAM:       Vitals:    09/10/21 0931 09/10/21 0955   BP: 130/70 132/70   Site: Left Upper Arm    Position: Sitting    Cuff Size: Medium Adult    Pulse: 82    SpO2: 99%    Weight: 97 lb 4.8 oz (44.1 kg)    Height: 5' 2\" (1.575 m)         VITALS:  /70 (Site: Left Upper Arm, Position: Sitting, Cuff Size: Medium Adult)   Pulse 82   Ht 5' 2\" (1.575 m)   Wt 97 lb 4.8 oz (44.1 kg)   SpO2 99%   BMI 17.80 kg/m²   CONSTITUTIONAL: Cooperative, no apparent distress, and appears thin / frail   NEUROLOGIC:  Awake and orientated to person, place and time. PSYCH: Calm affect. SKIN: Warm and dry. HEENT: Sclera non-icteric, normocephalic, neck supple, no elevation of JVP, normal carotid pulses with no bruits and thyroid normal size. LUNGS:  No increased work of breathing and clear to auscultation, no crackles or wheezing  CARDIOVASCULAR:  Regular rate with ectopy 80 and rhythm with no murmurs, gallops, rubs, or abnormal heart sounds, normal PMI. The apical impulses not displaced  JVP less than 8 cm H2O  Heart tones are crisp and normal  Cervical veins are not engorged  The carotid upstroke is normal in amplitude and contour without delay or bruit  JVP is not elevated  ABDOMEN:  Normal bowel sounds, non-distended and non-tender to palpation  EXT: No edema, no calf tenderness. Pulses are present bilaterally.     DATA:    Lab Results   Component Value Date    ALT 8 (L) 06/02/2021    AST 27 06/02/2021    ALKPHOS 66 06/02/2021    BILITOT 0.5 06/02/2021     Lab Results   Component Value Date    CREATININE 0.9 06/02/2021    BUN 19 06/02/2021     06/02/2021    K 4.2 06/02/2021     06/02/2021    CO2 31 06/02/2021     Lab Results   Component Value Date    TSH 3.40 06/02/2021    I3CWGIY 7.5 09/12/2013     Lab Results   Component Value Date    WBC 4.5 08/23/2021    HGB 11.7 (L) 08/23/2021    HCT 34.7 (L) 08/23/2021    MCV 94.5 08/23/2021     08/23/2021 No components found for: CHLPL  Lab Results   Component Value Date    TRIG 45 06/02/2021    TRIG 43 03/02/2021    TRIG 56 12/03/2020     Lab Results   Component Value Date    HDL 83 (H) 06/02/2021    HDL 82 (H) 03/02/2021    HDL 88 (H) 12/03/2020     Lab Results   Component Value Date    LDLCALC 45 06/02/2021    LDLCALC 42 03/02/2021    LDLCALC 48 12/03/2020     Lab Results   Component Value Date    LABVLDL 9 06/02/2021    LABVLDL 9 03/02/2021    LABVLDL 11 12/03/2020     Radiology Review:  Pertinent images / reports were reviewed as a part of this visit and reveals the following:    Last Holter: 2/9/12: NSR, brief episode of supraventricular ectopy, PVCs, PACs    Last  Angiogram: 1/23/12: Nonobstructive CAD , Normal LVEF >> Med mgmt    Last echo: 1/23/12:  Right Ventricular Dimension-  Left Ventricular Dimension- 4.5/2.0  Posterior Wall Thickness- 0.8  Septal Wall Thickness- 0.8  Aortic Root- 3.3  Aortic Cusps Separation- 2.3  Left Atrial Dimension- 2.7    FINDINGS-  Left ventricle is normal in size and systolic function. Ejection  fraction is estimated to be 60%. The left atrium is normal. The  right atrium is normal. The right ventricle is normal. The aortic  root is normal in size. The mitral valve is normal. The aortic  valve is mildly thickened but without significant gradient across  the valve. There is mild aortic insufficiency. There is mild  tricuspid regurgitation. The right ventricular systolic pressure is  estimated to be 30 mmHg. No significant pericardial effusion is  present. CONCLUSION-  1. Normal left ventricular systolic function. 2. Mild aortic insufficiency. 3. Mild tricuspid regurgitation. Echo: July 17:  Summary   Normal left ventricle size, wall thickness and systolic function with an   estimated ejection fraction of 60%. No regional wall motion abnormalities   are seen. E/e\"= 8. Aortic valve appears sclerotic but opens adequately. No stenosis.    Mild aortic regurgitation is present. Pressure half-time is calculated at   656 msec. Mild-moderate tricuspid regurgitation with RVSP estimated at 33 mmHg. Mild-to-moderate pulmonic regurgitation. Assessment:     1. CAD (coronary artery disease)   ~stable : denies angina  ~non-obst disease by cath '12  ~mild-mod TR & MA on echo '17  ~statin /  BB   ~states ASA stopped    2. Mixed hyperlipidemia   ~HDL high : well controlled   ~ low dose simvastatin    3. Palpitations   ~stable : denies palpitations   ~tolerating low dose metoprolol   ~thyroid managed by PCP  ~K+ WNL   4. TIA (transient ischemic attack)   ~offers no c/o stroke-like symptoms  ~follows with Dr. Sera Robison (sees for Parkinson's)statin   ~last carotid US: EULALIO 40% '10      I had the opportunity to review the clinical symptoms and presentation of Keith Hebert. Plan:     1. EKG : sinus rhtyhm, PAC   ~resume ASA 81 mg daily  2. Echocardiogram reassess LVF : last test remote  3. Fasting lipid profile / LFT as routine  4. F/U in 12 months     Overall the patient is stable from CV standpoint    I have addresed the patient's cardiac risk factors and adjusted pharmacologic treatment as needed. In addition, I have reinforced the need for patient directed risk factor modification. Further evaluation will be based upon the patient's clinical course and testing results. All questions and concerns were addressed to the patient. Alternatives to my treatment were discussed. The patient is not currently smoking. The risks related to smoking were reviewed with the patient. Recommend maintaining a smoke-free lifestyle. Patient is not on a beta-blocker : neg MI   Patient is not on an ace-I : neg CHF  Patient is on a statin     Antiplatelet therapy has been recommended / prescribed for this patient. Education conducted on adverse reactions including bleeding was discussed. The patient verbalizes understanding not to stop medications without discussing with us.     Discussed

## 2021-09-10 NOTE — PATIENT INSTRUCTIONS
Resume aspirin 81 mg daily     Echocardiogram to look at your heart's squeeze and function     appt in one year / test dependent

## 2021-09-13 ENCOUNTER — OFFICE VISIT (OUTPATIENT)
Dept: ENDOCRINOLOGY | Age: 81
End: 2021-09-13
Payer: MEDICARE

## 2021-09-13 VITALS
DIASTOLIC BLOOD PRESSURE: 68 MMHG | OXYGEN SATURATION: 99 % | RESPIRATION RATE: 16 BRPM | TEMPERATURE: 98 F | SYSTOLIC BLOOD PRESSURE: 118 MMHG | HEART RATE: 91 BPM | HEIGHT: 62 IN | BODY MASS INDEX: 18 KG/M2 | WEIGHT: 97.8 LBS

## 2021-09-13 DIAGNOSIS — E78.2 MIXED HYPERLIPIDEMIA: ICD-10-CM

## 2021-09-13 DIAGNOSIS — E03.9 ACQUIRED HYPOTHYROIDISM: ICD-10-CM

## 2021-09-13 DIAGNOSIS — R63.6 UNDERWEIGHT: ICD-10-CM

## 2021-09-13 DIAGNOSIS — I25.10 CORONARY ARTERY DISEASE INVOLVING NATIVE CORONARY ARTERY OF NATIVE HEART WITHOUT ANGINA PECTORIS: ICD-10-CM

## 2021-09-13 DIAGNOSIS — E06.3 HASHIMOTO'S THYROIDITIS: ICD-10-CM

## 2021-09-13 DIAGNOSIS — M81.0 OSTEOPOROSIS, UNSPECIFIED OSTEOPOROSIS TYPE, UNSPECIFIED PATHOLOGICAL FRACTURE PRESENCE: ICD-10-CM

## 2021-09-13 PROCEDURE — G8427 DOCREV CUR MEDS BY ELIG CLIN: HCPCS | Performed by: INTERNAL MEDICINE

## 2021-09-13 PROCEDURE — G8418 CALC BMI BLW LOW PARAM F/U: HCPCS | Performed by: INTERNAL MEDICINE

## 2021-09-13 PROCEDURE — 1090F PRES/ABSN URINE INCON ASSESS: CPT | Performed by: INTERNAL MEDICINE

## 2021-09-13 PROCEDURE — G8399 PT W/DXA RESULTS DOCUMENT: HCPCS | Performed by: INTERNAL MEDICINE

## 2021-09-13 PROCEDURE — 1036F TOBACCO NON-USER: CPT | Performed by: INTERNAL MEDICINE

## 2021-09-13 PROCEDURE — 1123F ACP DISCUSS/DSCN MKR DOCD: CPT | Performed by: INTERNAL MEDICINE

## 2021-09-13 PROCEDURE — 3051F HG A1C>EQUAL 7.0%<8.0%: CPT | Performed by: INTERNAL MEDICINE

## 2021-09-13 PROCEDURE — 99215 OFFICE O/P EST HI 40 MIN: CPT | Performed by: INTERNAL MEDICINE

## 2021-09-13 PROCEDURE — 4040F PNEUMOC VAC/ADMIN/RCVD: CPT | Performed by: INTERNAL MEDICINE

## 2021-09-13 RX ORDER — ASPIRIN 81 MG/1
81 TABLET ORAL NIGHTLY
COMMUNITY

## 2021-09-13 NOTE — PROGRESS NOTES
Ramiro Allen is a 80 y.o. female who presents for Type 1 diabetes mellitus. Current symptoms/problems include fluctuating BG levels and show no change. 1.  Type 1 diabetes, uncontrolled, with neuropathy ([facility]) [E10.40, E10.65]    Diagnosed with Type 1 diabetes mellitus in 1999. Low BG at night. Comorbid conditions: hyperlipidemia, Neuropathy, Retinopathy, Chronic Kidney Disease and Coronary Artery Disease    Current diabetic medications include: Novolog    On Medtronic Minimed insulin pump and Dexcom CGM    Intolerance to diabetes medications: No     Weight trend: stable  Prior visit with dietician: yes  Current diet: on average, 3 meals per day  Current exercise: walks     Current monitoring regimen: home blood tests - 8 times daily  Has brought blood glucose log/meter:  Yes  Home blood sugar records: fasting range: 100-150 and postprandial range:   Any episodes of hypoglycemia? Yes  Hypoglycemia frequency and time(s):  daily  Does patient have Glucagon emergency kit? No  Does patient have rapid acting carbohydrate? Yes  Does patient wear a medic alert bracelet or necklace? No    2. Osteoporosis, unspecified osteoporosis type, unspecified pathological fracture presence  Reclast 5 years. Not on any meds now. On vitamin D 2000 IU, calcium in food    3. Hashimoto's thyroiditis  Has fatigue. 4. Acquired hypothyroidism  On levothyroxine 0.1 mg qd. Has fatigue. 5. Coronary artery disease involving native coronary artery of native heart without angina pectoris  No chest pain. 6. Type 1 diabetes, uncontrolled, with retinopathy ([facility]  No recent vision changes. 7.  Underweight  Lost 42 lbs not intentionally. No nausea, vomiting.     8.  Hyperlipidemia  No muscle pain      Past Medical History:   Diagnosis Date    CAD (coronary artery disease) 1/2012    non obstructive    Diabetes mellitus with neurological manifestation ([facility])     Hyperlipidemia     Hyperlipidemia     Hypertension  Hypertension     Hypothyroidism (acquired)     Insomnia     Neuropathy     Parkinson disease (HCC)     Parkinson disease ([facility]     Parkinson disease ([facility]     Renal insufficiency     Restless legs syndrome     Type I (juvenile type) diabetes mellitus without mention of complication, not stated as uncontrolled       Patient Active Problem List   Diagnosis    Diabetic polyneuropathy ([facility]    Mixed hyperlipidemia    Hashimoto's thyroiditis    Acquired hypothyroidism    [facility], [facility]    Chest pain    CAD (coronary artery disease)    Rotator cuff tendonitis    Osteoporosis    Type 1 diabetes, uncontrolled, with retinopathy ([facility]    Hypertension    Parkinson disease ([facility]    Underweight    Essential hypertension, benign     Past Surgical History:   Procedure Laterality Date    CARDIAC CATHETERIZATION  2012    non obstructive CAD    COLONOSCOPY      DENTAL SURGERY      TONSILLECTOMY       Social History     Socioeconomic History    Marital status:      Spouse name: Not on file    Number of children: Not on file    Years of education: Not on file    Highest education level: Not on file   Occupational History    Not on file   Tobacco Use    Smoking status: Former Smoker     Packs/day: 0.25     Years: 4.00     Pack years: 1.00     Types: Cigarettes     Quit date: 1966     Years since quittin.7    Smokeless tobacco: Never Used   Vaping Use    Vaping Use: Never used   Substance and Sexual Activity    Alcohol use:  Yes     Alcohol/week: 1.0 standard drinks     Types: 1 Glasses of wine per week     Comment: social    Drug use: No    Sexual activity: Never     Partners: Male   Other Topics Concern    Not on file   Social History Narrative    Not on file     Social Determinants of Health     Financial Resource Strain: Low Risk     Difficulty of Paying Living Expenses: Not hard at all   Food Insecurity: No Food Insecurity    Worried About [address] in the Last Year: Never true    Marissa of Food in the Last Year: Never true   Transportation Needs:     Lack of Transportation (Medical):  Lack of Transportation (Non-Medical):    Physical Activity:     Days of Exercise per Week:     Minutes of Exercise per Session:    Stress:     Feeling of Stress :    Social Connections:     Frequency of Communication with Friends and Family:     Frequency of Social Gatherings with Friends and Family:     Attends Hindu Services:     Active Member of Clubs or Organizations:     Attends Club or Organization Meetings:     Marital Status:    Intimate Partner Violence:     Fear of Current or Ex-Partner:     Emotionally Abused:     Physically Abused:     Sexually Abused:      Family History   Problem Relation Age of Onset    Cancer Father     Heart Disease Father     Heart Disease Mother     Cancer Other         sister's daughter - Melanoma    Heart Disease Brother     Arthritis Sister     Diabetes Neg Hx     Stroke Neg Hx     Osteoporosis Neg Hx     Thyroid Disease Neg Hx      Current Outpatient Medications   Medication Sig Dispense Refill    aspirin 81 MG EC tablet Take 81 mg by mouth daily      simvastatin (ZOCOR) 20 MG tablet Take 1 tablet by mouth nightly 90 tablet 3    levothyroxine (SYNTHROID) 100 MCG tablet TAKE 1 TABLET DAILY 90 tablet 3    insulin aspart (NOVOLOG) 100 UNIT/ML injection vial INJECT TOTAL DAILY DOSE 30 UNITS SUBCUTANEOUSLY WITH INSULIN PUMP.  90 mL 3    denosumab (PROLIA) 60 MG/ML SOSY SC injection Inject 1 mL into the skin once for 1 dose (Patient taking differently: Inject 60 mg into the skin every 6 months ) 1 mL 0    pramipexole (MIRAPEX) 0.5 MG tablet Take 0.5 mg by mouth 2 times daily 2 tabs with dinner, 1 tab at HS      Multiple Vitamins-Minerals (CENTRUM SILVER) TABS Take by mouth daily      Cholecalciferol (VITAMIN D3) 2000 UNITS CAPS Take 1 capsule by mouth daily      TIANA MICROLET LANCETS MISC 1 each by Does not apply route 8 times daily Brittle Diabetes Dx Code 250.61 (Patient taking differently: 1 each by Does not apply route 8 times daily Retinopathy DX Code E10.49 & Peripheal Neuropathy DX Code E10.65) 240 each 3    carbidopa-levodopa (SINEMET)  MG per tablet Take 1 tablet by mouth 4 times daily       Insulin Infusion Pump (PARADIGM INSULIN PUMP) by Does not apply route.  GVOKE HYPOPEN 2-PACK 1 MG/0.2ML SOAJ Use for low glucose emergencies, may repeat in 20 min (Patient not taking: Reported on 2021) 2 Syringe 3     No current facility-administered medications for this visit.      No Known Allergies  Family Status   Relation Name Status    Father      Mother  Alive    Other niece Alive    Brother  [de-identified]    Sister  Alive    Neg Hx  (Not Specified)       Lab Review:    Lab Results   Component Value Date    WBC 4.5 2021    HGB 11.7 2021    HGB 9.6 2021    HCT 34.7 2021    MCV 94.5 2021     2021     Lab Results   Component Value Date     2021    K 4.2 2021     2021    CO2 31 2021    BUN 19 2021    CREATININE 0.9 2021    GLUCOSE 158 2021    GLUCOSE 152 2012    CALCIUM 9.4 2021    PROT 6.6 2021    PROT 6.9 2012    LABALBU 4.0 2021    BILITOT 0.5 2021    ALKPHOS 66 2021    AST 27 2021    ALT 8 2021    LABGLOM >60 2021    LABGLOM 54 2014    GFRAA >60 2021    GFRAA >60 2012    AGRATIO 1.5 2021    GLOB 2.6 2021     Lab Results   Component Value Date    TSH 3.40 2021    FT3 2.1 2021     Lab Results   Component Value Date    LABA1C 7.0 2021     Lab Results   Component Value Date    .2 2021     Lab Results   Component Value Date    CHOL 137 2021     Lab Results   Component Value Date    TRIG 45 2021     Lab Results   Component Value Date HDL 83 06/02/2021    HDL 88 01/22/2012     Lab Results   Component Value Date    LDLCALC 45 06/02/2021     Lab Results   Component Value Date    LABVLDL 9 06/02/2021     Lab Results   Component Value Date    CHOLHDLRATIO 2.1 09/12/2013     Lab Results   Component Value Date    LABMICR 1.80 06/02/2021     Lab Results   Component Value Date    VITD25 53.4 06/02/2021        Review of Systems:  Constitutional: has fatigue, no fever, no recent weight gain, has recent weight loss, has changes in appetite  Eyes: no eye pain, no change in vision, no eye redness, no eye irritation, no double vision  Ears, nose, throat: no nasal congestion, no sore throat, no earache, no decrease in hearing, no hoarseness, no dry mouth, no sinus problems, no difficulty swallowing, no neck lumps, no dental problems, no mouth sores, no ringing in ears  Pulmonary: sometimes has shortness of breath, no wheezing, has dyspnea on exertion, no cough  Cardiovascular: no chest pain, has lower extremity edema, no orthopnea, no intermittent leg claudication, no palpitations  Gastrointestinal: no abdominal pain, no nausea, no vomiting, no diarrhea, no constipation, no dysphagia, no heartburn, no bloating  Genitourinary: no dysuria, has sometimes urinary incontinence, no urinary hesitancy, no urinary frequency, no feelings of urinary urgency, no nocturia  Musculoskeletal: no joint swelling, no joint stiffness, no joint pain, no muscle cramps, no muscle pain, no bone pain  Integument/Breast: no hair loss, no skin rashes, no skin lesions, no itching, no dry skin  Neurological: no numbness, no tingling, no weakness, no confusion, no headaches, no dizziness, no fainting, no tremors, no decrease in memory, has balance problems  Psychiatric: no anxiety, no depression, no insomnia  Hematologic/Lymphatic: no tendency for easy bleeding, no swollen lymph nodes, no tendency for easy bruising  Immunology: no seasonal allergies, no frequent infections, no frequent illnesses  Endocrine: no temperature intolerance    /68   Pulse 91   Temp 98 °F (36.7 °C)   Resp 16   Ht 5' 2\" (1.575 m)   Wt 97 lb 12.8 oz (44.4 kg)   SpO2 99%   BMI 17.89 kg/m²    Wt Readings from Last 3 Encounters:   09/13/21 97 lb 12.8 oz (44.4 kg)   09/10/21 97 lb 4.8 oz (44.1 kg)   08/23/21 94 lb 8 oz (42.9 kg)     Body mass index is 17.89 kg/m². OBJECTIVE:  Constitutional: no acute distress, well appearing and well nourished  Psychiatric: oriented to person, place and time, judgement and insight and normal, recent and remote memory and intact and mood and affect are normal  Skin: skin and subcutaneous tissue is normal without mass, normal turgor  Head and Face: examination of head and face revealed no abnormalities  Eyes: no lid or conjunctival swelling, erythema or discharge, pupils are normal, equal, round, reactive to light  Ears/Nose: external inspection of ears and nose revealed no abnormalities, hearing is grossly normal  Oropharynx/Mouth/Face: lips, tongue and gums are normal with no lesions, the voice quality was normal  Neck: neck is supple and symmetric, with midline trachea and no masses, thyroid is normal  Lymphatics: normal cervical lymph nodes, normal supraclavicular nodes  Pulmonary: no increased work of breathing or signs of respiratory distress, lungs are clear to auscultation  Cardiovascular: normal heart rate and rhythm, normal S1 and S2, no murmurs and pedal pulses and 2+ bilaterally, No edema  Abdomen: abdomen is soft, non-tender with no masses  Musculoskeletal: normal gait and station and exam of the digits and nails are normal  Neurological: normal coordination and normal general cortical function    ASSESSMENT/PLAN:  1.  Type 1 diabetes, uncontrolled, with neuropathy (HCC)  Has a lot of stress  Recommend to do boluses 15 min before meal  Hypoglycemia management  Do not underestimate carbohydrates  Diabetes educator follow up  On Medtronic Minimed insulin pump and Dexcom CGM  Hemoglobin A1c 7.8-7.1-7.6-7.5-7.6-7.1-7.2-7.0  Same ratios and ratios. Average blood glucose 217±79  Bolus amount 60%, basal amount 40%  Carbohydrate intake per day 102±31  Very wide fluctuations of blood glucose anywhere from 70 to 400  - Hemoglobin A1C; Future  - Comprehensive Metabolic Panel; Future  - Lipid Panel; Future  - Microalbumin / Creatinine Urine Ratio; Future    2. Osteoporosis, unspecified osteoporosis type, unspecified pathological fracture presence  T-score -2.9 in 2007  DXA 2007 Osteoporosis, had Reclast 5 years  No upset stomach  No GERD  Worsening of bone density in a hip  Continue Prolia   Started 6/2021  25 hydroxy vitamin D 50-49-47-50.6-52.2-63.8-55.4-53.4  - Vitamin D 25 Hydroxy; Future    3. Hashimoto's thyroiditis  TSH 2.0  - T4, Free; Future  - TSH without Reflex; Future    4. Acquired hypothyroidism    TSH 2.5-3.0-2.38-1.12-2.85-7.29-3.4  - levothyroxine (SYNTHROID) 100 MCG tablet; Take 1 tablet by mouth daily      5. Coronary artery disease involving native coronary artery of native heart without angina pectoris  Follow with Cardiology    6. Type 1 diabetes, uncontrolled, with retinopathy ([facility]  - Hemoglobin A1C; Future  - Comprehensive Metabolic Panel; Future  - Lipid Panel; Future  - Microalbumin / Creatinine Urine Ratio; Future    7. Underweight  Weight stable since last appointment  Diabetes educator  Supplements with protein, adequate caloric intake is recommended  - ACTH; Future  - Cortisol AM, Total; Future  On Glucerna    8. Mixed hyperlipidemia  LDL 77-91-11-93-89-56-48-42-45  Lipid panel  Simvastatin 20 mg daily        Reviewed and/or ordered clinical lab results Yes  Reviewed and/or ordered radiology tests Yes  Reviewed and/or ordered other diagnostic tests No  Discussed test results with performing physician No  Independently reviewed image, tracing, or specimen Yes Medtronic MiniMed insulin pump data, total 7 pages.   See interpretation and recommendations and assessment and plan  Made a decision to obtain old records No  Reviewed and summarized old records Yes  TSH 3.89, HbA1C 7.7, LDL 49  Obtained history from other than patient No    Delta Theodore was counseled regarding symptoms of diabetes diagnosis, course and complications of disease if inadequately treated, side effects of medications, diagnosis, treatment options, and prognosis, risks, benefits, complications, and alternatives of treatment, labs, imaging and other studies and treatment targets and goals, insulin pump adjustments, hypoglycemia management, brittle diabetes, fluctuations of blood glucose, osteoporosis treatment, insulin pump data and interpretation, recommendations  She understands instructions and counseling. Total time I spent for this encounter 40 minutes      Return in about 3 months (around 12/13/2021) for diabetes.

## 2021-10-26 ENCOUNTER — HOSPITAL ENCOUNTER (OUTPATIENT)
Dept: NON INVASIVE DIAGNOSTICS | Age: 81
Discharge: HOME | End: 2021-10-26
Payer: MEDICARE

## 2021-10-26 DIAGNOSIS — I25.10 CORONARY ARTERY DISEASE INVOLVING NATIVE CORONARY ARTERY OF NATIVE HEART WITHOUT ANGINA PECTORIS: ICD-10-CM

## 2021-10-26 LAB
LV EF: 60 %
LVEF MODALITY: NORMAL

## 2021-10-26 PROCEDURE — 93306 TTE W/DOPPLER COMPLETE: CPT

## 2021-10-27 ENCOUNTER — TELEPHONE (OUTPATIENT)
Dept: CARDIOLOGY CLINIC | Age: 81
End: 2021-10-27

## 2021-10-27 NOTE — TELEPHONE ENCOUNTER
Called patient with instructions and told her we will call her to change appointments    VON Galloway - RADHA   10/26/2021  4:28 PM EDT       Some progression in aortic regurg; went from mild to mild-moderate.  change appt from one year to six months and see Dr. Avery Bergeron

## 2021-12-03 ASSESSMENT — LIFESTYLE VARIABLES
HAS A RELATIVE, FRIEND, DOCTOR, OR ANOTHER HEALTH PROFESSIONAL EXPRESSED CONCERN ABOUT YOUR DRINKING OR SUGGESTED YOU CUT DOWN: NO
HOW OFTEN DURING THE LAST YEAR HAVE YOU NEEDED AN ALCOHOLIC DRINK FIRST THING IN THE MORNING TO GET YOURSELF GOING AFTER A NIGHT OF HEAVY DRINKING: 0
HOW OFTEN DO YOU HAVE SIX OR MORE DRINKS ON ONE OCCASION: NEVER
HOW OFTEN DURING THE LAST YEAR HAVE YOU BEEN UNABLE TO REMEMBER WHAT HAPPENED THE NIGHT BEFORE BECAUSE YOU HAD BEEN DRINKING: 0
AUDIT-C TOTAL SCORE: 1
HOW OFTEN DO YOU HAVE A DRINK CONTAINING ALCOHOL: 1
HOW OFTEN DURING THE LAST YEAR HAVE YOU BEEN UNABLE TO REMEMBER WHAT HAPPENED THE NIGHT BEFORE BECAUSE YOU HAD BEEN DRINKING: NEVER
HOW OFTEN DURING THE LAST YEAR HAVE YOU HAD A FEELING OF GUILT OR REMORSE AFTER DRINKING: NEVER
HOW OFTEN DURING THE LAST YEAR HAVE YOU FOUND THAT YOU WERE NOT ABLE TO STOP DRINKING ONCE YOU HAD STARTED: 0
HAVE YOU OR SOMEONE ELSE BEEN INJURED AS A RESULT OF YOUR DRINKING: NO
HOW OFTEN DO YOU HAVE A DRINK CONTAINING ALCOHOL: MONTHLY OR LESS
AUDIT TOTAL SCORE: 0
HAS A RELATIVE, FRIEND, DOCTOR, OR ANOTHER HEALTH PROFESSIONAL EXPRESSED CONCERN ABOUT YOUR DRINKING OR SUGGESTED YOU CUT DOWN: 0
HOW OFTEN DURING THE LAST YEAR HAVE YOU NEEDED AN ALCOHOLIC DRINK FIRST THING IN THE MORNING TO GET YOURSELF GOING AFTER A NIGHT OF HEAVY DRINKING: NEVER
AUDIT-C TOTAL SCORE: 0
HOW OFTEN DURING THE LAST YEAR HAVE YOU FAILED TO DO WHAT WAS NORMALLY EXPECTED FROM YOU BECAUSE OF DRINKING: 0
HOW OFTEN DURING THE LAST YEAR HAVE YOU FOUND THAT YOU WERE NOT ABLE TO STOP DRINKING ONCE YOU HAD STARTED: NEVER
HOW MANY STANDARD DRINKS CONTAINING ALCOHOL DO YOU HAVE ON A TYPICAL DAY: ONE OR TWO
HOW OFTEN DURING THE LAST YEAR HAVE YOU HAD A FEELING OF GUILT OR REMORSE AFTER DRINKING: 0
HOW OFTEN DO YOU HAVE SIX OR MORE DRINKS ON ONE OCCASION: 0
HOW OFTEN DURING THE LAST YEAR HAVE YOU FAILED TO DO WHAT WAS NORMALLY EXPECTED FROM YOU BECAUSE OF DRINKING: NEVER
AUDIT TOTAL SCORE: 1
HOW MANY STANDARD DRINKS CONTAINING ALCOHOL DO YOU HAVE ON A TYPICAL DAY: 0
HAVE YOU OR SOMEONE ELSE BEEN INJURED AS A RESULT OF YOUR DRINKING: 0

## 2021-12-03 ASSESSMENT — PATIENT HEALTH QUESTIONNAIRE - PHQ9
1. LITTLE INTEREST OR PLEASURE IN DOING THINGS: 1
SUM OF ALL RESPONSES TO PHQ QUESTIONS 1-9: 1
2. FEELING DOWN, DEPRESSED OR HOPELESS: 0
SUM OF ALL RESPONSES TO PHQ QUESTIONS 1-9: 1
SUM OF ALL RESPONSES TO PHQ QUESTIONS 1-9: 1
SUM OF ALL RESPONSES TO PHQ9 QUESTIONS 1 & 2: 1

## 2021-12-08 DIAGNOSIS — M81.0 OSTEOPOROSIS, UNSPECIFIED OSTEOPOROSIS TYPE, UNSPECIFIED PATHOLOGICAL FRACTURE PRESENCE: ICD-10-CM

## 2021-12-08 DIAGNOSIS — E03.9 ACQUIRED HYPOTHYROIDISM: ICD-10-CM

## 2021-12-08 DIAGNOSIS — E78.2 MIXED HYPERLIPIDEMIA: ICD-10-CM

## 2021-12-08 DIAGNOSIS — E06.3 HASHIMOTO'S THYROIDITIS: ICD-10-CM

## 2021-12-08 LAB
A/G RATIO: 1.9 (ref 1.1–2.2)
ALBUMIN SERPL-MCNC: 4.5 G/DL (ref 3.4–5)
ALP BLD-CCNC: 59 U/L (ref 40–129)
ALT SERPL-CCNC: 27 U/L (ref 10–40)
ANION GAP SERPL CALCULATED.3IONS-SCNC: 13 MMOL/L (ref 3–16)
AST SERPL-CCNC: 32 U/L (ref 15–37)
BILIRUB SERPL-MCNC: 0.8 MG/DL (ref 0–1)
BUN BLDV-MCNC: 21 MG/DL (ref 7–20)
CALCIUM SERPL-MCNC: 9.8 MG/DL (ref 8.3–10.6)
CHLORIDE BLD-SCNC: 100 MMOL/L (ref 99–110)
CHOLESTEROL, TOTAL: 158 MG/DL (ref 0–199)
CO2: 28 MMOL/L (ref 21–32)
CREAT SERPL-MCNC: 0.8 MG/DL (ref 0.6–1.2)
CREATININE URINE: 79.7 MG/DL (ref 28–259)
ESTIMATED AVERAGE GLUCOSE: 157.1 MG/DL
GFR AFRICAN AMERICAN: >60
GFR NON-AFRICAN AMERICAN: >60
GLUCOSE BLD-MCNC: 110 MG/DL (ref 70–99)
HBA1C MFR BLD: 7.1 %
HDLC SERPL-MCNC: 102 MG/DL (ref 40–60)
LDL CHOLESTEROL CALCULATED: 45 MG/DL
MICROALBUMIN UR-MCNC: 1.9 MG/DL
MICROALBUMIN/CREAT UR-RTO: 23.8 MG/G (ref 0–30)
POTASSIUM SERPL-SCNC: 3.9 MMOL/L (ref 3.5–5.1)
SODIUM BLD-SCNC: 141 MMOL/L (ref 136–145)
T3 FREE: 2.2 PG/ML (ref 2.3–4.2)
T4 FREE: 1.7 NG/DL (ref 0.9–1.8)
TOTAL PROTEIN: 6.9 G/DL (ref 6.4–8.2)
TRIGL SERPL-MCNC: 57 MG/DL (ref 0–150)
TSH SERPL DL<=0.05 MIU/L-ACNC: 5.05 UIU/ML (ref 0.27–4.2)
VITAMIN D 25-HYDROXY: 63.3 NG/ML
VLDLC SERPL CALC-MCNC: 11 MG/DL

## 2021-12-09 LAB
CATARACTS: NEGATIVE
DIABETIC RETINOPATHY: NORMAL
GLAUCOMA: NEGATIVE
INTRAOCULAR PRESSURE EYE: NORMAL
VISUAL ACUITY DISTANCE LEFT EYE: NORMAL
VISUAL ACUITY DISTANCE RIGHT EYE: NORMAL

## 2021-12-10 ENCOUNTER — OFFICE VISIT (OUTPATIENT)
Dept: FAMILY MEDICINE CLINIC | Age: 81
End: 2021-12-10
Payer: MEDICARE

## 2021-12-10 VITALS
DIASTOLIC BLOOD PRESSURE: 66 MMHG | SYSTOLIC BLOOD PRESSURE: 131 MMHG | HEART RATE: 69 BPM | HEIGHT: 62 IN | WEIGHT: 96.25 LBS | OXYGEN SATURATION: 100 % | BODY MASS INDEX: 17.71 KG/M2

## 2021-12-10 DIAGNOSIS — I25.10 CORONARY ARTERY DISEASE INVOLVING NATIVE CORONARY ARTERY OF NATIVE HEART WITHOUT ANGINA PECTORIS: Primary | ICD-10-CM

## 2021-12-10 DIAGNOSIS — M81.0 OSTEOPOROSIS, UNSPECIFIED OSTEOPOROSIS TYPE, UNSPECIFIED PATHOLOGICAL FRACTURE PRESENCE: ICD-10-CM

## 2021-12-10 DIAGNOSIS — E78.2 MIXED HYPERLIPIDEMIA: ICD-10-CM

## 2021-12-10 DIAGNOSIS — E03.9 ACQUIRED HYPOTHYROIDISM: ICD-10-CM

## 2021-12-10 DIAGNOSIS — I10 ESSENTIAL HYPERTENSION, BENIGN: ICD-10-CM

## 2021-12-10 DIAGNOSIS — E06.3 HASHIMOTO'S THYROIDITIS: ICD-10-CM

## 2021-12-10 DIAGNOSIS — G20 PARKINSON DISEASE (HCC): ICD-10-CM

## 2021-12-10 DIAGNOSIS — Z00.00 ROUTINE GENERAL MEDICAL EXAMINATION AT A HEALTH CARE FACILITY: ICD-10-CM

## 2021-12-10 PROCEDURE — 4040F PNEUMOC VAC/ADMIN/RCVD: CPT | Performed by: FAMILY MEDICINE

## 2021-12-10 PROCEDURE — G8482 FLU IMMUNIZE ORDER/ADMIN: HCPCS | Performed by: FAMILY MEDICINE

## 2021-12-10 PROCEDURE — 1123F ACP DISCUSS/DSCN MKR DOCD: CPT | Performed by: FAMILY MEDICINE

## 2021-12-10 PROCEDURE — G0439 PPPS, SUBSEQ VISIT: HCPCS | Performed by: FAMILY MEDICINE

## 2021-12-10 PROCEDURE — 3051F HG A1C>EQUAL 7.0%<8.0%: CPT | Performed by: FAMILY MEDICINE

## 2021-12-10 RX ORDER — CARBIDOPA AND LEVODOPA 50; 200 MG/1; MG/1
1 TABLET, EXTENDED RELEASE ORAL NIGHTLY
COMMUNITY
Start: 2021-11-05

## 2021-12-10 NOTE — PROGRESS NOTES
Medicare Annual Wellness Visit  Name: Richy Parmar Date: 12/10/2021   MRN: [id number] Sex: Female   Age: 80 y.o. Ethnicity: Non- / Non    : 1940 Race: White (non-)      Radha Garcia is here for Medicare AWV  lives at home with   Mild urinary leakage, wears a pad  Screenings for behavioral, psychosocial and functional/safety risks, and cognitive dysfunction are all negative except as indicated below. These results, as well as other patient data from the [address] form, are documented in Flowsheets linked to this Encounter. No Known Allergies    Prior to Visit Medications    Medication Sig Taking? Authorizing Provider   aspirin 81 MG EC tablet Take 81 mg by mouth daily Yes Historical Provider, MD   simvastatin (ZOCOR) 20 MG tablet Take 1 tablet by mouth nightly Yes Mellissa Kim MD   levothyroxine (SYNTHROID) 100 MCG tablet TAKE 1 TABLET DAILY Yes Mellissa Kim MD   insulin aspart (NOVOLOG) 100 UNIT/ML injection vial INJECT TOTAL DAILY DOSE 30 UNITS SUBCUTANEOUSLY WITH INSULIN PUMP.  Yes Mellissa Kim MD   Mariah Alter 2-PACK 1 MG/0.2ML SOAJ Use for low glucose emergencies, may repeat in 20 min Yes Mellissa Kim MD   pramipexole (MIRAPEX) 0.5 MG tablet Take 0.5 mg by mouth 2 times daily 2 tabs with dinner, 1 tab at HS Yes Historical Provider, MD   Multiple Vitamins-Minerals (CENTRUM SILVER) TABS Take by mouth daily Yes Historical Provider, MD   Cholecalciferol (VITAMIN D3) 2000 UNITS CAPS Take 1 capsule by mouth daily Yes Historical Provider, MD   TIANA MICROLET LANCETS MISC 1 each by Does not apply route 8 times daily Brittle Diabetes Dx Code 250.61  Patient taking differently: 1 each by Does not apply route 8 times daily Retinopathy DX Code E10.49 & Peripheal Neuropathy DX Code E10.65 Yes Judith Clayton MD   carbidopa-levodopa (SINEMET)  MG per tablet Take 1 tablet by mouth 4 times daily  Yes Historical Provider, MD   Insulin Infusion Pump (PARADIGM INSULIN PUMP) by Does not apply route.  Yes Historical Provider, MD   carbidopa-levodopa (SINEMET CR)  MG per extended release tablet   Historical Provider, MD   denosumab (PROLIA) 60 MG/ML SOSY SC injection Inject 1 mL into the skin once for 1 dose  Patient taking differently: Inject 60 mg into the skin every 6 months   Maria Elena Savage MD       Past Medical History:   Diagnosis Date    CAD (coronary artery disease) 1/2012    non obstructive    Diabetes mellitus with neurological manifestation (HCC)     Hyperlipidemia     Hyperlipidemia     Hypertension     Hypertension     Hypothyroidism (acquired)     Insomnia     Neuropathy     Parkinson disease ([address]     Parkinson disease ([address]     Parkinson disease ([address]     Renal insufficiency     Restless legs syndrome     Type I (juvenile type) diabetes mellitus without mention of complication, not stated as uncontrolled        Past Surgical History:   Procedure Laterality Date    CARDIAC CATHETERIZATION  1/24/2012    non obstructive CAD    COLONOSCOPY      DENTAL SURGERY      TONSILLECTOMY         Family History   Problem Relation Age of Onset    Cancer Father     Heart Disease Father     Heart Disease Mother     Cancer Other         sister's daughter - Melanoma    Heart Disease Brother     Arthritis Sister     Diabetes Neg Hx     Stroke Neg Hx     Osteoporosis Neg Hx     Thyroid Disease Neg Hx        CareTeam (Including outside providers/suppliers regularly involved in providing care):   Patient Care Team:  Wale Barron MD as PCP - Shantelle Caban MD as PCP - [facility] Empaneled Provider  VON Dodson - CNP as Nurse Practitioner (Nurse Practitioner)  Davion Omer MD  optifeanyi - Margret  Dentist/periodontist  Neurology - Emerson  Endo - Montse Girt  - new  Wt Readings from Last 3 Encounters:   12/10/21 96 lb 4 oz (43.7 kg)   09/13/21 97 lb 12.8 oz (44.4 kg)   09/10/21 97 lb 4.8 oz (44.1 kg)     Vitals: 12/10/21 1005 12/10/21 1007   BP: (!) 144/74 131/66   Pulse: 69    SpO2: 100%    Weight: 96 lb 4 oz (43.7 kg)    Height: 5' 2\" (1.575 m)      Body mass index is 17.6 kg/m². Based upon direct observation of the patient, evaluation of cognition reveals recent and remote memory intact. General Appearance: alert and oriented to person, place and time, well-developed and well-nourished, in no acute distress, thin  ENT: tympanic membrane, external ear and ear canal normal bilaterally, oropharynx clear and moist with normal mucous membranes  Neck: neck supple and non tender without mass, no thyromegaly or thyroid nodules, no cervical lymphadenopathy   Pulmonary/Chest: clear to auscultation bilaterally- no wheezes, rales or rhonchi, normal air movement, no respiratory distress  Cardiovascular: normal rate, normal S1 and S2, no gallops, intact distal pulses and no carotid bruits  Abdomen: soft, non-tender, non-distended, normal bowel sounds, no masses or organomegaly, has insulin pump  Extremities: no cyanosis and no clubbing    Patient's complete Health Risk Assessment and screening values have been reviewed and are found in Flowsheets. The following problems were reviewed today and where indicated follow up appointments were made and/or referrals ordered. Positive Risk Factor Screenings with Interventions:          General Health and ACP:  General  In general, how would you say your health is?: Good  In the past 7 days, have you experienced any of the following?  New or Increased Pain, New or Increased Fatigue, Loneliness, Social Isolation, Stress or Anger?: (!) Stress  Do you get the social and emotional support that you need?: Yes  Do you have a Living Will?: Yes  Advance Directives     Power of  Living Will ACP-Advance Directive ACP-Power of     Not on File Not on File Not on File Not on File      General Health Risk Interventions:  · Stress: relaxation techniques discussed    Health Habits/Nutrition:  Health Habits/Nutrition  Do you exercise for at least 20 minutes 2-3 times per week?: (!) No  Have you lost any weight without trying in the past 3 months?: No  Do you eat only one meal per day?: No  Have you seen the dentist within the past year?: Yes  Body mass index: (!) 17.6  Health Habits/Nutrition Interventions:  · Inadequate physical activity:  patient is not ready to increase his/her physical activity level at this time     Safety:  Safety  Do you have working smoke detectors?: Yes  Have all throw rugs been removed or fastened?: (!) No  Do you have non-slip mats or surfaces in all bathtubs/showers?: Yes  Do all of your stairways have a railing or banister?: Yes  Are your doorways, halls and stairs free of clutter?: Yes  Do you always fasten your seatbelt when you are in a car?: Yes  Safety Interventions:  · Home safety tips provided     Personalized Preventive Plan   Current Health Maintenance Status  Immunization History   Administered Date(s) Administered    COVID-19, [place], PF, 30mcg/0.3mL 01/28/2021, 02/19/2021, 09/28/2021    Influenza A ([id number]) Vaccine PF IM 01/22/2010    Influenza Vaccine, unspecified formulation 10/24/2016    Influenza Virus Vaccine 09/17/2013    Influenza Whole 09/17/2013    Influenza, High Dose (Fluzone 65 yrs and older) 11/25/2014, 10/30/2017, 09/19/2019, 09/08/2021    Influenza, Quadv, adjuvanted, 65 yrs +, IM, PF (Fluad) 10/05/2020    Pneumococcal Conjugate 13-valent ([id number]) 10/24/2016    Pneumococcal Conjugate 7-valent (Prevnar7) 09/19/2019    Pneumococcal Polysaccharide ([id number]) 11/12/2008, 09/19/2019    Td, unspecified formulation 06/04/1998, 04/23/2012    Tdap (Boostrix, Adacel) 06/26/2017    Zoster Live (Zostavax) 04/17/2009, 01/17/2013    Zoster Recombinant (Shingrix) 09/19/2019, 01/14/2020        Health Maintenance   Topic Date Due    Annual Wellness Visit (AWV)  12/09/2021    Lipid screen  12/08/2022    TSH testing 12/08/2022    Potassium monitoring  12/08/2022    Creatinine monitoring  12/08/2022    DTaP/Tdap/Td vaccine (2 - Td or Tdap) 06/26/2027    DEXA (modify frequency per FRAX score)  Completed    Flu vaccine  Completed    Shingles Vaccine  Completed    Pneumococcal 65+ years Vaccine  Completed    COVID-19 Vaccine  Completed    Hepatitis A vaccine  Aged Out    Hib vaccine  Aged Out    Meningococcal (ACWY) vaccine  Aged Out     Recommendations for Inverted Edge Due: see orders and patient instructions/AVS.  . Recommended screening schedule for the next 5-10 years is provided to the patient in written form: see Patient Yoandy Blue was seen today for medicare awv.     Diagnoses and all orders for this visit:  Labs - reviewed  Echo - reviewed  Coronary artery disease involving native coronary artery of native heart without angina pectoris  Mixed hyperlipidemia  Acquired hypothyroidism  Essential hypertension, benign  Hashimoto's thyroiditis  Osteoporosis, unspecified osteoporosis type, unspecified pathological fracture presence  Parkinson disease ([facility]  Type 1 diabetes, uncontrolled, with neuropathy ([facility]  Routine general medical examination at a health care facility  IMM UTD    Inc glucerna to 2 cans a day

## 2021-12-10 NOTE — PATIENT INSTRUCTIONS
Personalized Preventive Plan for Johanna Deleon - 12/10/2021  Medicare offers a range of preventive health benefits. Some of the tests and screenings are paid in full while other may be subject to a deductible, co-insurance, and/or copay. Some of these benefits include a comprehensive review of your medical history including lifestyle, illnesses that may run in your family, and various assessments and screenings as appropriate. After reviewing your medical record and screening and assessments performed today your provider may have ordered immunizations, labs, imaging, and/or referrals for you. A list of these orders (if applicable) as well as your Preventive Care list are included within your After Visit Summary for your review. Other Preventive Recommendations:    · A preventive eye exam performed by an eye specialist is recommended every 1-2 years to screen for glaucoma; cataracts, macular degeneration, and other eye disorders. · A preventive dental visit is recommended every 6 months. · Try to get at least 150 minutes of exercise per week or 10,000 steps per day on a pedometer . · Order or download the FREE \"Exercise & Physical Activity: Your Everyday Guide\" from [employer]. Call 2-505.811.6832 or search [employer] online. · You need 3223-2003 mg of calcium and 4388-6552 IU of vitamin D per day. It is possible to meet your calcium requirement with diet alone, but a vitamin D supplement is usually necessary to meet this goal.  · When exposed to the sun, use a sunscreen that protects against both UVA and UVB radiation with an SPF of 30 or greater. Reapply every 2 to 3 hours or after sweating, drying off with a towel, or swimming. · Always wear a seat belt when traveling in a car. Always wear a helmet when riding a bicycle or motorcycle.

## 2021-12-16 ENCOUNTER — TELEPHONE (OUTPATIENT)
Dept: ENDOCRINOLOGY | Age: 81
End: 2021-12-16

## 2021-12-20 ENCOUNTER — OFFICE VISIT (OUTPATIENT)
Dept: ENDOCRINOLOGY | Age: 81
End: 2021-12-20
Payer: MEDICARE

## 2021-12-20 VITALS
HEIGHT: 62 IN | OXYGEN SATURATION: 99 % | WEIGHT: 96.4 LBS | SYSTOLIC BLOOD PRESSURE: 126 MMHG | BODY MASS INDEX: 17.74 KG/M2 | HEART RATE: 76 BPM | RESPIRATION RATE: 14 BRPM | TEMPERATURE: 98 F | DIASTOLIC BLOOD PRESSURE: 80 MMHG

## 2021-12-20 DIAGNOSIS — E78.2 MIXED HYPERLIPIDEMIA: ICD-10-CM

## 2021-12-20 DIAGNOSIS — M81.0 OSTEOPOROSIS, UNSPECIFIED OSTEOPOROSIS TYPE, UNSPECIFIED PATHOLOGICAL FRACTURE PRESENCE: ICD-10-CM

## 2021-12-20 DIAGNOSIS — E06.3 HASHIMOTO'S THYROIDITIS: ICD-10-CM

## 2021-12-20 DIAGNOSIS — R63.6 UNDERWEIGHT: ICD-10-CM

## 2021-12-20 DIAGNOSIS — E03.9 ACQUIRED HYPOTHYROIDISM: ICD-10-CM

## 2021-12-20 DIAGNOSIS — I25.10 CORONARY ARTERY DISEASE INVOLVING NATIVE CORONARY ARTERY OF NATIVE HEART WITHOUT ANGINA PECTORIS: ICD-10-CM

## 2021-12-20 PROCEDURE — 3051F HG A1C>EQUAL 7.0%<8.0%: CPT | Performed by: INTERNAL MEDICINE

## 2021-12-20 PROCEDURE — 99215 OFFICE O/P EST HI 40 MIN: CPT | Performed by: INTERNAL MEDICINE

## 2021-12-20 PROCEDURE — G8427 DOCREV CUR MEDS BY ELIG CLIN: HCPCS | Performed by: INTERNAL MEDICINE

## 2021-12-20 PROCEDURE — 1090F PRES/ABSN URINE INCON ASSESS: CPT | Performed by: INTERNAL MEDICINE

## 2021-12-20 PROCEDURE — G8399 PT W/DXA RESULTS DOCUMENT: HCPCS | Performed by: INTERNAL MEDICINE

## 2021-12-20 PROCEDURE — 4040F PNEUMOC VAC/ADMIN/RCVD: CPT | Performed by: INTERNAL MEDICINE

## 2021-12-20 PROCEDURE — 1123F ACP DISCUSS/DSCN MKR DOCD: CPT | Performed by: INTERNAL MEDICINE

## 2021-12-20 PROCEDURE — G8418 CALC BMI BLW LOW PARAM F/U: HCPCS | Performed by: INTERNAL MEDICINE

## 2021-12-20 PROCEDURE — 1036F TOBACCO NON-USER: CPT | Performed by: INTERNAL MEDICINE

## 2021-12-20 PROCEDURE — G8482 FLU IMMUNIZE ORDER/ADMIN: HCPCS | Performed by: INTERNAL MEDICINE

## 2021-12-20 RX ORDER — LEVOTHYROXINE SODIUM 0.1 MG/1
TABLET ORAL
Qty: 90 TABLET | Refills: 3 | Status: SHIPPED | OUTPATIENT
Start: 2021-12-20 | End: 2022-01-10 | Stop reason: SDUPTHER

## 2021-12-20 NOTE — PROGRESS NOTES
Lewis Parra is a 80 y.o. female who presents for Type 1 diabetes mellitus. Current symptoms/problems include fluctuating BG levels and show no change. 1.  Type 1 diabetes, uncontrolled, with neuropathy ([facility]) [E10.40, E10.65]    Diagnosed with Type 1 diabetes mellitus in 1999. Low BG at night. Comorbid conditions: hyperlipidemia, Neuropathy, Retinopathy, Chronic Kidney Disease and Coronary Artery Disease    Current diabetic medications include: Novolog    On Medtronic Minimed insulin pump and Dexcom CGM    Intolerance to diabetes medications: No     Weight trend: stable  Prior visit with dietician: yes  Current diet: on average, 3 meals per day  Current exercise: walks     Current monitoring regimen: home blood tests - 8 times daily  Has brought blood glucose log/meter:  Yes  Home blood sugar records: fasting range: 100-150 and postprandial range:   Any episodes of hypoglycemia? Yes  Hypoglycemia frequency and time(s):  daily  Does patient have Glucagon emergency kit? No  Does patient have rapid acting carbohydrate? Yes  Does patient wear a medic alert bracelet or necklace? No    2. Osteoporosis, unspecified osteoporosis type, unspecified pathological fracture presence  Reclast 5 years. Not on any meds now. On vitamin D 2000 IU, calcium in food    3. Hashimoto's thyroiditis  Has fatigue. 4. Acquired hypothyroidism  On levothyroxine 0.1 mg qd. Has fatigue. 5. Coronary artery disease involving native coronary artery of native heart without angina pectoris  No chest pain. 6. Type 1 diabetes, uncontrolled, with retinopathy ([facility]  No recent vision changes. 7.  Underweight  Lost 42 lbs not intentionally. No nausea, vomiting.     8.  Hyperlipidemia  No muscle pain      Past Medical History:   Diagnosis Date    CAD (coronary artery disease) 1/2012    non obstructive    Diabetes mellitus with neurological manifestation ([facility])     Hyperlipidemia     Hyperlipidemia     Hypothyroidism (acquired)     Insomnia     Neuropathy     [facility]     [facility]     Parkinson disease ([facility]     Renal insufficiency     Restless legs syndrome     Type I (juvenile type) diabetes mellitus without mention of complication, not stated as uncontrolled       Patient Active Problem List   Diagnosis    [facility]    Mixed hyperlipidemia    Hashimoto's thyroiditis    Acquired hypothyroidism    Type 1 diabetes, uncontrolled, with neuropathy ([facility]    Chest pain    CAD (coronary artery disease)    Rotator cuff tendonitis    Osteoporosis    Type 1 diabetes, uncontrolled, [facility] retinopathy [facility]    Parkinson disease ([facility]    Underweight     Past Surgical History:   Procedure Laterality Date    CARDIAC CATHETERIZATION  2012    non obstructive CAD    COLONOSCOPY      DENTAL SURGERY      TONSILLECTOMY       Social History     Socioeconomic History    Marital status:      Spouse name: Not on file    Number of children: Not on file    Years of education: Not on file    Highest education level: Not on file   Occupational History    Not on file   Tobacco Use    Smoking status: Former Smoker     Packs/day: 0.25     Years: 4.00     Pack years: 1.00     Types: Cigarettes     Quit date: 1966     Years since quittin.0    Smokeless tobacco: Never Used   Vaping Use    Vaping Use: Never used   Substance and Sexual Activity    Alcohol use: Not Currently     Alcohol/week: 1.0 standard drink     Types: 1 Glasses of wine per week     Comment: social    Drug use: No    Sexual activity: Never     Partners: Male   Other Topics Concern    Not on file   Social History Narrative    Not on file     Social Determinants of Health     Financial Resource Strain: Low Risk     Difficulty of Paying Living Expenses: Not hard at all   Food Insecurity: No Food Insecurity    Worried About [facility] in the Last Year: Never true    [employer]issa of [employer] in the Last Year: Never true   Transportation Needs:     Lack of Transportation (Medical): Not on file    Lack of Transportation (Non-Medical): Not on file   Physical Activity:     Days of Exercise per Week: Not on file    Minutes of Exercise per Session: Not on file   Stress:     Feeling of Stress : Not on file   Social Connections:     Frequency of Communication with Friends and Family: Not on file    Frequency of Social Gatherings with Friends and Family: Not on file    Attends Religion Services: Not on file    Active Member of Clubs or Organizations: Not on file    Attends Club or Organization Meetings: Not on file    Marital Status: Not on file   Intimate Partner Violence:     Fear of Current or Ex-Partner: Not on file    Emotionally Abused: Not on file    Physically Abused: Not on file    Sexually Abused: Not on file   Housing Stability:     Unable to Pay for Housing in the Last Year: Not on file    Number of Places Lived in the Last Year: Not on file    Unstable Housing in the Last Year: Not on file     Family History   Problem Relation Age of Onset    Cancer Father     Heart Disease Father     Heart Disease Mother     Cancer Other         sister's daughter - Melanoma    Heart Disease Brother     Arthritis Sister     Diabetes Neg Hx     Stroke Neg Hx     Osteoporosis Neg Hx     Thyroid Disease Neg Hx      Current Outpatient Medications   Medication Sig Dispense Refill    levothyroxine (SYNTHROID) 100 MCG tablet TAKE 1 TABLET DAILY 90 tablet 3    insulin aspart (NOVOLOG) 100 UNIT/ML injection vial INJECT TOTAL DAILY DOSE 30 UNITS SUBCUTANEOUSLY WITH INSULIN PUMP.  30 mL 3    carbidopa-levodopa (SINEMET CR)  MG per extended release tablet       aspirin 81 MG EC tablet Take 81 mg by mouth daily      simvastatin (ZOCOR) 20 MG tablet Take 1 tablet by mouth nightly 90 tablet 3    GVOKE HYPOPEN 2-PACK 1 MG/0.2ML SOAJ Use for low glucose emergencies, may repeat in 20 min 2 Syringe 3    denosumab (PROLIA) 60 MG/ML SOSY SC injection Inject 1 mL into the skin once for 1 dose (Patient taking differently: Inject 60 mg into the skin every 6 months ) 1 mL 0    pramipexole (MIRAPEX) 0.5 MG tablet Take 0.5 mg by mouth 2 times daily 2 tabs with dinner, 1 tab at HS      Multiple Vitamins-Minerals (CENTRUM SILVER) TABS Take by mouth daily      Cholecalciferol (VITAMIN D3) 2000 UNITS CAPS Take 1 capsule by mouth daily      TIANA MICROLET LANCETS MISC 1 each by Does not apply route 8 times daily Brittle Diabetes Dx Code 250.61 (Patient taking differently: 1 each by Does not apply route 8 times daily Retinopathy DX Code E10.49 & Peripheal Neuropathy DX Code E10.65) 240 each 3    carbidopa-levodopa (SINEMET)  MG per tablet Take 1 tablet by mouth 4 times daily       Insulin Infusion Pump (PARADIGM INSULIN PUMP) by Does not apply route. No current facility-administered medications for this visit.      No Known Allergies  Family Status   Relation Name Status    Father      Mother  Alive    Other niece Alive    Brother  [de-identified]    Sister  Alive    Neg Hx  (Not Specified)       Lab Review:    Lab Results   Component Value Date    WBC 4.5 2021    HGB 11.7 2021    HGB 9.6 2021    HCT 34.7 2021    MCV 94.5 2021     2021     Lab Results   Component Value Date     2021    K 3.9 2021     2021    CO2 28 2021    BUN 21 2021    CREATININE 0.8 2021    GLUCOSE 110 2021    GLUCOSE 152 2012    CALCIUM 9.8 2021    PROT 6.9 2021    PROT 6.9 2012    LABALBU 4.5 2021    BILITOT 0.8 2021    ALKPHOS 59 2021    AST 32 2021    ALT 27 2021    LABGLOM >60 2021    LABGLOM 54 2014    GFRAA >60 2021    GFRAA >60 2012    AGRATIO 1.9 2021    GLOB 2.6 2021     Lab Results   Component Value Date    TSH 5.05 2021 dizziness, no fainting, no tremors, no decrease in memory, has balance problems  Psychiatric: no anxiety, no depression, no insomnia  Hematologic/Lymphatic: no tendency for easy bleeding, no swollen lymph nodes, no tendency for easy bruising  Immunology: no seasonal allergies, no frequent infections, no frequent illnesses  Endocrine: no temperature intolerance    /80   Pulse 76   Temp 98 °F (36.7 °C)   Resp 14   Ht 5' 2\" (1.575 m)   Wt 96 lb 6.4 oz (43.7 kg)   SpO2 99%   BMI 17.63 kg/m²    Wt Readings from Last 3 Encounters:   12/20/21 96 lb 6.4 oz (43.7 kg)   12/10/21 96 lb 4 oz (43.7 kg)   09/13/21 97 lb 12.8 oz (44.4 kg)     Body mass index is 17.63 kg/m².       OBJECTIVE:  Constitutional: no acute distress, well appearing and well nourished  Psychiatric: oriented to person, place and time, judgement and insight and normal, recent and remote memory and intact and mood and affect are normal  Skin: skin and subcutaneous tissue is normal without mass, normal turgor  Head and Face: examination of head and face revealed no abnormalities  Eyes: no lid or conjunctival swelling, erythema or discharge, pupils are normal, equal, round, reactive to light  Ears/Nose: external inspection of ears and nose revealed no abnormalities, hearing is grossly normal  Oropharynx/Mouth/Face: lips, tongue and gums are normal with no lesions, the voice quality was normal  Neck: neck is supple and symmetric, with midline trachea and no masses, thyroid is normal  Lymphatics: normal cervical lymph nodes, normal supraclavicular nodes  Pulmonary: no increased work of breathing or signs of respiratory distress, lungs are clear to auscultation  Cardiovascular: normal heart rate and rhythm, normal S1 and S2, no murmurs and pedal pulses and 2+ bilaterally, No edema  Abdomen: abdomen is soft, non-tender with no masses  Musculoskeletal: normal gait and station and exam of the digits and nails are normal  Neurological: normal coordination and normal general cortical function    ASSESSMENT/PLAN:  1. Type 1 diabetes, uncontrolled, with neuropathy (HCC)  Has a lot of stress  Recommend to do boluses 15 min before meal  Hypoglycemia management  Do not underestimate carbohydrates  Diabetes educator follow up  On [employer] Minimed insulin pump and Dexcom CGM  Hemoglobin A1c 7.8-7.1-7.6-7.5-7.6-7.1-7.2-7.0-7.1  Average blood glucose 213±77  Bolus amount 57%, basal amount 43%  Carbohydrate intake per day 90±28  Very wide fluctuations of blood glucose anywhere from 70 to 400  - Hemoglobin A1C; Future  - Comprehensive Metabolic Panel; Future  - Lipid Panel; Future  - Microalbumin / Creatinine Urine Ratio; Future    2. Osteoporosis, unspecified osteoporosis type, unspecified pathological fracture presence  T-score -2.9 in 2007  DXA 2007 Osteoporosis, had Reclast 5 years  No upset stomach  No GERD  Worsening of bone density in a hip  Continue Prolia   Started 6/2021  25 hydroxy vitamin D 50-49-47-50.6-52.2-63.8-55.4-53.4-63.3  - Vitamin D 25 Hydroxy; Future    3. Hashimoto's thyroiditis  TSH 2.0  - T4, Free; Future  - TSH without Reflex; Future    4. Acquired hypothyroidism  TSH 2.5-3.0-2.38-1.12-2.85-7.29-3.4-5.05  - levothyroxine (SYNTHROID) 100 MCG tablet; Take 1 tablet by mouth daily      5. Coronary artery disease involving native coronary artery of native heart without angina pectoris  Follow with Cardiology    6. Type 1 diabetes, uncontrolled, with retinopathy ([facility]  - Hemoglobin A1C; Future  - Comprehensive Metabolic Panel; Future  - Lipid Panel; Future  - Microalbumin / Creatinine Urine Ratio; Future    7. Underweight  Weight stable since last appointment  Diabetes educator  Supplements with protein, adequate caloric intake is recommended  - ACTH; Future  - Cortisol AM, Total; Future  On Glucerna    8.  Mixed hyperlipidemia  LDL 42-87-49-46-56-81-48-42-45  Lipid panel  Simvastatin 20 mg daily    Reviewed and/or ordered clinical lab results Yes  Reviewed and/or ordered radiology tests Yes  Reviewed and/or ordered other diagnostic tests No  Discussed test results with performing physician No  Independently reviewed image, tracing, or specimen Yes Medtronic MiniMed insulin pump data, total 7 pages, Dexcom clarity CGM, total 26 pages. See interpretation and recommendations in assessment and plan and below  Made a decision to obtain old records No  Reviewed and summarized old records Yes  TSH 3.89, HbA1C 7.7, LDL 49  Obtained history from other than patient No    Lavinia Dowd was counseled regarding symptoms of diabetes diagnosis, course and complications of disease if inadequately treated, side effects of medications, diagnosis, treatment options, and prognosis, risks, benefits, complications, and alternatives of treatment, labs, imaging and other studies and treatment targets and goals, insulin pump adjustments, hypoglycemia management, brittle diabetes, fluctuations of blood glucose, osteoporosis treatment, insulin pump data and interpretation, recommendations  She understands instructions and counseling. CGMS Download Review and Recommendations  See scanned document for blood glucose tracing documentation  Freestyle Alea/Dexcom/Guardian personal CGMS data downloaded and reviewed. Average glucose 181± 78 SD  Time in range: 53%  Time above 180: 44%  Time under 70: Less than 3%   Glucose management indicator 7.6%    Basal pattern review: Basal hypoglycemia at night  Postprandial pattern review: Postprandial hyperglycemia  Hypoglycemia review: Nighttime hypoglycemia  Activity related review: Not recorded      Based on the data, I recommend:    1. Decrease basal rate at 10 PM 0.200 units/h, decrease basal rate at midnight to 0.090 units/h    2. Do timely boluses, healthy food choices    3. Do not overcorrect hypoglycemia    4. Review carb counting    5.   Dietitian consultation      Total time I spent for this encounter 40 minutes      Return in about 3 months (around 3/20/2022) for diabetes.

## 2021-12-21 ENCOUNTER — TELEPHONE (OUTPATIENT)
Dept: ENDOCRINOLOGY | Age: 81
End: 2021-12-21

## 2021-12-21 ENCOUNTER — OFFICE VISIT (OUTPATIENT)
Dept: ENDOCRINOLOGY | Age: 81
End: 2021-12-21
Payer: MEDICARE

## 2021-12-21 PROCEDURE — 97803 MED NUTRITION INDIV SUBSEQ: CPT | Performed by: DIETITIAN, REGISTERED

## 2021-12-21 RX ORDER — BLOOD-GLUCOSE TRANSMITTER
EACH MISCELLANEOUS
Qty: 4 EACH | Refills: 0 | Status: SHIPPED | OUTPATIENT
Start: 2021-12-21

## 2021-12-21 NOTE — PROGRESS NOTES
Medical Nutrition Therapy for Diabetes    Tacho Loo  December 21, 2021      Patient Care Team:  Jes Crisostomo MD as PCP - David Calderón MD as PCP - [facility] Empaneled Provider  VON Blue CNP as Nurse Practitioner (Nurse Practitioner)    Reason for visit: Type 1 Diabetes    ASSESSMENT/PLAN:   NUTRITION DIAGNOSIS  Follow up    #1 Problem: Altered Nutrition-Related Laboratory Values (NC-2.2)  Related to: Endocrine/Diabetes   As Evidenced by: Elevated Plasma glucose and/or HgbA1c levels         #2 Problem: Inconsistent Carbohydrate and Fiber Intake  Related to: Food- and nutrition-related knowledge deficit concerning appropriate amount of carbohydrate and fiber intake  As evidenced by: patient food recall        #3 Problem: Fluids-NI-3.1                 Inadequate fluid volume    NUTRITION INTERVENTION  Nutrition Prescription: 30 - 45 grams carbohydrate per meal with protein and non-starch vegetables  15 gram carbohydrate snacks    Diabetes Education/Counseling included:  Carbohydrate Control, Label-reading, Monitoring and Hypoyglycemia prevention/treatment, insulin pump placement  Discussed benefits of consistent, adequate carbohydrate intake each meal.  Reinforced benefits for including nutrient dense carbohydrates in meal planning. Interventions:  Control Carbohydrate Intake using Carb Counting, Increase intake of vegetables and Increase intake of whole grains   Recommended including 30-45 grams carbohydrate each meal.  Reviewed rotation of insulin pump placement. Encouraged alternate sites. Provided Dexcom transmitter since her company is behind on delivery for the month. Handouts:  How to inject insulin-AADE, Manageing and Prevention of Hypoglycemia-AND, Sample menus-StartDate Labs, Planning Healthy Meals-[employer]  NUTRITION MONITORING AND EVALUATION  30 -45 grams carbohydrate meals  Include protein with meals  Rotate insulin pump placement  Increase water         Patient Active Problem List   Diagnosis    Diabetic [facility]neuropathy ([facility]    Mixed hyperlipidemia    Hashimoto's thyroiditis    Acquired hypothyroidism    Type 1 diabetes, uncontrolled, with neuropathy ([facility]    Chest pain    CAD (coronary artery disease)    Rotator cuff tendonitis    Osteoporosis    Type 1 diabetes, uncontrolled, with retinopathy (HCC)    [facility]    Underweight       Current Outpatient Medications   Medication Sig Dispense Refill    Continuous Blood Gluc Transmit (DEXCOM G6 TRANSMITTER) MISC Change transmitter every 3 months 4 each 0    levothyroxine (SYNTHROID) 100 MCG tablet TAKE 1 TABLET DAILY 90 tablet 3    insulin aspart (NOVOLOG) 100 UNIT/ML injection vial INJECT TOTAL DAILY DOSE 30 UNITS SUBCUTANEOUSLY WITH INSULIN PUMP.  30 mL 3    carbidopa-levodopa (SINEMET CR)  MG per extended release tablet       aspirin 81 MG EC tablet Take 81 mg by mouth daily      simvastatin (ZOCOR) 20 MG tablet Take 1 tablet by mouth nightly 90 tablet 3    GVOKE HYPOPEN 2-PACK 1 MG/0.2ML SOAJ Use for low glucose emergencies, may repeat in 20 min 2 Syringe 3    denosumab (PROLIA) 60 MG/ML SOSY SC injection Inject 1 mL into the skin once for 1 dose (Patient taking differently: Inject 60 mg into the skin every 6 months ) 1 mL 0    pramipexole (MIRAPEX) 0.5 MG tablet Take 0.5 mg by mouth 2 times daily 2 tabs with dinner, 1 tab at HS      Multiple Vitamins-Minerals (CENTRUM SILVER) TABS Take by mouth daily      Cholecalciferol (VITAMIN D3) 2000 UNITS CAPS Take 1 capsule by mouth daily      TIANA MICROLET LANCETS MISC 1 each by Does not apply route 8 times daily Brittle Diabetes Dx Code 250.61 (Patient taking differently: 1 each by Does not apply route 8 times daily Retinopathy DX Code E10.49 & Peripheal Neuropathy DX Code E10.65) 240 each 3    carbidopa-levodopa (SINEMET)  MG per tablet Take 1 tablet by mouth 4 times daily       Insulin Infusion Pump (PARADIGM INSULIN PUMP) by Does not apply route. No current facility-administered medications for this visit. NUTRITION ASSESSMENT    Biochemical Data:    Lab Results   Component Value Date    LABA1C 7.1 12/08/2021     Lab Results   Component Value Date    .1 12/08/2021       Lab Results   Component Value Date    CHOL 158 12/08/2021    CHOL 137 06/02/2021    CHOL 133 03/02/2021     Lab Results   Component Value Date    TRIG 57 12/08/2021    TRIG 45 06/02/2021    TRIG 43 03/02/2021     Lab Results   Component Value Date     (H) 12/08/2021    HDL 93 (H) 09/21/2021    HDL 83 (H) 06/02/2021     Lab Results   Component Value Date    LDLCALC 45 12/08/2021    LDLCALC 46 09/21/2021    LDLCALC 45 06/02/2021     Lab Results   Component Value Date    LABVLDL 11 12/08/2021    LABVLDL 9 09/21/2021    LABVLDL 9 06/02/2021     Lab Results   Component Value Date    CHOLHDLRATIO 2.1 09/12/2013    CHOLHDLRATIO 1.9 04/18/2012    CHOLHDLRATIO 2.0 02/22/2012       Lab Results   Component Value Date    WBC 4.5 08/23/2021    HGB 11.7 (L) 08/23/2021    HCT 34.7 (L) 08/23/2021    MCV 94.5 08/23/2021     08/23/2021       Lab Results   Component Value Date    CREATININE 0.8 12/08/2021    BUN 21 (H) 12/08/2021     12/08/2021    K 3.9 12/08/2021     12/08/2021    CO2 28 12/08/2021       Diabetes Medications: Yes  Knows name and dose of prescribed medications Yes  Knows prescribed schedule for medicationsYes  Recent change in medication type/dosage: No  Stores  medications properlyYes  Comments:     Monitoring:   Has BG meter: Yes, Dexcom, Patient reports Dexcom transmitter has not arrived. The medical supply company informed her there is a change in ownership and shipping will be delayed indefinately. Had to burrow a transmitter from her neighbor. Testing frequency:   Recent results:   Hypoglycemia? Anthropometric Measurements:   Wt:   Wt Readings from Last 3 Encounters:   12/20/21 96 lb 6.4 oz (43.7 kg)   12/10/21 96 lb 4 oz (43.7 kg)   09/13/21 97 lb 12.8 oz (44.4 kg)      BMI:   BMI Readings from Last 3 Encounters:   12/20/21 17.63 kg/m²   12/10/21 17.60 kg/m²   09/13/21 17.89 kg/m²     Patient's stated goal weight:   7% Weight loss goal weight:     Physical Activity History:   Physical activity: housework  Frequency of activity: most days  Duration of activity: irregular  Obstacles to activity: none    Sleep: not addressed    Food and Nutrition History:   Nutrition Awareness/Previous DSMES: yes  Number of people in household: 2  Frequency of Meals Eaten away from home:not oftern  Food Availability Problems  Within the past 12 months, have you worried that your food would run out before you got money to buy more? No  Within the past 12 months, has the food you bought not lasted till the end of the month and you didn't have money to get more? No  Beverage consumption: ices tea primarily  Alcohol consumption: yes  Usual Food consumption:   3 meals, 15-60 grams carbohydrate meals     Barriers:   -none          Follow Up Plan: 3 months Will remain available. Contact information given.     Referring Provider: Jose Khan MD  Time spent with patient: 30 minutes

## 2021-12-21 NOTE — TELEPHONE ENCOUNTER
PT will pay out of pocket for Dexcom G6 Transmitter.  Please send script for Transmitter to [facility]   [facility], [place], [address], [phone]

## 2021-12-22 ENCOUNTER — TELEPHONE (OUTPATIENT)
Dept: ENDOCRINOLOGY | Age: 81
End: 2021-12-22

## 2021-12-22 NOTE — TELEPHONE ENCOUNTER
I instructed patient:  Decrease basal rate at 10 PM 0.200 units/h, decrease basal rate at midnight to 0.090 units/h    The CGM tracing report was marked as scanned, however, I do not see any attached documents to my note or in media as CGM tracing. Please reprint the pump settings and CGM report and attach to my chart if it is not done.

## 2021-12-27 ENCOUNTER — TELEPHONE (OUTPATIENT)
Dept: ENDOCRINOLOGY | Age: 81
End: 2021-12-27

## 2021-12-27 NOTE — TELEPHONE ENCOUNTER
PT stated Dr. Cameron Parnell told her to call and speak with Nurse Seble Rios regarding changing her DM Supplier

## 2021-12-28 ENCOUNTER — NURSE ONLY (OUTPATIENT)
Dept: ENDOCRINOLOGY | Age: 81
End: 2021-12-28
Payer: MEDICARE

## 2021-12-28 ENCOUNTER — TELEPHONE (OUTPATIENT)
Dept: FAMILY MEDICINE CLINIC | Age: 81
End: 2021-12-28

## 2021-12-28 DIAGNOSIS — R05.9 COUGH: Primary | ICD-10-CM

## 2021-12-28 DIAGNOSIS — M81.0 OSTEOPOROSIS, UNSPECIFIED OSTEOPOROSIS TYPE, UNSPECIFIED PATHOLOGICAL FRACTURE PRESENCE: ICD-10-CM

## 2021-12-28 PROCEDURE — 96372 THER/PROPH/DIAG INJ SC/IM: CPT | Performed by: INTERNAL MEDICINE

## 2021-12-28 NOTE — TELEPHONE ENCOUNTER
Spoke w/ pt. Pt already has new DME Set up. Will use [employer] for pump supplies and [employer] for dexcom supplies. Pt wants us to contact diabetic [facility] and have them stop sending all diabetic supplies. Pt will no longer us diabetic [facility] for supplies.

## 2021-12-28 NOTE — TELEPHONE ENCOUNTER
Pt would like to have an order for a covid test for travel. Please place order so pt can be tested 12/29/21. Pt will be going out of town on 12/31/21.     Please call pt once order is in Epic

## 2021-12-29 ENCOUNTER — NURSE ONLY (OUTPATIENT)
Dept: FAMILY MEDICINE CLINIC | Age: 81
End: 2021-12-29

## 2021-12-29 DIAGNOSIS — R05.9 COUGH: ICD-10-CM

## 2021-12-30 LAB — SARS-COV-2: NOT DETECTED

## 2022-01-10 DIAGNOSIS — I25.10 CORONARY ARTERY DISEASE INVOLVING NATIVE CORONARY ARTERY OF NATIVE HEART WITHOUT ANGINA PECTORIS: ICD-10-CM

## 2022-01-10 DIAGNOSIS — E03.9 ACQUIRED HYPOTHYROIDISM: ICD-10-CM

## 2022-01-10 DIAGNOSIS — E78.2 MIXED HYPERLIPIDEMIA: ICD-10-CM

## 2022-01-10 RX ORDER — CARBIDOPA AND LEVODOPA 50; 200 MG/1; MG/1
TABLET, EXTENDED RELEASE ORAL
Qty: 60 TABLET | OUTPATIENT
Start: 2022-01-10

## 2022-01-10 RX ORDER — PRAMIPEXOLE DIHYDROCHLORIDE 0.5 MG/1
0.5 TABLET ORAL 2 TIMES DAILY
Qty: 90 TABLET | Refills: 3 | Status: SHIPPED | OUTPATIENT
Start: 2022-01-10 | End: 2022-01-11 | Stop reason: SDUPTHER

## 2022-01-10 RX ORDER — CARBIDOPA AND LEVODOPA 50; 200 MG/1; MG/1
TABLET, EXTENDED RELEASE ORAL
Qty: 60 TABLET | Status: CANCELLED | OUTPATIENT
Start: 2022-01-10

## 2022-01-10 RX ORDER — LEVOTHYROXINE SODIUM 0.1 MG/1
TABLET ORAL
Qty: 90 TABLET | Refills: 3 | Status: SHIPPED | OUTPATIENT
Start: 2022-01-10 | End: 2022-01-11 | Stop reason: SDUPTHER

## 2022-01-10 RX ORDER — PRAMIPEXOLE DIHYDROCHLORIDE 0.5 MG/1
0.5 TABLET ORAL 2 TIMES DAILY
Qty: 90 TABLET | Refills: 0 | Status: CANCELLED | OUTPATIENT
Start: 2022-01-10

## 2022-01-10 RX ORDER — LEVOTHYROXINE SODIUM 0.1 MG/1
TABLET ORAL
Qty: 90 TABLET | Refills: 3 | Status: CANCELLED | OUTPATIENT
Start: 2022-01-10

## 2022-01-10 NOTE — TELEPHONE ENCOUNTER
Medication:   Requested Prescriptions     Pending Prescriptions Disp Refills    carbidopa-levodopa (SINEMET CR)  MG per extended release tablet 60 tablet     pramipexole (MIRAPEX) 0.5 MG tablet 90 tablet 3     Sig: Take 1 tablet by mouth 2 times daily 2 tabs with dinner, 1 tab at HS    levothyroxine (SYNTHROID) 100 MCG tablet 90 tablet 3     Sig: TAKE 1 TABLET DAILY        Last Filled:  Historical provider    Patient Phone Number: [phone] (home)     Last appt: 12/10/2021   Next appt: 6/13/2022    Last OARRS: No flowsheet data found.

## 2022-01-10 NOTE — TELEPHONE ENCOUNTER
She should get her Sinemet CR prescription from her neurologist as I have not prescribed in the past

## 2022-01-10 NOTE — TELEPHONE ENCOUNTER
The patient calls in to request 30 Day Supplies of the Medications listed below as patient is in [place] and does not want to run out.       Medication and Quantity requested: carbidopa-levodopa (SINEMET)  MG per tablet       carbidopa-levodopa (SINEMET CR)  MG per extended release tablet      pramipexole (MIRAPEX) 0.5 MG tablet      levothyroxine (SYNTHROID) 100 MCG tablet          Last Visit  12/10/2021    Pharmacy and phone number updated in EPIC:  Yes      [address] [facility]

## 2022-01-10 NOTE — TELEPHONE ENCOUNTER
----- Message from Troy Gutierrez sent at 1/8/2022 11:45 AM EST -----  Subject: Refill Request    QUESTIONS  Name of Medication? pramipexole (MIRAPEX) 0.5 MG tablet  Patient-reported dosage and instructions? .5 mg 3x a day   How many days do you have left? 4  Preferred Pharmacy? [address]  Pharmacy phone number (if available)? [phone]  Additional Information for Provider? requesting 30 day reply   ---------------------------------------------------------------------------  --------------,  Name of Medication? carbidopa-levodopa (SINEMET)  MG per tablet  Patient-reported dosage and instructions? 4x a day   How many days do you have left? 4  Preferred Pharmacy? [address]  Pharmacy phone number (if available)? [phone]  Additional Information for Provider? requesting 30 day supply   ---------------------------------------------------------------------------  --------------,  Name of Medication? levothyroxine (SYNTHROID) 100 MCG tablet  Patient-reported dosage and instructions? 1x a day   How many days do you have left? 4  Preferred Pharmacy? [address]  Pharmacy phone number (if available)? [phone]  ---------------------------------------------------------------------------  --------------  Liam HUBER  What is the best way for the office to contact you? OK to leave message on   voicemail  Preferred Call Back Phone Number?  [phone]

## 2022-01-11 DIAGNOSIS — E78.2 MIXED HYPERLIPIDEMIA: ICD-10-CM

## 2022-01-11 DIAGNOSIS — E03.9 ACQUIRED HYPOTHYROIDISM: ICD-10-CM

## 2022-01-11 DIAGNOSIS — I25.10 CORONARY ARTERY DISEASE INVOLVING NATIVE CORONARY ARTERY OF NATIVE HEART WITHOUT ANGINA PECTORIS: ICD-10-CM

## 2022-01-11 RX ORDER — PRAMIPEXOLE DIHYDROCHLORIDE 0.5 MG/1
0.5 TABLET ORAL 2 TIMES DAILY
Qty: 90 TABLET | Refills: 3 | Status: SHIPPED | OUTPATIENT
Start: 2022-01-11 | End: 2022-06-16 | Stop reason: SDUPTHER

## 2022-01-11 RX ORDER — LEVOTHYROXINE SODIUM 0.1 MG/1
TABLET ORAL
Qty: 90 TABLET | Refills: 3 | Status: SHIPPED | OUTPATIENT
Start: 2022-01-11

## 2022-01-11 NOTE — TELEPHONE ENCOUNTER
Medication and Quantity requested: pramipexole (MIRAPEX) 0.5 MG tablet     And      carbidopa-levodopa (SINEMET)  MG per tablet          Last Visit  12/10/21    Pharmacy and phone number updated in [facility]:  Yes  Nara    Pt is out of town and needs meds sent to pharmacy [address], [address] [place] [address] Rd.  [phone]

## 2022-01-28 ENCOUNTER — TELEPHONE (OUTPATIENT)
Dept: ENDOCRINOLOGY | Age: 82
End: 2022-01-28

## 2022-01-28 NOTE — TELEPHONE ENCOUNTER
Pt lvm stating we need to fill out her for for her Dexcom they should've been faxed over yesterday they were coming from [address] [place] [address]

## 2022-02-13 NOTE — PROGRESS NOTES
[facility]   Cardiac f/up    Referring Provider:  Erickson Morrow MD     Chief Complaint   Patient presents with    Coronary Artery Disease    Hyperlipidemia    6 Month Follow-Up        History of Present Illness:  Esperanza Miller is 80 y.o. 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 She continues to see Dr. Pierre Lopez for Parkinson's. 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 Today,  is present for the visit. She has shortness of breath which she thinks is relates to wearing a facial mask. She has chest tightness that occurs both at rest and when active, is associated with inability to take in a deep breath, and is increasing in frequency. She believes her chest tightness is stress related. Her symptoms are similar to what she had prior to angiogram in 2012. She used nitroglycerin in the past, but does not have nitroglycerin available to use currently. Neurologist recently increased medications for Parkinson's. She states her tremors are better controlled, but her balance is more affected. Past Medical History:   has a past medical history of CAD (coronary artery disease), Diabetes mellitus with neurological manifestation ([address] Hyperlipidemia, Hyperlipidemia, Hypothyroidism (acquired), Insomnia, Neuropathy, Parkinson disease ([address] Parkinson disease ([address] Parkinson disease ([address] Renal insufficiency, Restless legs syndrome, and Type I (juvenile type) diabetes mellitus without mention of complication, not stated as uncontrolled. Surgical History:   has a past surgical history that includes Dental surgery; Tonsillectomy; Cardiac catheterization (1/24/2012); and Colonoscopy. Social History:   reports that she quit smoking about 56 years ago. Her smoking use included cigarettes.  She has a 1.00 pack-year smoking history. She has never used smokeless tobacco. She reports current alcohol use of about 1.0 standard drink of alcohol per week. She reports that she does not use drugs. Family History:  family history includes Arthritis in her sister; Cancer in her father and another family member; Heart Disease in her brother, father, and mother. Home Medications:  Prior to Admission medications    Medication Sig Start Date End Date Taking? Authorizing Provider   pramipexole (MIRAPEX) 0.5 MG tablet Take 1 tablet by mouth 2 times daily 2 tabs with dinner, 1 tab at [facility] 1/11/22  Yes Elena Acuña MD   levothyroxine (SYNTHROID) 100 MCG tablet TAKE 1 TABLET DAILY 1/11/22  Yes Elena Acuña MD   carbidopa-levodopa (SINEMET)  MG per tablet Take 1 tablet by mouth 4 times daily 1/10/22  Yes Elena Acuña MD   Continuous Blood Gluc Transmit (DEXCOM G6 TRANSMITTER) MISC Change transmitter every 3 months 12/21/21  Yes Nohemi Lange MD   insulin aspart (NOVOLOG) 100 UNIT/ML injection vial INJECT TOTAL DAILY DOSE 30 UNITS SUBCUTANEOUSLY WITH INSULIN PUMP. 12/20/21  Yes Nohemi Lange MD   carbidopa-levodopa (SINEMET CR)  MG per extended release tablet  11/5/21  Yes Historical Provider, MD   aspirin 81 MG EC tablet Take 81 mg by mouth daily   Yes Historical Provider, MD   simvastatin (ZOCOR) 20 MG tablet Take 1 tablet by mouth nightly 6/21/21  Yes Nohemi Lange MD   Multiple Vitamins-Minerals (CENTRUM SILVER) TABS Take by mouth daily   Yes Historical Provider, MD   Cholecalciferol (VITAMIN D3) 2000 UNITS CAPS Take 1 capsule by mouth daily   Yes Historical Provider, MD   Insulin Infusion Pump (PARADIGM INSULIN PUMP) by Does not apply route. Yes Historical Provider, MD        Allergies:  Patient has no known allergies. Review of Systems:   · Constitutional: there has been no unanticipated weight loss. There's been no change in energy level, sleep pattern, or activity level.      · Eyes: No visual changes or diplopia. No scleral icterus. · ENT: No Headaches, hearing loss or vertigo. No mouth sores or sore throat. · Cardiovascular: Reviewed in HPI  · Respiratory: No cough or wheezing, no sputum production. No hematemesis. · Gastrointestinal: No abdominal pain, appetite loss, blood in stools. No change in bowel or bladder habits. · Genitourinary: No dysuria, trouble voiding, or hematuria. · Musculoskeletal:  No gait disturbance, weakness or joint complaints. · Integumentary: No rash or pruritis. · Neurological: No headache, diplopia, change in muscle strength, numbness or tingling. No change in gait, balance, coordination, mood, affect, memory, mentation, behavior. · Psychiatric: No anxiety, no depression. · Endocrine: No malaise, fatigue or temperature intolerance. No excessive thirst, fluid intake, or urination. No tremor. · Hematologic/Lymphatic: No abnormal bruising or bleeding, blood clots or swollen lymph nodes. · Allergic/Immunologic: No nasal congestion or hives. Physical Examination:    Vitals:    02/14/22 0838   BP: 118/70          Constitutional and General Appearance: No acute distress, thin build  Skin:good turgor,intact without lesions  HEENT: EOMI ,normal  Neck:no JVD  Respiratory:  · Normal excursion and expansion without use of accessory muscles  · Resp Auscultation: Normal breath sounds without dullness  Cardiovascular:  · The apical impulses not displaced  · Heart tones are crisp and normal  · Cervical veins are not engorged  · The carotid upstroke is normal in amplitude and contour without delay or bruit  · Peripheral pulses are symmetrical and full  · There is no clubbing, cyanosis of the extremities.   · No edema  · Femoral Arteries: 2+ and equal  · Pedal Pulses: 2+ and equal   Abdomen:  · No masses or tenderness  · Liver/Spleen: No Abnormalities Noted  Neurological/Psychiatric:  · Alert and oriented in all spheres  · Moves all extremities well  · Exhibits normal gait balance and coordination  · No abnormalities of mood, affect, memory, mentation, or behavior are noted       Last  Angiogram: 1/23/12: Nonobstructive CAD , Normal LVEF >> Med mgmt    Last Echocardiogram 10/26/21:  Summary   -Left ventricular cavity size is normal.   -Ejection fraction is visually estimated to be 60%. -No regional wall motion abnormalities are noted. -Grade II diastolic dysfunction with elevated LV filling pressures. E/e' = 11.1.   -The aortic valve appears sclerotic but opens well. -Mild to moderate aortic regurgitation.   -Mild tricuspid regurgitation. -RVSP = 35 mmHG. Assessment/Plan:    1. Coronary artery disease involving native coronary artery of native heart without angina pectoris -   Has chest tightness similar to what she had prior to 2012 angiogram  Reviewed 2012 angiogram during the visit. She may have had progression of CAD  EKG today - sinus rhythm without acute ischemic changes  Prescribe NTG sl tabs prn chest tightness  Lexiscan myoview soon  Non-obst CAD 40% LAD & OM-1 lesions by cath '12   2. Other hyperlipidemia -   12/8/21 - TC- 158, TG- 57, HDL- 102, LDL- 45. Well controlled with Zocor 20 mg    3. Chest tightness /SOB            Could be related to Parkinson's disease (increased workload with mechanics of breathing)            Will check for cardiac etiology with [facility] as above  4. Tricuspid regurgitation-   7/2017 Echo - mild-moderate TR, RVSP 33 mmHg. 10/2021 Echo - mild TR, RVSP 35 mmHg  4. Type 1 diabetes, uncontrolled, with retinopathy ([facility] [address] [facility]   12/8/21 - Hgb A1C - 7.1  Follows with endocrinologist, Dr. Robert Vicente  5. Acquired hypothyroidism  Stable, managed by PCP     Poor appetite and weight loss with loss of taste and smell sensation      Plan:  Anmol Elise is having worsening chest tightness and shortness of breath. Etiology of shortness of breath and chest tightness is unclear.   Cardiac test and lab results personally reviewed by me during this office visit and discussed. Nitroglycerin sublingual tabs prn for shortness of breath and chest tightness  Schedule Lexiscan myoview soon  Call our office with any worsening symptoms of shortness of breath or chest tightness  Schedule echo and office visit with Cathi MURPHY in Sept - Oct 2022   Follow up with Dr. Prabhu Reece in one year       I appreciate the opportunity of cooperating in the care of this individual.    Haidre Reece M.D., [facility]    Patient's problem list, medications, allergies, past medical, surgical, social and family histories were reviewed and updated as appropriate. Scribe's attestation: This note was scribed in the presence of Alesha Reece M.D. by Ravi Tejeda RN    The scribe's documentation has been prepared under my direction and personally reviewed by me in its entirety. I confirm that the note above accurately reflects all work, treatment, procedures, and medical decision making performed by me.

## 2022-02-14 ENCOUNTER — OFFICE VISIT (OUTPATIENT)
Dept: CARDIOLOGY CLINIC | Age: 82
End: 2022-02-14
Payer: MEDICARE

## 2022-02-14 VITALS
BODY MASS INDEX: 17.85 KG/M2 | WEIGHT: 97 LBS | HEIGHT: 62 IN | SYSTOLIC BLOOD PRESSURE: 118 MMHG | DIASTOLIC BLOOD PRESSURE: 70 MMHG

## 2022-02-14 DIAGNOSIS — E03.9 ACQUIRED HYPOTHYROIDISM: ICD-10-CM

## 2022-02-14 DIAGNOSIS — R07.89 CHEST TIGHTNESS: ICD-10-CM

## 2022-02-14 DIAGNOSIS — I25.10 CORONARY ARTERY DISEASE INVOLVING NATIVE CORONARY ARTERY OF NATIVE HEART WITHOUT ANGINA PECTORIS: Primary | ICD-10-CM

## 2022-02-14 DIAGNOSIS — R06.02 SHORTNESS OF BREATH: ICD-10-CM

## 2022-02-14 DIAGNOSIS — I36.1 NONRHEUMATIC TRICUSPID VALVE REGURGITATION: ICD-10-CM

## 2022-02-14 DIAGNOSIS — E78.2 MIXED HYPERLIPIDEMIA: ICD-10-CM

## 2022-02-14 PROCEDURE — G8418 CALC BMI BLW LOW PARAM F/U: HCPCS | Performed by: INTERNAL MEDICINE

## 2022-02-14 PROCEDURE — G8482 FLU IMMUNIZE ORDER/ADMIN: HCPCS | Performed by: INTERNAL MEDICINE

## 2022-02-14 PROCEDURE — G8427 DOCREV CUR MEDS BY ELIG CLIN: HCPCS | Performed by: INTERNAL MEDICINE

## 2022-02-14 PROCEDURE — G8399 PT W/DXA RESULTS DOCUMENT: HCPCS | Performed by: INTERNAL MEDICINE

## 2022-02-14 PROCEDURE — 4040F PNEUMOC VAC/ADMIN/RCVD: CPT | Performed by: INTERNAL MEDICINE

## 2022-02-14 PROCEDURE — 1036F TOBACCO NON-USER: CPT | Performed by: INTERNAL MEDICINE

## 2022-02-14 PROCEDURE — 1090F PRES/ABSN URINE INCON ASSESS: CPT | Performed by: INTERNAL MEDICINE

## 2022-02-14 PROCEDURE — 93000 ELECTROCARDIOGRAM COMPLETE: CPT | Performed by: INTERNAL MEDICINE

## 2022-02-14 PROCEDURE — 99214 OFFICE O/P EST MOD 30 MIN: CPT | Performed by: INTERNAL MEDICINE

## 2022-02-14 PROCEDURE — 1123F ACP DISCUSS/DSCN MKR DOCD: CPT | Performed by: INTERNAL MEDICINE

## 2022-02-14 RX ORDER — NITROGLYCERIN 0.4 MG/1
0.4 TABLET SUBLINGUAL EVERY 5 MIN PRN
Qty: 25 TABLET | Refills: 3 | Status: ON HOLD
Start: 2022-02-14 | End: 2022-05-25 | Stop reason: HOSPADM

## 2022-02-14 NOTE — PATIENT INSTRUCTIONS
Nitroglycerin sublingual tabs to take as needed for shortness of breath and chest tightness  Schedule Lexiscan myoview soon  Call our office with any worsening symptoms of shortness of breath or chest tightness  Schedule echo and office visit with Cathi MURPHY in Sept - Oct 2022   Follow up with Dr. Lluvia Kong in one year

## 2022-02-25 ENCOUNTER — HOSPITAL ENCOUNTER (OUTPATIENT)
Dept: NON INVASIVE DIAGNOSTICS | Age: 82
Discharge: HOME | End: 2022-02-25
Payer: MEDICARE

## 2022-02-25 DIAGNOSIS — I25.10 CORONARY ARTERY DISEASE INVOLVING NATIVE CORONARY ARTERY OF NATIVE HEART WITHOUT ANGINA PECTORIS: ICD-10-CM

## 2022-02-25 DIAGNOSIS — R07.89 CHEST TIGHTNESS: ICD-10-CM

## 2022-02-25 DIAGNOSIS — R06.02 SHORTNESS OF BREATH: ICD-10-CM

## 2022-02-25 LAB
LV EF: 68 %
LVEF MODALITY: NORMAL

## 2022-02-25 PROCEDURE — 6360000002 HC RX W HCPCS: Performed by: INTERNAL MEDICINE

## 2022-02-25 PROCEDURE — 78452 HT MUSCLE IMAGE SPECT MULT: CPT | Performed by: INTERNAL MEDICINE

## 2022-02-25 PROCEDURE — A9502 TC99M TETROFOSMIN: HCPCS | Performed by: INTERNAL MEDICINE

## 2022-02-25 PROCEDURE — 3430000000 HC RX DIAGNOSTIC RADIOPHARMACEUTICAL: Performed by: INTERNAL MEDICINE

## 2022-02-25 PROCEDURE — 93017 CV STRESS TEST TRACING ONLY: CPT | Performed by: INTERNAL MEDICINE

## 2022-02-25 RX ADMIN — TETROFOSMIN 30 MILLICURIE: 1.38 INJECTION, POWDER, LYOPHILIZED, FOR SOLUTION INTRAVENOUS at 10:30

## 2022-02-25 RX ADMIN — REGADENOSON 0.4 MG: 0.08 INJECTION, SOLUTION INTRAVENOUS at 10:20

## 2022-02-25 RX ADMIN — TETROFOSMIN 10 MILLICURIE: 1.38 INJECTION, POWDER, LYOPHILIZED, FOR SOLUTION INTRAVENOUS at 09:05

## 2022-02-25 NOTE — PROGRESS NOTES
Instructed on Lexiscan Stress Test Procedure including possible side effects/ adverse reactions. Patient verbalizes  understanding and denies having any questions. See [address] [place] [address] Rd Cardiology.   Roxy Cueva RN

## 2022-03-07 ENCOUNTER — HOSPITAL ENCOUNTER (OUTPATIENT)
Dept: GENERAL RADIOLOGY | Age: 82
Discharge: HOME | End: 2022-03-07
Payer: MEDICARE

## 2022-03-07 DIAGNOSIS — M81.0 OSTEOPOROSIS, UNSPECIFIED OSTEOPOROSIS TYPE, UNSPECIFIED PATHOLOGICAL FRACTURE PRESENCE: ICD-10-CM

## 2022-03-07 PROCEDURE — 77080 DXA BONE DENSITY AXIAL: CPT

## 2022-03-11 ENCOUNTER — HOSPITAL ENCOUNTER (OUTPATIENT)
Age: 82
Discharge: HOME | End: 2022-03-11
Payer: MEDICARE

## 2022-03-11 DIAGNOSIS — E78.2 MIXED HYPERLIPIDEMIA: ICD-10-CM

## 2022-03-11 DIAGNOSIS — E06.3 HASHIMOTO'S THYROIDITIS: ICD-10-CM

## 2022-03-11 DIAGNOSIS — M81.0 OSTEOPOROSIS, UNSPECIFIED OSTEOPOROSIS TYPE, UNSPECIFIED PATHOLOGICAL FRACTURE PRESENCE: ICD-10-CM

## 2022-03-11 DIAGNOSIS — E03.9 ACQUIRED HYPOTHYROIDISM: ICD-10-CM

## 2022-03-11 LAB
A/G RATIO: 1.8 (ref 1.1–2.2)
ALBUMIN SERPL-MCNC: 4.2 G/DL (ref 3.4–5)
ALP BLD-CCNC: 59 U/L (ref 40–129)
ALT SERPL-CCNC: <5 U/L (ref 10–40)
ANION GAP SERPL CALCULATED.3IONS-SCNC: 11 MMOL/L (ref 3–16)
AST SERPL-CCNC: 26 U/L (ref 15–37)
BILIRUB SERPL-MCNC: 0.5 MG/DL (ref 0–1)
BUN BLDV-MCNC: 17 MG/DL (ref 7–20)
CALCIUM SERPL-MCNC: 9.6 MG/DL (ref 8.3–10.6)
CHLORIDE BLD-SCNC: 100 MMOL/L (ref 99–110)
CHOLESTEROL, TOTAL: 150 MG/DL (ref 0–199)
CO2: 28 MMOL/L (ref 21–32)
CREAT SERPL-MCNC: 1 MG/DL (ref 0.6–1.2)
CREATININE URINE: 195.3 MG/DL (ref 28–259)
ESTIMATED AVERAGE GLUCOSE: 171.4 MG/DL
GFR AFRICAN AMERICAN: >60
GFR NON-AFRICAN AMERICAN: 53
GLUCOSE BLD-MCNC: 126 MG/DL (ref 70–99)
HBA1C MFR BLD: 7.6 %
HDLC SERPL-MCNC: 91 MG/DL (ref 40–60)
LDL CHOLESTEROL CALCULATED: 52 MG/DL
MICROALBUMIN UR-MCNC: <1.2 MG/DL
MICROALBUMIN/CREAT UR-RTO: NORMAL MG/G (ref 0–30)
POTASSIUM SERPL-SCNC: 4 MMOL/L (ref 3.5–5.1)
SODIUM BLD-SCNC: 139 MMOL/L (ref 136–145)
T3 FREE: 2 PG/ML (ref 2.3–4.2)
T4 FREE: 1.6 NG/DL (ref 0.9–1.8)
TOTAL PROTEIN: 6.6 G/DL (ref 6.4–8.2)
TRIGL SERPL-MCNC: 36 MG/DL (ref 0–150)
TSH SERPL DL<=0.05 MIU/L-ACNC: 3.98 UIU/ML (ref 0.27–4.2)
VITAMIN D 25-HYDROXY: 46.1 NG/ML
VLDLC SERPL CALC-MCNC: 7 MG/DL

## 2022-03-11 PROCEDURE — 82306 VITAMIN D 25 HYDROXY: CPT

## 2022-03-11 PROCEDURE — 80053 COMPREHEN METABOLIC PANEL: CPT

## 2022-03-11 PROCEDURE — 36415 COLL VENOUS BLD VENIPUNCTURE: CPT

## 2022-03-11 PROCEDURE — 82570 ASSAY OF URINE CREATININE: CPT

## 2022-03-11 PROCEDURE — 80061 LIPID PANEL: CPT

## 2022-03-11 PROCEDURE — 83036 HEMOGLOBIN GLYCOSYLATED A1C: CPT

## 2022-03-11 PROCEDURE — 82043 UR ALBUMIN QUANTITATIVE: CPT

## 2022-03-11 PROCEDURE — 84481 FREE ASSAY (FT-3): CPT

## 2022-03-11 PROCEDURE — 84443 ASSAY THYROID STIM HORMONE: CPT

## 2022-03-11 PROCEDURE — 84439 ASSAY OF FREE THYROXINE: CPT

## 2022-03-20 NOTE — PROGRESS NOTES
Esperanza Miller is a 80 y.o. female who presents for Type 1 diabetes mellitus. Current symptoms/problems include fluctuating BG levels and show no change. 1.  Type 1 diabetes, uncontrolled, with neuropathy (HCC) [E10.40, E10.65]    Diagnosed with Type 1 diabetes mellitus in 1999. Low BG at night. Comorbid conditions: hyperlipidemia, Neuropathy, Retinopathy, Chronic Kidney Disease and Coronary Artery Disease    Current diabetic medications include: Novolog    On Medtronic Minimed insulin pump and Dexcom CGM    Intolerance to diabetes medications: No     Weight trend: stable  Prior visit with dietician: yes  Current diet: on average, 3 meals per day  Current exercise: walks     Current monitoring regimen: home blood tests - 8 times daily  Has brought blood glucose log/meter:  Yes  Home blood sugar records: fasting range: 100-150 and postprandial range:   Any episodes of hypoglycemia? Yes  Hypoglycemia frequency and time(s):  daily  Does patient have Glucagon emergency kit? No  Does patient have rapid acting carbohydrate? Yes  Does patient wear a medic alert bracelet or necklace? No    2. Osteoporosis, unspecified osteoporosis type, unspecified pathological fracture presence  Reclast 5 years. Not on any meds now. On vitamin D 2000 IU, calcium in food    3. Hashimoto's thyroiditis  Has fatigue. 4. Acquired hypothyroidism  On levothyroxine 0.1 mg qd. Has fatigue. 5. Coronary artery disease involving native coronary artery of native heart without angina pectoris  No chest pain. 6. Type 1 diabetes, uncontrolled, with retinopathy ([address]  No recent vision changes. 7.  Underweight  Lost 42 lbs not intentionally. No nausea, vomiting.     8.  Hyperlipidemia  No muscle pain    EXAMINATION:   BONE DENSITOMETRY       3/7/2022 10:07 am       TECHNIQUE:   A bone density DEXA scan was performed of the lumbar spine and bilateral hips   on a [employer] system.       COMPARISON:   August 28, 2019 study was performed on a separate workstation, limiting   direct comparison.       HISTORY:   ORDERING SYSTEM PROVIDED HISTORY: Osteoporosis, unspecified osteoporosis   type, unspecified pathological fracture presence       Baseline on this machine.       FINDINGS:   LUMBAR SPINE:       The bone mineral density in the lumbar spine including the L1 through L4   levels is measured at 0.767 g/cm2, which corresponds to a T-score of -2.5 and   a Z-score of 0.2.  This is within the osteoporotic range by WHO criteria. Findings similar to prior where T-score was -2. 4.       LEFT HIP:       The bone mineral density in the total hip is measured at 0.696 g/cm2   corresponding to a T-score of -2.0 and a Z-score of 0.2.  This is within the   osteopenic range by WHO criteria.       The bone mineral density of the femoral neck is measured at 0.652 g/cm2   corresponding to a T-score of -1.8 and a Z-score of 0.6.  This is within the   osteopenic range by WHO criteria.       RIGHT HIP:       The bone mineral density in the total hip is measured at 0.716 g/cm2   corresponding to a T-score of -1.9 and a Z-score of 0.3.  This is within the   osteopenic range by WHO criteria.       The bone mineral density of the femoral neck is measured at 0.692 g/cm2   corresponding to a T-score of -1.4 and a Z-score of 1.0.  This is within the   osteopenic range by WHO criteria.       No left hip comparison is available. No substantial change in the right hip   with prior T-score -2. 0.       FRAX analysis was not reported because some T-scores are at or below -2. 5.           Impression   Osteoporosis by WHO criteria.          Past Medical History:   Diagnosis Date    CAD (coronary artery disease) 1/2012    non obstructive    Diabetes mellitus with neurological manifestation (HCC)     Hyperlipidemia     Hyperlipidemia     Hypothyroidism (acquired)     Insomnia     Neuropathy     [facility]     Parkinson disease ([facility]     Parkinson disease ([facility]     Renal insufficiency     Restless legs syndrome     Type I (juvenile type) diabetes mellitus without mention of complication, not stated as uncontrolled       Patient Active Problem List   Diagnosis    Diabetic polyneuropathy ([facility]    Mixed hyperlipidemia    Hashimoto's thyroiditis    Acquired hypothyroidism    [facility], [facility]    Chest pain    CAD (coronary artery disease)    Rotator cuff tendonitis    Osteoporosis    Type 1 diabetes, uncontrolled, with retinopathy ([facility]    Parkinson disease ([facility]    Underweight     Past Surgical History:   Procedure Laterality Date    CARDIAC CATHETERIZATION  2012    non obstructive CAD    COLONOSCOPY      DENTAL SURGERY      TONSILLECTOMY       Social History     Socioeconomic History    Marital status:      Spouse name: Not on file    Number of children: Not on file    Years of education: Not on file    Highest education level: Not on file   Occupational History    Not on file   Tobacco Use    Smoking status: Former Smoker     Packs/day: 0.25     Years: 4.00     Pack years: 1.00     Types: Cigarettes     Quit date: 1966     Years since quittin.2    Smokeless tobacco: Never Used   Vaping Use    Vaping Use: Never used   Substance and Sexual Activity    Alcohol use: Not Currently     Alcohol/week: 1.0 standard drink     Types: 1 Glasses of wine per week     Comment: social    Drug use: No    Sexual activity: Never     Partners: Male   Other Topics Concern    Not on file   Social History Narrative    Not on file     Social Determinants of Health     Financial Resource Strain: Low Risk     Difficulty of Paying Living Expenses: Not hard at all   Food Insecurity: No Food Insecurity    Worried About Running Out of Food in the Last Year: Never true    Marissa of Food in the Last Year: Never true   Transportation Needs:     Lack of Transportation (Medical):  Not on file    Lack of Transportation (Non-Medical):  Not on file   Physical Activity:     Days of Exercise per Week: Not on file    Minutes of Exercise per Session: Not on file   Stress:     Feeling of Stress : Not on file   Social Connections:     Frequency of Communication with Friends and Family: Not on file    Frequency of Social Gatherings with Friends and Family: Not on file    Attends Taoist Services: Not on file    Active Member of Clubs or Organizations: Not on file    Attends Club or Organization Meetings: Not on file    Marital Status: Not on file   Intimate Partner Violence:     Fear of Current or Ex-Partner: Not on file    Emotionally Abused: Not on file    Physically Abused: Not on file    Sexually Abused: Not on file   Housing Stability:     Unable to Pay for Housing in the Last Year: Not on file    Number of Places Lived in the Last Year: Not on file    Unstable Housing in the Last Year: Not on file     Family History   Problem Relation Age of Onset    Cancer Father     Heart Disease Father     Heart Disease Mother     Cancer Other         sister's daughter - Melanoma    Heart Disease Brother     Arthritis Sister     Diabetes Neg Hx     Stroke Neg Hx     Osteoporosis Neg Hx     Thyroid Disease Neg Hx      Current Outpatient Medications   Medication Sig Dispense Refill    insulin aspart (NOVOLOG) 100 UNIT/ML injection vial INJECT TOTAL DAILY DOSE 30 UNITS SUBCUTANEOUSLY WITH INSULIN PUMP 30 mL 3    nitroGLYCERIN (NITROSTAT) 0.4 MG SL tablet Place 1 tablet under the tongue every 5 minutes as needed for Chest pain 25 tablet 3    pramipexole (MIRAPEX) 0.5 MG tablet Take 1 tablet by mouth 2 times daily 2 tabs with dinner, 1 tab at HS 90 tablet 3    levothyroxine (SYNTHROID) 100 MCG tablet TAKE 1 TABLET DAILY 90 tablet 3    carbidopa-levodopa (SINEMET)  MG per tablet Take 1 tablet by mouth 4 times daily 120 tablet 5    Continuous Blood Gluc Transmit (DEXCOM G6 TRANSMITTER) MISC Change transmitter every 3 months 4 each 0    carbidopa-levodopa (SINEMET CR)  MG per extended release tablet Take 1 tablet by mouth nightly       aspirin 81 MG EC tablet Take 81 mg by mouth daily      simvastatin (ZOCOR) 20 MG tablet Take 1 tablet by mouth nightly 90 tablet 3    Multiple Vitamins-Minerals (CENTRUM SILVER) TABS Take by mouth daily      Cholecalciferol (VITAMIN D3) 2000 UNITS CAPS Take 1 capsule by mouth daily      Insulin Infusion Pump (PARADIGM INSULIN PUMP) by Does not apply route. No current facility-administered medications for this visit.      No Known Allergies  Family Status   Relation Name Status    Father      Mother  Alive    Other niece Alive    Brother  [de-identified]    Sister  Alive    Neg Hx  (Not Specified)       Lab Review:    Lab Results   Component Value Date    WBC 4.5 2021    HGB 11.7 2021    HGB 9.6 2021    HCT 34.7 2021    MCV 94.5 2021     2021     Lab Results   Component Value Date     2022    K 4.0 2022     2022    CO2 28 2022    BUN 17 2022    CREATININE 1.0 2022    GLUCOSE 126 2022    GLUCOSE 152 2012    CALCIUM 9.6 2022    PROT 6.6 2022    PROT 6.9 2012    LABALBU 4.2 2022    BILITOT 0.5 2022    ALKPHOS 59 2022    AST 26 2022    ALT <5 2022    LABGLOM 53 2022    LABGLOM 54 2014    GFRAA >60 2022    GFRAA >60 2012    AGRATIO 1.8 2022    GLOB 2.6 2021     Lab Results   Component Value Date    TSH 3.98 2022    FT3 2.0 2022     Lab Results   Component Value Date    LABA1C 7.6 2022     Lab Results   Component Value Date    .4 2022     Lab Results   Component Value Date    CHOL 150 2022     Lab Results   Component Value Date    TRIG 36 2022     Lab Results   Component Value Date    HDL 91 2022    HDL 88 2012     Lab Results Component Value Date    LDLCALC 52 03/11/2022     Lab Results   Component Value Date    LABVLDL 7 03/11/2022     Lab Results   Component Value Date    CHOLHDLRATIO 2.1 09/12/2013     Lab Results   Component Value Date    LABMICR <1.20 03/11/2022     Lab Results   Component Value Date    VITD25 46.1 03/11/2022        Review of Systems:  Constitutional: has fatigue, no fever, no recent weight gain, has recent weight loss, has changes in appetite  Eyes: no eye pain, no change in vision, no eye redness, no eye irritation, no double vision  Ears, nose, throat: no nasal congestion, no sore throat, no earache, no decrease in hearing, no hoarseness, no dry mouth, no sinus problems, no difficulty swallowing, no neck lumps, no dental problems, no mouth sores, no ringing in ears  Pulmonary: sometimes has shortness of breath, no wheezing, has dyspnea on exertion, no cough  Cardiovascular: no chest pain, has lower extremity edema, no orthopnea, no intermittent leg claudication, no palpitations  Gastrointestinal: no abdominal pain, no nausea, no vomiting, no diarrhea, no constipation, no dysphagia, no heartburn, no bloating  Genitourinary: no dysuria, has sometimes urinary incontinence, no urinary hesitancy, no urinary frequency, no feelings of urinary urgency, no nocturia  Musculoskeletal: no joint swelling, no joint stiffness, no joint pain, no muscle cramps, no muscle pain, no bone pain  Integument/Breast: no hair loss, no skin rashes, no skin lesions, no itching, no dry skin  Neurological: no numbness, no tingling, no weakness, no confusion, no headaches, no dizziness, no fainting, no tremors, no decrease in memory, has balance problems  Psychiatric: no anxiety, no depression, no insomnia  Hematologic/Lymphatic: no tendency for easy bleeding, no swollen lymph nodes, no tendency for easy bruising  Immunology: no seasonal allergies, no frequent infections, no frequent illnesses  Endocrine: no temperature intolerance    BP 110/62   Pulse 66   Temp 98 °F (36.7 °C)   Resp 14   Ht 5' 2\" (1.575 m)   Wt 97 lb 12.8 oz (44.4 kg)   SpO2 99%   BMI 17.89 kg/m²    Wt Readings from Last 3 Encounters:   03/21/22 97 lb 12.8 oz (44.4 kg)   02/14/22 97 lb (44 kg)   12/20/21 96 lb 6.4 oz (43.7 kg)     Body mass index is 17.89 kg/m².       OBJECTIVE:  Constitutional: no acute distress, well appearing and well nourished  Psychiatric: oriented to person, place and time, judgement and insight and normal, recent and remote memory and intact and mood and affect are normal  Skin: skin and subcutaneous tissue is normal without mass, normal turgor  Head and Face: examination of head and face revealed no abnormalities  Eyes: no lid or conjunctival swelling, erythema or discharge, pupils are normal, equal, round, reactive to light  Ears/Nose: external inspection of ears and nose revealed no abnormalities, hearing is grossly normal  Oropharynx/Mouth/Face: lips, tongue and gums are normal with no lesions, the voice quality was normal  Neck: neck is supple and symmetric, with midline trachea and no masses, thyroid is normal  Lymphatics: normal cervical lymph nodes, normal supraclavicular nodes  Pulmonary: no increased work of breathing or signs of respiratory distress, lungs are clear to auscultation  Cardiovascular: normal heart rate and rhythm, normal S1 and S2, no murmurs and pedal pulses and 2+ bilaterally, No edema  Abdomen: abdomen is soft, non-tender with no masses  Musculoskeletal: normal gait and station and exam of the digits and nails are normal  Neurological: normal coordination and normal general cortical function    Visual inspection:  Deformity/amputation: absent  Skin lesions/pre-ulcerative calluses: absent  Edema: right- negative, left- negative     Sensory exam:  Monofilament sensation: normal  (minimum of 5 random plantar locations tested, avoiding callused areas - > 1 area with absence of sensation is + for neuropathy)     Plus at least one of the following:  Pulses: normal  Proprioception: Intact  Vibration (128 Hz): Impaired    ASSESSMENT/PLAN:  1. Type 1 diabetes, uncontrolled, with neuropathy (HCC)  Target HbA1C 7.5  Has a lot of stress  Recommend to do boluses 15 min before meal  Hypoglycemia management  Do not underestimate carbohydrates  Diabetes educator follow up  On Medtronic Minimed insulin pump and Dexcom CGM  Hemoglobin A1c 7.8-7.1-7.6-7.5-7.6-7.1-7.2-7.0-7.1-7.6  Average blood glucose 204±69  Bolus amount 63%, basal amount 37%  Carbohydrate intake per day 127±40  Very wide fluctuations of blood glucose anywhere from 70 to 400  - Hemoglobin A1C; Future  - Comprehensive Metabolic Panel; Future  - Lipid Panel; Future  - Microalbumin / Creatinine Urine Ratio; Future    2. Osteoporosis, unspecified osteoporosis type, unspecified pathological fracture presence  T-score -2.9 in 2007  DXA 2007 Osteoporosis, had Reclast 5 years  No upset stomach  No GERD  3/2022 DEXA similar bone density, no direct comparison, did in different place  Continue Prolia   Started 6/2021  25 hydroxy vitamin D 50-49-47-50.6-52.2-63.8-55.4-53.4-63.3-46. 1  - Vitamin D 25 Hydroxy; Future    3. Hashimoto's thyroiditis  TSH 2.0  - T4, Free; Future  - TSH without Reflex; Future    4. Acquired hypothyroidism  TSH 2.5-3.0-2.38-1.12-2.85-7.29-3.4-5.05-3.98  - levothyroxine (SYNTHROID) 100 MCG tablet; Take 1 tablet by mouth daily      5. Coronary artery disease involving native coronary artery of native heart without angina pectoris  Follow with Cardiology    6. Type 1 diabetes, uncontrolled, with retinopathy ([facility]  - Hemoglobin A1C; Future  - Comprehensive Metabolic Panel; Future  - Lipid Panel; Future  - Microalbumin / Creatinine Urine Ratio; Future    7. Underweight  Weight stable since last appointment  Diabetes educator  Supplements with protein, adequate caloric intake is recommended  - ACTH; Future  - Cortisol AM, Total; Future  On Glucerna    8.  Mixed hyperlipidemia  LDL 39-51-69-04-75-63-48-42-45-52  Lipid panel  Simvastatin 20 mg daily    Reviewed and/or ordered clinical lab results Yes  Reviewed and/or ordered radiology tests Yes  Reviewed and/or ordered other diagnostic tests No  Discussed test results with performing physician No  Independently reviewed image, tracing, or specimen Yes Medtronic MiniMed insulin pump data, total 7 pages  Made a decision to obtain old records No  Reviewed and summarized old records Yes  TSH 3.89, HbA1C 7.7, LDL 49  Obtained history from other than patient No    Mazinglen Michelle was counseled regarding symptoms of diabetes diagnosis, course and complications of disease if inadequately treated, side effects of medications, diagnosis, treatment options, and prognosis, risks, benefits, complications, and alternatives of treatment, labs, imaging and other studies and treatment targets and goals, insulin pump adjustments, hypoglycemia management, brittle diabetes, fluctuations of blood glucose, osteoporosis treatment, insulin pump data and interpretation, recommendations  She understands instructions and counseling. Total time I spent for this encounter 40 minutes      Return in about 3 months (around 6/21/2022) for diabetes.

## 2022-03-21 ENCOUNTER — OFFICE VISIT (OUTPATIENT)
Dept: ENDOCRINOLOGY | Age: 82
End: 2022-03-21
Payer: MEDICARE

## 2022-03-21 VITALS
OXYGEN SATURATION: 99 % | DIASTOLIC BLOOD PRESSURE: 62 MMHG | WEIGHT: 97.8 LBS | HEART RATE: 66 BPM | SYSTOLIC BLOOD PRESSURE: 110 MMHG | HEIGHT: 62 IN | TEMPERATURE: 98 F | RESPIRATION RATE: 14 BRPM | BODY MASS INDEX: 18 KG/M2

## 2022-03-21 DIAGNOSIS — R63.6 UNDERWEIGHT: ICD-10-CM

## 2022-03-21 DIAGNOSIS — E78.2 MIXED HYPERLIPIDEMIA: ICD-10-CM

## 2022-03-21 DIAGNOSIS — M81.0 OSTEOPOROSIS, UNSPECIFIED OSTEOPOROSIS TYPE, UNSPECIFIED PATHOLOGICAL FRACTURE PRESENCE: ICD-10-CM

## 2022-03-21 DIAGNOSIS — E06.3 HASHIMOTO'S THYROIDITIS: ICD-10-CM

## 2022-03-21 DIAGNOSIS — I25.10 CORONARY ARTERY DISEASE INVOLVING NATIVE CORONARY ARTERY OF NATIVE HEART WITHOUT ANGINA PECTORIS: ICD-10-CM

## 2022-03-21 DIAGNOSIS — E03.9 ACQUIRED HYPOTHYROIDISM: ICD-10-CM

## 2022-03-21 PROCEDURE — 99215 OFFICE O/P EST HI 40 MIN: CPT | Performed by: INTERNAL MEDICINE

## 2022-03-21 PROCEDURE — G8482 FLU IMMUNIZE ORDER/ADMIN: HCPCS | Performed by: INTERNAL MEDICINE

## 2022-03-21 PROCEDURE — G8399 PT W/DXA RESULTS DOCUMENT: HCPCS | Performed by: INTERNAL MEDICINE

## 2022-03-21 PROCEDURE — 4040F PNEUMOC VAC/ADMIN/RCVD: CPT | Performed by: INTERNAL MEDICINE

## 2022-03-21 PROCEDURE — G8427 DOCREV CUR MEDS BY ELIG CLIN: HCPCS | Performed by: INTERNAL MEDICINE

## 2022-03-21 PROCEDURE — G8418 CALC BMI BLW LOW PARAM F/U: HCPCS | Performed by: INTERNAL MEDICINE

## 2022-03-21 PROCEDURE — 1090F PRES/ABSN URINE INCON ASSESS: CPT | Performed by: INTERNAL MEDICINE

## 2022-03-21 PROCEDURE — 1036F TOBACCO NON-USER: CPT | Performed by: INTERNAL MEDICINE

## 2022-03-21 PROCEDURE — 3051F HG A1C>EQUAL 7.0%<8.0%: CPT | Performed by: INTERNAL MEDICINE

## 2022-03-21 PROCEDURE — 1123F ACP DISCUSS/DSCN MKR DOCD: CPT | Performed by: INTERNAL MEDICINE

## 2022-03-29 ENCOUNTER — TELEPHONE (OUTPATIENT)
Dept: CARDIOLOGY CLINIC | Age: 82
End: 2022-03-29

## 2022-03-29 DIAGNOSIS — I79.8 OTHER DISORDERS OF ARTERIES, ARTERIOLES AND CAPILLARIES IN DISEASES CLASSIFIED ELSEWHERE (HCC): ICD-10-CM

## 2022-03-29 DIAGNOSIS — G45.9 TIA (TRANSIENT ISCHEMIC ATTACK): Primary | ICD-10-CM

## 2022-03-29 NOTE — TELEPHONE ENCOUNTER
This morning at 730 am as pt got up and went to walk her body took her into direction she was not intending to go. Pt states she held onto bed and waited a min or two. At the second attempt to walk her body was taking her into a different direction and she walked into wall. She sat for about 10-15 min and was ok. Pt went to neurologist this morning about 1030 am and her  /80 181/79 pt was told to call cardiologist. Please call pt to advise.

## 2022-03-30 NOTE — TELEPHONE ENCOUNTER
Order for carotid duplex placed in [facility]. Please call patient to help her move up echocardiogram (currently scheduled 9/14/22) to date \"as soon as possible\" and get carotid duplex scheduled soon too. musculoskeletal

## 2022-03-30 NOTE — TELEPHONE ENCOUNTER
Echo and carotid duplex scheduled at [facility] on 4/4/22 starting at 8:30 am (echo), 9:30 am (carotid ) per BAYLEE Escobar MA.

## 2022-03-30 NOTE — TELEPHONE ENCOUNTER
I spoke with her. Sounds like she had a TIA.  Please order carotid doppler and move up her echo to asap

## 2022-03-31 NOTE — TELEPHONE ENCOUNTER
Spoke with patient to confirm that she is aware of new date and time for echocardiogram and carotid duplex. Neurologist asked patient to check orthostatic blood pressures 5 times and send him the information.     Below are the results of first two checks    3/30/22 - 10 pm  Lying for 10 minutes  = 125/71,   Immediate standing   = 109/64  1 minute standing      =  118/68  2 minute standing      = 125/70  Patient felt fine, no dizziness weakness etc    3/31/22 - am  Lying for 10 minutes  = 123/61  Immediate standing   = 109/64  1 minute standing      = 91/55  2 minute standing      = 93/70  Patient denied dizziness, light headedness, weakness    Patient will contact us tomorrow (call or Mychart) with recheck of BP later today and tomorrow am.

## 2022-04-04 ENCOUNTER — PROCEDURE VISIT (OUTPATIENT)
Dept: CARDIOLOGY CLINIC | Age: 82
End: 2022-04-04
Payer: MEDICARE

## 2022-04-04 DIAGNOSIS — I36.1 NONRHEUMATIC TRICUSPID VALVE REGURGITATION: ICD-10-CM

## 2022-04-04 DIAGNOSIS — G45.9 TIA (TRANSIENT ISCHEMIC ATTACK): ICD-10-CM

## 2022-04-04 DIAGNOSIS — I25.10 CORONARY ARTERY DISEASE INVOLVING NATIVE CORONARY ARTERY OF NATIVE HEART WITHOUT ANGINA PECTORIS: ICD-10-CM

## 2022-04-04 DIAGNOSIS — I79.8 OTHER DISORDERS OF ARTERIES, ARTERIOLES AND CAPILLARIES IN DISEASES CLASSIFIED ELSEWHERE (HCC): ICD-10-CM

## 2022-04-04 LAB
LV EF: 55 %
LVEF MODALITY: NORMAL

## 2022-04-04 PROCEDURE — 93880 EXTRACRANIAL BILAT STUDY: CPT | Performed by: INTERNAL MEDICINE

## 2022-04-04 PROCEDURE — 93306 TTE W/DOPPLER COMPLETE: CPT | Performed by: INTERNAL MEDICINE

## 2022-04-07 ENCOUNTER — TELEPHONE (OUTPATIENT)
Dept: CARDIOLOGY CLINIC | Age: 82
End: 2022-04-07

## 2022-04-07 NOTE — TELEPHONE ENCOUNTER
Gavin Moore called in this morning stating that she had very low BP that woke her up round 5 am, and it lasted about 2 hours. 75/50, 85/48, 85/52, 76/45, 82/53, 85/54,     7:15 am 83/52  8:23 am 120/76    Aileen stated she is experiencing some chest tightness, and wanted to know if she needed to e seen.

## 2022-04-15 ENCOUNTER — HOSPITAL ENCOUNTER (INPATIENT)
Age: 82
LOS: 3 days | Discharge: HOME | DRG: 287 | End: 2022-04-18
Attending: EMERGENCY MEDICINE | Admitting: INTERNAL MEDICINE
Payer: MEDICARE

## 2022-04-15 ENCOUNTER — APPOINTMENT (OUTPATIENT)
Dept: GENERAL RADIOLOGY | Age: 82
DRG: 287 | End: 2022-04-15
Payer: MEDICARE

## 2022-04-15 DIAGNOSIS — R07.89 CHEST HEAVINESS: Primary | ICD-10-CM

## 2022-04-15 PROBLEM — R09.89 LABILE HYPERTENSION: Status: ACTIVE | Noted: 2022-04-15

## 2022-04-15 LAB
A/G RATIO: 1.6 (ref 1.1–2.2)
ALBUMIN SERPL-MCNC: 4.1 G/DL (ref 3.4–5)
ALP BLD-CCNC: 67 U/L (ref 40–129)
ALT SERPL-CCNC: 15 U/L (ref 10–40)
ANION GAP SERPL CALCULATED.3IONS-SCNC: 13 MMOL/L (ref 3–16)
AST SERPL-CCNC: 25 U/L (ref 15–37)
BASOPHILS ABSOLUTE: 0 K/UL (ref 0–0.2)
BASOPHILS RELATIVE PERCENT: 0.7 %
BILIRUB SERPL-MCNC: 0.7 MG/DL (ref 0–1)
BUN BLDV-MCNC: 20 MG/DL (ref 7–20)
CALCIUM SERPL-MCNC: 9.3 MG/DL (ref 8.3–10.6)
CHLORIDE BLD-SCNC: 96 MMOL/L (ref 99–110)
CO2: 26 MMOL/L (ref 21–32)
CREAT SERPL-MCNC: 0.9 MG/DL (ref 0.6–1.2)
EKG ATRIAL RATE: 87 BPM
EKG DIAGNOSIS: NORMAL
EKG P AXIS: 78 DEGREES
EKG P-R INTERVAL: 156 MS
EKG Q-T INTERVAL: 364 MS
EKG QRS DURATION: 66 MS
EKG QTC CALCULATION (BAZETT): 438 MS
EKG R AXIS: 58 DEGREES
EKG T AXIS: 58 DEGREES
EKG VENTRICULAR RATE: 87 BPM
EOSINOPHILS ABSOLUTE: 0.2 K/UL (ref 0–0.6)
EOSINOPHILS RELATIVE PERCENT: 2.5 %
GFR AFRICAN AMERICAN: >60
GFR NON-AFRICAN AMERICAN: 60
GLUCOSE BLD-MCNC: 160 MG/DL (ref 70–99)
GLUCOSE BLD-MCNC: 254 MG/DL (ref 70–99)
GLUCOSE BLD-MCNC: 91 MG/DL (ref 70–99)
HCT VFR BLD CALC: 37.8 % (ref 36–48)
HEMOGLOBIN: 12.5 G/DL (ref 12–16)
LYMPHOCYTES ABSOLUTE: 0.7 K/UL (ref 1–5.1)
LYMPHOCYTES RELATIVE PERCENT: 11.8 %
MCH RBC QN AUTO: 30.8 PG (ref 26–34)
MCHC RBC AUTO-ENTMCNC: 33.1 G/DL (ref 31–36)
MCV RBC AUTO: 92.9 FL (ref 80–100)
MONOCYTES ABSOLUTE: 0.4 K/UL (ref 0–1.3)
MONOCYTES RELATIVE PERCENT: 7 %
NEUTROPHILS ABSOLUTE: 4.8 K/UL (ref 1.7–7.7)
NEUTROPHILS RELATIVE PERCENT: 78 %
PDW BLD-RTO: 14.2 % (ref 12.4–15.4)
PERFORMED ON: ABNORMAL
PERFORMED ON: NORMAL
PLATELET # BLD: 206 K/UL (ref 135–450)
PMV BLD AUTO: 9.4 FL (ref 5–10.5)
POTASSIUM REFLEX MAGNESIUM: 4.4 MMOL/L (ref 3.5–5.1)
PRO-BNP: 401 PG/ML (ref 0–449)
RBC # BLD: 4.06 M/UL (ref 4–5.2)
SODIUM BLD-SCNC: 135 MMOL/L (ref 136–145)
TOTAL PROTEIN: 6.6 G/DL (ref 6.4–8.2)
TROPONIN: <0.01 NG/ML
TROPONIN: <0.01 NG/ML
WBC # BLD: 6.1 K/UL (ref 4–11)

## 2022-04-15 PROCEDURE — 6370000000 HC RX 637 (ALT 250 FOR IP): Performed by: INTERNAL MEDICINE

## 2022-04-15 PROCEDURE — 85025 COMPLETE CBC W/AUTO DIFF WBC: CPT

## 2022-04-15 PROCEDURE — 99223 1ST HOSP IP/OBS HIGH 75: CPT | Performed by: INTERNAL MEDICINE

## 2022-04-15 PROCEDURE — 84484 ASSAY OF TROPONIN QUANT: CPT

## 2022-04-15 PROCEDURE — 71045 X-RAY EXAM CHEST 1 VIEW: CPT

## 2022-04-15 PROCEDURE — 6370000000 HC RX 637 (ALT 250 FOR IP): Performed by: NURSE PRACTITIONER

## 2022-04-15 PROCEDURE — 93005 ELECTROCARDIOGRAM TRACING: CPT | Performed by: EMERGENCY MEDICINE

## 2022-04-15 PROCEDURE — 36415 COLL VENOUS BLD VENIPUNCTURE: CPT

## 2022-04-15 PROCEDURE — 80053 COMPREHEN METABOLIC PANEL: CPT

## 2022-04-15 PROCEDURE — 93010 ELECTROCARDIOGRAM REPORT: CPT | Performed by: INTERNAL MEDICINE

## 2022-04-15 PROCEDURE — 99284 EMERGENCY DEPT VISIT MOD MDM: CPT

## 2022-04-15 PROCEDURE — 83880 ASSAY OF NATRIURETIC PEPTIDE: CPT

## 2022-04-15 PROCEDURE — 2580000003 HC RX 258: Performed by: INTERNAL MEDICINE

## 2022-04-15 PROCEDURE — 1200000000 HC SEMI PRIVATE

## 2022-04-15 RX ORDER — ACETAMINOPHEN 650 MG/1
650 SUPPOSITORY RECTAL EVERY 6 HOURS PRN
Status: DISCONTINUED | OUTPATIENT
Start: 2022-04-15 | End: 2022-04-18 | Stop reason: HOSPADM

## 2022-04-15 RX ORDER — ACETAMINOPHEN 325 MG/1
650 TABLET ORAL EVERY 6 HOURS PRN
Status: DISCONTINUED | OUTPATIENT
Start: 2022-04-15 | End: 2022-04-18 | Stop reason: DRUGHIGH

## 2022-04-15 RX ORDER — SODIUM CHLORIDE 9 MG/ML
INJECTION, SOLUTION INTRAVENOUS PRN
Status: DISCONTINUED | OUTPATIENT
Start: 2022-04-15 | End: 2022-04-18 | Stop reason: HOSPADM

## 2022-04-15 RX ORDER — SODIUM CHLORIDE 0.9 % (FLUSH) 0.9 %
5-40 SYRINGE (ML) INJECTION PRN
Status: DISCONTINUED | OUTPATIENT
Start: 2022-04-15 | End: 2022-04-18 | Stop reason: HOSPADM

## 2022-04-15 RX ORDER — DEXTROSE MONOHYDRATE 25 G/50ML
12.5 INJECTION, SOLUTION INTRAVENOUS PRN
Status: DISCONTINUED | OUTPATIENT
Start: 2022-04-15 | End: 2022-04-15 | Stop reason: SDUPTHER

## 2022-04-15 RX ORDER — LEVOTHYROXINE SODIUM 0.1 MG/1
100 TABLET ORAL DAILY
Status: DISCONTINUED | OUTPATIENT
Start: 2022-04-15 | End: 2022-04-18 | Stop reason: HOSPADM

## 2022-04-15 RX ORDER — SODIUM CHLORIDE 0.9 % (FLUSH) 0.9 %
5-40 SYRINGE (ML) INJECTION EVERY 12 HOURS SCHEDULED
Status: DISCONTINUED | OUTPATIENT
Start: 2022-04-15 | End: 2022-04-18 | Stop reason: HOSPADM

## 2022-04-15 RX ORDER — ASPIRIN 81 MG/1
81 TABLET ORAL NIGHTLY
Status: DISCONTINUED | OUTPATIENT
Start: 2022-04-15 | End: 2022-04-18 | Stop reason: HOSPADM

## 2022-04-15 RX ORDER — PRAMIPEXOLE DIHYDROCHLORIDE 0.25 MG/1
0.5 TABLET ORAL NIGHTLY
Status: DISCONTINUED | OUTPATIENT
Start: 2022-04-15 | End: 2022-04-18 | Stop reason: HOSPADM

## 2022-04-15 RX ORDER — ONDANSETRON 4 MG/1
4 TABLET, ORALLY DISINTEGRATING ORAL EVERY 8 HOURS PRN
Status: DISCONTINUED | OUTPATIENT
Start: 2022-04-15 | End: 2022-04-18 | Stop reason: HOSPADM

## 2022-04-15 RX ORDER — POLYETHYLENE GLYCOL 3350 17 G/17G
17 POWDER, FOR SOLUTION ORAL DAILY PRN
Status: DISCONTINUED | OUTPATIENT
Start: 2022-04-15 | End: 2022-04-18 | Stop reason: HOSPADM

## 2022-04-15 RX ORDER — VITAMIN B COMPLEX
2000 TABLET ORAL DAILY
Status: DISCONTINUED | OUTPATIENT
Start: 2022-04-15 | End: 2022-04-18 | Stop reason: HOSPADM

## 2022-04-15 RX ORDER — DEXTROSE MONOHYDRATE 50 MG/ML
100 INJECTION, SOLUTION INTRAVENOUS PRN
Status: DISCONTINUED | OUTPATIENT
Start: 2022-04-15 | End: 2022-04-18 | Stop reason: HOSPADM

## 2022-04-15 RX ORDER — NICOTINE POLACRILEX 4 MG
15 LOZENGE BUCCAL PRN
Status: DISCONTINUED | OUTPATIENT
Start: 2022-04-15 | End: 2022-04-18 | Stop reason: HOSPADM

## 2022-04-15 RX ORDER — NITROGLYCERIN 0.4 MG/1
0.4 TABLET SUBLINGUAL ONCE
Status: DISCONTINUED | OUTPATIENT
Start: 2022-04-15 | End: 2022-04-18 | Stop reason: HOSPADM

## 2022-04-15 RX ORDER — PRAMIPEXOLE DIHYDROCHLORIDE 0.25 MG/1
1 TABLET ORAL
Status: DISCONTINUED | OUTPATIENT
Start: 2022-04-15 | End: 2022-04-18 | Stop reason: HOSPADM

## 2022-04-15 RX ORDER — ONDANSETRON 2 MG/ML
4 INJECTION INTRAMUSCULAR; INTRAVENOUS EVERY 6 HOURS PRN
Status: DISCONTINUED | OUTPATIENT
Start: 2022-04-15 | End: 2022-04-18 | Stop reason: HOSPADM

## 2022-04-15 RX ORDER — ASPIRIN 81 MG/1
324 TABLET, CHEWABLE ORAL ONCE
Status: COMPLETED | OUTPATIENT
Start: 2022-04-15 | End: 2022-04-15

## 2022-04-15 RX ORDER — CARBIDOPA AND LEVODOPA 50; 200 MG/1; MG/1
1 TABLET, EXTENDED RELEASE ORAL NIGHTLY
Status: DISCONTINUED | OUTPATIENT
Start: 2022-04-15 | End: 2022-04-18 | Stop reason: HOSPADM

## 2022-04-15 RX ORDER — PRAMIPEXOLE DIHYDROCHLORIDE 0.25 MG/1
0.5 TABLET ORAL 2 TIMES DAILY
Status: DISCONTINUED | OUTPATIENT
Start: 2022-04-15 | End: 2022-04-15

## 2022-04-15 RX ORDER — ATORVASTATIN CALCIUM 10 MG/1
10 TABLET, FILM COATED ORAL DAILY
Status: DISCONTINUED | OUTPATIENT
Start: 2022-04-15 | End: 2022-04-18 | Stop reason: HOSPADM

## 2022-04-15 RX ADMIN — Medication 10 ML: at 21:32

## 2022-04-15 RX ADMIN — Medication 2000 UNITS: at 18:16

## 2022-04-15 RX ADMIN — LEVOTHYROXINE SODIUM 100 MCG: 0.1 TABLET ORAL at 18:15

## 2022-04-15 RX ADMIN — ATORVASTATIN CALCIUM 10 MG: 10 TABLET, FILM COATED ORAL at 21:31

## 2022-04-15 RX ADMIN — CARBIDOPA AND LEVODOPA 1 TABLET: 25; 100 TABLET ORAL at 21:31

## 2022-04-15 RX ADMIN — PRAMIPEXOLE DIHYDROCHLORIDE 0.5 MG: 0.25 TABLET ORAL at 21:31

## 2022-04-15 RX ADMIN — ASPIRIN 81 MG 324 MG: 81 TABLET ORAL at 09:49

## 2022-04-15 RX ADMIN — CARBIDOPA AND LEVODOPA 1 TABLET: 25; 100 TABLET ORAL at 18:15

## 2022-04-15 RX ADMIN — ASPIRIN 81 MG: 81 TABLET, COATED ORAL at 21:31

## 2022-04-15 RX ADMIN — PRAMIPEXOLE DIHYDROCHLORIDE 1 MG: 0.25 TABLET ORAL at 18:16

## 2022-04-15 RX ADMIN — METOPROLOL TARTRATE 12.5 MG: 25 TABLET, FILM COATED ORAL at 21:31

## 2022-04-15 RX ADMIN — CARBIDOPA AND LEVODOPA 1 TABLET: 50; 200 TABLET, EXTENDED RELEASE ORAL at 21:31

## 2022-04-15 ASSESSMENT — PAIN SCALES - GENERAL
PAINLEVEL_OUTOF10: 0
PAINLEVEL_OUTOF10: 7
PAINLEVEL_OUTOF10: 0

## 2022-04-15 ASSESSMENT — PAIN - FUNCTIONAL ASSESSMENT: PAIN_FUNCTIONAL_ASSESSMENT: 0-10

## 2022-04-15 ASSESSMENT — PAIN DESCRIPTION - DESCRIPTORS: DESCRIPTORS: PRESSURE;TIGHTNESS

## 2022-04-15 ASSESSMENT — HEART SCORE: ECG: 0

## 2022-04-15 ASSESSMENT — PAIN DESCRIPTION - LOCATION: LOCATION: CHEST

## 2022-04-15 ASSESSMENT — PAIN DESCRIPTION - PAIN TYPE: TYPE: ACUTE PAIN

## 2022-04-15 NOTE — H&P
HOSPITALISTS HISTORY AND PHYSICAL    4/15/2022 2:29 PM    Patient Information:  Terri Krause is a 80 y.o. female [id number]  PCP:  Aurora Guerrero MD (Tel: [phone] )    Chief complaint:    Chief Complaint   Patient presents with    Chest Pain     +pressure since this AM. Took BP at home 60/45. History of Present Illness:  Felipe Bacon is a 80 y.o. female may contact this morning when she had midsternal pressure-like chest pain like something was sitting on her 8 out of 10 not associate with shortness of breath fever chills nausea vomiting or palpitations. Patient's pain lasted roughly an hour and a half patient dates blood pressure was 70s over 50s at home in the ED patient was normotensive. The patient does have stairs at home does walk up 11 flights of stairs without any chest pain but does occasionally personal dyspnea. Patient had a cath in 2012 which patient did not require any stents for. Is on a daily aspirin does not smoke occasionally drinks had a stress test last month which was negative echo with normal EF and no wall motion abnormalities      REVIEW OF SYSTEMS:   Constitutional: Negative for fever,chills or night sweats  ENT: Negative for rhinorrhea, epistaxis, hoarseness, sore throat. Respiratory: Negative for shortness of breath,wheezing  Cardiovascular:see above  Gastrointestinal: Negative for nausea, vomiting, diarrhea  Genitourinary: Negative for polyuria, dysuria   Hematologic/Lymphatic: Negative for bleeding tendency, easy bruising  Musculoskeletal: Negative for myalgias and arthralgias  Neurologic: Negative for confusion,dysarthria. Skin: Negative for itching,rash  Psychiatric: Negative for depression,anxiety, agitation. Endocrine: Negative for polydipsia,polyuria,heat /cold intolerance.     Past Medical History:   has a past medical history of CAD (coronary artery disease), Diabetes mellitus with neurological manifestation ([facility] [address] Hyperlipidemia, Hyperlipidemia, Hypothyroidism (acquired), Insomnia, Neuropathy, Parkinson disease ([facility] [address] Parkinson disease ([facility] [address] Parkinson disease ([facility] [address] Renal insufficiency, Restless legs syndrome, and Type I (juvenile type) diabetes mellitus without mention of complication, not stated as uncontrolled. Past Surgical History:   has a past surgical history that includes Dental surgery; Tonsillectomy; Cardiac catheterization (1/24/2012); and Colonoscopy. Medications:  No current facility-administered medications on file prior to encounter.      Current Outpatient Medications on File Prior to Encounter   Medication Sig Dispense Refill    Calcium Citrate-Vitamin D (MYRON-CITRATE PLUS VITAMIN D PO) Take 1 tablet by mouth daily      insulin aspart (NOVOLOG) 100 UNIT/ML injection vial INJECT TOTAL DAILY DOSE 30 UNITS SUBCUTANEOUSLY WITH INSULIN PUMP 30 mL 3    nitroGLYCERIN (NITROSTAT) 0.4 MG SL tablet Place 1 tablet under the tongue every 5 minutes as needed for Chest pain 25 tablet 3    pramipexole (MIRAPEX) 0.5 MG tablet Take 1 tablet by mouth 2 times daily 2 tabs with dinner, 1 tab at HS (Patient taking differently: Take 0.5 mg by mouth See Admin Instructions 2 tabs with dinner, 1 tab at HS) 90 tablet 3    levothyroxine (SYNTHROID) 100 MCG tablet TAKE 1 TABLET DAILY 90 tablet 3    carbidopa-levodopa (SINEMET)  MG per tablet Take 1 tablet by mouth 4 times daily 120 tablet 5    Continuous Blood Gluc Transmit (DEXCOM G6 TRANSMITTER) MISC Change transmitter every 3 months 4 each 0    carbidopa-levodopa (SINEMET CR)  MG per extended release tablet Take 1 tablet by mouth nightly       aspirin 81 MG EC tablet Take 81 mg by mouth at bedtime       simvastatin (ZOCOR) 20 MG tablet Take 1 tablet by mouth nightly 90 tablet 3    Multiple Vitamins-Minerals (CENTRUM SILVER) TABS Take 1 tablet by mouth daily       Cholecalciferol (VITAMIN D3) 2000 UNITS CAPS Take 1 capsule by mouth daily      Insulin Infusion Pump (PARADIGM INSULIN PUMP) by Does not apply route. Allergies:  No Known Allergies     Social History:  Patient Lives at home   reports that she quit smoking about 56 years ago. Her smoking use included cigarettes. She has a 1.00 pack-year smoking history. She has never used smokeless tobacco. She reports previous alcohol use of about 1.0 standard drink of alcohol per week. She reports that she does not use drugs. Family History:  family history includes Arthritis in her sister; Cancer in her father and another family member; Heart Disease in her brother, father, and mother. ,     Physical Exam:  /63   Pulse 74   Temp 96.9 °F (36.1 °C) (Oral)   Resp 18   Wt 95 lb (43.1 kg)   SpO2 98%   BMI 17.38 kg/m²     General appearance:  Appears comfortable. AAOx3  HEENT: atraumatic, Pupils equal, muscous membranes moist, no masses appreciated  Cardiovascular: Regular rate and rhythm no murmurs appreciated  Respiratory: CTAB no wheezing  Gastrointestinal: Abdomen soft, non-tender, BS+  EXT: no edema  Neurology: no gross focal deficts  Psychiatry: Appropriate affect. Not agitated  Skin: Warm, dry, no rashes appreciated    Labs:  CBC:   Lab Results   Component Value Date    WBC 6.1 04/15/2022    RBC 4.06 04/15/2022    HGB 12.5 04/15/2022    HCT 37.8 04/15/2022    MCV 92.9 04/15/2022    MCH 30.8 04/15/2022    MCHC 33.1 04/15/2022    RDW 14.2 04/15/2022     04/15/2022    MPV 9.4 04/15/2022     BMP:    Lab Results   Component Value Date     04/15/2022    K 4.4 04/15/2022    CL 96 04/15/2022    CO2 26 04/15/2022    BUN 20 04/15/2022    CREATININE 0.9 04/15/2022    CALCIUM 9.3 04/15/2022    GFRAA >60 04/15/2022    GFRAA >60 01/24/2012    LABGLOM 60 04/15/2022    LABGLOM 54 01/16/2014    GLUCOSE 254 04/15/2022    GLUCOSE 152 04/18/2012     XR CHEST PORTABLE   Final Result   Clear lungs.              Recent imaging

## 2022-04-15 NOTE — PROGRESS NOTES
Pharmacy Home Medication Reconciliation Note    A medication reconciliation has been completed for Nelly Tam 1940    Pharmacy: [address] [place] [address]    The patient's home medication list is as follows:  Prior to Admission medications    Medication Sig Start Date End Date Taking? Authorizing Provider   Calcium Citrate-Vitamin D (MYRON-CITRATE PLUS VITAMIN D PO) Take 1 tablet by mouth daily   Yes Historical Provider, MD   insulin aspart (NOVOLOG) 100 UNIT/ML injection vial INJECT TOTAL DAILY DOSE 30 UNITS SUBCUTANEOUSLY WITH INSULIN PUMP 3/21/22   Dhaval Gracia MD   nitroGLYCERIN (NITROSTAT) 0.4 MG SL tablet Place 1 tablet under the tongue every 5 minutes as needed for Chest pain 2/14/22   Christine Wolff MD   pramipexole (MIRAPEX) 0.5 MG tablet Take 1 tablet by mouth 2 times daily 2 tabs with dinner, 1 tab at [facility]  Patient taking differently: Take 0.5 mg by mouth See Admin Instructions 2 tabs with dinner, 1 tab at [facility] 1/11/22   Manny Vela MD   levothyroxine (SYNTHROID) 100 MCG tablet TAKE 1 TABLET DAILY 1/11/22   Manny Vela MD   carbidopa-levodopa (SINEMET)  MG per tablet Take 1 tablet by mouth 4 times daily 1/10/22   Manny Vela MD   Continuous Blood Gluc Transmit (DEXCOM G6 TRANSMITTER) MISC Change transmitter every 3 months 12/21/21   Dhaval Gracia MD   carbidopa-levodopa (SINEMET CR)  MG per extended release tablet Take 1 tablet by mouth nightly  11/5/21   Historical Provider, MD   aspirin 81 MG EC tablet Take 81 mg by mouth at bedtime     Historical Provider, MD   simvastatin (ZOCOR) 20 MG tablet Take 1 tablet by mouth nightly 6/21/21   Dhaval Gracia MD   Multiple Vitamins-Minerals (CENTRUM SILVER) TABS Take 1 tablet by mouth daily     Historical Provider, MD   Cholecalciferol (VITAMIN D3) 2000 UNITS CAPS Take 1 capsule by mouth daily    Historical Provider, MD   Insulin Infusion Pump (PARADIGM INSULIN PUMP) by Does not apply route.     Historical Provider, MD Timing of last doses updated.     Thank you,  Veena Cameron, PharmD, BCCCP

## 2022-04-15 NOTE — ED PROVIDER NOTES
Jose Elias Dumont        Pt Name: Nelly Tam  MRN: [id number]  [place] 1940  Date of evaluation: 4/15/2022  Provider: VON Smith - CNP  PCP: Manny Vela MD  Note Started: 9:41 AM EDT        I have seen and evaluated this patient with my supervising physician Prem Gamez MD.    [address] [place] [address]       Chief Complaint   Patient presents with    Chest Pain     +pressure since this AM. Took BP at home 60/45. HISTORY OF PRESENT ILLNESS   (Location, Timing/Onset, Context/Setting, Quality, Duration, Modifying Factors, Severity, Associated Signs and Symptoms)  Note limiting factors. Chief Complaint: Chest heaviness    Nelly Tam is a 80 y.o. female with medical history of CAD, DM, HLD, hypothyroidism, insomnia, neuropathy, Parkinson's disease, renal insufficiency, RLS, DM type I on insulin pump who presents emergency department with complaints of acute onset of midsternal chest heaviness that radiated to her left chest since 730 when she awoke. The heaviness remained was worse when she got up and tried to walk. She did take her blood pressure at home it was noted to be low at 60/45. Upon ED arrival blood pressure was elevated 150/72. Patient states she has had these ongoing symptoms for many weeks and is followed by cardiology Dr. Tj Esqueda. She notes she had a cardiac echo last week that was negative. She had a carotid Doppler ultrasounds that were negative. It was noted that her Sinemet was changed approximately 2 weeks ago and she is unsure if this is the culprit for her symptoms. The chest heaviness was initially 10/10 is currently 7/10. Some aggravating factors do include position change. Denies any alleviating factors. Denies any associated lightheaded, dizzy, syncope, nausea, vomiting, diarrhea, numbness, tingling, weakness, leg swelling.   She denies taking any medications at home to include her daily medicine today.  She takes aspirin 81 mg nightly. She denies any smoking, occasional alcohol use, denies street drugs. Denies any recent trauma, accidents, injuries, or falls. Her last cardiac cath was in 2012 that showed nonobstructing CAD. She denies any PCI. Nursing Notes were all reviewed and agreed with or any disagreements were addressed in the HPI. Cardiac cath 2012  non-obst CAD 40% LAD & OM-1 lesions by cath '12    REVIEW OF SYSTEMS    (2-9 systems for level 4, 10 or more for level 5)     Review of Systems    Positives and Pertinent negatives as per HPI. Except as noted above in the ROS, all other systems were reviewed and negative.        PAST MEDICAL HISTORY     Past Medical History:   Diagnosis Date    CAD (coronary artery disease) 1/2012    non obstructive    Diabetes mellitus with neurological manifestation (HCC)     Hyperlipidemia     Hyperlipidemia     Hypothyroidism (acquired)     Insomnia     Neuropathy     Parkinson disease (HCC)     Parkinson disease ([address]     Parkinson disease ([facility] [address]     Renal insufficiency     Restless legs syndrome     Type I (juvenile type) diabetes mellitus without mention of complication, not stated as uncontrolled          SURGICAL HISTORY     Past Surgical History:   Procedure Laterality Date    CARDIAC CATHETERIZATION  1/24/2012    non obstructive CAD    COLONOSCOPY      at age 71    DENTAL SURGERY      EYE SURGERY Bilateral     with lens implants    SKIN BIOPSY Right     neg skin biopsy on right arm    TONSILLECTOMY           CURRENTMEDICATIONS       Current Discharge Medication List      CONTINUE these medications which have NOT CHANGED    Details   Calcium Citrate-Vitamin D (MYRON-CITRATE PLUS VITAMIN D PO) Take 1 tablet by mouth daily      insulin aspart (NOVOLOG) 100 UNIT/ML injection vial INJECT TOTAL DAILY DOSE 30 UNITS SUBCUTANEOUSLY WITH INSULIN PUMP  Qty: 30 mL, Refills: 3    Comments: Insulin needs to be billed under MEDICARE PART B. PT uses a Insulin Pump. DX CODE:E10.40  Associated Diagnoses: Type 1 diabetes, uncontrolled, with retinopathy (HCC)      nitroGLYCERIN (NITROSTAT) 0.4 MG SL tablet Place 1 tablet under the tongue every 5 minutes as needed for Chest pain  Qty: 25 tablet, Refills: 3    Associated Diagnoses: Coronary artery disease involving native coronary artery of native heart without angina pectoris; Chest tightness      pramipexole (MIRAPEX) 0.5 MG tablet Take 1 tablet by mouth 2 times daily 2 tabs with dinner, 1 tab at HS  Qty: 90 tablet, Refills: 3      levothyroxine (SYNTHROID) 100 MCG tablet TAKE 1 TABLET DAILY  Qty: 90 tablet, Refills: 3    Associated Diagnoses: Type 1 diabetes, uncontrolled, with neuropathy ([address] Type 1 diabetes, uncontrolled, with retinopathy ([address] Mixed hyperlipidemia; Coronary artery disease involving native coronary artery of native heart without angina pectoris; Acquired hypothyroidism      carbidopa-levodopa (SINEMET)  MG per tablet Take 1 tablet by mouth 4 times daily  Qty: 120 tablet, Refills: 5      Continuous Blood Gluc Transmit (DEXCOM G6 TRANSMITTER) MISC Change transmitter every 3 months  Qty: 4 each, Refills: 0    Associated Diagnoses: Type 1 diabetes, uncontrolled, with neuropathy (HCC)      carbidopa-levodopa (SINEMET CR)  MG per extended release tablet Take 1 tablet by mouth nightly       aspirin 81 MG EC tablet Take 81 mg by mouth at bedtime       simvastatin (ZOCOR) 20 MG tablet Take 1 tablet by mouth nightly  Qty: 90 tablet, Refills: 3    Comments: Needs OV for further refills. Multiple Vitamins-Minerals (CENTRUM SILVER) TABS Take 1 tablet by mouth daily       Cholecalciferol (VITAMIN D3) 2000 UNITS CAPS Take 1 capsule by mouth daily      Insulin Infusion Pump (PARADIGM INSULIN PUMP) by Does not apply route. ALLERGIES     Patient has no known allergies.     FAMILYHISTORY       Family History   Problem Relation Age of Onset    Cancer Father     Heart Disease Father  Heart Disease Mother     Cancer Other         sister's daughter - Melanoma    Heart Disease Brother     Arthritis Sister     Diabetes Neg Hx     Stroke Neg Hx     Osteoporosis Neg Hx     Thyroid Disease Neg Hx           SOCIAL HISTORY       Social History     Tobacco Use    Smoking status: Former Smoker     Packs/day: 0.25     Years: 4.00     Pack years: 1.00     Types: Cigarettes     Quit date: 1966     Years since quittin.3    Smokeless tobacco: Never Used   Vaping Use    Vaping Use: Never used   Substance Use Topics    Alcohol use: Not Currently     Alcohol/week: 1.0 standard drink     Types: 1 Glasses of wine per week     Comment: social    Drug use: No       SCREENINGS    [place] Coma Scale  Eye Opening: Spontaneous  Best Verbal Response: Oriented  Best Motor Response: Obeys commands  Tian Coma Scale Score: 15 Heart Score for chest pain patients  History: Moderately Suspicious  ECG: Normal  Patient Age: > 65 years  *Risk factors for Atherosclerotic disease: Diabetes Mellitus,Coronary Artery Disease,Hypercholesterolemia  Risk Factors: > 3 Risk factors or history of atherosclerotic disease*  Troponin: < 1X normal limit  Heart Score Total: 5      PHYSICAL EXAM    (up to 7 for level 4, 8 or more for level 5)     ED Triage Vitals [04/15/22 0931]   BP Temp Temp Source Pulse Resp SpO2 Height Weight   (!) 150/72 96.9 °F (36.1 °C) Oral 92 16 99 % -- 95 lb (43.1 kg)       Physical Exam  Vitals and nursing note reviewed. Constitutional:       General: She is awake. Appearance: Normal appearance. She is well-developed and normal weight. HENT:      Head: Normocephalic and atraumatic. Nose: Nose normal.   Eyes:      General:         Right eye: No discharge. Left eye: No discharge. Cardiovascular:      Rate and Rhythm: Normal rate and regular rhythm. Heart sounds: Normal heart sounds. Pulmonary:      Effort: Pulmonary effort is normal. No respiratory distress. Breath sounds: Normal breath sounds. Abdominal:      General: Bowel sounds are normal.      Palpations: Abdomen is soft. Tenderness: There is no abdominal tenderness. Musculoskeletal:         General: Normal range of motion. Cervical back: Normal range of motion. Right lower leg: No edema. Left lower leg: No edema. Skin:     General: Skin is warm and dry. Coloration: Skin is not pale. Neurological:      Mental Status: She is alert and oriented to person, place, and time. Psychiatric:         Behavior: Behavior normal. Behavior is cooperative. DIAGNOSTIC RESULTS   LABS:    Labs Reviewed   CBC WITH AUTO DIFFERENTIAL - Abnormal; Notable for the following components:       Result Value    Lymphocytes Absolute 0.7 (*)     All other components within normal limits   COMPREHENSIVE METABOLIC PANEL W/ REFLEX TO MG FOR LOW K - Abnormal; Notable for the following components:    Sodium 135 (*)     Chloride 96 (*)     Glucose 254 (*)     GFR Non- 60 (*)     All other components within normal limits   POCT GLUCOSE - Abnormal; Notable for the following components:    POC Glucose 160 (*)     All other components within normal limits   BRAIN NATRIURETIC PEPTIDE   TROPONIN   TROPONIN   POCT GLUCOSE       When ordered only abnormal lab results are displayed. All other labs were within normal range or not returned as of this dictation. EKG: When ordered, EKG's are interpreted by the Emergency Department Physician in the absence of a cardiologist.  Please see their note for interpretation of EKG. RADIOLOGY:   Non-plain film images such as CT, Ultrasound and MRI are read by the radiologist. Plain radiographic images are visualized and preliminarily interpreted by the ED Provider with the below findings:        Interpretation per the Radiologist below, if available at the time of this note:    XR CHEST PORTABLE   Final Result   Clear lungs. No results found. PROCEDURES   Unless otherwise noted below, none     Procedures    CRITICAL CARE TIME       CONSULTS:  IP CONSULT TO HOSPITALIST  IP CONSULT TO CARDIOLOGY      EMERGENCY DEPARTMENT COURSE and DIFFERENTIAL DIAGNOSIS/MDM:   Vitals:    Vitals:    04/15/22 1157 04/15/22 1232 04/15/22 1432 04/15/22 1515   BP: 116/63 103/61 (!) 158/83 (!) 160/77   Pulse: 74 70 78 74   Resp: 18 20 18 16   Temp:    97.6 °F (36.4 °C)   TempSrc:    Oral   SpO2: 98% 97% 100% 100%   Weight:    94 lb 14.4 oz (43 kg)   Height:    5' 2\" (1.575 m)       Patient was given the following medications:  Medications   nitroGLYCERIN (NITROSTAT) SL tablet 0.4 mg (0.4 mg SubLINGual Not Given 4/15/22 1620)   aspirin EC tablet 81 mg (has no administration in time range)   carbidopa-levodopa (SINEMET CR)  MG per extended release tablet 1 tablet (has no administration in time range)   carbidopa-levodopa (SINEMET)  MG per tablet 1 tablet (has no administration in time range)   Vitamin D (CHOLECALCIFEROL) tablet 2,000 Units (has no administration in time range)   levothyroxine (SYNTHROID) tablet 100 mcg (has no administration in time range)   pramipexole (MIRAPEX) tablet 0.5 mg (has no administration in time range)   atorvastatin (LIPITOR) tablet 10 mg (has no administration in time range)   sodium chloride flush 0.9 % injection 5-40 mL (has no administration in time range)   sodium chloride flush 0.9 % injection 5-40 mL (has no administration in time range)   0.9 % sodium chloride infusion (has no administration in time range)   enoxaparin (LOVENOX) injection 40 mg (has no administration in time range)   ondansetron (ZOFRAN-ODT) disintegrating tablet 4 mg (has no administration in time range)     Or   ondansetron (ZOFRAN) injection 4 mg (has no administration in time range)   polyethylene glycol (GLYCOLAX) packet 17 g (has no administration in time range)   acetaminophen (TYLENOL) tablet 650 mg (has no administration in time range)     Or acetaminophen (TYLENOL) suppository 650 mg (has no administration in time range)   glucose (GLUTOSE) 40 % oral gel 15 g (has no administration in time range)   glucagon (rDNA) injection 1 mg (has no administration in time range)   dextrose 5 % solution (has no administration in time range)   dextrose bolus (hypoglycemia) 10% 125 mL (has no administration in time range)     Or   dextrose bolus (hypoglycemia) 10% 250 mL (has no administration in time range)   aspirin chewable tablet 324 mg (324 mg Oral Given 4/15/22 0949)           Care of this patient took place during the COVID-19 pandemic emergency. ED COURSE & MEDICAL DECISION MAKING    - The patient presented to the ER with complaints of chest heaviness. Vital signs were reviewed. Exam well-developed, well-nourished female who appears uncomfortable. Peripheral IV placed. Labs, Imaging ordered. - Pertinent Labs & Imaging studies reviewed. (See chart for details)   -  Patient seen and evaluated in the emergency department. -  Triage and nursing notes reviewed and incorporated. -  Old chart records reviewed and incorporated.  -  Dr. Landon Cloud emergency department attending assessed the patient. -  Differential diagnosis includes: ACS, MI, costochondritis, pleurisy, aneurysm, dissection, bronchitis, pneumonia, pleural effusion, CHF, cardiomegaly, esophageal rupture, endocarditis, pericarditis, PE, pneumothorax, tamponade versus COVID-19  -  Work-up included:  See above  -  ED treatment included:  ASA, nitroglycerin  -  Results discussed with patient. Zoe Ceron is an 80-year-old female with complaints of acute onset of midsternal chest heaviness since 1130 this morning she noted you awoke and got out of bed. She checked her blood pressure at home and was noted to be low with systolic at 60. Upon ED arrival systolic is 672. She does note this has been having frequent was been followed by cardiology Dr. Zi Birmingham.   Last cardiac cath was 10 years ago. She did recently have a stress test last month, echo, and carotid duplex last week that were unremarkable. On exam cardiac with regular rate and rhythm. No lower extremity edema noted. Lungs clear to auscultate bilateral.  Abdomen soft nontender. No reproducible chest wall tenderness. No JVD. Heart score 5. Lab work and imaging is obtained. Cardiology was consulted and Dr. Talib Cardoso is at bedside. He request patient be admitted and she will be kept on Monday. Hospitalist Dr. Liana Montano was consulted and admission orders are placed and agreeable with the plan of care. CRITICAL CARE TIME   Total Critical Care time was 34 minutes, excluding separately reportable procedures. There was a high probability of clinically significant/life threatening deterioration in the patient's condition which required my urgent intervention. FINAL IMPRESSION      1.  Chest heaviness          DISPOSITION/PLAN   DISPOSITION    Admission         (Please note that portions of this note were completed with a voice recognition program.  Efforts were made to edit the dictations but occasionally words are mis-transcribed.)    VON Olsen CNP (electronically signed)            VON Olsen CNP  04/15/22 7159

## 2022-04-15 NOTE — ED NOTES
patient resting in bed, vss, call light with in reach. denies any needs or wants at this time. Patient denies any CP or pressure at this time.  Will continue to moniotor closely     Sonya Perez RN  04/15/22 8994

## 2022-04-15 NOTE — ACP (ADVANCE CARE PLANNING)
Advanced Care Planning Note.     Purpose of Encounter: Advanced care planning in light of hospitalization  Parties In Attendance: Patient,    Decisional Capacity: Yes  Subjective: Patient  understand that this conversation is to address long term care goal  Objective: Patient admitted to hospital with chest pain history of hypothyroidism and diabetes on insulin pump  Goals of Care Determination: Patient pursue CPR and intubation initially if required no tracheostomy long-term ventilation  Code Status: full code  Time spent on Advanced care Plannin minutes  [address] documented patient's wishes, would like  Eugenia Mora to make medical decisions if unable to make decisions    Camille Stacy MD  4/15/2022 2:31 PM

## 2022-04-15 NOTE — ED NOTES
patient resting in bed, vss, call light with in reach. denies any needs or wants at this time.  Will continue to moniotor closely     Marychuy Bustamante RN  04/15/22 9772

## 2022-04-15 NOTE — ED NOTES
patient resting in bed, vss, call light with in reach. denies any needs or wants at this time.  Will continue to moniotor closely     Pa Perea RN  04/15/22 0959

## 2022-04-15 NOTE — Clinical Note
Discharge Plan[de-identified] Other/[facility])   Telemetry/Cardiac Monitoring Required?: Yes

## 2022-04-15 NOTE — ED NOTES
Patient placed in room 27 for further workup and evaluation. Patient given food and drink in fear of patient becoming hypoglycemic per patient statement.  Dietary and glucose control discussed with NP     Jacob Kumar RN  04/15/22 6738

## 2022-04-15 NOTE — PRE SEDATION
Brief Pre-Op Note/Sedation Assessment      Stephane Parra  1940  [id number]  7:37 AM    Planned Procedure: Cardiac Catheterization Procedure  Post Procedure Plan: Return to same level of care  Consent: I have discussed with the patient and/or the patient representative the indication, alternatives, and the possible risks and/or complications of the planned procedure and the anesthesia methods. The patient and/or patient representative appear to understand and agree to proceed. Chief Complaint:   Chest Pain/Pressure  Anginal Equivalent      Indications for Cath Procedure:  1. Presentation:  New Onset Angina <= 2 months and Worsening Angina  2. Anginal Classification within 2 weeks:  CCS IV - Inability to perform any activity without angina or angina at rest, i.e., severe limitation  3. Angina Symptoms Assessment:  Typical Chest Pain  4. Heart Failure Class within last 2 weeks:  No symptoms  5. Cardiovascular Instability:  No    Prior Ischemic Workup/Eval:  1. Pre-Procedural Medications: Yes: Aspirin, Beta Blockers, Long Acting Nitrates (Any) and STATIN  2. Stress Test Completed? Yes:  Stress or Imaging Studies Performed (within ANY time period):   Type:  Stress Nuclear  Results:  Negative Extent of Ischemia:  Low Risk (<1% annual death or MI)    Does Patient need surgery?   Cath Valve Surgery:  No    Pre-Procedure Medical History:  Vital Signs:  BP (!) 149/77   Pulse 66   Temp 97.6 °F (36.4 °C) (Oral)   Resp 16   Ht 5' 2\" (1.575 m)   Wt 91 lb 9.6 oz (41.5 kg)   SpO2 100%   BMI 16.75 kg/m²     Allergies:  No Known Allergies  Medications:    Current Facility-Administered Medications   Medication Dose Route Frequency Provider Last Rate Last Admin    0.9 % sodium chloride infusion   IntraVENous Continuous Joanna Eduardo MD 50 mL/hr at 04/18/22 0653 New Bag at 04/18/22 0653    sodium chloride flush 0.9 % injection 5-40 mL  5-40 mL IntraVENous 2 times per day Joanna Eduardo MD       [facility] sodium chloride flush 0.9 % injection 5-40 mL  5-40 mL IntraVENous PRN Malia Evans MD        0.9 % sodium chloride infusion  25 mL IntraVENous PRN Malia Evans MD        nitroGLYCERIN (NITROSTAT) SL tablet 0.4 mg  0.4 mg SubLINGual Once Ernesto Arms, APRN - CNP        aspirin EC tablet 81 mg  81 mg Oral Nightly Swati Kirkpatrick MD   81 mg at 04/16/22 2116    carbidopa-levodopa (SINEMET CR)  MG per extended release tablet 1 tablet  1 tablet Oral Nightly Swati Kirkpatrick MD   1 tablet at 04/17/22 2120    carbidopa-levodopa (SINEMET)  MG per tablet 1 tablet  1 tablet Oral 4x Daily Swati Kirkpatrick MD   1 tablet at 04/18/22 1631    Vitamin D (CHOLECALCIFEROL) tablet 2,000 Units  2,000 Units Oral Daily Swati Kirkpatrick MD   2,000 Units at 04/17/22 0819    levothyroxine (SYNTHROID) tablet 100 mcg  100 mcg Oral Daily Swati Kirkpatrick MD   100 mcg at 04/18/22 2088    atorvastatin (LIPITOR) tablet 10 mg  10 mg Oral Daily Swati Kirkpatrick MD   10 mg at 04/17/22 2112    sodium chloride flush 0.9 % injection 5-40 mL  5-40 mL IntraVENous 2 times per day Swati Kirkpatrick MD   10 mL at 04/18/22 0654    sodium chloride flush 0.9 % injection 5-40 mL  5-40 mL IntraVENous PRN Swati Kirkpatrick MD        0.9 % sodium chloride infusion   IntraVENous PRN Swati Kirkpatrick MD        ondansetron (ZOFRAN-ODT) disintegrating tablet 4 mg  4 mg Oral Q8H PRN Gregg Olvera MD        Or    ondansetron (ZOFRAN) injection 4 mg  4 mg IntraVENous Q6H PRN Swati Kirkpatrick MD        polyethylene glycol (GLYCOLAX) packet 17 g  17 g Oral Daily PRN Swati Kirkpatrick MD        acetaminophen (TYLENOL) tablet 650 mg  650 mg Oral Q6H PRN Swati Kirkpatrick MD        [address] acetaminophen (TYLENOL) suppository 650 mg  650 mg Rectal Q6H PRN Swati Kirkpatrick MD        glucose (GLUTOSE) 40 % oral gel 15 g  15 g Oral PRN Swati Kirkpatrick MD        glucagon (rDNA) injection 1 mg  1 mg IntraMUSCular PRN Fernando Soliz MD        dextrose 5 % solution  100 mL/hr IntraVENous PRN Fernando Soliz MD        dextrose bolus (hypoglycemia) 10% 125 mL  125 mL IntraVENous PRN Zachary Liang MD        Or    dextrose bolus (hypoglycemia) 10% 250 mL  250 mL IntraVENous PRN Zachary Liang MD        nitroglycerin (NITRO-BID) 2 % ointment 0.5 inch  0.5 inch Topical 4 times per day Zachary Liang MD        metoprolol tartrate (LOPRESSOR) tablet 12.5 mg  12.5 mg Oral BID Zachary Liang MD   12.5 mg at 04/17/22 2110    pramipexole (MIRAPEX) tablet 1 mg  1 mg Oral Dinner Fernando Soliz MD   1 mg at 04/17/22 1650    And    pramipexole (MIRAPEX) tablet 0.5 mg  0.5 mg Oral Nightly Fernando Soliz MD   0.5 mg at 04/17/22 2110    Insulin Pump - insulin aspart (Patient Supplied)  1.4 Units/hr SubCUTAneous Continuous Fernando Soliz MD           Past Medical History:    Past Medical History:   Diagnosis Date    CAD (coronary artery disease) 1/2012    non obstructive    Diabetes mellitus with neurological manifestation (HCC)     Hyperlipidemia     Hyperlipidemia     Hypothyroidism (acquired)     Insomnia     Neuropathy     Parkinson disease ([facility]     Parkinson disease ([facility]     Parkinson disease ([facility] [address]     Renal insufficiency     Restless legs syndrome     Type I (juvenile type) diabetes mellitus without mention of complication, not stated as uncontrolled        Surgical History:    Past Surgical History:   Procedure Laterality Date    CARDIAC CATHETERIZATION  1/24/2012    non obstructive CAD    COLONOSCOPY      at age 71   [facility] [address] Bilateral     with lens implants    SKIN BIOPSY Right     neg skin biopsy on right arm    TONSILLECTOMY               Pre-Sedation:  Pre-Sedation Documentation and Exam:  I have personally completed a history, physical exam & review of systems for this patient (see notes).     Prior History of Anesthesia Complications:   none    Modified

## 2022-04-15 NOTE — CONSULTS
[facility]  [phone]        Reason for Consultation/Chief Complaint: \" Chest pain. \"    History of Present Illness:  Gem Sal is a 80 y.o. patient who presented to the hospital with complaints of chest pain. She describes midsternal chest discomfort, as though an elephant was sitting on her, which seems to correlate with low blood pressures. She had undergone recent stress testing which was negative for ischemia. She also had a recent 2D echo with Doppler. She is a patient of Dr. Jaquelin Anderson of our group. Past history of  non-obst CAD 40% LAD & OM-1 lesions by cath '12, hyperlipidemia, TIA and palpitations. She continues to see Dr. Renny Petit for Parkinson's. Past Medical History:   has a past medical history of CAD (coronary artery disease), Diabetes mellitus with neurological manifestation ([address] Hyperlipidemia, Hyperlipidemia, Hypothyroidism (acquired), Insomnia, Neuropathy, Parkinson disease ([address] Parkinson disease ([address] Parkinson disease ([address] Renal insufficiency, Restless legs syndrome, and Type I (juvenile type) diabetes mellitus without mention of complication, not stated as uncontrolled. Surgical History:   has a past surgical history that includes Dental surgery; Tonsillectomy; Cardiac catheterization (1/24/2012); and Colonoscopy. Social History:   reports that she quit smoking about 56 years ago. Her smoking use included cigarettes. She has a 1.00 pack-year smoking history. She has never used smokeless tobacco. She reports previous alcohol use of about 1.0 standard drink of alcohol per week. She reports that she does not use drugs. Family History:  family history includes Arthritis in her sister; Cancer in her father and another family member; Heart Disease in her brother, father, and mother. Home Medications:  Were reviewed and are listed in nursing record.  and/or listed below  Prior to Admission medications    Medication Sig Start Date End Date Taking? Authorizing Provider   Calcium Citrate-Vitamin D (MYRON-CITRATE PLUS VITAMIN D PO) Take 1 tablet by mouth daily   Yes Historical Provider, MD   insulin aspart (NOVOLOG) 100 UNIT/ML injection vial INJECT TOTAL DAILY DOSE 30 UNITS SUBCUTANEOUSLY WITH INSULIN PUMP 3/21/22   Zia Carballo MD   nitroGLYCERIN (NITROSTAT) 0.4 MG SL tablet Place 1 tablet under the tongue every 5 minutes as needed for Chest pain 2/14/22   John Schmidt MD   pramipexole (MIRAPEX) 0.5 MG tablet Take 1 tablet by mouth 2 times daily 2 tabs with dinner, 1 tab at [facility]  Patient taking differently: Take 0.5 mg by mouth See Admin Instructions 2 tabs with dinner, 1 tab at [facility] 1/11/22   Shiva Gasca MD   levothyroxine (SYNTHROID) 100 MCG tablet TAKE 1 TABLET DAILY 1/11/22   Shiva Gasca MD   carbidopa-levodopa (SINEMET)  MG per tablet Take 1 tablet by mouth 4 times daily 1/10/22   Shiva Gasca MD   Continuous Blood Gluc Transmit (DEXCOM G6 TRANSMITTER) MISC Change transmitter every 3 months 12/21/21   Zia Carballo MD   carbidopa-levodopa (SINEMET CR)  MG per extended release tablet Take 1 tablet by mouth nightly  11/5/21   Historical Provider, MD   aspirin 81 MG EC tablet Take 81 mg by mouth at bedtime     Historical Provider, MD   simvastatin (ZOCOR) 20 MG tablet Take 1 tablet by mouth nightly 6/21/21   Zia Carballo MD   Multiple Vitamins-Minerals (CENTRUM SILVER) TABS Take 1 tablet by mouth daily     Historical Provider, MD   Cholecalciferol (VITAMIN D3) 2000 UNITS CAPS Take 1 capsule by mouth daily    Historical Provider, MD   Insulin Infusion Pump (PARADIGM INSULIN PUMP) by Does not apply route. Historical Provider, MD        Allergies:  Patient has no known allergies. Review of Systems:   A complete review of systems has been reviewed and updated today and is negative except as noted in the history of present illness.       Physical Examination:    Vitals:    04/15/22 1157   BP: 116/63   Pulse: 74 Resp: 18   Temp:    SpO2: 98%    Weight: 95 lb (43.1 kg)         General Appearance:  Alert, cooperative, no distress, appears stated age   Head:  Normocephalic, without obvious abnormality, atraumatic   Eyes:  EOMI, conjunctiva/corneas clear       Nose: Nares normal   Throat: Lips normal   Neck: Supple, symmetrical, trachea midline,  no carotid bruit or JVD       Lungs:   Clear to auscultation bilaterally, respirations unlabored   Chest Wall:  No tenderness or deformity   Heart:  Regular rate and rhythm, S1, S2 normal, 2/6 systolic murmur, rub or gallop   Abdomen:   Soft, non-tender, bowel sounds active all four quadrants,  no masses, no organomegaly           Extremities: Extremities normal, atraumatic, no cyanosis or edema   Pulses: 2+ and symmetric   Skin: Skin color, texture, turgor normal, no rashes or lesions   Pysch: Normal mood and affect   Neurologic: Normal gross motor and sensory exam.         Labs  CBC:   Lab Results   Component Value Date    WBC 6.1 04/15/2022    RBC 4.06 04/15/2022    HGB 12.5 04/15/2022    HCT 37.8 04/15/2022    MCV 92.9 04/15/2022    RDW 14.2 04/15/2022     04/15/2022     CMP:    Lab Results   Component Value Date     04/15/2022    K 4.4 04/15/2022    CL 96 04/15/2022    CO2 26 04/15/2022    BUN 20 04/15/2022    CREATININE 0.9 04/15/2022    GFRAA >60 04/15/2022    GFRAA >60 01/24/2012    AGRATIO 1.6 04/15/2022    LABGLOM 60 04/15/2022    LABGLOM 54 01/16/2014    GLUCOSE 254 04/15/2022    GLUCOSE 152 04/18/2012    PROT 6.6 04/15/2022    PROT 6.9 04/18/2012    CALCIUM 9.3 04/15/2022    BILITOT 0.7 04/15/2022    ALKPHOS 67 04/15/2022    AST 25 04/15/2022    ALT 15 04/15/2022     LIPIDS: No components found for: TOTAL CHOLESTEROL,  HDL,  LDL,  TRIGLYCERIDES  PT/INR:  No results found for: PTINR  Lab Results   Component Value Date    CKTOTAL 89 01/22/2012    CKMB 4.60 01/22/2012    TROPONINI <0.01 04/15/2022       EKG:  I have reviewed EKG with the following interpretation:  Impression:    15-APR-2022 09:39:10 [facility] ROUTINE RECORD  Normal sinus rhythm  Possible Left atrial enlargement  Nonspecific ST and T wave abnormality  Abnormal ECG    Imaging/Procedures:   Echo 4/4/2022:   Summary   -Normal left ventricle size, wall thickness and systolic function with an   estimated ejection fraction of 55%.   -No regional wall motion abnormalities are seen. -Diastolic filling parameters suggest grade I diastolic   dysfunction. E/e\"=13.75.   -The mitral valve leaflets appear myxomatous. -Mild mitral regurgitation.   -The aortic valve is mildly thickened/calcified but opens well with normal   gradients. -Mild to moderate aortic regurgitation.   -Mild tricuspid regurgitation.   -Mild pulmonic regurgitation present.   -Estimated pulmonary artery systolic pressure is borderline elevated at 35 mmHg assuming a right atrial pressure of 3 mmHg. -The left atrium is mildly dilated. -There is a trivial localized right sided pericardial effusion noted. -Appears unchanged when compared to previous study dated 10/26/2021.   -The ascending aorta is mildly dilated (3.7 cm). Consider cross-sectional imaging of the aorta if clinically indicated. Myoview stress test 2/25/2022:   Summary    Normal myocardial perfusion.    Normal LV size and systolic function. Assessment/Plan:  Principal Problem:    Chest pain  Plan: Sounds like angina. Correlation with low blood pressure is of concern. Cardiac catheterization seems very reasonable however she did have lunch today around 11 AM.  Generally would require an 8-hour fast before proceeding. She is willing to stay through the weekend. Would recommend starting Lovenox and nitroglycerin paste. Active Problems:    CAD (coronary artery disease)  Plan: Previously nonobstructive by cardiac cath in 2012. Now presenting with chest discomfort. Recommend cardiac catheterization.       Labile hypertension  Plan: Periods of elevated blood pressures as well as low blood pressures. Etiology? Mixed hyperlipidemia  Plan: Continue statin. Type 1 diabetes, uncontrolled, with retinopathy ([facility] [address]  Plan: Chronic. Lovenox. Nitroglycerin paste. Low-dose beta-blocker. Plan for cardiac catheterization on Monday. 2D echo with Doppler. Thank you for allowing us to participate in the care of Reena Larsen. Further evaluation will be based upon the patient's clinical course and testing results. All questions and concerns were addressed to the patient/family. Alternatives to my treatment were discussed. The note was completed using EMR. Every effort was made to ensure accuracy; however, inadvertent computerized transcription errors may be present.     Salvador Osorio M.D.

## 2022-04-16 LAB
ANION GAP SERPL CALCULATED.3IONS-SCNC: 12 MMOL/L (ref 3–16)
BASOPHILS ABSOLUTE: 0 K/UL (ref 0–0.2)
BASOPHILS RELATIVE PERCENT: 0.7 %
BUN BLDV-MCNC: 17 MG/DL (ref 7–20)
CALCIUM SERPL-MCNC: 9.4 MG/DL (ref 8.3–10.6)
CHLORIDE BLD-SCNC: 99 MMOL/L (ref 99–110)
CO2: 26 MMOL/L (ref 21–32)
CREAT SERPL-MCNC: 0.7 MG/DL (ref 0.6–1.2)
EOSINOPHILS ABSOLUTE: 0.3 K/UL (ref 0–0.6)
EOSINOPHILS RELATIVE PERCENT: 5.2 %
GFR AFRICAN AMERICAN: >60
GFR NON-AFRICAN AMERICAN: >60
GLUCOSE BLD-MCNC: 123 MG/DL (ref 70–99)
GLUCOSE BLD-MCNC: 157 MG/DL (ref 70–99)
GLUCOSE BLD-MCNC: 158 MG/DL (ref 70–99)
GLUCOSE BLD-MCNC: 198 MG/DL (ref 70–99)
GLUCOSE BLD-MCNC: 64 MG/DL (ref 70–99)
GLUCOSE BLD-MCNC: 77 MG/DL (ref 70–99)
HCT VFR BLD CALC: 35.8 % (ref 36–48)
HEMOGLOBIN: 11.6 G/DL (ref 12–16)
LYMPHOCYTES ABSOLUTE: 1 K/UL (ref 1–5.1)
LYMPHOCYTES RELATIVE PERCENT: 17.8 %
MCH RBC QN AUTO: 30.3 PG (ref 26–34)
MCHC RBC AUTO-ENTMCNC: 32.5 G/DL (ref 31–36)
MCV RBC AUTO: 93.2 FL (ref 80–100)
MONOCYTES ABSOLUTE: 0.7 K/UL (ref 0–1.3)
MONOCYTES RELATIVE PERCENT: 12.6 %
NEUTROPHILS ABSOLUTE: 3.4 K/UL (ref 1.7–7.7)
NEUTROPHILS RELATIVE PERCENT: 63.7 %
PDW BLD-RTO: 14.2 % (ref 12.4–15.4)
PERFORMED ON: ABNORMAL
PERFORMED ON: NORMAL
PLATELET # BLD: 195 K/UL (ref 135–450)
PMV BLD AUTO: 9.3 FL (ref 5–10.5)
POTASSIUM REFLEX MAGNESIUM: 4.5 MMOL/L (ref 3.5–5.1)
RBC # BLD: 3.84 M/UL (ref 4–5.2)
SODIUM BLD-SCNC: 137 MMOL/L (ref 136–145)
WBC # BLD: 5.4 K/UL (ref 4–11)

## 2022-04-16 PROCEDURE — 80048 BASIC METABOLIC PNL TOTAL CA: CPT

## 2022-04-16 PROCEDURE — 6370000000 HC RX 637 (ALT 250 FOR IP): Performed by: INTERNAL MEDICINE

## 2022-04-16 PROCEDURE — 99233 SBSQ HOSP IP/OBS HIGH 50: CPT | Performed by: INTERNAL MEDICINE

## 2022-04-16 PROCEDURE — 6360000002 HC RX W HCPCS: Performed by: INTERNAL MEDICINE

## 2022-04-16 PROCEDURE — 36415 COLL VENOUS BLD VENIPUNCTURE: CPT

## 2022-04-16 PROCEDURE — 2580000003 HC RX 258: Performed by: INTERNAL MEDICINE

## 2022-04-16 PROCEDURE — 1200000000 HC SEMI PRIVATE

## 2022-04-16 PROCEDURE — 85025 COMPLETE CBC W/AUTO DIFF WBC: CPT

## 2022-04-16 RX ADMIN — CARBIDOPA AND LEVODOPA 1 TABLET: 25; 100 TABLET ORAL at 17:16

## 2022-04-16 RX ADMIN — ATORVASTATIN CALCIUM 10 MG: 10 TABLET, FILM COATED ORAL at 21:16

## 2022-04-16 RX ADMIN — CARBIDOPA AND LEVODOPA 1 TABLET: 50; 200 TABLET, EXTENDED RELEASE ORAL at 21:15

## 2022-04-16 RX ADMIN — CARBIDOPA AND LEVODOPA 1 TABLET: 25; 100 TABLET ORAL at 21:15

## 2022-04-16 RX ADMIN — CARBIDOPA AND LEVODOPA 1 TABLET: 25; 100 TABLET ORAL at 06:20

## 2022-04-16 RX ADMIN — ENOXAPARIN SODIUM 40 MG: 100 INJECTION SUBCUTANEOUS at 21:15

## 2022-04-16 RX ADMIN — ENOXAPARIN SODIUM 40 MG: 40 INJECTION SUBCUTANEOUS at 08:46

## 2022-04-16 RX ADMIN — METOPROLOL TARTRATE 12.5 MG: 25 TABLET, FILM COATED ORAL at 21:15

## 2022-04-16 RX ADMIN — ASPIRIN 81 MG: 81 TABLET, COATED ORAL at 21:16

## 2022-04-16 RX ADMIN — Medication 10 ML: at 08:46

## 2022-04-16 RX ADMIN — LEVOTHYROXINE SODIUM 100 MCG: 0.1 TABLET ORAL at 06:21

## 2022-04-16 RX ADMIN — PRAMIPEXOLE DIHYDROCHLORIDE 1 MG: 0.25 TABLET ORAL at 17:15

## 2022-04-16 RX ADMIN — Medication 10 ML: at 21:14

## 2022-04-16 RX ADMIN — METOPROLOL TARTRATE 12.5 MG: 25 TABLET, FILM COATED ORAL at 08:44

## 2022-04-16 RX ADMIN — PRAMIPEXOLE DIHYDROCHLORIDE 0.5 MG: 0.25 TABLET ORAL at 21:15

## 2022-04-16 RX ADMIN — Medication 2000 UNITS: at 08:44

## 2022-04-16 RX ADMIN — CARBIDOPA AND LEVODOPA 1 TABLET: 25; 100 TABLET ORAL at 10:36

## 2022-04-16 ASSESSMENT — PAIN SCALES - GENERAL
PAINLEVEL_OUTOF10: 0

## 2022-04-16 NOTE — PLAN OF CARE
Nutrition Problem #1: Inadequate protein-energy intake  Intervention: Food and/or Nutrient Delivery: Continue Current Diet,Start Oral Nutrition Supplement  Nutritional Goals: Pt will consume >50% of meals this admission

## 2022-04-16 NOTE — CONSULTS
Nutrition Note    RECOMMENDATIONS  1. PO Diet: Continue CC4 diet    2. ONS: Glucerna BID     NUTRITION ASSESSMENT   Consult received for weight loss, poor po intake prior to admission. Pt reports decreased oral intake for a few months r/t decreased appetite, loss of taste and smell. Per EMR wt hx, UBW over past 1-2 years ~96-97lbs. Last actual in March (3/21/22) 97lbs, indicating a loss of 6 lbs 6% in 1 month. She drinks Glucerna at home once a day. Agreeable to drinking BID while here. Encouraged the importance of adequate nutrition to prevent further wt loss. Pt works with an outpatient RD on CC diet. She follows a 3 carb choice (45 gm/meal) diet. Will continue to monitor.  Nutrition Related Findings: non-pitting BLE edema; A1c 3/11 7.6; +BM 4/14   Wounds: None   Nutrition Education:  Education not indicated    Nutrition Goals: Pt will consume >50% of meals this admission     MALNUTRITION ASSESSMENT   Context of Malnutrition: Chronic Illness   Malnutrition Status: Moderate malnutrition  Findings of the 6 clinical characteristics of malnutrition:  Energy Intake:  Mild decrease in energy intake (Comment)  Weight Loss:  7 - 5% over 1 month (6lbs, 6% in 1 month)     Body Fat Loss:  1 - Mild body fat loss Triceps   Muscle Mass Loss:  1 - Mild muscle mass loss Temples (temporalis),Clavicles (pectoralis & deltoids)  Fluid Accumulation:   (non-pitting BLE) Extremities   Strength:  Not Performed      NUTRITION DIAGNOSIS   · Inadequate protein-energy intake related to inadequate protein-energy intake as evidenced by poor intake prior to admission,weight loss        CURRENT NUTRITION THERAPIES  ADULT DIET; Regular; 4 carb choices (60 gm/meal)     PO Intake: 51-75%   PO Supplement Intake:None Ordered    ADULT DIET;  Regular; 4 carb choices (60 gm/meal)    ANTHROPOMETRICS   Current Height: 5' 2\" (157.5 cm)   Current Weight: 91 lb 4.8 oz (41.4 kg)     Admission weight:     Ideal Body Weight (IBW): 110 lbs  (50 kg)     Usual Bodyweight 97 lb (44 kg)       BMI: 16.6      COMPARATIVE STANDARDS  Energy (kcal):  3768-4567     Protein (g):  62-74 (1.5-1.8)       Fluid (ml/day):  7486-2021    The patient will be monitored per nutrition standards of care. Consult dietitian if additional nutrition interventions are needed prior to RD reassessment.      David Hernandez, [address] [place] [address],     Contact: [phone]

## 2022-04-16 NOTE — PROGRESS NOTES
[facility]    4/16/2022 11:47 AM        Name: Morteza Zheng .               Admitted: 4/15/2022  Primary Care Provider: Kadi Monk MD (Tel: [phone])        Subjective:      Patient lying in bed having no further chest discomfort no nausea vomiting fever  Reviewed interval ancillary notes    Current Medications  enoxaparin (LOVENOX) injection 40 mg, BID  nitroGLYCERIN (NITROSTAT) SL tablet 0.4 mg, Once  aspirin EC tablet 81 mg, Nightly  carbidopa-levodopa (SINEMET CR)  MG per extended release tablet 1 tablet, Nightly  carbidopa-levodopa (SINEMET)  MG per tablet 1 tablet, 4x Daily  Vitamin D (CHOLECALCIFEROL) tablet 2,000 Units, Daily  levothyroxine (SYNTHROID) tablet 100 mcg, Daily  atorvastatin (LIPITOR) tablet 10 mg, Daily  sodium chloride flush 0.9 % injection 5-40 mL, 2 times per day  sodium chloride flush 0.9 % injection 5-40 mL, PRN  0.9 % sodium chloride infusion, PRN  ondansetron (ZOFRAN-ODT) disintegrating tablet 4 mg, Q8H PRN   Or  ondansetron (ZOFRAN) injection 4 mg, Q6H PRN  polyethylene glycol (GLYCOLAX) packet 17 g, Daily PRN  acetaminophen (TYLENOL) tablet 650 mg, Q6H PRN   Or  acetaminophen (TYLENOL) suppository 650 mg, Q6H PRN  glucose (GLUTOSE) 40 % oral gel 15 g, PRN  glucagon (rDNA) injection 1 mg, PRN  dextrose 5 % solution, PRN  dextrose bolus (hypoglycemia) 10% 125 mL, PRN   Or  dextrose bolus (hypoglycemia) 10% 250 mL, PRN  nitroglycerin (NITRO-BID) 2 % ointment 0.5 inch, 4 times per day  metoprolol tartrate (LOPRESSOR) tablet 12.5 mg, BID  pramipexole (MIRAPEX) tablet 1 mg, Dinner   And  pramipexole (MIRAPEX) tablet 0.5 mg, Nightly  Insulin Pump - insulin aspart (Patient Supplied), Continuous        Objective:  /60   Pulse 73   Temp 97.4 °F (36.3 °C) (Oral)   Resp 16   Ht 5' 2\" (1.575 m)   Wt 91 lb 4.8 oz (41.4 kg)   SpO2 98%   BMI 16.70 kg/m² Intake/Output Summary (Last 24 hours) at 4/16/2022 1147  Last data filed at 4/16/2022 0523  Gross per 24 hour   Intake 720 ml   Output 675 ml   Net 45 ml      Wt Readings from Last 3 Encounters:   04/16/22 91 lb 4.8 oz (41.4 kg)   03/21/22 97 lb 12.8 oz (44.4 kg)   02/14/22 97 lb (44 kg)       General appearance:  Appears comfortable. AAOx3  HEENT: atraumatic, Pupils equal, muscous membranes moist, no masses appreciated  Cardiovascular: Regular rate and rhythm no murmurs appreciated  Respiratory: CTAB no wheezing  Gastrointestinal: Abdomen soft, non-tender, BS+  EXT: no edema  Neurology: no gross focal deficts  Psychiatry: Appropriate affect. Not agitated  Skin: Warm, dry, no rashes appreciated    Labs and Tests:  CBC:   Recent Labs     04/15/22  1000 04/16/22  0550   WBC 6.1 5.4   HGB 12.5 11.6*    195     BMP:    Recent Labs     04/15/22  1000 04/16/22  0550   * 137   K 4.4 4.5   CL 96* 99   CO2 26 26   BUN 20 17   CREATININE 0.9 0.7   GLUCOSE 254* 198*     Hepatic:   Recent Labs     04/15/22  1000   AST 25   ALT 15   BILITOT 0.7   ALKPHOS 67     XR CHEST PORTABLE   Final Result   Clear lungs. Recent imaging reviewed    Problem List  Principal Problem:    Chest pain  Active Problems:    CAD (coronary artery disease)    Labile hypertension    Mixed hyperlipidemia    Type 1 diabetes, uncontrolled, with retinopathy ([facility] [address]  Resolved Problems:    * No resolved hospital problems.  *          Assessment/Plan:   Chest pain  Cussed with cardiology will start on Lovenox 1 mg/kg twice daily today plan for left heart cath on Monday     Dm1:home insulin pump     Hypothhyroidism Home meds     DVT prophylaxis lovenox  Code status full code      Neptali Whyte MD   4/16/2022 11:47 AM

## 2022-04-16 NOTE — PROGRESS NOTES
[address] Daily Progress Note      Admit Date:  4/15/2022    Chief Complaint: Chest pain    Subjective:  Ms. Devon Dunaway denies chest discomfort or shortness of breath.     Objective:   /67   Pulse 75   Temp 98.2 °F (36.8 °C) (Oral)   Resp 16   Ht 5' 2\" (1.575 m)   Wt 91 lb 4.8 oz (41.4 kg)   SpO2 96%   BMI 16.70 kg/m²     Intake/Output Summary (Last 24 hours) at 4/16/2022 1510  Last data filed at 4/16/2022 0523  Gross per 24 hour   Intake 720 ml   Output 675 ml   Net 45 ml       TELEMETRY: Sinus     Physical Exam:  General:  Awake, alert, oriented x 3, NAD  Skin:  Warm and dry  Neck:  JVD normal  Chest:  normal air entry  Cardiovascular:  RRR S1S2, no S3, 2/6 systolic at 2nd left intercostal space  Abdomen:  Soft, ND, NT, No HSM  Extremities:  No edema    Medications:    enoxaparin  1 mg/kg SubCUTAneous BID    nitroGLYCERIN  0.4 mg SubLINGual Once    aspirin  81 mg Oral Nightly    carbidopa-levodopa  1 tablet Oral Nightly    carbidopa-levodopa  1 tablet Oral 4x Daily    Vitamin D  2,000 Units Oral Daily    levothyroxine  100 mcg Oral Daily    atorvastatin  10 mg Oral Daily    sodium chloride flush  5-40 mL IntraVENous 2 times per day    nitroglycerin  0.5 inch Topical 4 times per day    metoprolol tartrate  12.5 mg Oral BID    pramipexole  1 mg Oral Dinner    And    pramipexole  0.5 mg Oral Nightly      sodium chloride      dextrose      Insulin Pump - insulin aspart       sodium chloride flush, sodium chloride, ondansetron **OR** ondansetron, polyethylene glycol, acetaminophen **OR** acetaminophen, glucose, glucagon (rDNA), dextrose, dextrose bolus (hypoglycemia) **OR** dextrose bolus (hypoglycemia)    Lab Data:  CBC:   Recent Labs     04/15/22  1000 04/16/22  0550   WBC 6.1 5.4   HGB 12.5 11.6*   HCT 37.8 35.8*   MCV 92.9 93.2    195     BMP:   Recent Labs     04/15/22  1000 04/16/22  0550   * 137   K 4.4 4.5   CL 96* 99   CO2 26 26   BUN 20 17   CREATININE 0.9 hyperlipidemia  Plan: Continue statin.       Type 1 diabetes, uncontrolled, with retinopathy ([facility] [address]  Plan: Chronic.       Cardiac cath on Monday.         Charlene Chen MD, MD 4/16/2022 3:10 PM

## 2022-04-16 NOTE — PLAN OF CARE
Problem: Skin Integrity:  Goal: Will show no infection signs and symptoms  Description: Will show no infection signs and symptoms  Outcome: Ongoing  Goal: Absence of new skin breakdown  Description: Absence of new skin breakdown  Outcome: Ongoing     Problem: Falls - Risk of:  Goal: Will remain free from falls  Description: Will remain free from falls  Outcome: Ongoing  Goal: Absence of physical injury  Description: Absence of physical injury  Outcome: Ongoing     Problem: Infection:  Goal: Will remain free from infection  Description: Will remain free from infection  Outcome: Ongoing     Problem: Safety:  Goal: Free from accidental physical injury  Description: Free from accidental physical injury  Outcome: Ongoing  Goal: Free from intentional harm  Description: Free from intentional harm  Outcome: Ongoing     Problem: Daily Care:  Goal: Daily care needs are met  Description: Daily care needs are met  Outcome: Ongoing     Problem: Pain:  Goal: Patient's pain/discomfort is manageable  Description: Patient's pain/discomfort is manageable  Outcome: Ongoing     Problem: Skin Integrity:  Goal: Skin integrity will stabilize  Description: Skin integrity will stabilize  Outcome: Ongoing     Problem: Discharge Planning:  Goal: Patients continuum of care needs are met  Description: Patients continuum of care needs are met  Outcome: Ongoing     Problem: Discharge Planning:  Goal: Discharged to appropriate level of care  Description: Discharged to appropriate level of care  Outcome: Ongoing     Problem: Serum Glucose Level - Abnormal:  Goal: Ability to maintain appropriate glucose levels will improve  Description: Ability to maintain appropriate glucose levels will improve  Outcome: Ongoing     Problem: Sensory Perception - Impaired:  Goal: Ability to maintain a stable neurologic state will improve  Description: Ability to maintain a stable neurologic state will improve  Outcome: Ongoing     Problem: Cardiac:  Goal: Ability to maintain an adequate cardiac output will improve  Description: Ability to maintain an adequate cardiac output will improve  Outcome: Ongoing  Goal: Complications related to the disease process, condition or treatment will be avoided or minimized  Description: Complications related to the disease process, condition or treatment will be avoided or minimized  Outcome: Ongoing  Goal: Hemodynamic stability will improve  Description: Hemodynamic stability will improve  Outcome: Ongoing  Goal: Risk factors for ineffective tissue perfusion will decrease  Description: Risk factors for ineffective tissue perfusion will decrease  Outcome: Ongoing     Problem: Fluid Volume:  Goal: Will show no signs or symptoms of fluid imbalance  Description: Will show no signs or symptoms of fluid imbalance  Outcome: Ongoing

## 2022-04-16 NOTE — PLAN OF CARE
Problem: Skin Integrity:  Goal: Will show no infection signs and symptoms  Description: Will show no infection signs and symptoms  4/15/2022 2203 by Gayathri Santos RN  Outcome: Ongoing  4/15/2022 2012 by Duane Garcia RN  Outcome: Ongoing  Goal: Absence of new skin breakdown  Description: Absence of new skin breakdown  4/15/2022 2012 by Duane Garcia RN  Outcome: Ongoing     Problem: Falls - Risk of:  Goal: Will remain free from falls  Description: Will remain free from falls  4/15/2022 2203 by Gayathri Santos RN  Outcome: Ongoing  4/15/2022 2012 by Duane Garcia RN  Outcome: Ongoing  Goal: Absence of physical injury  Description: Absence of physical injury  4/15/2022 2012 by Duane Garcia RN  Outcome: Ongoing     Problem: Infection:  Goal: Will remain free from infection  Description: Will remain free from infection  4/15/2022 2012 by Duane Garcia RN  Outcome: Ongoing     Problem: Safety:  Goal: Free from accidental physical injury  Description: Free from accidental physical injury  4/15/2022 2012 by Duane Garcia RN  Outcome: Ongoing  Goal: Free from intentional harm  Description: Free from intentional harm  4/15/2022 2012 by Duane Garcia RN  Outcome: Ongoing     Problem: Daily Care:  Goal: Daily care needs are met  Description: Daily care needs are met  4/15/2022 2012 by Duane Garcia RN  Outcome: Ongoing     Problem: Pain:  Goal: Patient's pain/discomfort is manageable  Description: Patient's pain/discomfort is manageable  4/15/2022 2203 by Gayathri Santos RN  Outcome: Ongoing  4/15/2022 2012 by Duane Garcia RN  Outcome: Ongoing     Problem: Skin Integrity:  Goal: Skin integrity will stabilize  Description: Skin integrity will stabilize  4/15/2022 2012 by Duane Garcia RN  Outcome: Ongoing     Problem: Discharge Planning:  Goal: Patients continuum of care needs are met  Description: Patients continuum of care needs are met  4/15/2022 2012 by Mango Rizo RN  Outcome: Ongoing     Problem: Discharge Planning:  Goal: Discharged to appropriate level of care  Description: Discharged to appropriate level of care  4/15/2022 2012 by Mango Rizo RN  Outcome: Ongoing     Problem: Serum Glucose Level - Abnormal:  Goal: Ability to maintain appropriate glucose levels will improve  Description: Ability to maintain appropriate glucose levels will improve  4/15/2022 2203 by Ryan Vincent RN  Outcome: Ongoing  4/15/2022 2012 by Mango Rizo RN  Outcome: Ongoing     Problem: Sensory Perception - Impaired:  Goal: Ability to maintain a stable neurologic state will improve  Description: Ability to maintain a stable neurologic state will improve  4/15/2022 2012 by Mango Rizo RN  Outcome: Ongoing     Problem: Cardiac:  Goal: Ability to maintain an adequate cardiac output will improve  Description: Ability to maintain an adequate cardiac output will improve  4/15/2022 2203 by Ryan Vincent RN  Outcome: Ongoing  4/15/2022 2012 by Mango Rizo RN  Outcome: Ongoing  Goal: Complications related to the disease process, condition or treatment will be avoided or minimized  Description: Complications related to the disease process, condition or treatment will be avoided or minimized  4/15/2022 2203 by Ryan Vincent RN  Outcome: Ongoing  4/15/2022 2012 by Mango Rizo RN  Outcome: Ongoing  Goal: Hemodynamic stability will improve  Description: Hemodynamic stability will improve  4/15/2022 2203 by Ryan Vincent RN  Outcome: Ongoing  4/15/2022 2012 by Mango Rizo RN  Outcome: Ongoing  Goal: Risk factors for ineffective tissue perfusion will decrease  Description: Risk factors for ineffective tissue perfusion will decrease  4/15/2022 2012 by Mango Rizo RN  Outcome: Ongoing     Problem: Fluid Volume:  Goal: Will show no signs or symptoms of fluid imbalance  Description: Will show no signs or symptoms of fluid imbalance  4/15/2022 2203 by Ila Diaz RN  Outcome: Ongoing  4/15/2022 2012 by Lauren Wilkerson RN  Outcome: Ongoing

## 2022-04-17 LAB
GLUCOSE BLD-MCNC: 113 MG/DL (ref 70–99)
GLUCOSE BLD-MCNC: 160 MG/DL (ref 70–99)
GLUCOSE BLD-MCNC: 182 MG/DL (ref 70–99)
GLUCOSE BLD-MCNC: 94 MG/DL (ref 70–99)
PERFORMED ON: ABNORMAL
PERFORMED ON: NORMAL

## 2022-04-17 PROCEDURE — 6360000002 HC RX W HCPCS: Performed by: INTERNAL MEDICINE

## 2022-04-17 PROCEDURE — 6370000000 HC RX 637 (ALT 250 FOR IP): Performed by: INTERNAL MEDICINE

## 2022-04-17 PROCEDURE — 2580000003 HC RX 258: Performed by: INTERNAL MEDICINE

## 2022-04-17 PROCEDURE — 1200000000 HC SEMI PRIVATE

## 2022-04-17 PROCEDURE — 99233 SBSQ HOSP IP/OBS HIGH 50: CPT | Performed by: INTERNAL MEDICINE

## 2022-04-17 RX ADMIN — CARBIDOPA AND LEVODOPA 1 TABLET: 25; 100 TABLET ORAL at 10:41

## 2022-04-17 RX ADMIN — ATORVASTATIN CALCIUM 10 MG: 10 TABLET, FILM COATED ORAL at 21:12

## 2022-04-17 RX ADMIN — CARBIDOPA AND LEVODOPA 1 TABLET: 25; 100 TABLET ORAL at 05:42

## 2022-04-17 RX ADMIN — LEVOTHYROXINE SODIUM 100 MCG: 0.1 TABLET ORAL at 05:42

## 2022-04-17 RX ADMIN — METOPROLOL TARTRATE 12.5 MG: 25 TABLET, FILM COATED ORAL at 21:10

## 2022-04-17 RX ADMIN — ENOXAPARIN SODIUM 40 MG: 100 INJECTION SUBCUTANEOUS at 21:10

## 2022-04-17 RX ADMIN — PRAMIPEXOLE DIHYDROCHLORIDE 0.5 MG: 0.25 TABLET ORAL at 21:10

## 2022-04-17 RX ADMIN — ENOXAPARIN SODIUM 40 MG: 100 INJECTION SUBCUTANEOUS at 08:20

## 2022-04-17 RX ADMIN — METOPROLOL TARTRATE 12.5 MG: 25 TABLET, FILM COATED ORAL at 08:19

## 2022-04-17 RX ADMIN — PRAMIPEXOLE DIHYDROCHLORIDE 1 MG: 0.25 TABLET ORAL at 16:50

## 2022-04-17 RX ADMIN — Medication 10 ML: at 08:20

## 2022-04-17 RX ADMIN — CARBIDOPA AND LEVODOPA 1 TABLET: 25; 100 TABLET ORAL at 21:12

## 2022-04-17 RX ADMIN — Medication 2000 UNITS: at 08:19

## 2022-04-17 RX ADMIN — CARBIDOPA AND LEVODOPA 1 TABLET: 25; 100 TABLET ORAL at 16:51

## 2022-04-17 RX ADMIN — CARBIDOPA AND LEVODOPA 1 TABLET: 50; 200 TABLET, EXTENDED RELEASE ORAL at 21:20

## 2022-04-17 ASSESSMENT — PAIN SCALES - GENERAL
PAINLEVEL_OUTOF10: 0

## 2022-04-17 NOTE — PROGRESS NOTES
CREATININE 0.9 0.7     LIVER PROFILE:   Recent Labs     04/15/22  1000   AST 25   ALT 15   BILITOT 0.7   ALKPHOS 67     PT/INR: No results for input(s): PROTIME, INR in the last 72 hours. APTT: No results for input(s): APTT in the last 72 hours. BNP:  No results for input(s): BNP in the last 72 hours. Imaging/Procedures:   Echo 4/4/2022:   Summary   -Normal left ventricle size, wall thickness and systolic function with an   estimated ejection fraction of 55%.  -No regional wall motion abnormalities are seen.   -Diastolic filling parameters suggest grade I diastolic   dysfunction. E/e\"=13.75.   -The mitral valve leaflets appear myxomatous.   -Mild mitral regurgitation.   -The aortic valve is mildly thickened/calcified but opens well with normal   gradients.   -Mild to moderate aortic regurgitation.   -Mild tricuspid regurgitation.   -Mild pulmonic regurgitation present.   -Estimated pulmonary artery systolic pressure is borderline elevated at 35 mmHg assuming a right atrial pressure of 3 mmHg.   -The left atrium is mildly dilated.   -There is a trivial localized right sided pericardial effusion noted.  -Appears unchanged when compared to previous study dated 10/26/2021.   -The ascending aorta is mildly dilated (3.7 cm).     Consider cross-sectional imaging of the aorta if clinically indicated.     Myoview stress test 2/25/2022:   Summary    Normal myocardial perfusion.    Normal LV size and systolic function. Assessment/Plan:  Principal Problem:    Chest pain  Plan: Sounds like angina. Correlation with low blood pressure is of concern. Continue Lovenox. Plan cardiac catheterization Monday.     Active Problems:    CAD (coronary artery disease)  Plan: Previously nonobstructive by cardiac cath in 2012. Now presenting with chest discomfort. Recommend cardiac catheterization.       Labile hypertension  Plan: Periods of elevated blood pressures as well as low blood pressures.   Etiology?       Mixed hyperlipidemia  Plan: Continue statin.       Type 1 diabetes, uncontrolled, with retinopathy ([facility] [address]  Plan: Chronic.       Cardiac cath on Monday.         Joe Azar MD, MD 4/17/2022 10:18 AM

## 2022-04-17 NOTE — PROGRESS NOTES
[facility]    4/17/2022 11:36 AM        Name: Sandi Carney .               Admitted: 4/15/2022  Primary Care Provider: Idell Nyhan, MD (Tel: [phone])        Subjective:    Had some chest discomfort yesterday no nausea vomiting no further chest discomfort today no shortness of  Reviewed interval ancillary notes    Current Medications  enoxaparin (LOVENOX) injection 40 mg, BID  nitroGLYCERIN (NITROSTAT) SL tablet 0.4 mg, Once  aspirin EC tablet 81 mg, Nightly  carbidopa-levodopa (SINEMET CR)  MG per extended release tablet 1 tablet, Nightly  carbidopa-levodopa (SINEMET)  MG per tablet 1 tablet, 4x Daily  Vitamin D (CHOLECALCIFEROL) tablet 2,000 Units, Daily  levothyroxine (SYNTHROID) tablet 100 mcg, Daily  atorvastatin (LIPITOR) tablet 10 mg, Daily  sodium chloride flush 0.9 % injection 5-40 mL, 2 times per day  sodium chloride flush 0.9 % injection 5-40 mL, PRN  0.9 % sodium chloride infusion, PRN  ondansetron (ZOFRAN-ODT) disintegrating tablet 4 mg, Q8H PRN   Or  ondansetron (ZOFRAN) injection 4 mg, Q6H PRN  polyethylene glycol (GLYCOLAX) packet 17 g, Daily PRN  acetaminophen (TYLENOL) tablet 650 mg, Q6H PRN   Or  acetaminophen (TYLENOL) suppository 650 mg, Q6H PRN  glucose (GLUTOSE) 40 % oral gel 15 g, PRN  glucagon (rDNA) injection 1 mg, PRN  dextrose 5 % solution, PRN  dextrose bolus (hypoglycemia) 10% 125 mL, PRN   Or  dextrose bolus (hypoglycemia) 10% 250 mL, PRN  nitroglycerin (NITRO-BID) 2 % ointment 0.5 inch, 4 times per day  metoprolol tartrate (LOPRESSOR) tablet 12.5 mg, BID  pramipexole (MIRAPEX) tablet 1 mg, Dinner   And  pramipexole (MIRAPEX) tablet 0.5 mg, Nightly  Insulin Pump - insulin aspart (Patient Supplied), Continuous        Objective:  BP (!) 126/56   Pulse 63   Temp 97.2 °F (36.2 °C) (Axillary)   Resp 18   Ht 5' 2\" (1.575 m)   Wt 91 lb 14.4 oz (41.7 kg)   SpO2 100% BMI 16.81 kg/m²     Intake/Output Summary (Last 24 hours) at 4/17/2022 1136  Last data filed at 4/17/2022 0543  Gross per 24 hour   Intake 240 ml   Output 1300 ml   Net -1060 ml      Wt Readings from Last 3 Encounters:   04/17/22 91 lb 14.4 oz (41.7 kg)   03/21/22 97 lb 12.8 oz (44.4 kg)   02/14/22 97 lb (44 kg)       General appearance:  Appears comfortable. AAOx3  HEENT: atraumatic, Pupils equal, muscous membranes moist, no masses appreciated  Cardiovascular: Regular rate and rhythm no murmurs appreciated  Respiratory: CTAB no wheezing  Gastrointestinal: Abdomen soft, non-tender, BS+  EXT: no edema  Neurology: no gross focal deficts  Psychiatry: Appropriate affect. Not agitated  Skin: Warm, dry, no rashes appreciated    Labs and Tests:  CBC:   Recent Labs     04/15/22  1000 04/16/22  0550   WBC 6.1 5.4   HGB 12.5 11.6*    195     BMP:    Recent Labs     04/15/22  1000 04/16/22  0550   * 137   K 4.4 4.5   CL 96* 99   CO2 26 26   BUN 20 17   CREATININE 0.9 0.7   GLUCOSE 254* 198*     Hepatic:   Recent Labs     04/15/22  1000   AST 25   ALT 15   BILITOT 0.7   ALKPHOS 67     XR CHEST PORTABLE   Final Result   Clear lungs. Recent imaging reviewed    Problem List  Principal Problem:    Chest pain  Active Problems:    CAD (coronary artery disease)    Labile hypertension    Mixed hyperlipidemia    Type 1 diabetes, uncontrolled, with retinopathy ([facility] [address]  Resolved Problems:    * No resolved hospital problems.  *          Assessment/Plan:   Chest pain  Cussed with cardiology will start on Lovenox 1 mg/kg twice daily today plan for left heart cath on Monday     Dm1:home insulin pump     Hypothhyroidism Home meds     DVT prophylaxis lovenox  Code status full code      Angelia Saeed MD   4/17/2022 11:36 AM

## 2022-04-17 NOTE — PLAN OF CARE
Problem: Skin Integrity:  Goal: Absence of new skin breakdown  Description: Absence of new skin breakdown  Outcome: Ongoing     Problem: Falls - Risk of:  Goal: Will remain free from falls  Description: Will remain free from falls  Outcome: Ongoing     Problem: Serum Glucose Level - Abnormal:  Goal: Ability to maintain appropriate glucose levels will improve  Description: Ability to maintain appropriate glucose levels will improve  Outcome: Ongoing     Problem: Cardiac:  Goal: Ability to maintain an adequate cardiac output will improve  Description: Ability to maintain an adequate cardiac output will improve  Outcome: Ongoing  Goal: Complications related to the disease process, condition or treatment will be avoided or minimized  Description: Complications related to the disease process, condition or treatment will be avoided or minimized  Outcome: Ongoing  Goal: Hemodynamic stability will improve  Description: Hemodynamic stability will improve  Outcome: Ongoing     Problem: Fluid Volume:  Goal: Will show no signs or symptoms of fluid imbalance  Description: Will show no signs or symptoms of fluid imbalance  Outcome: Ongoing

## 2022-04-18 ENCOUNTER — TELEPHONE (OUTPATIENT)
Dept: ENDOCRINOLOGY | Age: 82
End: 2022-04-18

## 2022-04-18 VITALS
BODY MASS INDEX: 16.86 KG/M2 | SYSTOLIC BLOOD PRESSURE: 119 MMHG | RESPIRATION RATE: 18 BRPM | HEART RATE: 64 BPM | DIASTOLIC BLOOD PRESSURE: 62 MMHG | WEIGHT: 91.6 LBS | HEIGHT: 62 IN | OXYGEN SATURATION: 94 % | TEMPERATURE: 98 F

## 2022-04-18 LAB
ANION GAP SERPL CALCULATED.3IONS-SCNC: 10 MMOL/L (ref 3–16)
BASOPHILS ABSOLUTE: 0 K/UL (ref 0–0.2)
BASOPHILS RELATIVE PERCENT: 0.8 %
BUN BLDV-MCNC: 19 MG/DL (ref 7–20)
CALCIUM SERPL-MCNC: 9.7 MG/DL (ref 8.3–10.6)
CHLORIDE BLD-SCNC: 99 MMOL/L (ref 99–110)
CO2: 27 MMOL/L (ref 21–32)
CREAT SERPL-MCNC: 0.7 MG/DL (ref 0.6–1.2)
EOSINOPHILS ABSOLUTE: 0.3 K/UL (ref 0–0.6)
EOSINOPHILS RELATIVE PERCENT: 6.2 %
GFR AFRICAN AMERICAN: >60
GFR NON-AFRICAN AMERICAN: >60
GLUCOSE BLD-MCNC: 157 MG/DL (ref 70–99)
GLUCOSE BLD-MCNC: 198 MG/DL (ref 70–99)
HCT VFR BLD CALC: 35.7 % (ref 36–48)
HEMOGLOBIN: 11.6 G/DL (ref 12–16)
INR BLD: 0.91 (ref 0.88–1.12)
LEFT VENTRICULAR EJECTION FRACTION MODE: NORMAL
LV EF: 70 %
LYMPHOCYTES ABSOLUTE: 1.1 K/UL (ref 1–5.1)
LYMPHOCYTES RELATIVE PERCENT: 28.1 %
MCH RBC QN AUTO: 30.3 PG (ref 26–34)
MCHC RBC AUTO-ENTMCNC: 32.6 G/DL (ref 31–36)
MCV RBC AUTO: 93.1 FL (ref 80–100)
MONOCYTES ABSOLUTE: 0.6 K/UL (ref 0–1.3)
MONOCYTES RELATIVE PERCENT: 13.7 %
NEUTROPHILS ABSOLUTE: 2.1 K/UL (ref 1.7–7.7)
NEUTROPHILS RELATIVE PERCENT: 51.2 %
PDW BLD-RTO: 14.1 % (ref 12.4–15.4)
PERFORMED ON: ABNORMAL
PLATELET # BLD: 179 K/UL (ref 135–450)
PMV BLD AUTO: 9 FL (ref 5–10.5)
POC ACT LR: 289 SEC
POTASSIUM REFLEX MAGNESIUM: 4.3 MMOL/L (ref 3.5–5.1)
PROTHROMBIN TIME: 10.3 SEC (ref 9.9–12.7)
RBC # BLD: 3.83 M/UL (ref 4–5.2)
SODIUM BLD-SCNC: 136 MMOL/L (ref 136–145)
WBC # BLD: 4.1 K/UL (ref 4–11)

## 2022-04-18 PROCEDURE — 2500000003 HC RX 250 WO HCPCS

## 2022-04-18 PROCEDURE — 85025 COMPLETE CBC W/AUTO DIFF WBC: CPT

## 2022-04-18 PROCEDURE — 80048 BASIC METABOLIC PNL TOTAL CA: CPT

## 2022-04-18 PROCEDURE — 6370000000 HC RX 637 (ALT 250 FOR IP): Performed by: INTERNAL MEDICINE

## 2022-04-18 PROCEDURE — 36415 COLL VENOUS BLD VENIPUNCTURE: CPT

## 2022-04-18 PROCEDURE — 93571 IV DOP VEL&/PRESS C FLO 1ST: CPT

## 2022-04-18 PROCEDURE — C1887 CATHETER, GUIDING: HCPCS

## 2022-04-18 PROCEDURE — 2580000003 HC RX 258: Performed by: INTERNAL MEDICINE

## 2022-04-18 PROCEDURE — 99152 MOD SED SAME PHYS/QHP 5/>YRS: CPT

## 2022-04-18 PROCEDURE — C1894 INTRO/SHEATH, NON-LASER: HCPCS

## 2022-04-18 PROCEDURE — 85610 PROTHROMBIN TIME: CPT

## 2022-04-18 PROCEDURE — 93458 L HRT ARTERY/VENTRICLE ANGIO: CPT

## 2022-04-18 PROCEDURE — C1769 GUIDE WIRE: HCPCS

## 2022-04-18 PROCEDURE — 2580000003 HC RX 258

## 2022-04-18 PROCEDURE — 99153 MOD SED SAME PHYS/QHP EA: CPT

## 2022-04-18 PROCEDURE — 85347 COAGULATION TIME ACTIVATED: CPT

## 2022-04-18 PROCEDURE — 4A023N7 MEASUREMENT OF CARDIAC SAMPLING AND PRESSURE, LEFT HEART, PERCUTANEOUS APPROACH: ICD-10-PCS | Performed by: INTERNAL MEDICINE

## 2022-04-18 PROCEDURE — 6360000004 HC RX CONTRAST MEDICATION: Performed by: INTERNAL MEDICINE

## 2022-04-18 PROCEDURE — 2709999900 HC NON-CHARGEABLE SUPPLY

## 2022-04-18 PROCEDURE — 6360000002 HC RX W HCPCS

## 2022-04-18 RX ORDER — SODIUM CHLORIDE 9 MG/ML
INJECTION, SOLUTION INTRAVENOUS CONTINUOUS
Status: DISCONTINUED | OUTPATIENT
Start: 2022-04-18 | End: 2022-04-18 | Stop reason: HOSPADM

## 2022-04-18 RX ORDER — SODIUM CHLORIDE 9 MG/ML
25 INJECTION, SOLUTION INTRAVENOUS PRN
Status: DISCONTINUED | OUTPATIENT
Start: 2022-04-18 | End: 2022-04-18

## 2022-04-18 RX ORDER — SODIUM CHLORIDE 9 MG/ML
25 INJECTION, SOLUTION INTRAVENOUS PRN
Status: DISCONTINUED | OUTPATIENT
Start: 2022-04-18 | End: 2022-04-18 | Stop reason: HOSPADM

## 2022-04-18 RX ORDER — SODIUM CHLORIDE 0.9 % (FLUSH) 0.9 %
5-40 SYRINGE (ML) INJECTION EVERY 12 HOURS SCHEDULED
Status: DISCONTINUED | OUTPATIENT
Start: 2022-04-18 | End: 2022-04-18

## 2022-04-18 RX ORDER — SODIUM CHLORIDE 0.9 % (FLUSH) 0.9 %
5-40 SYRINGE (ML) INJECTION EVERY 12 HOURS SCHEDULED
Status: DISCONTINUED | OUTPATIENT
Start: 2022-04-18 | End: 2022-04-18 | Stop reason: HOSPADM

## 2022-04-18 RX ORDER — SODIUM CHLORIDE 0.9 % (FLUSH) 0.9 %
5-40 SYRINGE (ML) INJECTION PRN
Status: DISCONTINUED | OUTPATIENT
Start: 2022-04-18 | End: 2022-04-18 | Stop reason: HOSPADM

## 2022-04-18 RX ORDER — ACETAMINOPHEN 325 MG/1
650 TABLET ORAL EVERY 4 HOURS PRN
Status: DISCONTINUED | OUTPATIENT
Start: 2022-04-18 | End: 2022-04-18 | Stop reason: HOSPADM

## 2022-04-18 RX ORDER — SODIUM CHLORIDE 0.9 % (FLUSH) 0.9 %
5-40 SYRINGE (ML) INJECTION PRN
Status: DISCONTINUED | OUTPATIENT
Start: 2022-04-18 | End: 2022-04-18

## 2022-04-18 RX ORDER — SODIUM CHLORIDE 9 MG/ML
INJECTION, SOLUTION INTRAVENOUS CONTINUOUS
Status: DISCONTINUED | OUTPATIENT
Start: 2022-04-18 | End: 2022-04-18

## 2022-04-18 RX ADMIN — LEVOTHYROXINE SODIUM 100 MCG: 0.1 TABLET ORAL at 06:51

## 2022-04-18 RX ADMIN — Medication 10 ML: at 06:54

## 2022-04-18 RX ADMIN — Medication 10 ML: at 09:38

## 2022-04-18 RX ADMIN — ACETAMINOPHEN 650 MG: 325 TABLET ORAL at 11:46

## 2022-04-18 RX ADMIN — CARBIDOPA AND LEVODOPA 1 TABLET: 25; 100 TABLET ORAL at 06:51

## 2022-04-18 RX ADMIN — SODIUM CHLORIDE: 9 INJECTION, SOLUTION INTRAVENOUS at 06:53

## 2022-04-18 RX ADMIN — Medication 2000 UNITS: at 09:37

## 2022-04-18 RX ADMIN — CARBIDOPA AND LEVODOPA 1 TABLET: 25; 100 TABLET ORAL at 11:06

## 2022-04-18 RX ADMIN — SODIUM CHLORIDE: 9 INJECTION, SOLUTION INTRAVENOUS at 09:41

## 2022-04-18 RX ADMIN — IOPAMIDOL 48 ML: 755 INJECTION, SOLUTION INTRAVENOUS at 08:50

## 2022-04-18 ASSESSMENT — PAIN SCALES - GENERAL: PAINLEVEL_OUTOF10: 6

## 2022-04-18 NOTE — CARE COORDINATION
DISCHARGE PLANNING NOTE :   Patient's chart  reviewed for discharge planning needs ; admitted from home, A&O, lives with spouse  and it appears that patient has minimal needs for discharge at this time with readmission score of 13%. Discussed with patients nurse and requested that case management be notified if discharge needs are identified. Patient has active insurance with Medicare     Dr Yaneth Jones  is listed as the PCP  Spouse will transport patient home. Case management will continue to follow progress and update discharge plan as needed.

## 2022-04-18 NOTE — PROGRESS NOTES
Data- discharge order received, pt verbalized agreement to discharge, disposition to previous residence, no needs for HHC/DME. Action- discharge instructions prepared and given to [facility], pt verbalized understanding. Medication information packet given r/t NEW and/or CHANGED prescriptions emphasizing name/purpose/side effects, pt verbalized understanding. Discharge instruction summary: Diet- carb controled, Activity- as tolerated with 10 lb weight restriction on right wrist for 3 days, Primary Care Physician as follows: Angelica Arnold [phone] f/u appointment to be scheduled by pt, Inpatient surgical procedure precautions reviewed: post cath restriction and wound care instructions reviewed. 1. WEIGHT: Admit Weight: 95 lb (43.1 kg) (04/15/22 0931)        Today  Weight: 91 lb 9.6 oz (41.5 kg) (04/18/22 0645)       2. O2 SAT.: SpO2: 94 % (04/18/22 1032)    Response- Pt belongings gathered, IV removed. Disposition is home (no HHC/DME needs), transported to [facility] via w/c w/ belongings, no complications.

## 2022-04-18 NOTE — BRIEF OP NOTE
Cardiac Cath 1422:  Access: Right RA  Ultrasound: Ultrasound guidance used to determine after mentioned artery patency, size (> 2 mm), anatomic variations and ideal puncture location. Real-time ultrasound utilized concurrent with vascular needle entry into the artery. Images permanently recorded and reported in the patient chart. Hemostasis: TR band    Moderate Sedation:  Start time: 0752  Stop time: 0843  1.5 mg versed   25 mcg fentanyl   An independent trained observer pushed medications at my direction. We monitored the patient's level of consciousness and vital signs/physiologic status throughout the procedure duration (see start and start times above). Bleeding risk: Intermediate  LVEDP: 5 mmHg  AO: 100/42 mmHg  Estimated blood loss: Less than 20 mL  Contrast: 67 mL  Fluoroscopy time: 7.8 min. Anatomy:   LM-normal  LAD-40% proximal, FFR 0.99  Cx-30% mid  OM1-patent  RCA-10% mid, dominant  RPDA-normal  LVEF-70%    Impression:  1. Mild nonobstructive CAD. 2.  Hyperdynamic LV systolic function. 3.  Noncardiac chest pain. Likely blood pressure related. Plan:  1. Medical management. 2.  Okay for discharge home from cardiology standpoint.

## 2022-04-18 NOTE — PROGRESS NOTES
[facility] Nurse  Consult Note       NAME:  Vlad Pina  MEDICAL RECORD NUMBER:  [id number]  AGE: 80 y.o. GENDER: female  : 1940  TODAY'S DATE:  2022    Subjective:     Reason for Educator consult ---  Evaluation and Assessment:    Vlad Pina is a 80 y.o. female referred by:   [x] Physician  [] Nursing  [] Other:    Patient states diagnosed with diabetes 24 years ago. The patient does have a glucometer at home and states testing 6 to 8 times a day. Reviewed S/S of hyper- and hypoglycemia and the proper treatment of hyper- and hypoglycemia, given handouts. Explained A1C values 7.6 and goals <7. Patient states afraid to eat carbohydrates, will not eat bedtime snack & blood sugar will still spike at 3 am. Explained importance of having meal or snack every 4 to 5 hours including a bedtime snack to prevent liver from pumping out concentrated sugar when liver thinks we are starving. Gave recommendations and handout information on diet with diabetes and counting carbohydrates  Encouraged diet compliance to improve pt's health and deter long term complications of DM. Discussed exercise, sick day management, following dietary plan, following up with PCP. Discussed medications including insulin. Discussed health benefits of non smoking. the patient is a non-smoker. Recommend maintaining a smoke-free lifestyle. Patient given pamphlets of written material regarding diabetes titled \" What is Diabetes:\", \"Checking your Blood Sugar\", \"Know your Numbers\", and \"Building a Balanced Meal\".         PAST MEDICAL HISTORY      Diagnosis Date    CAD (coronary artery disease) 2012    non obstructive    Diabetes mellitus with neurological manifestation (HCC)     Hyperlipidemia     Hyperlipidemia     Hypothyroidism (acquired)     Insomnia     Neuropathy     Parkinson disease (HCC)     [facility]     Parkinson disease ([facility]     Renal insufficiency     Restless legs syndrome     Type I (juvenile type) diabetes mellitus without mention of complication, not stated as uncontrolled        PAST SURGICAL HISTORY  Past Surgical History:   Procedure Laterality Date    CARDIAC CATHETERIZATION  2012    non obstructive CAD    COLONOSCOPY      at age 71    DENTAL SURGERY      EYE SURGERY Bilateral     with lens implants    SKIN BIOPSY Right     neg skin biopsy on right arm    TONSILLECTOMY         FAMILY HISTORY  Family History   Problem Relation Age of Onset    Cancer Father     Heart Disease Father     Heart Disease Mother     Cancer Other         sister's daughter - Melanoma    Heart Disease Brother     Arthritis Sister     Diabetes Neg Hx     Stroke Neg Hx     Osteoporosis Neg Hx     Thyroid Disease Neg Hx        SOCIAL HISTORY  Social History     Tobacco Use    Smoking status: Former Smoker     Packs/day: 0.25     Years: 4.00     Pack years: 1.00     Types: Cigarettes     Quit date: 1966     Years since quittin.3    Smokeless tobacco: Never Used   Vaping Use    Vaping Use: Never used   Substance Use Topics    Alcohol use: Not Currently     Alcohol/week: 1.0 standard drink     Types: 1 Glasses of wine per week     Comment: social    Drug use: No       ALLERGIES  No Known Allergies    MEDICATIONS  No current facility-administered medications on file prior to encounter.      Current Outpatient Medications on File Prior to Encounter   Medication Sig Dispense Refill    Calcium Citrate-Vitamin D (MYRON-CITRATE PLUS VITAMIN D PO) Take 1 tablet by mouth daily      insulin aspart (NOVOLOG) 100 UNIT/ML injection vial INJECT TOTAL DAILY DOSE 30 UNITS SUBCUTANEOUSLY WITH INSULIN PUMP 30 mL 3    nitroGLYCERIN (NITROSTAT) 0.4 MG SL tablet Place 1 tablet under the tongue every 5 minutes as needed for Chest pain 25 tablet 3    pramipexole (MIRAPEX) 0.5 MG tablet Take 1 tablet by mouth 2 times daily 2 tabs with dinner, 1 tab at HS (Patient taking differently: Take 0.5 mg by mouth See Admin Instructions 2 tabs with dinner, 1 tab at HS) 90 tablet 3    levothyroxine (SYNTHROID) 100 MCG tablet TAKE 1 TABLET DAILY 90 tablet 3    carbidopa-levodopa (SINEMET)  MG per tablet Take 1 tablet by mouth 4 times daily 120 tablet 5    Continuous Blood Gluc Transmit (DEXCOM G6 TRANSMITTER) MISC Change transmitter every 3 months 4 each 0    carbidopa-levodopa (SINEMET CR)  MG per extended release tablet Take 1 tablet by mouth nightly       aspirin 81 MG EC tablet Take 81 mg by mouth at bedtime       simvastatin (ZOCOR) 20 MG tablet Take 1 tablet by mouth nightly 90 tablet 3    Multiple Vitamins-Minerals (CENTRUM SILVER) TABS Take 1 tablet by mouth daily       Cholecalciferol (VITAMIN D3) 2000 UNITS CAPS Take 1 capsule by mouth daily      Insulin Infusion Pump (PARADIGM INSULIN PUMP) by Does not apply route.          Objective:     LABS    CBC:   Lab Results   Component Value Date    WBC 4.1 04/18/2022    RBC 3.83 04/18/2022    HGB 11.6 04/18/2022    HCT 35.7 04/18/2022    MCV 93.1 04/18/2022    MCH 30.3 04/18/2022    MCHC 32.6 04/18/2022    RDW 14.1 04/18/2022     04/18/2022    MPV 9.0 04/18/2022     CMP:    Lab Results   Component Value Date     04/18/2022    K 4.3 04/18/2022    CL 99 04/18/2022    CO2 27 04/18/2022    BUN 19 04/18/2022    CREATININE 0.7 04/18/2022    GFRAA >60 04/18/2022    GFRAA >60 01/24/2012    AGRATIO 1.6 04/15/2022    LABGLOM >60 04/18/2022    LABGLOM 54 01/16/2014    GLUCOSE 198 04/18/2022    GLUCOSE 152 04/18/2012    PROT 6.6 04/15/2022    PROT 6.9 04/18/2012    LABALBU 4.1 04/15/2022    CALCIUM 9.7 04/18/2022    BILITOT 0.7 04/15/2022    ALKPHOS 67 04/15/2022    AST 25 04/15/2022    ALT 15 04/15/2022       HgBA1c:    Lab Results   Component Value Date    LABA1C 7.6 03/11/2022     This patient's last creatinine was   Recent Labs     04/18/22  0220   CREATININE 0.7        Recent Blood sugars have been   Lab Results   Component Value Date    POCGLU 157 04/18/2022    POCGLU 160 04/17/2022    POCGLU 113 04/17/2022    POCGLU 94 04/17/2022    POCGLU 182 04/17/2022    POCGLU 77 04/16/2022    POCGLU 64 04/16/2022    POCGLU 157 04/16/2022     Lab Results   Component Value Date    GLUCOSE 198 04/18/2022    GLUCOSE 198 04/16/2022    GLUCOSE 254 04/15/2022    GLUCOSE 126 03/11/2022    GLUCOSE 110 12/08/2021    GLUCOSE 158 06/02/2021    GLUCOSE 152 04/18/2012    GLUCOSE 161 02/22/2012    GLUCOSE 154 08/03/2011    GLUCOSE 199 06/01/2011            Assessment:     Patient Active Problem List   Diagnosis    Diabetic polyneuropathy ([address]    Mixed hyperlipidemia    Hashimoto's thyroiditis    Acquired hypothyroidism    Type 1 diabetes, uncontrolled, with neuropathy ([address]    Chest pain    CAD (coronary artery disease)    Rotator cuff tendonitis    Osteoporosis    Type 1 diabetes, uncontrolled, with retinopathy ([address]    Parkinson disease ([address]    Underweight    Labile hypertension       Plan:     Plan of Care:   Diabetes Type 1.5 JOHN controlled  Lipids-- last HDL 91, LDL 52, trig 36   Treated with: atorvastatin  Renal- creatinine today 0.7, eGFR >60  ACE/ARB: no  DVT prophylaxis: on Lovenox  Current DM tx: insulin pump with pre-set basal & correction scale programmed by the patient  Would recommend continuing with current insulin orders at this time. Continue to monitor blood sugars to determine adequacy of current dosages  Patient would like prescriptions on discharge to be filled here. Message sent to Dr. Rosendo Romero with recommendations      Discharge Plan:  Patient will continue to need insulin at home: suggest full basal bolus insulin pump therapy  Patient to f/u with PCP. Instructed to log blood sugars and take blood sugar log to her f/u appointment. Erin Hutchinson RN, BSN, [address] [place] [address].

## 2022-04-18 NOTE — DISCHARGE SUMMARY
Hospital Medicine Discharge Summary    Patient: Vj Ayala     Gender: female  : 1940   Age: 80 y.o. MRN: [id number]    Admitting Physician: Jillian Dakins, MD  Discharge Physician: Jillian Dakins, MD     Code Status: Full Code     Admit Date: 4/15/2022   Discharge Date:   2022    Disposition:  Home  Time spent arranging discharge: 35 minutes    Discharge Diagnoses: Active Hospital Problems    Diagnosis Date Noted    Labile hypertension [R09.89] 04/15/2022     Priority: High    CAD (coronary artery disease) [I25.10] 2012     Priority: High    Mixed hyperlipidemia [E78.2] 2010     Priority: Medium    Type 1 diabetes, uncontrolled, with retinopathy ([facility] [address] [E10.319, E10.65] 2019    Chest pain [R07.9] 2012           Condition at Discharge:  [address] Course:   Admitted to hospital with chest pain started on Lovenox 1 mg/kg twice daily underwent left heart cath which showed    Anatomy:   LM-normal  LAD-40% proximal, FFR 0.99  Cx-30% mid  OM1-patent  RCA-10% mid, dominant  RPDA-normal  LVEF-70%     Impression:  1. Mild nonobstructive CAD. 2.  Hyperdynamic LV systolic function. 3.  Noncardiac chest pain. Likely blood pressure related.     Plan:  1. Medical management. 2.  Okay for discharge home from cardiology standpoint. Discharge Exam:    /64   Pulse 69   Temp 98 °F (36.7 °C) (Temporal)   Resp 18   Ht 5' 2\" (1.575 m)   Wt 91 lb 9.6 oz (41.5 kg)   SpO2 94%   BMI 16.75 kg/m²   General appearance:  Appears comfortable. AAOx3  HEENT: atraumatic, Pupils equal, muscous membranes moist, no masses appreciated  Cardiovascular: Regular rate and rhythm no murmurs appreciated  Respiratory: CTAB no wheezing  Gastrointestinal: Abdomen soft, non-tender, BS+  EXT: no edema  Neurology: no gross focal deficts  Psychiatry: Appropriate affect.  Not agitated  Skin: Warm, dry, no rashes appreciated    Discharge Medications:   Current Discharge Medication List      START taking these medications    Details   metoprolol tartrate (LOPRESSOR) 25 MG tablet Take 0.5 tablets by mouth 2 times daily  Qty: 60 tablet, Refills: 3           Current Discharge Medication List        Current Discharge Medication List      CONTINUE these medications which have NOT CHANGED    Details   Calcium Citrate-Vitamin D (MRYON-CITRATE PLUS VITAMIN D PO) Take 1 tablet by mouth daily      insulin aspart (NOVOLOG) 100 UNIT/ML injection vial INJECT TOTAL DAILY DOSE 30 UNITS SUBCUTANEOUSLY WITH INSULIN PUMP  Qty: 30 mL, Refills: 3    Comments: Insulin needs to be billed under MEDICARE PART B. PT uses a Insulin Pump. DX CODE:E10.40  Associated Diagnoses: Type 1 diabetes, uncontrolled, with retinopathy (HCC)      nitroGLYCERIN (NITROSTAT) 0.4 MG SL tablet Place 1 tablet under the tongue every 5 minutes as needed for Chest pain  Qty: 25 tablet, Refills: 3    Associated Diagnoses: Coronary artery disease involving native coronary artery of native heart without angina pectoris; Chest tightness      pramipexole (MIRAPEX) 0.5 MG tablet Take 1 tablet by mouth 2 times daily 2 tabs with dinner, 1 tab at HS  Qty: 90 tablet, Refills: 3      levothyroxine (SYNTHROID) 100 MCG tablet TAKE 1 TABLET DAILY  Qty: 90 tablet, Refills: 3    Associated Diagnoses: Type 1 diabetes, uncontrolled, with neuropathy ([address] Type 1 diabetes, uncontrolled, with retinopathy ([facility] Mixed hyperlipidemia; Coronary artery disease involving native coronary artery of native heart without angina pectoris;  Acquired hypothyroidism      carbidopa-levodopa (SINEMET)  MG per tablet Take 1 tablet by mouth 4 times daily  Qty: 120 tablet, Refills: 5      Continuous Blood Gluc Transmit (DEXCOM G6 TRANSMITTER) MISC Change transmitter every 3 months  Qty: 4 each, Refills: 0    Associated Diagnoses: Type 1 diabetes, uncontrolled, with neuropathy (HCC)      carbidopa-levodopa (SINEMET CR)  MG per extended release tablet Take 1 tablet by mouth nightly       aspirin 81 MG EC tablet Take 81 mg by mouth at bedtime       simvastatin (ZOCOR) 20 MG tablet Take 1 tablet by mouth nightly  Qty: 90 tablet, Refills: 3    Comments: Needs OV for further refills. Multiple Vitamins-Minerals (CENTRUM SILVER) TABS Take 1 tablet by mouth daily       Cholecalciferol (VITAMIN D3) 2000 UNITS CAPS Take 1 capsule by mouth daily      Insulin Infusion Pump (PARADIGM INSULIN PUMP) by Does not apply route. Current Discharge Medication List          Labs:  For convenience and continuity at follow-up the following most recent labs are provided:    Lab Results   Component Value Date    WBC 4.1 04/18/2022    HGB 11.6 04/18/2022    HCT 35.7 04/18/2022    MCV 93.1 04/18/2022     04/18/2022     04/18/2022    K 4.3 04/18/2022    CL 99 04/18/2022    CO2 27 04/18/2022    BUN 19 04/18/2022    CREATININE 0.7 04/18/2022    CALCIUM 9.7 04/18/2022    PHOS 3.8 11/08/2019     01/22/2012    ALKPHOS 67 04/15/2022    ALT 15 04/15/2022    AST 25 04/15/2022    BILITOT 0.7 04/15/2022    BILIDIR <0.2 09/21/2021    LABALBU 4.1 04/15/2022    LDLCALC 52 03/11/2022    TRIG 36 03/11/2022     Lab Results   Component Value Date    INR 0.91 04/18/2022    INR 0.90 01/22/2012       Radiology:  Echo 2D w doppler w color complete    Result Date: 4/5/2022  Transthoracic Echocardiography Report (TTE)  Demographics   Patient Name       Mare Mendes   Date of Study      04/04/2022         Gender              Female   Patient Number                        Date of Birth       1940   Visit Number       [id number]          Age                 80 year(s)   Accession Number   [id number]         Room Number         OP   Corporate ID       [id number]             Sonographer         Ana María Lerner,                                                            RDCS, RDMS, RT   Ordering Physician Mitchell Álvarez MD,  Interpreting        Matt Handy DO                     [facility] Physician  Procedure Type of Study   TTE procedure:ECHOCARDIOGRAM COMPLETE 2D W DOPPLER W COLOR. Procedure Date Date: 04/04/2022 Start: 08:30 AM Study Location: [address] [place] [address] - Echo Lab Technical Quality: Adequate visualization Indications:Coronary artery disease and Tricuspid valve disorder. Patient Status: Routine Height: 62 inches Weight: 97 pounds BSA: 1.41 m2 BMI: 17.74 kg/m2 BP: 110/62 mmHg  Conclusions   Summary  -Normal left ventricle size, wall thickness and systolic function with an  estimated ejection fraction of 55%.  -No regional wall motion abnormalities are seen. -Diastolic filling parameters suggest grade I diastolic  dysfunction. E/e\"=13.75.  -The mitral valve leaflets appear myxomatous. -Mild mitral regurgitation.  -The aortic valve is mildly thickened/calcified but opens well with normal  gradients. -Mild to moderate aortic regurgitation.  -Mild tricuspid regurgitation.  -Mild pulmonic regurgitation present.  -Estimated pulmonary artery systolic pressure is borderline elevated at 35  mmHg assuming a right atrial pressure of 3 mmHg. -The left atrium is mildly dilated. -There is a trivial localized right sided pericardial effusion noted. -Appears unchanged when compared to previous study dated 10/26/2021.  -The ascending aorta is mildly dilated (3.7 cm). Consider cross-sectional imaging of the aorta if clinically indicated. Signature   ------------------------------------------------------------------  Electronically signed by Laney Fournier DO (Interpreting  physician) on 04/05/2022 at 08:30 AM  ------------------------------------------------------------------   Findings   Left Ventricle  Normal left ventricle size, wall thickness and systolic function with an  estimated ejection fraction of 55%. No regional wall motion abnormalities  are seen. Diastolic filling parameters suggest grade I diastolic  dysfunction. E/e\"=13.75.    Mitral Valve  The mitral valve leaflets appear myxomatous. Mild mitral regurgitation. Left Atrium  The left atrium is mildly dilated. Aortic Valve  The aortic valve is mildly thickened/calcified but opens well with normal  gradients. Mild to moderate aortic regurgitation. Aorta  The aortic root is normal in size. The ascending aorta is mildly dilated (3.7 cm). Right Ventricle  The right ventricle is normal in size and function. TAPSE 2.3 cm. Tricuspid Valve  Mild tricuspid regurgitation. Tricuspid valve is structurally normal.   Right Atrium  The right atrium is borderline mildly dilated. Pulmonic Valve  The pulmonic valve is structurally normal.  Mild pulmonic regurgitation present. Pericardial Effusion  There is a trivial localized right sided pericardial effusion noted. Appears unchanged when compared to previous study dated 10/26/2021. Pleural Effusion  No pleural effusion. Miscellaneous  The inferior vena cava appears normal in size with normal respiratory  variation. Estimated pulmonary artery systolic pressure is borderline elevated at 35  mmHg assuming a right atrial pressure of 3 mmHg.   M-Mode/2D Measurements (cm)   LV Diastolic Dimension: 8.56 cm LV Systolic Dimension: 2.4 cm  LV Septum Diastolic: 5.63 cm  LV PW Diastolic: 0.51 cm        AO Root Dimension: 3.3 cm                                  LA Dimension: 3.3 cm                                  LA Area: 17.6 cm2  LVOT: 1.8 cm                    LA volume/Index: 50.3 ml /36 ml/m2  Doppler Measurements   AV Peak Velocity: 129 cm/s     MV Peak E-Wave: 95.6 cm/s  AV Peak Gradient: 6.66 mmHg    MV Peak A-Wave: 87.3 cm/s  AV Mean Gradient: 4 mmHg       MV E/A Ratio: 1.1  LVOT Peak Velocity: 95.3 cm/s  MV Mean Gradient: 2 mmHg  AV Area (Continuity):2.03 cm2  MV Max P mmHg                                 MV Vmax:101 cm/s  TR Velocity:281 cm/s           MV VTI:26 cm/s  TR Gradient:31.58 mmHg  Estimated RAP:3 mmHg           MV Area (continuity): 2.16 cm2  Estimated RVSP: 35 mmHg  E' Septal Velocity: 6.58 cm/s  E' Lateral Velocity: 7.35 cm/s  E/E' ratio: 13.75   Aortic Valve   Peak Velocity: 129 cm/s     Mean Velocity: 88.6 cm/s  Peak Gradient: 6.66 mmHg    Mean Gradient: 4 mmHg  Area (continuity): 2.03 cm2  AV VTI: 27.7 cm  AI P1/2t: 608 msec  Aorta   Aortic Root: 3.3 cm  LVOT Diameter: 1.8 cm      XR CHEST PORTABLE    Result Date: 4/15/2022  EXAMINATION: ONE XRAY VIEW OF THE CHEST 4/15/2022 9:50 am COMPARISON: Chest x-ray dated 01/22/2012 HISTORY: ORDERING SYSTEM PROVIDED HISTORY: chest pressure TECHNOLOGIST PROVIDED HISTORY: Reason for exam:->chest pressure Reason for Exam: Chest pressure FINDINGS: Clear lungs. Stable appearance of right lower lung calcified granuloma. No pleural effusion or pneumothorax. Cardiomediastinal silhouette is enlarged. Visualized osseous structures are unremarkable. Clear lungs. VL DUP CAROTID BILATERAL    Result Date: 4/4/2022  Carotid Duplex Study  Demographics   Patient Name       Joanne [place]   Date of Study      04/04/2022         Gender              Female   Patient Number                        Date of Birth       1940   Visit Number       [id number]          Age                 80 year(s)   Accession Number   [id number]         Room Number         OP   Corporate ID       [id number]             Sonographer         Candelaria Hyatt, RT   Ordering Physician Martin Doyle MD,  Interpreting        [facility]                     [address]               Physician           Cara Brown MD,                                                            [address]  Procedure Type of Study:   Cerebral:Carotid, VL CAROTID DUPLEX BILATERAL. Vascular Sonographer Report  Additional Indications:TIA Impressions Right Impression The right internal carotid artery appears to have a <50% diameter reducing stenosis based on velocity criteria.  The right vertebral artery demonstrates normal antegrade flow. The right subclavian artery is visualized and demonstrates multiphasic flow. Left Impression The left internal carotid artery appears to have a <50% diameter reducing stenosis based on velocity criteria. The left vertebral artery demonstrates normal antegrade flow. The left subclavian artery is visualized and demonstrates multiphasic flow. Conclusions   Summary   -The right internal carotid artery appears to have a <50% diameter reducing  stenosis based on velocity criteria. -The left internal carotid artery appears to have a <50% diameter reducing  stenosis based on velocity criteria. Recommendations   -Recommend follow-up study in 12 months. Signature   ------------------------------------------------------------------  Electronically signed by Mulugeta Cade MD, [facility] (Interpreting  physician) on 04/04/2022 at 06:01 PM  ------------------------------------------------------------------  Blood Pressure:Right arm 110/62 mmHg. Left arm 118/70 mmHg. Patient Status:Routine. Study [facility] - Vascular Lab. Technical Quality:Adequate visualization. Plaque   - A plaque was found in the Right Ostial ICA. The plaque characteristics are: uncomplicated category, high echogenicity, minimal severity and heterogeneous texture. - A plaque was found in the Right Ostial ECA. The plaque characteristics are: uncomplicated category, medium echogenicity, minimal severity and heterogeneous texture. - A plaque was found in the Left Prox ICA. The plaque characteristics are: uncomplicated category, high echogenicity, moderate severity and heterogeneous texture. - A plaque was found in the Left Ostial ECA. The plaque characteristics are: uncomplicated category, medium echogenicity, minimal severity and heterogeneous texture. Velocities are measured in cm/s ; Diameters are measured in mm Carotid Right Measurements +---------------+----+----+-----+----+ ! Location       ! PSV ! EDV ! Angle! RI  ! +---------------+----+----+-----+----+ ! Prox CCA       !73. 1!14. 3!60   !0.8 ! +---------------+----+----+-----+----+ ! Mid CCA        !69.5!12. 5!60   !0.82! +---------------+----+----+-----+----+ ! Dist CCA       !68. 3! 19  !60   !0.72! +---------------+----+----+-----+----+ ! Prox ICA       !64.7!16. 6!60   !0.74! +---------------+----+----+-----+----+ ! Mid ICA        !43. 9!11. 6!60   !0.74! +---------------+----+----+-----+----+ ! Dist ICA       !77.6!23. 6! 60   !0.7 ! +---------------+----+----+-----+----+ ! Prox ECA       !77.2!    !60   !    ! +---------------+----+----+-----+----+ ! Vertebral      !26.2!    !60   !    ! +---------------+----+----+-----+----+ ! Prox Subclavian! 119 !    !60   !    ! +---------------+----+----+-----+----+   - There is antegrade vertebral flow noted on the right side. - Additional Measurements:ICAPSV/CCAPSV 1.12. ICAEDV/CCAEDV 1.65. Carotid Left Measurements +---------------+----+----+-----+----+ ! Location       ! PSV ! EDV ! Angle! RI  ! +---------------+----+----+-----+----+ ! Prox CCA       !91.1!21. 4!60   !0.77! +---------------+----+----+-----+----+ ! Mid CCA        !83. 1!22. 8!60   !0.73! +---------------+----+----+-----+----+ ! Dist CCA       !66. 3!16. 7!60   !0.75! +---------------+----+----+-----+----+ ! Prox ICA       !74. 3!16. 1! 60   !0.78! +---------------+----+----+-----+----+ ! Mid ICA        !66. 3!16. 7!60   !0.75! +---------------+----+----+-----+----+ ! Dist ICA       !77. 4!20.4!0    !0.74! +---------------+----+----+-----+----+ ! Prox ECA       !98.5!    !60   !    ! +---------------+----+----+-----+----+ ! Vertebral      !41.4!    !60   !    ! +---------------+----+----+-----+----+ ! Prox Subclavian! 109 !    !60   !    ! +---------------+----+----+-----+----+   - There is antegrade vertebral flow noted on the left side. - Additional Measurements:ICAPSV/CCAPSV 0.93. ICAEDV/CCAEDV 0.95.         Signed:    Rozina Teague MD   4/18/2022

## 2022-04-19 ENCOUNTER — CARE COORDINATION (OUTPATIENT)
Dept: CASE MANAGEMENT | Age: 82
End: 2022-04-19

## 2022-04-19 NOTE — CARE COORDINATION
[facility] Transitions Initial Follow Up Call    Call within 2 business days of discharge: Yes    Patient: Nelly Tam Patient : 1940   MRN: [id number]  Reason for Admission:   Discharge Date: 22 RARS: Readmission Risk Score: 13.1 ( )      Last Discharge [facility]       Complaint Diagnosis Description Type Department Provider    4/15/22 Chest Pain Chest heaviness ED to Hosp-Admission (Discharged) (ADMITTED) [facility] 3A Sommer Guadarrama MD; Le Smith. .. Initial 24 hr call attempted, contact info left on vm.       Follow Up  Future Appointments   Date Time Provider Aria Coley   2022  9:00 AM VON Vizcaino CNP Cardio MMA   2022  8:30 AM MD JESICA Lerma FP [place] - [facility]   2022  9:30 AM Dhaval Gracia MD [facility] Endo MMA   2022  9:30 AM ECHO ROOM 1 [facility]   2022 10:30 AM VON Vizcaino CNP FF Cardio MMA       Caroline Castillo RN

## 2022-04-20 ENCOUNTER — CARE COORDINATION (OUTPATIENT)
Dept: CASE MANAGEMENT | Age: 82
End: 2022-04-20

## 2022-04-20 ENCOUNTER — OFFICE VISIT (OUTPATIENT)
Dept: CARDIOLOGY CLINIC | Age: 82
End: 2022-04-20
Payer: MEDICARE

## 2022-04-20 VITALS
OXYGEN SATURATION: 99 % | SYSTOLIC BLOOD PRESSURE: 134 MMHG | BODY MASS INDEX: 17.44 KG/M2 | DIASTOLIC BLOOD PRESSURE: 64 MMHG | HEART RATE: 79 BPM | WEIGHT: 94.8 LBS | HEIGHT: 62 IN

## 2022-04-20 DIAGNOSIS — I25.10 CORONARY ARTERY DISEASE INVOLVING NATIVE CORONARY ARTERY OF NATIVE HEART WITHOUT ANGINA PECTORIS: Primary | ICD-10-CM

## 2022-04-20 DIAGNOSIS — R09.89 LABILE HYPERTENSION: ICD-10-CM

## 2022-04-20 DIAGNOSIS — E78.2 MIXED HYPERLIPIDEMIA: ICD-10-CM

## 2022-04-20 DIAGNOSIS — R07.89 CHEST TIGHTNESS: ICD-10-CM

## 2022-04-20 PROCEDURE — 1036F TOBACCO NON-USER: CPT | Performed by: NURSE PRACTITIONER

## 2022-04-20 PROCEDURE — G8418 CALC BMI BLW LOW PARAM F/U: HCPCS | Performed by: NURSE PRACTITIONER

## 2022-04-20 PROCEDURE — G8427 DOCREV CUR MEDS BY ELIG CLIN: HCPCS | Performed by: NURSE PRACTITIONER

## 2022-04-20 PROCEDURE — G8399 PT W/DXA RESULTS DOCUMENT: HCPCS | Performed by: NURSE PRACTITIONER

## 2022-04-20 PROCEDURE — 99214 OFFICE O/P EST MOD 30 MIN: CPT | Performed by: NURSE PRACTITIONER

## 2022-04-20 PROCEDURE — 4040F PNEUMOC VAC/ADMIN/RCVD: CPT | Performed by: NURSE PRACTITIONER

## 2022-04-20 PROCEDURE — 1111F DSCHRG MED/CURRENT MED MERGE: CPT | Performed by: NURSE PRACTITIONER

## 2022-04-20 PROCEDURE — 1090F PRES/ABSN URINE INCON ASSESS: CPT | Performed by: NURSE PRACTITIONER

## 2022-04-20 PROCEDURE — 1123F ACP DISCUSS/DSCN MKR DOCD: CPT | Performed by: NURSE PRACTITIONER

## 2022-04-20 RX ORDER — DENOSUMAB 60 MG/ML
60 INJECTION SUBCUTANEOUS
COMMUNITY

## 2022-04-20 NOTE — PROGRESS NOTES
[address]     Outpatient Follow Up Note    CHIEF COMPLAINT / HPI: Hospital Follow Up secondary to status post coronary angiogram    Hospital record has been reviewed  Hospital Course progressed as follows per discharge summary: 4/15 - 4/18/22  presented to the hospital with complaints of chest pain. She describes midsternal chest discomfort, as though an elephant was sitting on her, which seems to correlate with low blood pressures. She had undergone recent stress testing which was negative for ischemia  Past history of  non-obst CAD 40% LAD & OM-1 lesions by cath '12, hyperlipidemia, TIA and palpitations  ~she had undergone a cardiac cath that showed mild, non-obst disease. She was started on low dose metoprolol at discharge     Keith Hebert is 80 y.o. female who presents today for a routine follow up after a recent hospitalization related to the above mentioned issues. She recalls having had chest tightness and her BP was low 68/ - 70/   Subjective:   Since the time of discharge, the patient admits their symptoms have improved. Her BP was 170-180/ when she'd seen her neurologist as a routine appt back in March. After an episode when her BP was low (4/7), she had associated chest tightness. Thereafter, it returned to normal    The last 2 days her BPs have been: 66/50 - 89/57 on 4/18 ; 85/64 - 119/70 on 4/19 ; and 101/60 this morning. She chose not to take metoprolol once reading it was for BP control. She had chest discomfort last night but her BP wasn't low. She was watching TV. It lasted 5-10 minutes then just went away. She denies recurrence of elephant type pressure. There is no SOB/PRO. The patient is not experiencing palpitations. She's had no light headedness/dizziness. She has swelling in her legs which is not new. These symptoms are improving over the last few days: discharged 4/18/22.    With regard to medication therapy the patient has been compliant with prescribed regimen. They have tolerated therapy to date.      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, not stated as uncontrolled      Social History:    Social History     Tobacco Use   Smoking Status Former Smoker    Packs/day: 0.25    Years: 4.00    Pack years: 1.00    Types: Cigarettes    Quit date: 1966    Years since quittin.3   Smokeless Tobacco Never Used     Current Medications:  Current Outpatient Medications   Medication Sig Dispense Refill    denosumab (PROLIA) 60 MG/ML SOSY SC injection Inject 60 mg into the skin every 6 months      insulin aspart (NOVOLOG) 100 UNIT/ML injection vial INJECT TOTAL DAILY DOSE 30 UNITS SUBCUTANEOUSLY WITH INSULIN PUMP 30 mL 3    pramipexole (MIRAPEX) 0.5 MG tablet Take 1 tablet by mouth 2 times daily 2 tabs with dinner, 1 tab at HS (Patient taking differently: Take 0.5 mg by mouth See Admin Instructions 2 tabs with dinner, 1 tab at HS) 90 tablet 3    levothyroxine (SYNTHROID) 100 MCG tablet TAKE 1 TABLET DAILY 90 tablet 3    carbidopa-levodopa (SINEMET)  MG per tablet Take 1 tablet by mouth 4 times daily 120 tablet 5    carbidopa-levodopa (SINEMET CR)  MG per extended release tablet Take 1 tablet by mouth nightly       aspirin 81 MG EC tablet Take 81 mg by mouth at bedtime       simvastatin (ZOCOR) 20 MG tablet Take 1 tablet by mouth nightly 90 tablet 3    Multiple Vitamins-Minerals (CENTRUM SILVER) TABS Take 1 tablet by mouth daily       Cholecalciferol (VITAMIN D3) 2000 UNITS CAPS Take 1 capsule by mouth daily      Insulin Infusion Pump (PARADIGM INSULIN PUMP) by Does not apply route.  metoprolol tartrate (LOPRESSOR) 25 MG tablet Take 0.5 tablets by mouth 2 times daily (Patient not taking: Reported on 4/20/2022) 60 tablet 3    nitroGLYCERIN (NITROSTAT) 0.4 MG SL tablet Place 1 tablet under the tongue every 5 minutes as needed for Chest pain (Patient not taking: Reported on 4/20/2022) 25 tablet 3    Continuous Blood Gluc Transmit (DEXCOM G6 TRANSMITTER) MISC Change transmitter every 3 months 4 each 0     No current facility-administered medications for this visit. REVIEW OF SYSTEMS:   CONSTITUTIONAL: No major weight gain or loss, fatigue, weakness, night sweats or fever. There's been no change in energy level, sleep pattern, or activity level. HEENT: No new vision difficulties or ringing in the ears. RESPIRATORY: No new SOB, PND, orthopnea or cough. CARDIOVASCULAR: See HPI  GI: No nausea, vomiting, diarrhea, constipation, abdominal pain or changes in bowel habits. : No urinary frequency, urgency, incontinence hematuria or dysuria. SKIN: No cyanosis or skin lesions. MUSCULOSKELETAL: No new muscle or joint pain. NEUROLOGICAL: No syncope or TIA-like symptoms. PSYCHIATRIC: No anxiety, pain, insomnia or depression    Objective:   PHYSICAL EXAM:        Vitals:    04/20/22 0905 04/20/22 0928 04/20/22 0931   BP: 130/60 128/60 134/64   Site: Right Upper Arm Right Upper Arm Right Upper Arm   Position: Sitting Sitting Standing   Cuff Size: Medium Adult Medium Adult Medium Adult   Pulse: 79  ~asymptomatic for light headedness.    SpO2: 99%     Weight: 94 lb 12.8 oz (43 kg)     Height: 5' 2\" (1.575 m)         VITALS:  /60 (Site: Right Upper Arm, Position: Sitting, Cuff Size: Medium Adult)   Pulse 79   Ht 5' 2\" (1.575 m)   Wt 94 lb 12.8 oz (43 kg)   SpO2 99%   BMI 17.34 kg/m²     CONSTITUTIONAL: Cooperative, no apparent distress, and appears well nourished / developed  NEUROLOGIC:  Awake and orientated to person, place and time. PSYCH: Calm affect. SKIN: Warm and dry: Rt radial unremarkable. HEENT: Sclera non-icteric, normocephalic, neck supple, no elevation of JVP, normal carotid pulses with no bruits and thyroid normal size. LUNGS:  No increased work of breathing 88 and clear to auscultation, no crackles or wheezing. CARDIOVASCULAR:  Regular rate and rhythm with no murmurs, gallops, rubs, or abnormal heart sounds, normal PMI. The apical impulses not displaced. Heart tones are crisp and normal                                                                                            Cervical veins are not engorged                 JVP less than 8 cm H2O                                                                              The carotid upstroke is normal in amplitude and contour without delay or bruit    ABDOMEN:  Normal bowel sounds, non-distended and non-tender to palpation   EXT: No edema, no calf tenderness. Pulses are present bilaterally.     DATA:    Lab Results   Component Value Date    ALT 15 04/15/2022    AST 25 04/15/2022    ALKPHOS 67 04/15/2022    BILITOT 0.7 04/15/2022     Lab Results   Component Value Date    CREATININE 0.7 04/18/2022    BUN 19 04/18/2022     04/18/2022    K 4.3 04/18/2022    CL 99 04/18/2022    CO2 27 04/18/2022     Lab Results   Component Value Date    TSH 3.98 03/11/2022    I8MTSLE 7.5 09/12/2013     Lab Results   Component Value Date    WBC 4.1 04/18/2022    HGB 11.6 (L) 04/18/2022    HCT 35.7 (L) 04/18/2022    MCV 93.1 04/18/2022     04/18/2022     No components found for: CHLPL  Lab Results   Component Value Date    TRIG 36 03/11/2022    TRIG 57 12/08/2021    TRIG 45 06/02/2021     Lab Results   Component Value Date    HDL 91 (H) 03/11/2022     (H) 12/08/2021    HDL 93 (H) 09/21/2021     Lab Results   Component Value Date    LDLCALC 52 03/11/2022    LDLCALC 45 12/08/2021    LDLCALC 46 09/21/2021     Lab Results   Component

## 2022-04-20 NOTE — PATIENT INSTRUCTIONS
Remain off metoprolol     Bring your BP cuff in with you at your next appt so we can compare to our readings    appt in 2 weeks

## 2022-04-25 NOTE — ED PROVIDER NOTES
In addition to the advanced practice provider, I personally saw Carey Akhtar and performed a substantive portion of the visit including all aspects of the medical decision making. Briefly, this is a 80 y.o. female here for ER for evaluation of recurrent precordial angina, prior stress test 1 month prior, heart score is 5, normal respirations normal pulse nondiaphoretic. [place] Harrison EKG  EKG was reviewed by emergency department physician in the absence of a cardiologist    Narrow complex sinus rhythm, rate, normal axis, normal MO and QRS intervals, normal Qtc, no ST elevations or depressions, normal t-wave morphology, impression NSR, no STEMI      Screenings   Tian Coma Scale  Eye Opening: Spontaneous  Best Verbal Response: Oriented  Best Motor Response: Obeys commands  Tian Coma Scale Score: 15 Heart Score for chest pain patients  History: Moderately Suspicious  ECG: Normal  Patient Age: > 65 years  *Risk factors for Atherosclerotic disease: Diabetes Mellitus,Coronary Artery Disease,Hypercholesterolemia  Risk Factors: > 3 Risk factors or history of atherosclerotic disease*  Troponin: < 1X normal limit  Heart Score Total: 5      MDM  Presents to the ER for evaluation of acute precordial chest pain, with persistent intermittent precordial chest pain with exertion. Cardiology consultation was performed that examined the patient in the emergency department. Patient will be admitted for isosorbide chest pain management and ultimately cardiac catheterization diagnostic. Patient Referrals:  No follow-up provider specified. Discharge Medications:  Discharge Medication List as of 4/18/2022 11:26 AM      START taking these medications    Details   metoprolol tartrate (LOPRESSOR) 25 MG tablet Take 0.5 tablets by mouth 2 times daily, Disp-60 tablet, R-3Normal             FINAL IMPRESSION  1.  Chest heaviness        Blood pressure 119/62, pulse 64, temperature 98 °F (36.7 °C), temperature source Temporal, resp. rate 18, height 5' 2\" (1.575 m), weight 91 lb 9.6 oz (41.5 kg), SpO2 94 %, not currently breastfeeding. For further details of [facility] emergency department encounter, please see documentation by advanced practice provider, .     Elizabeth Yip MD (electronically signed)  Attending Emergency Physician     Cheron Snellen, MD  11/18/97 1200

## 2022-05-04 ENCOUNTER — OFFICE VISIT (OUTPATIENT)
Dept: CARDIOLOGY CLINIC | Age: 82
End: 2022-05-04
Payer: MEDICARE

## 2022-05-04 VITALS
BODY MASS INDEX: 17.19 KG/M2 | WEIGHT: 93.4 LBS | OXYGEN SATURATION: 96 % | HEIGHT: 62 IN | HEART RATE: 81 BPM | SYSTOLIC BLOOD PRESSURE: 142 MMHG | DIASTOLIC BLOOD PRESSURE: 72 MMHG

## 2022-05-04 DIAGNOSIS — R09.89 LABILE HYPERTENSION: Primary | ICD-10-CM

## 2022-05-04 DIAGNOSIS — E78.2 MIXED HYPERLIPIDEMIA: ICD-10-CM

## 2022-05-04 DIAGNOSIS — I25.10 CORONARY ARTERY DISEASE INVOLVING NATIVE CORONARY ARTERY OF NATIVE HEART WITHOUT ANGINA PECTORIS: ICD-10-CM

## 2022-05-04 PROCEDURE — G8418 CALC BMI BLW LOW PARAM F/U: HCPCS | Performed by: NURSE PRACTITIONER

## 2022-05-04 PROCEDURE — G8427 DOCREV CUR MEDS BY ELIG CLIN: HCPCS | Performed by: NURSE PRACTITIONER

## 2022-05-04 PROCEDURE — 4040F PNEUMOC VAC/ADMIN/RCVD: CPT | Performed by: NURSE PRACTITIONER

## 2022-05-04 PROCEDURE — 99214 OFFICE O/P EST MOD 30 MIN: CPT | Performed by: NURSE PRACTITIONER

## 2022-05-04 PROCEDURE — G8399 PT W/DXA RESULTS DOCUMENT: HCPCS | Performed by: NURSE PRACTITIONER

## 2022-05-04 PROCEDURE — 1036F TOBACCO NON-USER: CPT | Performed by: NURSE PRACTITIONER

## 2022-05-04 PROCEDURE — 1111F DSCHRG MED/CURRENT MED MERGE: CPT | Performed by: NURSE PRACTITIONER

## 2022-05-04 PROCEDURE — 1090F PRES/ABSN URINE INCON ASSESS: CPT | Performed by: NURSE PRACTITIONER

## 2022-05-04 PROCEDURE — 1123F ACP DISCUSS/DSCN MKR DOCD: CPT | Performed by: NURSE PRACTITIONER

## 2022-05-04 NOTE — PATIENT INSTRUCTIONS
If your BP is < 85-90/ and feeling bad, you may need your sinemet dose adjusted    Keep your appt in Sept

## 2022-05-04 NOTE — PROGRESS NOTES
Addended by: ELIO HOLLIDAY on: 11/27/2020 11:47 AM     Modules accepted: Orders     [facility]     Outpatient Follow Up Note    Delta Jaimes is 80 y.o. female who presents today with a history of non-obst CAD 40% LAD & OM-1 lesions by cath '12, hyperlipidemia, aortic regurg, labile HTN, TIA and palpitations. CHIEF COMPLAINT / HPI:  Follow Up secondary to liable BPs : remaining off metoprolol. She has been checking/recording her BPs which have been : 127/75 - 136/73 at the highest to lowest 83/57 - 96/55    Subjective:   She has some chest tightness that comes/goes. She doesn't find it happening any time day or night. There is SOB after walking long distances. The patient denies orthopnea/PND. The patient some swelling in her legs. The patients weight is stable . The patient is not experiencing palpitations or dizziness. These symptoms show no change since the last OV. With regard to medication therapy the patient has been compliant with prescribed regimen. They have tolerated therapy to date.      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 mention of complication, not stated as uncontrolled      Social History:    Social History     Tobacco Use   Smoking Status Former Smoker    Packs/day: 0.25    Years: 4.00    Pack years: 1.00    Types: Cigarettes    Quit date: 1966    Years since quittin.3   Smokeless Tobacco Never Used     Current Medications:  Current Outpatient Medications   Medication Sig Dispense Refill    denosumab (PROLIA) 60 MG/ML SOSY SC injection Inject 60 mg into the skin every 6 months      insulin aspart (NOVOLOG) 100 UNIT/ML injection vial INJECT TOTAL DAILY DOSE 30 UNITS SUBCUTANEOUSLY WITH INSULIN PUMP 30 mL 3    pramipexole (MIRAPEX) 0.5 MG tablet Take 1 tablet by mouth 2 times daily 2 tabs with dinner, 1 tab at HS (Patient taking differently: Take 0.5 mg by mouth See Admin Instructions 2 tabs with dinner, 1 tab at HS) 90 tablet 3    levothyroxine (SYNTHROID) 100 MCG tablet TAKE 1 TABLET DAILY 90 tablet 3    carbidopa-levodopa (SINEMET)  MG per tablet Take 1 tablet by mouth 4 times daily 120 tablet 5    carbidopa-levodopa (SINEMET CR)  MG per extended release tablet Take 1 tablet by mouth nightly       aspirin 81 MG EC tablet Take 81 mg by mouth at bedtime       simvastatin (ZOCOR) 20 MG tablet Take 1 tablet by mouth nightly 90 tablet 3    Multiple Vitamins-Minerals (CENTRUM SILVER) TABS Take 1 tablet by mouth daily       Cholecalciferol (VITAMIN D3) 2000 UNITS CAPS Take 1 capsule by mouth daily      Insulin Infusion Pump (PARADIGM INSULIN PUMP) by Does not apply route.  nitroGLYCERIN (NITROSTAT) 0.4 MG SL tablet Place 1 tablet under the tongue every 5 minutes as needed for Chest pain (Patient not taking: Reported on 5/4/2022) 25 tablet 3    Continuous Blood Gluc Transmit (DEXCOM G6 TRANSMITTER) MISC Change transmitter every 3 months 4 each 0     No current facility-administered medications for this visit. REVIEW OF SYSTEMS:    CONSTITUTIONAL: No major weight gain or loss, fatigue, weakness, night sweats or fever. HEENT: No new vision difficulties or ringing in the ears. RESPIRATORY: No new SOB, PND, orthopnea or cough. CARDIOVASCULAR: See HPI  GI: No nausea, vomiting, diarrhea, constipation, abdominal pain or changes in bowel habits. : No urinary frequency, urgency, incontinence hematuria or dysuria. SKIN: No cyanosis or skin lesions. MUSCULOSKELETAL: No new muscle or joint pain. NEUROLOGICAL: No syncope or TIA-like symptoms.   PSYCHIATRIC: No anxiety, pain, insomnia or depression    Objective: PHYSICAL EXAM:        Vitals:    05/04/22 1104 05/04/22 1108 05/04/22 1124   BP: (!) 130/50 (!) 151/87 (!) 142/72   Site: Right Upper Arm Home cuff: comparison Right Upper Arm   Position: Sitting     Cuff Size: Medium Adult  Medium Adult   Pulse: 81     SpO2: 96%     Weight: 93 lb 6.4 oz (42.4 kg)     Height: 5' 2\" (1.575 m)         VITALS:  BP (!) 151/87   Pulse 81   Ht 5' 2\" (1.575 m)   Wt 93 lb 6.4 oz (42.4 kg)   SpO2 96%   BMI 17.08 kg/m²   CONSTITUTIONAL: Cooperative, no apparent distress, and appears well nourished / frail  NEUROLOGIC:  Awake and orientated to person, place and time. PSYCH: Calm affect. SKIN: Warm and dry. HEENT: Sclera non-icteric, normocephalic, neck supple, no elevation of JVP, normal carotid pulses with no bruits and thyroid normal size. LUNGS:  No increased work of breathing and clear to auscultation, no crackles or wheezing  CARDIOVASCULAR:  Regular rate 72 and rhythm with no murmurs, gallops, rubs, or abnormal heart sounds, normal PMI. The apical impulses not displaced  JVP less than 8 cm H2O  Heart tones are crisp and normal  Cervical veins are not engorged  The carotid upstroke is normal in amplitude and contour without delay or bruit  JVP is not elevated  ABDOMEN:  Normal bowel sounds, non-distended and non-tender to palpation  EXT: No edema, no calf tenderness. Pulses are present bilaterally.     DATA:    Lab Results   Component Value Date    ALT 15 04/15/2022    AST 25 04/15/2022    ALKPHOS 67 04/15/2022    BILITOT 0.7 04/15/2022     Lab Results   Component Value Date    CREATININE 0.7 04/18/2022    BUN 19 04/18/2022     04/18/2022    K 4.3 04/18/2022    CL 99 04/18/2022    CO2 27 04/18/2022     Lab Results   Component Value Date    TSH 3.98 03/11/2022    H5SOWXC 7.5 09/12/2013     Lab Results   Component Value Date    WBC 4.1 04/18/2022    HGB 11.6 (L) 04/18/2022    HCT 35.7 (L) 04/18/2022    MCV 93.1 04/18/2022     04/18/2022     No components found for: CHLPL  Lab Results   Component Value Date    TRIG 36 03/11/2022    TRIG 57 12/08/2021    TRIG 45 06/02/2021     Lab Results   Component Value Date    HDL 91 (H) 03/11/2022     (H) 12/08/2021    HDL 93 (H) 09/21/2021     Lab Results   Component Value Date    LDLCALC 52 03/11/2022    LDLCALC 45 12/08/2021    LDLCALC 46 09/21/2021     Lab Results   Component Value Date    LABVLDL 7 03/11/2022    LABVLDL 11 12/08/2021    LABVLDL 9 09/21/2021     Radiology Review:  Pertinent images / reports were reviewed as a part of this visit and reveals the following:    Echo:4/4/2022:   Summary   -Normal left ventricle size, wall thickness and systolic function with an estimated ejection fraction of 55%.  -No regional wall motion abnormalities are seen.   -Diastolic filling parameters suggest grade I diastolic dysfunction. E/e\"=13.75.   -The mitral valve leaflets appear myxomatous.   -Mild mitral regurgitation.   -The aortic valve is mildly thickened/calcified but opens well with normal gradients.   -Mild to moderate aortic regurgitation.   -Mild tricuspid regurgitation.   -Mild pulmonic regurgitation present.   -Estimated pulmonary artery systolic pressure is borderline elevated at 35 mmHg assuming a right atrial pressure of 3 mmHg.   -The left atrium is mildly dilated.   -There is a trivial localized right sided pericardial effusion noted.  -Appears unchanged when compared to previous study dated 10/26/2021.   -The ascending aorta is mildly dilated (3.7 cm).     Last Angiogram: 4/18/22  LM-normal  LAD-40% proximal, FFR 0.99  Cx-30% mid  OM1-patent  RCA-10% mid, dominant  RPDA-normal  LVEF-70%     Impression:  1.  Mild nonobstructive CAD. 2.  Hyperdynamic LV systolic function. 3.  Noncardiac chest pain.  Likely blood pressure related.         Assessment:      Diagnosis Orders   1.  Labile hypertension   ~checking BP several times daily  ~averaging 110-120/ by home log ; low 85-96/   ~suboptimal / reasonable today in office 2. Coronary artery disease involving native coronary artery of native heart without angina pectoris   ~stable : denies angina  ~non-obst disease by cath ; CP at that time   ~had not started metoprolol d/t hx of low BPs    3. Mixed hyperlipidemia   ~controlled with high HDL      I had the opportunity to review the clinical symptoms and presentation of Delta Jaimes. Plan:     1. Continue present management remaining off metoprolol  2. Encouraged to send BP log to her neurologist, Dr. Humberto Eddy (may need to consider adjusting sinemet)  3. F/U in 4 months as scheduled     Overall the patient is stable from CV standpoint    I have addresed the patient's cardiac risk factors and adjusted pharmacologic treatment as needed. In addition, I have reinforced the need for patient directed risk factor modification. Further evaluation will be based upon the patient's clinical course and testing results. All questions and concerns were addressed to the patient/family. Alternatives to my treatment were discussed. The patient is not currently smoking. The risks related to smoking were reviewed with the patient. Recommend maintaining a smoke-free lifestyle. Patient is not on a beta-blocker : BP does not support; previously stopped d/t fatigue   Patient is not on an ace-i/ARB : neg CHF  Patient is on a statin    Antiplatelet therapy has been recommended / prescribed for this patient. Education conducted on adverse reactions including bleeding was discussed. The patient verbalizes understanding not to stop medications without discussing with us. Discussed exercise: 30-60 minutes 7 days/week  Discussed Low saturated fat diet. Thank you for allowing to us to participate in the care of Delta Jaimes.     VON Zheng    Documentation of today's visit sent to PCP Negative

## 2022-05-21 ENCOUNTER — HOSPITAL ENCOUNTER (INPATIENT)
Age: 82
LOS: 4 days | Discharge: HOME | DRG: 919 | End: 2022-05-25
Attending: INTERNAL MEDICINE | Admitting: INTERNAL MEDICINE
Payer: MEDICARE

## 2022-05-21 ENCOUNTER — APPOINTMENT (OUTPATIENT)
Dept: GENERAL RADIOLOGY | Age: 82
DRG: 919 | End: 2022-05-21
Payer: MEDICARE

## 2022-05-21 DIAGNOSIS — E11.10 DIABETIC KETOACIDOSIS WITHOUT COMA ASSOCIATED WITH TYPE 2 DIABETES MELLITUS (HCC): Primary | ICD-10-CM

## 2022-05-21 PROBLEM — E10.10 DKA, TYPE 1, NOT AT GOAL (HCC): Status: ACTIVE | Noted: 2022-05-21

## 2022-05-21 LAB
A/G RATIO: 2 (ref 1.1–2.2)
ALBUMIN SERPL-MCNC: 4.4 G/DL (ref 3.4–5)
ALP BLD-CCNC: 69 U/L (ref 40–129)
ALT SERPL-CCNC: 9 U/L (ref 10–40)
ANION GAP SERPL CALCULATED.3IONS-SCNC: 28 MMOL/L (ref 3–16)
AST SERPL-CCNC: 42 U/L (ref 15–37)
BACTERIA: NORMAL /HPF
BASE EXCESS VENOUS: -9.5 MMOL/L (ref -3–3)
BASOPHILS ABSOLUTE: 0 K/UL (ref 0–0.2)
BASOPHILS RELATIVE PERCENT: 0.4 %
BETA-HYDROXYBUTYRATE: 4.6 MMOL/L (ref 0–0.27)
BILIRUB SERPL-MCNC: 0.8 MG/DL (ref 0–1)
BILIRUBIN URINE: NEGATIVE
BLOOD, URINE: NEGATIVE
BUN BLDV-MCNC: 37 MG/DL (ref 7–20)
CALCIUM SERPL-MCNC: 10 MG/DL (ref 8.3–10.6)
CARBOXYHEMOGLOBIN: 2.3 % (ref 0–1.5)
CHLORIDE BLD-SCNC: 90 MMOL/L (ref 99–110)
CLARITY: ABNORMAL
CO2: 16 MMOL/L (ref 21–32)
COLOR: YELLOW
CREAT SERPL-MCNC: 1.1 MG/DL (ref 0.6–1.2)
EOSINOPHILS ABSOLUTE: 0 K/UL (ref 0–0.6)
EOSINOPHILS RELATIVE PERCENT: 0 %
EPITHELIAL CELLS, UA: 0 /HPF (ref 0–5)
GFR AFRICAN AMERICAN: 57
GFR NON-AFRICAN AMERICAN: 48
GLUCOSE BLD-MCNC: 327 MG/DL (ref 70–99)
GLUCOSE BLD-MCNC: 480 MG/DL (ref 70–99)
GLUCOSE BLD-MCNC: 492 MG/DL (ref 70–99)
GLUCOSE BLD-MCNC: 553 MG/DL (ref 70–99)
GLUCOSE BLD-MCNC: 562 MG/DL (ref 70–99)
GLUCOSE BLD-MCNC: 634 MG/DL (ref 70–99)
GLUCOSE BLD-MCNC: >600 MG/DL (ref 70–99)
GLUCOSE URINE: >=1000 MG/DL
HCO3 VENOUS: 16 MMOL/L (ref 23–29)
HCT VFR BLD CALC: 38 % (ref 36–48)
HEMOGLOBIN: 12.1 G/DL (ref 12–16)
HYALINE CASTS: 0 /LPF (ref 0–8)
KETONES, URINE: 80 MG/DL
LEUKOCYTE ESTERASE, URINE: NEGATIVE
LYMPHOCYTES ABSOLUTE: 0.3 K/UL (ref 1–5.1)
LYMPHOCYTES RELATIVE PERCENT: 4.9 %
MCH RBC QN AUTO: 30.5 PG (ref 26–34)
MCHC RBC AUTO-ENTMCNC: 31.8 G/DL (ref 31–36)
MCV RBC AUTO: 95.9 FL (ref 80–100)
METHEMOGLOBIN VENOUS: 0.8 %
MICROSCOPIC EXAMINATION: YES
MONOCYTES ABSOLUTE: 0.2 K/UL (ref 0–1.3)
MONOCYTES RELATIVE PERCENT: 2.6 %
NEUTROPHILS ABSOLUTE: 5.8 K/UL (ref 1.7–7.7)
NEUTROPHILS RELATIVE PERCENT: 92.1 %
NITRITE, URINE: NEGATIVE
O2 CONTENT, VEN: 16 VOL %
O2 SAT, VEN: 99 %
O2 THERAPY: ABNORMAL
PCO2, VEN: 33.1 MMHG (ref 40–50)
PDW BLD-RTO: 14.7 % (ref 12.4–15.4)
PERFORMED ON: ABNORMAL
PH UA: 5 (ref 5–8)
PH VENOUS: 7.29 (ref 7.35–7.45)
PLATELET # BLD: 236 K/UL (ref 135–450)
PMV BLD AUTO: 9.9 FL (ref 5–10.5)
PO2, VEN: 203 MMHG (ref 25–40)
POTASSIUM SERPL-SCNC: 5.8 MMOL/L (ref 3.5–5.1)
PRO-BNP: 576 PG/ML (ref 0–449)
PROTEIN UA: NEGATIVE MG/DL
RBC # BLD: 3.96 M/UL (ref 4–5.2)
RBC UA: 0 /HPF (ref 0–4)
SODIUM BLD-SCNC: 134 MMOL/L (ref 136–145)
SPECIFIC GRAVITY UA: 1.02 (ref 1–1.03)
TCO2 CALC VENOUS: 38 MMOL/L
TOTAL PROTEIN: 6.6 G/DL (ref 6.4–8.2)
TROPONIN: <0.01 NG/ML
URINE REFLEX TO CULTURE: ABNORMAL
URINE TYPE: ABNORMAL
UROBILINOGEN, URINE: 0.2 E.U./DL
WBC # BLD: 6.2 K/UL (ref 4–11)
WBC UA: 0 /HPF (ref 0–5)

## 2022-05-21 PROCEDURE — 2580000003 HC RX 258: Performed by: PHYSICIAN ASSISTANT

## 2022-05-21 PROCEDURE — 6370000000 HC RX 637 (ALT 250 FOR IP): Performed by: PHYSICIAN ASSISTANT

## 2022-05-21 PROCEDURE — 99285 EMERGENCY DEPT VISIT HI MDM: CPT

## 2022-05-21 PROCEDURE — 82010 KETONE BODYS QUAN: CPT

## 2022-05-21 PROCEDURE — 83036 HEMOGLOBIN GLYCOSYLATED A1C: CPT

## 2022-05-21 PROCEDURE — 36415 COLL VENOUS BLD VENIPUNCTURE: CPT

## 2022-05-21 PROCEDURE — 85025 COMPLETE CBC W/AUTO DIFF WBC: CPT

## 2022-05-21 PROCEDURE — 83880 ASSAY OF NATRIURETIC PEPTIDE: CPT

## 2022-05-21 PROCEDURE — 83735 ASSAY OF MAGNESIUM: CPT

## 2022-05-21 PROCEDURE — 84100 ASSAY OF PHOSPHORUS: CPT

## 2022-05-21 PROCEDURE — 6360000002 HC RX W HCPCS: Performed by: INTERNAL MEDICINE

## 2022-05-21 PROCEDURE — 81001 URINALYSIS AUTO W/SCOPE: CPT

## 2022-05-21 PROCEDURE — 2580000003 HC RX 258: Performed by: INTERNAL MEDICINE

## 2022-05-21 PROCEDURE — 93005 ELECTROCARDIOGRAM TRACING: CPT | Performed by: INTERNAL MEDICINE

## 2022-05-21 PROCEDURE — 6370000000 HC RX 637 (ALT 250 FOR IP): Performed by: INTERNAL MEDICINE

## 2022-05-21 PROCEDURE — 84484 ASSAY OF TROPONIN QUANT: CPT

## 2022-05-21 PROCEDURE — 82803 BLOOD GASES ANY COMBINATION: CPT

## 2022-05-21 PROCEDURE — 96365 THER/PROPH/DIAG IV INF INIT: CPT

## 2022-05-21 PROCEDURE — 2000000000 HC ICU R&B

## 2022-05-21 PROCEDURE — 71045 X-RAY EXAM CHEST 1 VIEW: CPT

## 2022-05-21 PROCEDURE — 80053 COMPREHEN METABOLIC PANEL: CPT

## 2022-05-21 RX ORDER — PRAMIPEXOLE DIHYDROCHLORIDE 1 MG/1
1 TABLET ORAL
Status: DISCONTINUED | OUTPATIENT
Start: 2022-05-22 | End: 2022-05-25 | Stop reason: HOSPADM

## 2022-05-21 RX ORDER — M-VIT,TX,IRON,MINS/CALC/FOLIC 27MG-0.4MG
1 TABLET ORAL DAILY
Status: DISCONTINUED | OUTPATIENT
Start: 2022-05-22 | End: 2022-05-25 | Stop reason: HOSPADM

## 2022-05-21 RX ORDER — ASPIRIN 81 MG/1
81 TABLET ORAL NIGHTLY
Status: DISCONTINUED | OUTPATIENT
Start: 2022-05-21 | End: 2022-05-21

## 2022-05-21 RX ORDER — LEVOTHYROXINE SODIUM 0.1 MG/1
100 TABLET ORAL DAILY
Status: DISCONTINUED | OUTPATIENT
Start: 2022-05-22 | End: 2022-05-25 | Stop reason: HOSPADM

## 2022-05-21 RX ORDER — MAGNESIUM SULFATE 1 G/100ML
1000 INJECTION INTRAVENOUS PRN
Status: DISCONTINUED | OUTPATIENT
Start: 2022-05-21 | End: 2022-05-25 | Stop reason: HOSPADM

## 2022-05-21 RX ORDER — PRAMIPEXOLE DIHYDROCHLORIDE 0.25 MG/1
0.5 TABLET ORAL NIGHTLY
Status: DISCONTINUED | OUTPATIENT
Start: 2022-05-21 | End: 2022-05-25 | Stop reason: HOSPADM

## 2022-05-21 RX ORDER — DEXTROSE MONOHYDRATE 50 MG/ML
100 INJECTION, SOLUTION INTRAVENOUS PRN
Status: DISCONTINUED | OUTPATIENT
Start: 2022-05-21 | End: 2022-05-25 | Stop reason: HOSPADM

## 2022-05-21 RX ORDER — CARBIDOPA AND LEVODOPA 50; 200 MG/1; MG/1
1 TABLET, EXTENDED RELEASE ORAL NIGHTLY
Status: DISCONTINUED | OUTPATIENT
Start: 2022-05-21 | End: 2022-05-25 | Stop reason: HOSPADM

## 2022-05-21 RX ORDER — POTASSIUM CHLORIDE 7.45 MG/ML
10 INJECTION INTRAVENOUS PRN
Status: DISCONTINUED | OUTPATIENT
Start: 2022-05-21 | End: 2022-05-25 | Stop reason: HOSPADM

## 2022-05-21 RX ORDER — POLYETHYLENE GLYCOL 3350 17 G/17G
17 POWDER, FOR SOLUTION ORAL DAILY PRN
Status: DISCONTINUED | OUTPATIENT
Start: 2022-05-21 | End: 2022-05-25 | Stop reason: HOSPADM

## 2022-05-21 RX ORDER — SODIUM CHLORIDE 9 MG/ML
INJECTION, SOLUTION INTRAVENOUS CONTINUOUS
Status: DISCONTINUED | OUTPATIENT
Start: 2022-05-21 | End: 2022-05-22

## 2022-05-21 RX ORDER — DEXTROSE AND SODIUM CHLORIDE 5; .45 G/100ML; G/100ML
INJECTION, SOLUTION INTRAVENOUS CONTINUOUS PRN
Status: DISCONTINUED | OUTPATIENT
Start: 2022-05-21 | End: 2022-05-25 | Stop reason: HOSPADM

## 2022-05-21 RX ORDER — HEPARIN SODIUM 5000 [USP'U]/ML
5000 INJECTION, SOLUTION INTRAVENOUS; SUBCUTANEOUS 2 TIMES DAILY
Status: DISCONTINUED | OUTPATIENT
Start: 2022-05-21 | End: 2022-05-25 | Stop reason: HOSPADM

## 2022-05-21 RX ORDER — SIMVASTATIN 10 MG
20 TABLET ORAL NIGHTLY
Status: DISCONTINUED | OUTPATIENT
Start: 2022-05-21 | End: 2022-05-21 | Stop reason: CLARIF

## 2022-05-21 RX ORDER — ATORVASTATIN CALCIUM 10 MG/1
10 TABLET, FILM COATED ORAL NIGHTLY
Status: DISCONTINUED | OUTPATIENT
Start: 2022-05-21 | End: 2022-05-25 | Stop reason: HOSPADM

## 2022-05-21 RX ORDER — VITAMIN B COMPLEX
2000 TABLET ORAL DAILY
Status: DISCONTINUED | OUTPATIENT
Start: 2022-05-22 | End: 2022-05-25 | Stop reason: HOSPADM

## 2022-05-21 RX ORDER — ASPIRIN 325 MG
325 TABLET ORAL ONCE
Status: COMPLETED | OUTPATIENT
Start: 2022-05-21 | End: 2022-05-21

## 2022-05-21 RX ORDER — 0.9 % SODIUM CHLORIDE 0.9 %
1000 INTRAVENOUS SOLUTION INTRAVENOUS ONCE
Status: COMPLETED | OUTPATIENT
Start: 2022-05-21 | End: 2022-05-21

## 2022-05-21 RX ADMIN — SODIUM CHLORIDE 9.45 UNITS/HR: 9 INJECTION, SOLUTION INTRAVENOUS at 22:06

## 2022-05-21 RX ADMIN — PRAMIPEXOLE DIHYDROCHLORIDE 0.5 MG: 0.25 TABLET ORAL at 22:29

## 2022-05-21 RX ADMIN — HEPARIN SODIUM 5000 UNITS: 5000 INJECTION INTRAVENOUS; SUBCUTANEOUS at 22:29

## 2022-05-21 RX ADMIN — SODIUM CHLORIDE: 9 INJECTION, SOLUTION INTRAVENOUS at 21:24

## 2022-05-21 RX ADMIN — ATORVASTATIN CALCIUM 10 MG: 10 TABLET, FILM COATED ORAL at 22:29

## 2022-05-21 RX ADMIN — SODIUM CHLORIDE 1000 ML: 9 INJECTION, SOLUTION INTRAVENOUS at 19:27

## 2022-05-21 RX ADMIN — CARBIDOPA AND LEVODOPA 1 TABLET: 50; 200 TABLET, EXTENDED RELEASE ORAL at 22:29

## 2022-05-21 RX ADMIN — SODIUM CHLORIDE 0.1 UNITS/KG/HR: 9 INJECTION, SOLUTION INTRAVENOUS at 19:30

## 2022-05-21 RX ADMIN — ASPIRIN 325 MG: 325 TABLET ORAL at 21:00

## 2022-05-21 NOTE — ED PROVIDER NOTES
[address] [facility]        Pt Name: Ramiro Allen  MRN: [id number]  [place] 1940  Date of evaluation: 5/21/2022  Provider: Laney Mari PA-C  PCP: Lissy Sheehan MD  Note Started: 7:51 PM EDT       JAUN. I have evaluated this patient. My supervising physician was available for consultation. CHIEF COMPLAINT       Chief Complaint   Patient presents with    Chest Pain     arrived per  d/t intermittent CP that started this AM; BS elevated       HISTORY OF PRESENT ILLNESS   (Location, Timing/Onset, Context/Setting, Quality, Duration, Modifying Factors, Severity, Associated Signs and Symptoms)  Note limiting factors. Chief Complaint: CP     Ramiro Jimenesident is a 80 y.o. female who presents for evaluation of chest tightness that began this morning. States that it is intermittent but also reports associated symptoms of polydipsia, polyuria, weakness, fatigue and blurred vision. Patient states that she also has generalized body aches. No abdominal pain nausea vomiting or diarrhea. No urinary complaints. No known sick contacts with similar symptoms. No fevers or chills. Patient does have a history of insulin-dependent diabetes and states that her glucose today was in the 500s. States that she has never had at this time before and she is worried about this. She has insulin pump, however, nursing staff seemed concerned with the patient's ability to properly operate her pump. She had relatively benign left-sided heart cath 4 weeks ago. No shortness of breath. She has no other complaints or concerns at this time. Nursing Notes were all reviewed and agreed with or any disagreements were addressed in the HPI. REVIEW OF SYSTEMS    (2-9 systems for level 4, 10 or more for level 5)     Review of Systems    Positives and Pertinent negatives as per HPI.  Except as noted above in the ROS, all other systems were reviewed and negative. PAST MEDICAL HISTORY     Past Medical History:   Diagnosis Date    CAD (coronary artery disease) 1/2012    non obstructive    Diabetes mellitus with neurological manifestation (HCC)     Hyperlipidemia     Hyperlipidemia     Hypothyroidism (acquired)     Insomnia     Neuropathy     Parkinson disease (HCC)     [facility] [address]     Parkinson disease ([facility] [address] [facility]     Renal insufficiency     Restless legs syndrome     Type I (juvenile type) diabetes mellitus without mention of complication, not stated as uncontrolled          SURGICAL HISTORY     Past Surgical History:   Procedure Laterality Date    CARDIAC CATHETERIZATION  1/24/2012    non obstructive CAD    COLONOSCOPY      at age 71    DENTAL SURGERY      EYE SURGERY Bilateral     with lens implants    SKIN BIOPSY Right     neg skin biopsy on right arm    TONSILLECTOMY           CURRENTMEDICATIONS       Previous Medications    ASPIRIN 81 MG EC TABLET    Take 81 mg by mouth at bedtime     CARBIDOPA-LEVODOPA (SINEMET CR)  MG PER EXTENDED RELEASE TABLET    Take 1 tablet by mouth nightly     CARBIDOPA-LEVODOPA (SINEMET)  MG PER TABLET    Take 1 tablet by mouth 4 times daily    CHOLECALCIFEROL (VITAMIN D3) 2000 UNITS CAPS    Take 1 capsule by mouth daily    CONTINUOUS BLOOD GLUC TRANSMIT (DEXCOM G6 TRANSMITTER) MISC    Change transmitter every 3 months    DENOSUMAB (PROLIA) 60 MG/ML SOSY SC INJECTION    Inject 60 mg into the skin every 6 months    INSULIN ASPART (NOVOLOG) 100 UNIT/ML INJECTION VIAL    INJECT TOTAL DAILY DOSE 30 UNITS SUBCUTANEOUSLY WITH INSULIN PUMP    INSULIN INFUSION PUMP (PARADIGM INSULIN PUMP)    by Does not apply route.     LEVOTHYROXINE (SYNTHROID) 100 MCG TABLET    TAKE 1 TABLET DAILY    MULTIPLE VITAMINS-MINERALS (CENTRUM SILVER) TABS    Take 1 tablet by mouth daily     NITROGLYCERIN (NITROSTAT) 0.4 MG SL TABLET    Place 1 tablet under the tongue every 5 minutes as needed for Chest pain    PRAMIPEXOLE (MIRAPEX) 0.5 MG TABLET    Take 1 tablet by mouth 2 times daily 2 tabs with dinner, 1 tab at HS    SIMVASTATIN (ZOCOR) 20 MG TABLET    Take 1 tablet by mouth nightly         ALLERGIES     Patient has no known allergies.     FAMILYHISTORY       Family History   Problem Relation Age of Onset    Cancer Father     Heart Disease Father     Heart Disease Mother     Cancer Other         sister's daughter - Melanoma    Heart Disease Brother     Arthritis Sister     Diabetes Neg Hx     Stroke Neg Hx     Osteoporosis Neg Hx     Thyroid Disease Neg Hx           SOCIAL HISTORY       Social History     Tobacco Use    Smoking status: Former Smoker     Packs/day: 0.25     Years: 4.00     Pack years: 1.00     Types: Cigarettes     Quit date: 1966     Years since quittin.3    Smokeless tobacco: Never Used   Vaping Use    Vaping Use: Never used   Substance Use Topics    Alcohol use: Not Currently     Alcohol/week: 1.0 standard drink     Types: 1 Glasses of wine per week     Comment: social    Drug use: No       SCREENINGS    Tian Coma Scale  Eye Opening: Spontaneous  Best Verbal Response: Oriented  Best Motor Response: Obeys commands  Tian Coma Scale Score: 15        PHYSICAL EXAM    (up to 7 for level 4, 8 or more for level 5)     ED Triage Vitals   BP Temp Temp Source Pulse Resp SpO2 Height Weight   22 1816 22 1815 22 1815 22 18122 18122 18122 18122 181   (!) 145/56 97.9 °F (36.6 °C) Oral 105 18 95 % 5' 2\" (1.575 m) 93 lb (42.2 kg)       Physical Exam    DIAGNOSTIC RESULTS   LABS:    Labs Reviewed   CBC WITH AUTO DIFFERENTIAL - Abnormal; Notable for the following components:       Result Value    RBC 3.96 (*)     Lymphocytes Absolute 0.3 (*)     All other components within normal limits   COMPREHENSIVE METABOLIC PANEL - Abnormal; Notable for the following components:    Sodium 134 (*)     Potassium 5.8 (*)     Chloride 90 (*)     CO2 16 (*) Anion Gap 28 (*)     Glucose 634 (*)     BUN 37 (*)     GFR Non- 48 (*)     GFR  57 (*)     ALT 9 (*)     AST 42 (*)     All other components within normal limits    Narrative:     CALL  Rodriguez  [facility] tel. [phone],  Chemistry results called to and read back by HIRAM Dee, 05/21/2022  19:11, by [address] - Abnormal; Notable for the following components:    Pro- (*)     All other components within normal limits    Narrative:     [facility]  [facility] [phone],  Chemistry results called to and read back by HIRAM Dee, 05/21/2022  19:11, by [facility]   BLOOD GAS, VENOUS - Abnormal; Notable for the following components:    pH, [place] 7.294 (*)     pCO2, [place] 33.1 (*)     pO2, [place] 203.0 (*)     HCO3, Venous 16.0 (*)     Base Excess, [place] -9.5 (*)     Carboxyhemoglobin 2.3 (*)     All other components within normal limits   BETA-HYDROXYBUTYRATE - Abnormal; Notable for the following components:    Beta-Hydroxybutyrate 4.60 (*)     All other components within normal limits    Narrative:     CALL  [facility] tel. [phone],  Chemistry results called to and read back by HIRAM Dee, 05/21/2022  19:11, by [facility]   POCT GLUCOSE - Abnormal; Notable for the following components:    POC Glucose 553 (*)     All other components within normal limits   POCT GLUCOSE - Abnormal; Notable for the following components:    POC Glucose >600 (*)     All other components within normal limits   TROPONIN    Narrative:     CALL  [facility] tel. [phone],  Chemistry results called to and read back by HIRAM Dee, 05/21/2022  19:11, by Roque Johnson   POCT GLUCOSE   POCT GLUCOSE   POCT GLUCOSE   POCT GLUCOSE   POCT GLUCOSE   POCT GLUCOSE   POCT GLUCOSE   POCT GLUCOSE   POCT GLUCOSE   POCT GLUCOSE   POCT GLUCOSE   POCT GLUCOSE   POCT GLUCOSE       When ordered only abnormal lab results are displayed.  All other labs were within normal range or not returned as of this dictation. EKG: When ordered, EKG's are interpreted by the Emergency Department Physician in the absence of a cardiologist.  Please see their note for interpretation of EKG. RADIOLOGY:   Non-plain film images such as CT, Ultrasound and MRI are read by the radiologist. Plain radiographic images are visualized and preliminarily interpreted by the ED Provider with the below findings:        Interpretation per the Radiologist below, if available at the time of this note:    XR CHEST PORTABLE   Final Result   No acute process. XR CHEST PORTABLE    Result Date: 5/21/2022  EXAMINATION: ONE XRAY VIEW OF THE CHEST 5/21/2022 6:30 pm COMPARISON: 04/15/2022 HISTORY: ORDERING SYSTEM PROVIDED HISTORY: SOB TECHNOLOGIST PROVIDED HISTORY: Reason for exam:->SOB FINDINGS: The lungs are without acute focal process. There is no effusion or pneumothorax. The cardiomediastinal silhouette is without acute process. The osseous structures are without acute process. No acute process. PROCEDURES   Unless otherwise noted below, none     Procedures    CRITICAL CARE TIME   There was a high probability of life-threatening clinical deterioration in the patient's condition requiring my urgent intervention. I personally saw the patient and independently provided 46 minutes of non-concurrent critical care out of the total shared critical care time provided, excluding separately reportable procedures.        CONSULTS:  IP CONSULT TO DIABETES EDUCATOR      EMERGENCY DEPARTMENT COURSE and DIFFERENTIAL DIAGNOSIS/MDM:   Vitals:    Vitals:    05/21/22 1816 05/21/22 1848 05/21/22 1850 05/21/22 1900   BP: (!) 145/56 (!) 146/64  (!) 127/56   Pulse: 105  109 106   Resp:   22 25   Temp:       TempSrc:       SpO2: 95%  100% 97%   Weight: 93 lb (42.2 kg)      Height: 5' 2\" (1.575 m)          Patient was given the following medications:  Medications   0.9 % sodium chloride bolus (1,000 mLs IntraVENous New Bag 5/21/22 1927)   insulin regular (HUMULIN R;NOVOLIN R) 100 Units in sodium chloride 0.9 % 100 mL infusion (0.1 Units/kg/hr × 42.2 kg IntraVENous New Bag 5/21/22 1930)   dextrose bolus 10% 125 mL (has no administration in time range)     Or   dextrose bolus 10% 250 mL (has no administration in time range)   glucagon (rDNA) injection 1 mg (has no administration in time range)   dextrose 5 % solution (has no administration in time range)   aspirin tablet 325 mg (has no administration in time range)         Is this patient to be included in the SEP-1 Core Measure due to severe sepsis or septic shock? No   Exclusion criteria - the patient is NOT to be included for SEP-1 Core Measure due to: Infection is not suspected    Patient presents for evaluation of chest tightness also with concern of hyperglycemia and generalized body aches. On exam, she is resting comfortably in bed no acute distress and nontoxic. She is tachycardic and slightly tachypneic but vitals otherwise stable and she is afebrile. Lungs are clear to auscultation bilaterally, chest is nontender and abdomen is benign. Point-of-care glucose initially 593. Insulin pump was turned off and she was started on fluid resuscitation. Also given dose of aspirin. She will be reevaluated. Please see attending note for EKG interpretation. CBC and CMP are remarkable for hyperglycemia at 634 with a gap of 28 and bicarb of 16 concerning for diabetic ketoacidosis. Troponin negative. pH is 7.2 but hydroxybutyrate of 4.6. Chest x-ray is negative. Patient was started on insulin drip and will be admitted to the unit for further evaluation management of diabetic ketoacidosis. Hospitalist resume care the patient at this time. Patient informed and agreeable. She is stable for admission. FINAL IMPRESSION      1.  Diabetic ketoacidosis without coma associated with type 2 diabetes mellitus ([facility])          DISPOSITION/PLAN   DISPOSITION Admitted 05/21/2022 07:55:06 PM      PATIENT REFERRED TO:  No follow-up provider specified.     DISCHARGE MEDICATIONS:  New Prescriptions    No medications on file       DISCONTINUED MEDICATIONS:  Discontinued Medications    No medications on file              (Please note that portions of this note were completed with a voice recognition program.  Efforts were made to edit the dictations but occasionally words are mis-transcribed.)    Laney Mari PA-C (electronically signed)           [place]  05/21/22 1955

## 2022-05-21 NOTE — ED NOTES
Patient could not read her insulin pump nor did she or her spouse be able to turn it off/on.      J Carlos Sal RN  05/21/22 5556

## 2022-05-22 ENCOUNTER — APPOINTMENT (OUTPATIENT)
Dept: GENERAL RADIOLOGY | Age: 82
DRG: 919 | End: 2022-05-22
Payer: MEDICARE

## 2022-05-22 LAB
ANION GAP SERPL CALCULATED.3IONS-SCNC: 11 MMOL/L (ref 3–16)
ANION GAP SERPL CALCULATED.3IONS-SCNC: 13 MMOL/L (ref 3–16)
ANION GAP SERPL CALCULATED.3IONS-SCNC: 9 MMOL/L (ref 3–16)
BASOPHILS ABSOLUTE: 0 K/UL (ref 0–0.2)
BASOPHILS RELATIVE PERCENT: 0.3 %
BILIRUBIN URINE: NEGATIVE
BLOOD, URINE: NEGATIVE
BUN BLDV-MCNC: 24 MG/DL (ref 7–20)
BUN BLDV-MCNC: 27 MG/DL (ref 7–20)
BUN BLDV-MCNC: 31 MG/DL (ref 7–20)
CALCIUM SERPL-MCNC: 8.1 MG/DL (ref 8.3–10.6)
CALCIUM SERPL-MCNC: 8.5 MG/DL (ref 8.3–10.6)
CALCIUM SERPL-MCNC: 9 MG/DL (ref 8.3–10.6)
CHLORIDE BLD-SCNC: 100 MMOL/L (ref 99–110)
CHLORIDE BLD-SCNC: 103 MMOL/L (ref 99–110)
CHLORIDE BLD-SCNC: 104 MMOL/L (ref 99–110)
CLARITY: CLEAR
CO2: 20 MMOL/L (ref 21–32)
CO2: 23 MMOL/L (ref 21–32)
CO2: 24 MMOL/L (ref 21–32)
COLOR: YELLOW
CREAT SERPL-MCNC: 0.7 MG/DL (ref 0.6–1.2)
CREAT SERPL-MCNC: 0.8 MG/DL (ref 0.6–1.2)
CREAT SERPL-MCNC: 1 MG/DL (ref 0.6–1.2)
EOSINOPHILS ABSOLUTE: 0 K/UL (ref 0–0.6)
EOSINOPHILS RELATIVE PERCENT: 0.2 %
ESTIMATED AVERAGE GLUCOSE: 165.7 MG/DL
GFR AFRICAN AMERICAN: >60
GFR NON-AFRICAN AMERICAN: 53
GFR NON-AFRICAN AMERICAN: >60
GFR NON-AFRICAN AMERICAN: >60
GLUCOSE BLD-MCNC: 101 MG/DL (ref 70–99)
GLUCOSE BLD-MCNC: 102 MG/DL (ref 70–99)
GLUCOSE BLD-MCNC: 110 MG/DL (ref 70–99)
GLUCOSE BLD-MCNC: 111 MG/DL (ref 70–99)
GLUCOSE BLD-MCNC: 112 MG/DL (ref 70–99)
GLUCOSE BLD-MCNC: 113 MG/DL (ref 70–99)
GLUCOSE BLD-MCNC: 121 MG/DL (ref 70–99)
GLUCOSE BLD-MCNC: 123 MG/DL (ref 70–99)
GLUCOSE BLD-MCNC: 155 MG/DL (ref 70–99)
GLUCOSE BLD-MCNC: 157 MG/DL (ref 70–99)
GLUCOSE BLD-MCNC: 164 MG/DL (ref 70–99)
GLUCOSE BLD-MCNC: 166 MG/DL (ref 70–99)
GLUCOSE BLD-MCNC: 175 MG/DL (ref 70–99)
GLUCOSE BLD-MCNC: 185 MG/DL (ref 70–99)
GLUCOSE BLD-MCNC: 233 MG/DL (ref 70–99)
GLUCOSE BLD-MCNC: 276 MG/DL (ref 70–99)
GLUCOSE BLD-MCNC: 61 MG/DL (ref 70–99)
GLUCOSE BLD-MCNC: 70 MG/DL (ref 70–99)
GLUCOSE BLD-MCNC: 96 MG/DL (ref 70–99)
GLUCOSE URINE: NEGATIVE MG/DL
HBA1C MFR BLD: 7.4 %
HCT VFR BLD CALC: 33.7 % (ref 36–48)
HEMOGLOBIN: 10.8 G/DL (ref 12–16)
KETONES, URINE: NEGATIVE MG/DL
LEUKOCYTE ESTERASE, URINE: NEGATIVE
LYMPHOCYTES ABSOLUTE: 1.4 K/UL (ref 1–5.1)
LYMPHOCYTES RELATIVE PERCENT: 12.9 %
MAGNESIUM: 1.7 MG/DL (ref 1.8–2.4)
MAGNESIUM: 1.9 MG/DL (ref 1.8–2.4)
MAGNESIUM: 2 MG/DL (ref 1.8–2.4)
MCH RBC QN AUTO: 31 PG (ref 26–34)
MCHC RBC AUTO-ENTMCNC: 32 G/DL (ref 31–36)
MCV RBC AUTO: 96.9 FL (ref 80–100)
MICROSCOPIC EXAMINATION: NORMAL
MONOCYTES ABSOLUTE: 1.1 K/UL (ref 0–1.3)
MONOCYTES RELATIVE PERCENT: 10.5 %
NEUTROPHILS ABSOLUTE: 8.2 K/UL (ref 1.7–7.7)
NEUTROPHILS RELATIVE PERCENT: 76.1 %
NITRITE, URINE: NEGATIVE
PDW BLD-RTO: 14.5 % (ref 12.4–15.4)
PERFORMED ON: ABNORMAL
PERFORMED ON: NORMAL
PERFORMED ON: NORMAL
PH UA: 5 (ref 5–8)
PHOSPHORUS: 2.5 MG/DL (ref 2.5–4.9)
PHOSPHORUS: 2.5 MG/DL (ref 2.5–4.9)
PHOSPHORUS: 2.7 MG/DL (ref 2.5–4.9)
PLATELET # BLD: 195 K/UL (ref 135–450)
PMV BLD AUTO: 9.5 FL (ref 5–10.5)
POTASSIUM SERPL-SCNC: 3.9 MMOL/L (ref 3.5–5.1)
POTASSIUM SERPL-SCNC: 4 MMOL/L (ref 3.5–5.1)
POTASSIUM SERPL-SCNC: 4.7 MMOL/L (ref 3.5–5.1)
PROCALCITONIN: 0.56 NG/ML (ref 0–0.15)
PROTEIN UA: NEGATIVE MG/DL
RBC # BLD: 3.48 M/UL (ref 4–5.2)
SODIUM BLD-SCNC: 135 MMOL/L (ref 136–145)
SODIUM BLD-SCNC: 136 MMOL/L (ref 136–145)
SODIUM BLD-SCNC: 136 MMOL/L (ref 136–145)
SPECIFIC GRAVITY UA: 1.01 (ref 1–1.03)
URINE TYPE: NORMAL
UROBILINOGEN, URINE: 1 E.U./DL
WBC # BLD: 10.8 K/UL (ref 4–11)

## 2022-05-22 PROCEDURE — 83735 ASSAY OF MAGNESIUM: CPT

## 2022-05-22 PROCEDURE — 6360000002 HC RX W HCPCS: Performed by: INTERNAL MEDICINE

## 2022-05-22 PROCEDURE — 2500000003 HC RX 250 WO HCPCS: Performed by: INTERNAL MEDICINE

## 2022-05-22 PROCEDURE — 71045 X-RAY EXAM CHEST 1 VIEW: CPT

## 2022-05-22 PROCEDURE — 87040 BLOOD CULTURE FOR BACTERIA: CPT

## 2022-05-22 PROCEDURE — 6370000000 HC RX 637 (ALT 250 FOR IP): Performed by: INTERNAL MEDICINE

## 2022-05-22 PROCEDURE — 80048 BASIC METABOLIC PNL TOTAL CA: CPT

## 2022-05-22 PROCEDURE — 84100 ASSAY OF PHOSPHORUS: CPT

## 2022-05-22 PROCEDURE — 84145 PROCALCITONIN (PCT): CPT

## 2022-05-22 PROCEDURE — 81003 URINALYSIS AUTO W/O SCOPE: CPT

## 2022-05-22 PROCEDURE — 2580000003 HC RX 258: Performed by: INTERNAL MEDICINE

## 2022-05-22 PROCEDURE — 87449 NOS EACH ORGANISM AG IA: CPT

## 2022-05-22 PROCEDURE — 87086 URINE CULTURE/COLONY COUNT: CPT

## 2022-05-22 PROCEDURE — 74018 RADEX ABDOMEN 1 VIEW: CPT

## 2022-05-22 PROCEDURE — 2000000000 HC ICU R&B

## 2022-05-22 PROCEDURE — 85025 COMPLETE CBC W/AUTO DIFF WBC: CPT

## 2022-05-22 PROCEDURE — 36415 COLL VENOUS BLD VENIPUNCTURE: CPT

## 2022-05-22 RX ORDER — INSULIN LISPRO 100 [IU]/ML
0-12 INJECTION, SOLUTION INTRAVENOUS; SUBCUTANEOUS
Status: DISCONTINUED | OUTPATIENT
Start: 2022-05-22 | End: 2022-05-23

## 2022-05-22 RX ORDER — LIDOCAINE HYDROCHLORIDE 20 MG/ML
5 SOLUTION OROPHARYNGEAL
Status: DISCONTINUED | OUTPATIENT
Start: 2022-05-22 | End: 2022-05-25 | Stop reason: HOSPADM

## 2022-05-22 RX ORDER — INSULIN LISPRO 100 [IU]/ML
0-6 INJECTION, SOLUTION INTRAVENOUS; SUBCUTANEOUS NIGHTLY
Status: DISCONTINUED | OUTPATIENT
Start: 2022-05-22 | End: 2022-05-22

## 2022-05-22 RX ORDER — SODIUM CHLORIDE 9 MG/ML
INJECTION, SOLUTION INTRAVENOUS CONTINUOUS
Status: DISCONTINUED | OUTPATIENT
Start: 2022-05-22 | End: 2022-05-23

## 2022-05-22 RX ORDER — INSULIN GLARGINE 100 [IU]/ML
10 INJECTION, SOLUTION SUBCUTANEOUS DAILY
Status: DISCONTINUED | OUTPATIENT
Start: 2022-05-22 | End: 2022-05-23

## 2022-05-22 RX ADMIN — INSULIN LISPRO 2 UNITS: 100 INJECTION, SOLUTION INTRAVENOUS; SUBCUTANEOUS at 12:29

## 2022-05-22 RX ADMIN — CARBIDOPA AND LEVODOPA 1 TABLET: 50; 200 TABLET, EXTENDED RELEASE ORAL at 21:12

## 2022-05-22 RX ADMIN — Medication 10 MEQ: at 07:58

## 2022-05-22 RX ADMIN — PRAMIPEXOLE DIHYDROCHLORIDE 0.5 MG: 0.25 TABLET ORAL at 21:12

## 2022-05-22 RX ADMIN — LIDOCAINE HYDROCHLORIDE 5 ML: 20 SOLUTION ORAL; TOPICAL at 16:02

## 2022-05-22 RX ADMIN — Medication 10 MEQ: at 03:30

## 2022-05-22 RX ADMIN — CARBIDOPA AND LEVODOPA 1 TABLET: 25; 100 TABLET ORAL at 12:28

## 2022-05-22 RX ADMIN — Medication 10 MEQ: at 04:48

## 2022-05-22 RX ADMIN — SODIUM PHOSPHATE, MONOBASIC, MONOHYDRATE 10 MMOL: 276; 142 INJECTION, SOLUTION INTRAVENOUS at 00:44

## 2022-05-22 RX ADMIN — HEPARIN SODIUM 5000 UNITS: 5000 INJECTION INTRAVENOUS; SUBCUTANEOUS at 08:00

## 2022-05-22 RX ADMIN — CARBIDOPA AND LEVODOPA 1 TABLET: 25; 100 TABLET ORAL at 07:59

## 2022-05-22 RX ADMIN — Medication 1 TABLET: at 07:59

## 2022-05-22 RX ADMIN — PRAMIPEXOLE DIHYDROCHLORIDE 1 MG: 1 TABLET ORAL at 17:28

## 2022-05-22 RX ADMIN — INSULIN GLARGINE 10 UNITS: 100 INJECTION, SOLUTION SUBCUTANEOUS at 11:07

## 2022-05-22 RX ADMIN — HEPARIN SODIUM 5000 UNITS: 5000 INJECTION INTRAVENOUS; SUBCUTANEOUS at 21:13

## 2022-05-22 RX ADMIN — Medication 10 MEQ: at 00:32

## 2022-05-22 RX ADMIN — Medication 2000 UNITS: at 07:59

## 2022-05-22 RX ADMIN — ATORVASTATIN CALCIUM 10 MG: 10 TABLET, FILM COATED ORAL at 21:12

## 2022-05-22 RX ADMIN — Medication 10 MEQ: at 05:54

## 2022-05-22 RX ADMIN — Medication 10 MEQ: at 02:30

## 2022-05-22 RX ADMIN — CARBIDOPA AND LEVODOPA 1 TABLET: 25; 100 TABLET ORAL at 17:28

## 2022-05-22 RX ADMIN — SODIUM CHLORIDE: 9 INJECTION, SOLUTION INTRAVENOUS at 14:45

## 2022-05-22 RX ADMIN — CARBIDOPA AND LEVODOPA 1 TABLET: 25; 100 TABLET ORAL at 21:12

## 2022-05-22 RX ADMIN — Medication 10 MEQ: at 09:02

## 2022-05-22 RX ADMIN — Medication 10 MEQ: at 01:28

## 2022-05-22 RX ADMIN — DEXTROSE AND SODIUM CHLORIDE: 5; 450 INJECTION, SOLUTION INTRAVENOUS at 00:29

## 2022-05-22 RX ADMIN — DEXTROSE AND SODIUM CHLORIDE: 5; 450 INJECTION, SOLUTION INTRAVENOUS at 07:38

## 2022-05-22 RX ADMIN — LEVOTHYROXINE SODIUM 100 MCG: 0.1 TABLET ORAL at 06:10

## 2022-05-22 NOTE — PROGRESS NOTES
Shift assessment completed (See Flow sheets for details). Pt alert and awake. Oriented X 4. Follows command. Vitals stable. Continues on DKA protocol. Plan of the day discussed with patient. Able to turn and reposition self. Call light on reach. Will continue to monitor.

## 2022-05-22 NOTE — H&P
Hospital Medicine History & Physical      PCP: Shiloh Maciel MD    Date of Admission: 5/21/2022    Date of Service: Pt seen/examined on 5/21/2022  and Admitted to Inpatient with expected LOS greater than two midnights due to medical therapy. Chief Complaint:    Chief Complaint   Patient presents with    Chest Pain     arrived per  d/t intermittent CP that started this AM; BS elevated        History Of Present Illness: The patient is a 80 y.o. female with history of CAD, hyperlipidemia, type 1 diabetes on insulin pump with neuropathy, hypothyroidism, Parkinson's disease who presented with chest tightness and associated and associated polydipsia with polyuria, generalized malaise and weakness blurred vision. No cough or production, no fevers or chills, no dysuria or hematuria, no constipation or diarrhea. On presentation, and glucose levels noted to be elevated. Of note patient is on insulin. She had left heart cath done 4 weeks ago with nonobstructive coronaries. She continues to endorse shortness of breath on minimal exertion with chest tightness today. I wonder if this may be related to her DKA.   Laboratory work-up was noted for elevated ketones with anion gap metabolic acidosis due to DKA  Chest x-ray with clear lung fields    Past Medical History:        Diagnosis Date    CAD (coronary artery disease) 1/2012    non obstructive    Diabetes mellitus with neurological manifestation (HCC)     Hyperlipidemia     Hyperlipidemia     Hypothyroidism (acquired)     Insomnia     Neuropathy     Parkinson disease (HCC)     Parkinson disease ([facility] [address]     Parkinson disease ([facility] [address]     Renal insufficiency     Restless legs syndrome     Type I (juvenile type) diabetes mellitus without mention of complication, not stated as uncontrolled        Past Surgical History:        Procedure Laterality Date    CARDIAC CATHETERIZATION  1/24/2012    non obstructive CAD    COLONOSCOPY      at age 71  DENTAL SURGERY      EYE SURGERY Bilateral     with lens implants    SKIN BIOPSY Right     neg skin biopsy on right arm    TONSILLECTOMY         Medications Prior to Admission:    Prior to Admission medications    Medication Sig Start Date End Date Taking? Authorizing Provider   denosumab (PROLIA) 60 MG/ML SOSY SC injection Inject 60 mg into the skin every 6 months    Historical Provider, MD   insulin aspart (NOVOLOG) 100 UNIT/ML injection vial INJECT TOTAL DAILY DOSE 30 UNITS SUBCUTANEOUSLY WITH INSULIN PUMP 3/21/22   Waldemar Hilario MD   nitroGLYCERIN (NITROSTAT) 0.4 MG SL tablet Place 1 tablet under the tongue every 5 minutes as needed for Chest pain  Patient not taking: Reported on 5/4/2022 2/14/22   Wayne Farnsworth MD   pramipexole (MIRAPEX) 0.5 MG tablet Take 1 tablet by mouth 2 times daily 2 tabs with dinner, 1 tab at [facility]  Patient taking differently: Take 0.5 mg by mouth See Admin Instructions 2 tabs with dinner, 1 tab at [facility] 1/11/22   Carolina Saravia MD   levothyroxine (SYNTHROID) 100 MCG tablet TAKE 1 TABLET DAILY 1/11/22   Carolina Saravia MD   carbidopa-levodopa (SINEMET)  MG per tablet Take 1 tablet by mouth 4 times daily 1/10/22   Carolina Saravia MD   Continuous Blood Gluc Transmit (DEXCOM G6 TRANSMITTER) MISC Change transmitter every 3 months 12/21/21   Waldemar Hilario MD   carbidopa-levodopa (SINEMET CR)  MG per extended release tablet Take 1 tablet by mouth nightly  11/5/21   Historical Provider, MD   aspirin 81 MG EC tablet Take 81 mg by mouth at bedtime     Historical Provider, MD   simvastatin (ZOCOR) 20 MG tablet Take 1 tablet by mouth nightly 6/21/21   Waldemar Hilario MD   Multiple Vitamins-Minerals (CENTRUM SILVER) TABS Take 1 tablet by mouth daily     Historical Provider, MD   Cholecalciferol (VITAMIN D3) 2000 UNITS CAPS Take 1 capsule by mouth daily    Historical Provider, MD   Insulin Infusion Pump (PARADIGM INSULIN PUMP) by Does not apply route.     Historical Provider, MD       Allergies:  Patient has no known allergies. Social History:  The patient currently lives with family    TOBACCO:   reports that she quit smoking about 56 years ago. Her smoking use included cigarettes. She has a 1.00 pack-year smoking history. She has never used smokeless tobacco.  ETOH:   reports previous alcohol use of about 1.0 standard drink of alcohol per week. Family History:  Reviewed in detail and negative for DM, Early CAD, Cancer, CVA. Positive as follows:        Problem Relation Age of Onset    Cancer Father     Heart Disease Father     Heart Disease Mother     Cancer Other         sister's daughter - Melanoma    Heart Disease Brother     Arthritis Sister     Diabetes Neg Hx     Stroke Neg Hx     Osteoporosis Neg Hx     Thyroid Disease Neg Hx        REVIEW OF SYSTEMS:   Positive for chest pressure, Dyspnea on minimal exertion and as noted in the HPI. All other systems reviewed and negative. PHYSICAL EXAM:    BP (!) 143/63   Pulse 107   Temp 97.9 °F (36.6 °C) (Oral)   Resp 24   Ht 5' 2\" (1.575 m)   Wt 93 lb (42.2 kg)   SpO2 94%   BMI 17.01 kg/m²     General appearance: No apparent distress appears stated age and cooperative. HEENT Normal cephalic, atraumatic without obvious deformity. Pupils equal, round, and reactive to light. Extra ocular muscles intact. Conjunctivae/corneas clear. Neck: Supple, No jugular venous distention/bruits. Lungs: Clear to auscultation, bilaterally without Rales/Wheezes/Rhonchi with good respiratory effort. Heart: Regular rate and rhythm with Normal S1/S2 without murmurs, rubs or gallops  Abdomen: Soft, non-tender or non-distended without rigidity or guarding and positive bowel sounds  Extremities: No clubbing, cyanosis, or edema bilaterally  Skin: Skin color, texture, turgor normal.  No rashes or lesions.   Neurologic: Alert and oriented X 3, neurovascularly intact with sensory/motor intact upper extremities/lower extremities, bilaterally. Cranial nerves: II-XII intact, grossly non-focal.  Mental status: Alert, oriented, thought content appropriate. CBC   Recent Labs     05/21/22 1828   WBC 6.2   HGB 12.1   HCT 38.0         RENAL  Recent Labs     05/21/22 1828   *   K 5.8*   CL 90*   CO2 16*   BUN 37*   CREATININE 1.1     LFT'S  Recent Labs     05/21/22 1828   AST 42*   ALT 9*   BILITOT 0.8   ALKPHOS 71     CARDIAC ENZYMES  Recent Labs     05/21/22 1828   TROPONINI <0.01         Active Hospital Problems    Diagnosis Date Noted    CAD (coronary artery disease) [I25.10] 01/23/2012     Priority: High    DKA, type 1, not at goal [facility]) [E10.10] 05/21/2022     Priority: Medium    Mixed hyperlipidemia [E78.2] 04/19/2010     Priority: Medium    Parkinson disease ([facility] [address] [G20]     Acquired hypothyroidism [E03.9] 04/19/2010    Diabetic polyneuropathy ([facility] [address] [E11.42] 04/19/2010         ASSESSMENT/PLAN:  80 y.o. female with history of CAD, hyperlipidemia, type 1 diabetes on insulin pump with neuropathy, hypothyroidism, Parkinson's disease who presented with chest tightness and associated and associated polydipsia with polyuria, generalized malaise and weakness blurred vision found to be in DKA    Plan:  -Continue insulin infusion with DKA protocol  - IV hydration  - Monitor BMP every 4 hourly  - Monitor divalence and phosphorus and replete as appropriate  - Admit to ICU for close monitoring  - Continue other chronic home medication      DVT Prophylaxis: Subcut enoxaparin  Diet: N.p.o. until gap is closed then will transition to home insulin pump thereafter  Code Status: Full code         Yojana Maldonado MD    Thank you Kaylah Rodriguez MD for the opportunity to be involved in this patient's care. If you have any questions or concerns please feel free to contact me at [phone].

## 2022-05-22 NOTE — PROGRESS NOTES
BS 96. Multiplier at 0.01. Message sent to Dr. Mary Lou Vitale him of BS and multiplier at lowest rate. Also informed him pt's insulin pump is at home and can be brought back to hospital when  comes to visit. Asked if gap was closed. Notified him of lab results at 0200 and when next labs were due.      Waiting for response    6494-Dr. Alarcon stated gap needs to be closed x2 and to continue protocol for BS <200

## 2022-05-22 NOTE — PROGRESS NOTES
Pt admitted from ED to 5911.  with pt. Insulin pump disconnected to given to . Pt does have a continuous glucose monitor secured to Lt abd. Oriented to room and environment. No

## 2022-05-22 NOTE — PROGRESS NOTES
4 Eyes Skin Assessment     NAME:  Harley Faye  YOB: 1940  MEDICAL RECORD NUMBER:  [id number]    The patient is being assess for  Admission    I agree that 2 RN's have performed a thorough Head to Toe Skin Assessment on the patient. ALL assessment sites listed below have been assessed. Areas assessed by both nurses:    Head, Face, Ears, Shoulders, Back, Chest, Arms, Elbows, Hands, Sacrum. Buttock, Coccyx, Ischium and Legs. Feet and Heels        Does the Patient have a Wound?  No noted wound(s)       Hang Prevention initiated:  Yes   Wound Care Orders initiated:  NA    Pressure Injury (Stage 3,4, Unstageable, DTI, NWPT, and Complex wounds) if present place consult order under [de-identified] NA    New and Established Ostomies if present place consult order under : NA      Nurse 1 eSignature: Electronically signed by Jonel Apgar, RN on 22 at 10:44 PM EDT    **SHARE this note so that the co-signing nurse is able to place an eSignature**    Nurse 2 eSignature: Electronically signed by Kostas Khan RN on 22 at 10:44 PM EDT

## 2022-05-22 NOTE — CARE COORDINATION
Discharge Planning Note:    Chart reviewed and it appears that patient has minimal needs for discharge at this time. Discussed with patient and requested that case management be notified if discharge needs are identified.     - Current discharge plan is for the patient to return home with no needs. Case management will continue to follow progress and update discharge plan as needed.       Risk of Readmission Score: 17%    RICK Angelo RN    [facility]  Phone: [phone]

## 2022-05-22 NOTE — PROGRESS NOTES
Insulin drip per protocol. Potassium replacement currently infusing. No changes noted in assessment. Assisted to [facility]. Denies further needs at present. Will continue to monitor.

## 2022-05-22 NOTE — PROGRESS NOTES
Reassessment documented. Insulin drip and electrolyte replacements per protocol. Assisted to [facility]. Tolerated well. No other changes noted. Will continue to monitor.

## 2022-05-23 LAB
ANION GAP SERPL CALCULATED.3IONS-SCNC: 8 MMOL/L (ref 3–16)
BUN BLDV-MCNC: 13 MG/DL (ref 7–20)
CALCIUM SERPL-MCNC: 8.5 MG/DL (ref 8.3–10.6)
CHLORIDE BLD-SCNC: 105 MMOL/L (ref 99–110)
CO2: 23 MMOL/L (ref 21–32)
CREAT SERPL-MCNC: 0.7 MG/DL (ref 0.6–1.2)
EKG ATRIAL RATE: 97 BPM
EKG DIAGNOSIS: NORMAL
EKG P AXIS: 90 DEGREES
EKG P-R INTERVAL: 182 MS
EKG Q-T INTERVAL: 396 MS
EKG QRS DURATION: 70 MS
EKG QTC CALCULATION (BAZETT): 502 MS
EKG R AXIS: 61 DEGREES
EKG T AXIS: 67 DEGREES
EKG VENTRICULAR RATE: 97 BPM
GFR AFRICAN AMERICAN: >60
GFR NON-AFRICAN AMERICAN: >60
GLUCOSE BLD-MCNC: 112 MG/DL (ref 70–99)
GLUCOSE BLD-MCNC: 127 MG/DL (ref 70–99)
GLUCOSE BLD-MCNC: 129 MG/DL (ref 70–99)
GLUCOSE BLD-MCNC: 55 MG/DL (ref 70–99)
GLUCOSE BLD-MCNC: 63 MG/DL (ref 70–99)
GLUCOSE BLD-MCNC: 68 MG/DL (ref 70–99)
GLUCOSE BLD-MCNC: 86 MG/DL (ref 70–99)
GLUCOSE BLD-MCNC: 87 MG/DL (ref 70–99)
GLUCOSE BLD-MCNC: 87 MG/DL (ref 70–99)
GLUCOSE BLD-MCNC: 93 MG/DL (ref 70–99)
L. PNEUMOPHILA SEROGP 1 UR AG: NORMAL
PERFORMED ON: ABNORMAL
PERFORMED ON: NORMAL
POTASSIUM SERPL-SCNC: 4.2 MMOL/L (ref 3.5–5.1)
SODIUM BLD-SCNC: 136 MMOL/L (ref 136–145)
URINE CULTURE, ROUTINE: NORMAL

## 2022-05-23 PROCEDURE — 6370000000 HC RX 637 (ALT 250 FOR IP): Performed by: INTERNAL MEDICINE

## 2022-05-23 PROCEDURE — 6360000002 HC RX W HCPCS: Performed by: INTERNAL MEDICINE

## 2022-05-23 PROCEDURE — 2580000003 HC RX 258: Performed by: INTERNAL MEDICINE

## 2022-05-23 PROCEDURE — 1200000000 HC SEMI PRIVATE

## 2022-05-23 PROCEDURE — 80048 BASIC METABOLIC PNL TOTAL CA: CPT

## 2022-05-23 PROCEDURE — 36415 COLL VENOUS BLD VENIPUNCTURE: CPT

## 2022-05-23 PROCEDURE — 93010 ELECTROCARDIOGRAM REPORT: CPT | Performed by: INTERNAL MEDICINE

## 2022-05-23 RX ORDER — INSULIN GLARGINE 100 [IU]/ML
5 INJECTION, SOLUTION SUBCUTANEOUS DAILY
Status: DISCONTINUED | OUTPATIENT
Start: 2022-05-24 | End: 2022-05-24

## 2022-05-23 RX ORDER — INSULIN LISPRO 100 [IU]/ML
0-3 INJECTION, SOLUTION INTRAVENOUS; SUBCUTANEOUS NIGHTLY
Status: DISCONTINUED | OUTPATIENT
Start: 2022-05-23 | End: 2022-05-24

## 2022-05-23 RX ORDER — INSULIN LISPRO 100 [IU]/ML
0-6 INJECTION, SOLUTION INTRAVENOUS; SUBCUTANEOUS
Status: DISCONTINUED | OUTPATIENT
Start: 2022-05-23 | End: 2022-05-24

## 2022-05-23 RX ADMIN — SODIUM CHLORIDE: 9 INJECTION, SOLUTION INTRAVENOUS at 03:28

## 2022-05-23 RX ADMIN — CARBIDOPA AND LEVODOPA 1 TABLET: 25; 100 TABLET ORAL at 16:17

## 2022-05-23 RX ADMIN — LEVOTHYROXINE SODIUM 100 MCG: 0.1 TABLET ORAL at 04:51

## 2022-05-23 RX ADMIN — INSULIN GLARGINE 10 UNITS: 100 INJECTION, SOLUTION SUBCUTANEOUS at 08:01

## 2022-05-23 RX ADMIN — PRAMIPEXOLE DIHYDROCHLORIDE 1 MG: 1 TABLET ORAL at 16:17

## 2022-05-23 RX ADMIN — Medication 2000 UNITS: at 08:03

## 2022-05-23 RX ADMIN — CARBIDOPA AND LEVODOPA 1 TABLET: 50; 200 TABLET, EXTENDED RELEASE ORAL at 20:03

## 2022-05-23 RX ADMIN — CARBIDOPA AND LEVODOPA 1 TABLET: 25; 100 TABLET ORAL at 12:50

## 2022-05-23 RX ADMIN — CARBIDOPA AND LEVODOPA 1 TABLET: 25; 100 TABLET ORAL at 20:03

## 2022-05-23 RX ADMIN — PRAMIPEXOLE DIHYDROCHLORIDE 0.5 MG: 0.25 TABLET ORAL at 20:03

## 2022-05-23 RX ADMIN — HEPARIN SODIUM 5000 UNITS: 5000 INJECTION INTRAVENOUS; SUBCUTANEOUS at 08:01

## 2022-05-23 RX ADMIN — CARBIDOPA AND LEVODOPA 1 TABLET: 25; 100 TABLET ORAL at 08:03

## 2022-05-23 RX ADMIN — HEPARIN SODIUM 5000 UNITS: 5000 INJECTION INTRAVENOUS; SUBCUTANEOUS at 20:03

## 2022-05-23 RX ADMIN — ATORVASTATIN CALCIUM 10 MG: 10 TABLET, FILM COATED ORAL at 20:03

## 2022-05-23 RX ADMIN — Medication 1 TABLET: at 08:02

## 2022-05-23 NOTE — PROGRESS NOTES
Diabetic educator at bedside for diabetes management. Pt prefers home, if pt is to be D/C'd home will require assist with all functional mobility/no

## 2022-05-23 NOTE — PROGRESS NOTES
Nutrition Note    RECOMMENDATIONS  1. PO Diet: Continue current diet   2. ONS: Offer Glucerna BID  3. Nutrition Support: None      NUTRITION ASSESSMENT   RD triggered to see pt d/t BMI 17.63. Per hx in EMR, pt's weight has been stable since September 2021. Pt reports she consumes 3 meals daily at home, but limits her carbohydrate intake to 39 grams/meal. Pt also seemed confused about checking her blood sugar and dosing insulin. Question if inadequate PO intake combined with inconsistent blood sugar management could be contributing to low BMI. Encouraged 60 grams carb per meal, pt states she is \"excited to be able to eat more\" Pt also consumes Glucerna 1-2 times per day at home; will order BID. Diabetes educator has been consulted; encouraged pt to discuss insulin management at that time.  Nutrition Related Findings: No edema noted.  Wounds: None   Nutrition Education:  Education initiated   Will Arango Nutrition Goals: PO intake 50% or greater     MALNUTRITION ASSESSMENT   Chronic Illness  Malnutrition Status: Mild malnutrition  Findings of the 6 clinical characteristics of malnutrition:  Energy Intake:  Mild decrease in energy intake (Comment)  Weight Loss:  No significant weight loss     Body Fat Loss:  Mild body fat loss Orbital   Muscle Mass Loss:  Mild muscle mass loss Clavicles (pectoralis & deltoids),Temples (temporalis),Hand (interosseous)  Fluid Accumulation:  No significant fluid accumulation     Strength:  Not Performed    NUTRITION DIAGNOSIS   · Underweight related to inadequate protein-energy intake as evidenced by BMI    CURRENT NUTRITION THERAPIES  ADULT DIET;  Regular; 4 carb choices (60 gm/meal)     PO Intake: 51-75%   PO Supplement Intake:None Ordered    ANTHROPOMETRICS   Current Height: 5' 2\" (157.5 cm)   Current Weight: 96 lb 6.4 oz (43.7 kg)     Ideal Body Weight (IBW): 110 lbs  (50 kg)      BMI: 17.6      COMPARATIVE STANDARDS  Energy (kcal):  6957-2450     Protein (g):  62-75 grams Fluid (mL/day):  1540 mL    The patient will be monitored per nutrition standards of care. Consult dietitian if additional nutrition interventions are needed prior to RD reassessment.      Pat Tran, [address], [place]    Contact: [phone]

## 2022-05-23 NOTE — PROGRESS NOTES
Patient is doing well this morning. Fasting glucose this AM 93, no sliding scale needed per order. Per patient, her insulin pump will be coming in the mail today or tomorrow. Education provided regarding sliding scale dosages. Consult has been placed to diabetic educator. Patient denies needs at this time. Will continue to monitor closely.

## 2022-05-23 NOTE — PROGRESS NOTES
Insulin pump has not arrived in the mail today. Patient will stay an additional day to ensure insulin pump is programed correctly by hospital staff, and to monitor for hypoglycemia throughout the night. Patient updated on plan of care.

## 2022-05-23 NOTE — PROGRESS NOTES
Hospitalist Progress Note      PCP: Mary Lou Masterson MD    Date of Admission: 5/21/2022    Hospital Course:   \"  80 y.o. female with history of CAD, hyperlipidemia, type 1 diabetes on insulin pump with neuropathy, hypothyroidism, Parkinson's disease who presented with chest tightness and associated polydipsia with polyuria, generalized malaise and weakness blurred vision. She had left heart cath done 4 weeks ago with nonobstructive coronaries. She continues to endorse shortness of breath on minimal exertion with chest tightness today. Laboratory work-up was noted for elevated ketones with anion gap metabolic acidosis due to DKA. Chest x-ray with clear lung fields. Patient admitted for further evaluation. \"      Subjective:   No complaints. No dyspnea. No chest pain or cough.       Medications:  Reviewed    Infusion Medications    sodium chloride 75 mL/hr at 05/23/22 0328    dextrose      dextrose 5 % and 0.45 % NaCl Stopped (05/22/22 1329)     Scheduled Medications    insulin glargine  10 Units SubCUTAneous Daily    insulin lispro  0-12 Units SubCUTAneous TID WC    carbidopa-levodopa  1 tablet Oral Nightly    carbidopa-levodopa  1 tablet Oral 4x Daily    Vitamin D  2,000 Units Oral Daily    levothyroxine  100 mcg Oral Daily    therapeutic multivitamin-minerals  1 tablet Oral Daily    pramipexole  0.5 mg Oral Nightly    heparin (porcine)  5,000 Units SubCUTAneous BID    pramipexole  1 mg Oral Dinner    atorvastatin  10 mg Oral Nightly     PRN Meds: lidocaine viscous hcl, dextrose bolus **OR** dextrose bolus, glucagon (rDNA), dextrose, dextrose bolus **OR** dextrose bolus, potassium chloride, magnesium sulfate, sodium phosphate IVPB **OR** sodium phosphate IVPB **OR** sodium phosphate IVPB, polyethylene glycol, dextrose 5 % and 0.45 % NaCl      Intake/Output Summary (Last 24 hours) at 5/23/2022 1208  Last data filed at 5/23/2022 0900  Gross per 24 hour   Intake 5454.93 ml   Output --   Net 5454.93 ml       Physical Exam Performed:    /67   Pulse 81   Temp 96.5 °F (35.8 °C) (Oral)   Resp 20   Ht 5' 2\" (1.575 m)   Wt 96 lb 6.4 oz (43.7 kg)   SpO2 97%   BMI 17.63 kg/m²     General appearance: No apparent distress, appears stated age and cooperative. HEENT: Pupils equal, round, and reactive to light. Conjunctivae/corneas clear. Neck: Supple, with full range of motion. No jugular venous distention. Trachea midline. Respiratory:  Normal respiratory effort. Clear to auscultation,  Cardiovascular: Regular rate and rhythm with normal S1/S2 without murmurs, rubs or gallops. Abdomen: Soft, non-tender, non-distended with normal bowel sounds. Musculoskeletal: No clubbing, cyanosis or edema bilaterally. Skin: Skin color, texture, turgor normal.  No rashes or lesions. Neurologic:  Neurovascularly intact without any focal sensory/motor deficits. grossly non-focal.  Psychiatric: Alert and oriented, thought content appropriate, normal insight      Labs:   Recent Labs     05/21/22 1828 05/22/22  0657   WBC 6.2 10.8   HGB 12.1 10.8*   HCT 38.0 33.7*    195     Recent Labs     05/21/22  2341 05/21/22  2341 05/22/22  0221 05/22/22  0656 05/23/22  0448      < > 136 135* 136   K 3.9   < > 4.0 4.7 4.2      < > 103 104 105   CO2 23   < > 24 20* 23   BUN 31*   < > 27* 24* 13   CREATININE 1.0   < > 0.8 0.7 0.7   CALCIUM 9.0   < > 8.1* 8.5 8.5   PHOS 2.5  --  2.5 2.7  --     < > = values in this interval not displayed. Recent Labs     05/21/22 1828   AST 42*   ALT 9*   BILITOT 0.8   ALKPHOS 69     No results for input(s): INR in the last 72 hours.   Recent Labs     05/21/22 1828   TROPONINI <0.01       Urinalysis:      Lab Results   Component Value Date    NITRU Negative 05/22/2022    WBCUA 0 05/21/2022    BACTERIA None Seen 05/21/2022    RBCUA 0 05/21/2022    BLOODU Negative 05/22/2022    SPECGRAV 1.010 05/22/2022    GLUCOSEU Negative 05/22/2022    GLUCOSEU NEGATIVE 01/23/2012 Radiology:  XR ABDOMEN (KUB) (SINGLE AP VIEW)   Final Result   Normal, nonobstructive bowel gas pattern. XR CHEST PORTABLE   Final Result   No acute cardiopulmonary disease. XR CHEST PORTABLE   Final Result   No acute process. Assessment/Plan:    Active Hospital Problems    Diagnosis     CAD (coronary artery disease) [I25.10]      Priority: High    DKA, type 1, not at goal [facility]) [E10.10]      Priority: Medium    Mixed hyperlipidemia [E78.2]      Priority: Medium    Parkinson disease ([facility] [address] [G20]     Acquired hypothyroidism [E03.9]     Diabetic polyneuropathy ([facility] [address] [E11.42]    DKA/diabetes type 1  No evidence of infection. Chest x-ray nonacute. UA negative. No growth on blood cultures. -A1c 7.4. Anion gap is closed. Glycemic control improved. Continue lantus and insulin. Pt has insulin pump but wasn't delivered home today. Insulin pump arrives tomorrow. - DC IV fluids  - Monitor and replace electrolytes    Chest tightness  -Troponin negative. No acute changes on EKG. -Patient had recent cardiac catheterization showing mild nonobstructive disease 4/18/22.  -Pain control    History of hypothyroidism  -Continue thyroid medications      DVT Prophylaxis: Heparin  Diet: ADULT DIET;  Regular; 4 carb choices (60 gm/meal)  ADULT ORAL NUTRITION SUPPLEMENT; Lunch, Dinner; Diabetic Oral Supplement  Code Status: Full Code    PT/OT Eval Status: As needed    Dispo -Home tomorrow when insulin pump is delivered home    Luis Correa MD

## 2022-05-23 NOTE — PROGRESS NOTES
Patient hypoglycemic at pre-lunch blood glucose spot check. Patient given orange juice and blood glucose rechecked (see below). Of note, no sliding scale Humalog was administered for breakfast.     Ref.  Range 5/23/2022 07:30 5/23/2022 11:47 5/23/2022 12:01   POC Glucose Latest Ref Range: 70 - 99 mg/dl 93 63 (L) 87

## 2022-05-23 NOTE — PROGRESS NOTES
[facility] Diabetes Education Nurse  Consult Note       NAME:  Heather Long  MEDICAL RECORD NUMBER:  [id number]  AGE: 80 y.o. GENDER: female  : 1940  TODAY'S DATE:  2022    Subjective:     Reason for Educator consult ---  Evaluation and Assessment:    Heather Long is a 80 y.o. female referred by:   [x] Physician  [] Nursing  [] Other:      Patient states diagnosed with diabetes 24 years ago. The patient does have a glucometer at home and states testing 4 times a day but doesn't check sugar with CGM alarms. the patient states her CGM will alarm that bolus is blocked & she ignores message. Informed the patient & spouse that alarm may mean insulin pump tubing is kinked OR manual bolus is held due to low blood sugar, instructed the patient & spouse to read alarm messages fully & respond to them. Pt endorses confusion, polydipsia ,polyuria, polyphasia, and nocturia prior to admission. Reviewed S/S of hyper- and hypoglycemia and the proper treatment of hyper- and hypoglycemia. Handouts given. Explained A1C values 7.4 and goals <7. Patient states confused about counting carbohydrates, sometimes skips lunch & always skips bedtime snack. Gave recommendations on bedtime snacks to prevent lows at night & spikes in the morning from over-compensating liver. Gave recommendations and handout information on diet with diabetes and counting carbohydrates  Encouraged diet compliance to improve pt's health and deter long term complications of DM. Dietitian already consulted & saw the patient. Discussed exercise, sick day management, following dietary plan, following up with PCP. Discussed medications including insulin. Discussed health benefits of non smoking. the patient is a non-smoker. Recommend maintaining a smoke-free lifestyle.    Patient given pamphlets of written material regarding diabetes titled \" What is Diabetes:\", \"Checking your Blood Sugar\", \"Know your Numbers\", and \"Building a Balanced Meal\". Discussed basal bolus insulin regimen in hospital. Pt & spouse v/u. PAST MEDICAL HISTORY      Diagnosis Date    CAD (coronary artery disease) 2012    non obstructive    Diabetes mellitus with neurological manifestation (HCC)     Hyperlipidemia     Hyperlipidemia     Hypothyroidism (acquired)     Insomnia     Neuropathy     Parkinson disease (HCC)     [facility] ([facility] [address]     Parkinson disease ([facility] [address]     Renal insufficiency     Restless legs syndrome     Type I (juvenile type) diabetes mellitus without mention of complication, not stated as uncontrolled        PAST SURGICAL HISTORY  Past Surgical History:   Procedure Laterality Date    CARDIAC CATHETERIZATION  2012    non obstructive CAD    COLONOSCOPY      at age 71    DENTAL SURGERY      EYE SURGERY Bilateral     with lens implants    SKIN BIOPSY Right     neg skin biopsy on right arm    TONSILLECTOMY         FAMILY HISTORY  Family History   Problem Relation Age of Onset    Cancer Father     Heart Disease Father     Heart Disease Mother     Cancer Other         sister's daughter - Melanoma    Heart Disease Brother     Arthritis Sister     Diabetes Neg Hx     Stroke Neg Hx     Osteoporosis Neg Hx     Thyroid Disease Neg Hx        SOCIAL HISTORY  Social History     Tobacco Use    Smoking status: Former Smoker     Packs/day: 0.25     Years: 4.00     Pack years: 1.00     Types: Cigarettes     Quit date: 1966     Years since quittin.3    Smokeless tobacco: Never Used   Vaping Use    Vaping Use: Never used   Substance Use Topics    Alcohol use: Not Currently     Alcohol/week: 1.0 standard drink     Types: 1 Glasses of wine per week     Comment: social    Drug use: No       ALLERGIES  No Known Allergies    MEDICATIONS  No current facility-administered medications on file prior to encounter.      Current Outpatient Medications on File Prior to Encounter   Medication Sig Dispense Refill    denosumab (Bj Quiroz) 60 MG/ML SOSY SC injection Inject 60 mg into the skin every 6 months      insulin aspart (NOVOLOG) 100 UNIT/ML injection vial INJECT TOTAL DAILY DOSE 30 UNITS SUBCUTANEOUSLY WITH INSULIN PUMP 30 mL 3    nitroGLYCERIN (NITROSTAT) 0.4 MG SL tablet Place 1 tablet under the tongue every 5 minutes as needed for Chest pain (Patient not taking: Reported on 5/4/2022) 25 tablet 3    pramipexole (MIRAPEX) 0.5 MG tablet Take 1 tablet by mouth 2 times daily 2 tabs with dinner, 1 tab at HS (Patient taking differently: Take 0.5 mg by mouth See Admin Instructions 2 tabs with dinner, 1 tab at HS) 90 tablet 3    levothyroxine (SYNTHROID) 100 MCG tablet TAKE 1 TABLET DAILY 90 tablet 3    carbidopa-levodopa (SINEMET)  MG per tablet Take 1 tablet by mouth 4 times daily 120 tablet 5    Continuous Blood Gluc Transmit (DEXCOM G6 TRANSMITTER) MISC Change transmitter every 3 months 4 each 0    carbidopa-levodopa (SINEMET CR)  MG per extended release tablet Take 1 tablet by mouth nightly       aspirin 81 MG EC tablet Take 81 mg by mouth at bedtime  (Patient not taking: Reported on 5/21/2022)      simvastatin (ZOCOR) 20 MG tablet Take 1 tablet by mouth nightly 90 tablet 3    Multiple Vitamins-Minerals (CENTRUM SILVER) TABS Take 1 tablet by mouth daily       Cholecalciferol (VITAMIN D3) 2000 UNITS CAPS Take 1 capsule by mouth daily      Insulin Infusion Pump (PARADIGM INSULIN PUMP) by Does not apply route.          Objective:     LABS    CBC:   Lab Results   Component Value Date    WBC 10.8 05/22/2022    RBC 3.48 05/22/2022    HGB 10.8 05/22/2022    HCT 33.7 05/22/2022    MCV 96.9 05/22/2022    MCH 31.0 05/22/2022    MCHC 32.0 05/22/2022    RDW 14.5 05/22/2022     05/22/2022    MPV 9.5 05/22/2022     CMP:    Lab Results   Component Value Date     05/23/2022    K 4.2 05/23/2022    K 4.3 04/18/2022     05/23/2022    CO2 23 05/23/2022    BUN 13 05/23/2022    CREATININE 0.7 05/23/2022    GFRAA >60 05/23/2022    GFRAA >60 01/24/2012    AGRATIO 2.0 05/21/2022    LABGLOM >60 05/23/2022    LABGLOM 54 01/16/2014    GLUCOSE 129 05/23/2022    GLUCOSE 152 04/18/2012    PROT 6.6 05/21/2022    PROT 6.9 04/18/2012    LABALBU 4.4 05/21/2022    CALCIUM 8.5 05/23/2022    BILITOT 0.8 05/21/2022    ALKPHOS 69 05/21/2022    AST 42 05/21/2022    ALT 9 05/21/2022       HgBA1c:    Lab Results   Component Value Date    LABA1C 7.4 05/21/2022     This patient's last creatinine was   Recent Labs     05/23/22  0448   CREATININE 0.7        Recent Blood sugars have been   Lab Results   Component Value Date    POCGLU 87 05/23/2022    POCGLU 63 05/23/2022    POCGLU 93 05/23/2022    POCGLU 112 05/23/2022    POCGLU 87 05/23/2022    POCGLU 55 05/23/2022    POCGLU 111 05/22/2022    POCGLU 110 05/22/2022     Lab Results   Component Value Date    GLUCOSE 129 05/23/2022    GLUCOSE 121 05/22/2022    GLUCOSE 155 05/22/2022    GLUCOSE 276 05/21/2022    GLUCOSE 634 05/21/2022    GLUCOSE 198 04/18/2022    GLUCOSE 152 04/18/2012    GLUCOSE 161 02/22/2012    GLUCOSE 154 08/03/2011    GLUCOSE 199 06/01/2011            Assessment:     Patient Active Problem List   Diagnosis    Diabetic polyneuropathy ([address]    Mixed hyperlipidemia    Hashimoto's thyroiditis    Acquired hypothyroidism    Type 1 diabetes, uncontrolled, with neuropathy ([address]    Chest pain    CAD (coronary artery disease)    Rotator cuff tendonitis    Osteoporosis    Type 1 diabetes, uncontrolled, with retinopathy ([address]    Parkinson disease ([address]    Underweight    Labile hypertension    DKA, type 1, not at goal [facility])       Plan:     Plan of Care:   Diabetes Type 1.5 controlled  Lipids--Treated with: simvastatin  Renal- creatinine today 0.7, eGFR >60  ACE/ARB: no  DVT prophylaxis: on Lovenox  Current DM tx: basal & medium correction scale  Would recommend continuing with current insulin orders at this time.     Consult placed to dietitian for further education  Continue to monitor blood sugars to determine adequacy of current dosages  Patient would like prescriptions on discharge to be filled here. Message sent to Dr. Jennifer Dyson with recommendations to resume home insulin pump therapy at discharge. Discharge Plan:  Patient will continue to need insulin at home: suggest resuming home insulin pump therapy. Spouse instructed to get new pump from home and get it programmed prior to discharge so the patient can restart it as soon as discharged. Patient to f/u with PCP. Instructed to log blood sugars and take blood sugar log to her f/u appointment. Betty Rivas RN, BSN, [address] [place] [address].

## 2022-05-24 ENCOUNTER — TELEPHONE (OUTPATIENT)
Dept: ENDOCRINOLOGY | Age: 82
End: 2022-05-24

## 2022-05-24 LAB
ANION GAP SERPL CALCULATED.3IONS-SCNC: 11 MMOL/L (ref 3–16)
BUN BLDV-MCNC: 13 MG/DL (ref 7–20)
CALCIUM SERPL-MCNC: 9.2 MG/DL (ref 8.3–10.6)
CHLORIDE BLD-SCNC: 99 MMOL/L (ref 99–110)
CO2: 26 MMOL/L (ref 21–32)
CREAT SERPL-MCNC: 0.7 MG/DL (ref 0.6–1.2)
GFR AFRICAN AMERICAN: >60
GFR NON-AFRICAN AMERICAN: >60
GLUCOSE BLD-MCNC: 170 MG/DL (ref 70–99)
GLUCOSE BLD-MCNC: 182 MG/DL (ref 70–99)
GLUCOSE BLD-MCNC: 216 MG/DL (ref 70–99)
GLUCOSE BLD-MCNC: 248 MG/DL (ref 70–99)
GLUCOSE BLD-MCNC: 263 MG/DL (ref 70–99)
GLUCOSE BLD-MCNC: 265 MG/DL (ref 70–99)
GLUCOSE BLD-MCNC: 289 MG/DL (ref 70–99)
GLUCOSE BLD-MCNC: 340 MG/DL (ref 70–99)
GLUCOSE BLD-MCNC: 370 MG/DL (ref 70–99)
GLUCOSE BLD-MCNC: 390 MG/DL (ref 70–99)
PERFORMED ON: ABNORMAL
POTASSIUM SERPL-SCNC: 4.3 MMOL/L (ref 3.5–5.1)
SODIUM BLD-SCNC: 136 MMOL/L (ref 136–145)

## 2022-05-24 PROCEDURE — 36415 COLL VENOUS BLD VENIPUNCTURE: CPT

## 2022-05-24 PROCEDURE — 6370000000 HC RX 637 (ALT 250 FOR IP): Performed by: INTERNAL MEDICINE

## 2022-05-24 PROCEDURE — 6360000002 HC RX W HCPCS: Performed by: INTERNAL MEDICINE

## 2022-05-24 PROCEDURE — 1200000000 HC SEMI PRIVATE

## 2022-05-24 PROCEDURE — 80048 BASIC METABOLIC PNL TOTAL CA: CPT

## 2022-05-24 RX ORDER — INSULIN LISPRO 100 [IU]/ML
0-6 INJECTION, SOLUTION INTRAVENOUS; SUBCUTANEOUS NIGHTLY
Status: DISCONTINUED | OUTPATIENT
Start: 2022-05-24 | End: 2022-05-25 | Stop reason: HOSPADM

## 2022-05-24 RX ORDER — INSULIN GLARGINE 100 [IU]/ML
8 INJECTION, SOLUTION SUBCUTANEOUS DAILY
Status: DISCONTINUED | OUTPATIENT
Start: 2022-05-25 | End: 2022-05-25 | Stop reason: HOSPADM

## 2022-05-24 RX ORDER — INSULIN LISPRO 100 [IU]/ML
0-12 INJECTION, SOLUTION INTRAVENOUS; SUBCUTANEOUS
Status: DISCONTINUED | OUTPATIENT
Start: 2022-05-24 | End: 2022-05-25 | Stop reason: HOSPADM

## 2022-05-24 RX ADMIN — HEPARIN SODIUM 5000 UNITS: 5000 INJECTION INTRAVENOUS; SUBCUTANEOUS at 08:15

## 2022-05-24 RX ADMIN — INSULIN GLARGINE 5 UNITS: 100 INJECTION, SOLUTION SUBCUTANEOUS at 08:16

## 2022-05-24 RX ADMIN — ATORVASTATIN CALCIUM 10 MG: 10 TABLET, FILM COATED ORAL at 20:59

## 2022-05-24 RX ADMIN — INSULIN LISPRO 8 UNITS: 100 INJECTION, SOLUTION INTRAVENOUS; SUBCUTANEOUS at 12:40

## 2022-05-24 RX ADMIN — POLYETHYLENE GLYCOL 3350 17 G: 17 POWDER, FOR SOLUTION ORAL at 08:32

## 2022-05-24 RX ADMIN — CARBIDOPA AND LEVODOPA 1 TABLET: 25; 100 TABLET ORAL at 16:40

## 2022-05-24 RX ADMIN — INSULIN LISPRO 3 UNITS: 100 INJECTION, SOLUTION INTRAVENOUS; SUBCUTANEOUS at 08:17

## 2022-05-24 RX ADMIN — Medication 1 TABLET: at 08:15

## 2022-05-24 RX ADMIN — LEVOTHYROXINE SODIUM 100 MCG: 0.1 TABLET ORAL at 04:56

## 2022-05-24 RX ADMIN — CARBIDOPA AND LEVODOPA 1 TABLET: 25; 100 TABLET ORAL at 08:15

## 2022-05-24 RX ADMIN — PRAMIPEXOLE DIHYDROCHLORIDE 1 MG: 1 TABLET ORAL at 16:39

## 2022-05-24 RX ADMIN — PRAMIPEXOLE DIHYDROCHLORIDE 0.5 MG: 0.25 TABLET ORAL at 20:58

## 2022-05-24 RX ADMIN — CARBIDOPA AND LEVODOPA 1 TABLET: 50; 200 TABLET, EXTENDED RELEASE ORAL at 20:59

## 2022-05-24 RX ADMIN — Medication 2000 UNITS: at 08:14

## 2022-05-24 RX ADMIN — CARBIDOPA AND LEVODOPA 1 TABLET: 25; 100 TABLET ORAL at 20:59

## 2022-05-24 RX ADMIN — CARBIDOPA AND LEVODOPA 1 TABLET: 25; 100 TABLET ORAL at 12:39

## 2022-05-24 RX ADMIN — HEPARIN SODIUM 5000 UNITS: 5000 INJECTION INTRAVENOUS; SUBCUTANEOUS at 20:59

## 2022-05-24 NOTE — PROGRESS NOTES
Patient communicated with endocrinology for insuline pump settings. Physician office faxed a copy of with info.  [employer] device settings 03-, can be found under chart review-Media-03- under Basal 1(active) and Bolus

## 2022-05-24 NOTE — PROGRESS NOTES
Patient placed her own insuline pump, which will be in charge of administrated the insuline base on setting given by endocrinology. Patient and spouse set up insuline pump and will inform RN on how many units the insuline pump will administrae according to sugar level. Patient and spouse agree on staying one more night and possible talk one more time with diabetes educator.

## 2022-05-24 NOTE — TELEPHONE ENCOUNTER
Patient is currently admitted to [facility] for DKA. States her medtronic pump failed. She has been sent a new one and [employer] told her to reach out to Dr Sherice Rosario to get pump settings to enter into new pump. She would like to speak to Dr Sherice Rosario. Please call cell at [phone].

## 2022-05-24 NOTE — PROGRESS NOTES
Hospitalist Progress Note      PCP: Shiva Gasca MD    Date of Admission: 5/21/2022    Hospital Course:   \"  80 y.o. female with history of CAD, hyperlipidemia, type 1 diabetes on insulin pump with neuropathy, hypothyroidism, Parkinson's disease who presented with chest tightness and associated polydipsia with polyuria, generalized malaise and weakness blurred vision. She had left heart cath done 4 weeks ago with nonobstructive coronaries. She continues to endorse shortness of breath on minimal exertion with chest tightness today. Laboratory work-up was noted for elevated ketones with anion gap metabolic acidosis due to DKA. Chest x-ray with clear lung fields. Patient admitted for further evaluation. \"      Interval history:  Pt seen and examined today. Overnight events noted, interval ancillary notes and labs reviewed. Elevated blood glucose level this morning; insulin dose adjusted  denied cough, SOB, chest pain, nausea, vomiting or abdominal pain  Patient received insulin pump today but both patient and  does not know how to adjust settings on insulin pump and reported that they were told by their endocrinologist to call the company to help him figure the setting and call back once the insulin pump settings are done for further recommendation. Diabetes educator consulted. Plan to start patient back on insulin pump monitor overnight.         Medications:  Reviewed    Infusion Medications    dextrose      dextrose 5 % and 0.45 % NaCl Stopped (05/22/22 5569)     Scheduled Medications    insulin lispro  0-6 Units SubCUTAneous TID WC    insulin lispro  0-3 Units SubCUTAneous Nightly    insulin glargine  5 Units SubCUTAneous Daily    carbidopa-levodopa  1 tablet Oral Nightly    carbidopa-levodopa  1 tablet Oral 4x Daily    Vitamin D  2,000 Units Oral Daily    levothyroxine  100 mcg Oral Daily    therapeutic multivitamin-minerals  1 tablet Oral Daily    pramipexole  0.5 mg Oral Nightly BUN 31*   < > 27*   < > 24* 13 13   CREATININE 1.0   < > 0.8   < > 0.7 0.7 0.7   CALCIUM 9.0   < > 8.1*   < > 8.5 8.5 9.2   PHOS 2.5  --  2.5  --  2.7  --   --     < > = values in this interval not displayed. Recent Labs     05/21/22 1828   AST 42*   ALT 9*   BILITOT 0.8   ALKPHOS 69     No results for input(s): INR in the last 72 hours. Recent Labs     05/21/22 1828   TROPONINI <0.01       Urinalysis:      Lab Results   Component Value Date    NITRU Negative 05/22/2022    WBCUA 0 05/21/2022    BACTERIA None Seen 05/21/2022    RBCUA 0 05/21/2022    BLOODU Negative 05/22/2022    SPECGRAV 1.010 05/22/2022    GLUCOSEU Negative 05/22/2022    GLUCOSEU NEGATIVE 01/23/2012       Radiology:  XR ABDOMEN (KUB) (SINGLE AP VIEW)   Final Result   Normal, nonobstructive bowel gas pattern. XR CHEST PORTABLE   Final Result   No acute cardiopulmonary disease. XR CHEST PORTABLE   Final Result   No acute process. Assessment/Plan:    Active Hospital Problems    Diagnosis     CAD (coronary artery disease) [I25.10]      Priority: High    DKA, type 1, not at goal [facility]) [E10.10]      Priority: Medium    Mixed hyperlipidemia [E78.2]      Priority: Medium    Parkinson disease ([facility] [address] [G20]     Acquired hypothyroidism [E03.9]     Diabetic polyneuropathy ([facility] [address] [E11.42]      DKA: Resolved  No evidence of infection. Chest x-ray nonacute. UA negative. No growth on blood cultures. -A1c 7.4. Anion gap is closed; currently on Lantus and SSI. Elevated BG readings; insulin adjusted  IV fluids discontinued  Monitor and replace electrolytes    Chest tightness  -Troponin negative. No acute changes on EKG. -Patient had recent cardiac catheterization showing mild nonobstructive disease 4/18/22.  -Pain control    History of hypothyroidism  -Continue thyroid medications      DVT Prophylaxis: Heparin  Diet: ADULT DIET;  Regular; 4 carb choices (60 gm/meal)  ADULT ORAL NUTRITION SUPPLEMENT; Lunch, Dinner; Diabetic Oral Supplement  Code Status: Full Code    PT/OT Eval Status: As needed    Dispo -discharge home tomorrow if medically stable    Racquel Andujar MD

## 2022-05-24 NOTE — PLAN OF CARE
Problem: Discharge Planning  Goal: Discharge to home or other facility with appropriate resources  Outcome: Progressing  Flowsheets (Taken 5/24/2022 0809)  Discharge to home or other facility with appropriate resources: Identify barriers to discharge with patient and caregiver     Problem: Safety - Adult  Goal: Free from fall injury  Outcome: Progressing     Problem: Nutrition Deficit:  Goal: Optimize nutritional status  Outcome: Progressing     Problem: ABCDS Injury Assessment  Goal: Absence of physical injury  Outcome: Progressing     Problem: Chronic Conditions and Co-morbidities  Goal: Patient's chronic conditions and co-morbidity symptoms are monitored and maintained or improved  Outcome: Progressing

## 2022-05-24 NOTE — TELEPHONE ENCOUNTER
Patient stated that she is waiting to be discharged until the pump settings are confirmed. Can speak to the doctor or Hilda Barrientos.

## 2022-05-25 VITALS
WEIGHT: 97.56 LBS | OXYGEN SATURATION: 98 % | DIASTOLIC BLOOD PRESSURE: 74 MMHG | TEMPERATURE: 98.6 F | SYSTOLIC BLOOD PRESSURE: 136 MMHG | HEART RATE: 93 BPM | RESPIRATION RATE: 16 BRPM | BODY MASS INDEX: 17.95 KG/M2 | HEIGHT: 62 IN

## 2022-05-25 LAB
ANION GAP SERPL CALCULATED.3IONS-SCNC: 10 MMOL/L (ref 3–16)
BUN BLDV-MCNC: 30 MG/DL (ref 7–20)
CALCIUM SERPL-MCNC: 9.5 MG/DL (ref 8.3–10.6)
CHLORIDE BLD-SCNC: 98 MMOL/L (ref 99–110)
CO2: 29 MMOL/L (ref 21–32)
CREAT SERPL-MCNC: 0.9 MG/DL (ref 0.6–1.2)
GFR AFRICAN AMERICAN: >60
GFR NON-AFRICAN AMERICAN: 60
GLUCOSE BLD-MCNC: 108 MG/DL (ref 70–99)
GLUCOSE BLD-MCNC: 131 MG/DL (ref 70–99)
GLUCOSE BLD-MCNC: 151 MG/DL (ref 70–99)
PERFORMED ON: ABNORMAL
PERFORMED ON: ABNORMAL
POTASSIUM SERPL-SCNC: 4.2 MMOL/L (ref 3.5–5.1)
SODIUM BLD-SCNC: 137 MMOL/L (ref 136–145)

## 2022-05-25 PROCEDURE — 6370000000 HC RX 637 (ALT 250 FOR IP): Performed by: INTERNAL MEDICINE

## 2022-05-25 PROCEDURE — 36415 COLL VENOUS BLD VENIPUNCTURE: CPT

## 2022-05-25 PROCEDURE — 80048 BASIC METABOLIC PNL TOTAL CA: CPT

## 2022-05-25 RX ADMIN — LEVOTHYROXINE SODIUM 100 MCG: 0.1 TABLET ORAL at 06:03

## 2022-05-25 RX ADMIN — Medication 1 TABLET: at 08:17

## 2022-05-25 RX ADMIN — CARBIDOPA AND LEVODOPA 1 TABLET: 25; 100 TABLET ORAL at 08:18

## 2022-05-25 RX ADMIN — Medication 2000 UNITS: at 08:17

## 2022-05-25 NOTE — PLAN OF CARE
Problem: Safety - Adult  Goal: Free from fall injury  5/24/2022 2049 by Cherelle Brown RN  Outcome: Progressing  5/24/2022 1207 by Paulette Pena RN  Outcome: Progressing     Problem: ABCDS Injury Assessment  Goal: Absence of physical injury  5/24/2022 2049 by Cherelle Brown RN  Outcome: Progressing  5/24/2022 1207 by Paulette Pena RN  Outcome: Progressing

## 2022-05-25 NOTE — PROGRESS NOTES
[facility] Diabetes Education Nurse  Consult Note     Followed up with pt and spouse for DM education. BG stable overnight on insulin pump. Discussed meals and snacks. Pt states she didn't bolus for bedtime snack last night; glucose went from 151 to 108 in the morning. Discussed hypoglycemia management including glucose tablets, hard candy, and honey. Pt questioned need for glucagon kit as it is expensive. Advised that if kit is not available at home, spouse can call EMS for emergency assistance. Pt offers no other concerns at this time. Advised to call endocrinology office if non-emergency needs arise after discharge.     Teresa Rene MSN, RN, Latrell Asif  Certified Diabetes Care and

## 2022-05-25 NOTE — DISCHARGE SUMMARY
Hospital Medicine Discharge Summary    Patient ID: Nikos Matias      Patient's PCP: Valerie Nicholson MD    Admit Date: 5/21/2022     Discharge Date: 5/25/2022     Admitting Physician: Willian Valladares MD     Discharge Physician: Ravi Galeas MD        Active Hospital Problems    Diagnosis     CAD (coronary artery disease) [I25.10]      Priority: High    DKA, type 1, not at goal [facility]) [E10.10]      Priority: Medium    Mixed hyperlipidemia [E78.2]      Priority: Medium    Parkinson disease ([facility] [address] [G20]     Acquired hypothyroidism [E03.9]     Diabetic polyneuropathy ([facility] [address] [E11.42]          Hospital Course: This 55-year-old female with PMHx of CAD, hyperlipidemia, hypothyroidism, Parkinson disease and type 1 diabetes mellitus (on insulin pump) complicated with neuropathy presented with chest tightness, polydipsia, polyuria and generalized malaise and weakness. On admission, patient was found to be in DKA; started on insulin drip and transferred to ICU for further management. DKA: Likely secondary to insulin pump failure. Resolved  No evidence of infection. Chest x-ray nonacute. UA negative. No growth on blood cultures. Patient was initially started insulin drip per DKA protocol and later transitioned to insulin pump. Patient discharged home in stable condition and instructed to follow-up with her endocrinologist.     Chest tightness: Resolved  -Troponin negative. No acute changes on EKG. Patient had recent cardiac catheterization showing mild nonobstructive disease 4/18/22.     History of hypothyroidism: Continue Synthroid      Physical Exam Performed:     /74   Pulse 93   Temp 98.6 °F (37 °C) (Temporal)   Resp 16   Ht 5' 2\" (1.575 m)   Wt 97 lb 9 oz (44.3 kg)   SpO2 98%   BMI 17.84 kg/m²     General appearance:  No apparent distress, appears stated age and cooperative. Eyes: Sclera clear. Pupils equal.  ENT: Moist oral mucosa.  Trachea midline, no adenopathy. Cardiovascular: Regular rhythm, normal S1, S2. No murmur. No edema in lower extremities  Respiratory:Not usingaccessory muscles. Good inspiratory effort. Clear to auscultation bilaterally, no wheeze or crackles. GI: Abdomen soft, no tenderness, not distended, normal bowel sounds  Musculoskeletal: Normal ROM, No cyanosis in digits. Neurology: CN 2-12 grossly intact. No speech or motor deficits  Psych: Normal affect. Alert and oriented in time, place and person  Skin: Warm, dry, normal turgor      Labs: For convenience and continuity at follow-up the following most recent labs are provided:      CBC:    Lab Results   Component Value Date    WBC 10.8 05/22/2022    HGB 10.8 05/22/2022    HCT 33.7 05/22/2022     05/22/2022       Renal:    Lab Results   Component Value Date     05/25/2022    K 4.2 05/25/2022    K 4.3 04/18/2022    CL 98 05/25/2022    CO2 29 05/25/2022    BUN 30 05/25/2022    CREATININE 0.9 05/25/2022    CALCIUM 9.5 05/25/2022    PHOS 2.7 05/22/2022         Significant Diagnostic Studies    Radiology:   XR ABDOMEN (KUB) (SINGLE AP VIEW)   Final Result   Normal, nonobstructive bowel gas pattern. XR CHEST PORTABLE   Final Result   No acute cardiopulmonary disease. XR CHEST PORTABLE   Final Result   No acute process. Consults:     IP CONSULT TO DIABETES EDUCATOR  IP CONSULT TO DIABETES EDUCATOR  IP CONSULT TO DIABETES EDUCATOR    Disposition: Home    Condition at Discharge: Stable     Discharge Instructions/Follow-up:   Follow up with your PCP within 3-4 days of discharge. Follow up with Endocrinologist after discharge  Take all your medications as prescribed.       Discharge Medications:     Discharge Medication List as of 5/25/2022 10:43 AM           Details   denosumab (PROLIA) 60 MG/ML SOSY SC injection Inject 60 mg into the skin every 6 monthsHistorical Med      insulin aspart (NOVOLOG) 100 UNIT/ML injection vial INJECT TOTAL DAILY DOSE 30 UNITS SUBCUTANEOUSLY WITH INSULIN PUMP, Disp-30 mL, R-3Insulin needs to be billed under MEDICARE PART B. PT uses a Insulin Pump. DX CODE:E10.40Normal      pramipexole (MIRAPEX) 0.5 MG tablet Take 1 tablet by mouth 2 times daily 2 tabs with dinner, 1 tab at HS, Disp-90 tablet, R-3Normal      levothyroxine (SYNTHROID) 100 MCG tablet TAKE 1 TABLET DAILY, Disp-90 tablet, R-3Normal      carbidopa-levodopa (SINEMET)  MG per tablet Take 1 tablet by mouth 4 times daily, Disp-120 tablet, R-5Normal      Continuous Blood Gluc Transmit (DEXCOM G6 TRANSMITTER) MISC Change transmitter every 3 months, Disp-4 each, R-0Normal      carbidopa-levodopa (SINEMET CR)  MG per extended release tablet Take 1 tablet by mouth nightly Historical Med      aspirin 81 MG EC tablet Take 81 mg by mouth at bedtime Historical Med      simvastatin (ZOCOR) 20 MG tablet Take 1 tablet by mouth nightly, Disp-90 tablet, R-3Needs OV for further refills. Normal      Multiple Vitamins-Minerals (CENTRUM SILVER) TABS Take 1 tablet by mouth daily Historical Med      Cholecalciferol (VITAMIN D3) 2000 UNITS CAPS Take 1 capsule by mouth dailyHistorical Med      Insulin Infusion Pump (PARADIGM INSULIN PUMP) by Does not apply route. Historical Med               The patient was seen and examined on day of discharge and this discharge summary is in conjunction with any daily progress note from day of discharge. Time Spent on discharge is 45 minutes  in the examination, evaluation, counseling and review of medications and discharge plan. Note that greater  than 30 minutes was spent in preparing discharge papers, discussing discharge with patient, medication review, etc.     Signed:    Bebo Valdez MD   5/25/2022      Thank you Kadi Monk MD for the opportunity to be involved in this patient's care. If you have any questions or concerns please feel free to contact me at [phone].

## 2022-05-26 ENCOUNTER — CARE COORDINATION (OUTPATIENT)
Dept: CASE MANAGEMENT | Age: 82
End: 2022-05-26

## 2022-05-26 ENCOUNTER — TELEPHONE (OUTPATIENT)
Dept: ENDOCRINOLOGY | Age: 82
End: 2022-05-26

## 2022-05-26 LAB
BLOOD CULTURE, ROUTINE: NORMAL
CULTURE, BLOOD 2: NORMAL

## 2022-05-26 NOTE — TELEPHONE ENCOUNTER
Spoke w/ pt. Pt was in hospital for 4 days for DKA. Pt states she was informed to let us know if we would like to see her sooner. Put her in last min cancellation on June 2nd. Pt uses [employer]/dexcom. Since pts insurance is picky on labwork. Pt is not getting it done. Will do before July.  Pt had tons of labwork in Epic from hospital stay including an A1C of 7.4% on 5/21/22    FYI

## 2022-05-26 NOTE — CARE COORDINATION
[facility] Transitions Initial Follow Up Call    Call within 2 business days of discharge: Yes    Patient: Dayton Crews Patient : 1940   MRN: [id number]  Reason for Admission: DKA  Discharge Date: 22 RARS: Readmission Risk Score: 12.7 ( )    First attempt at 24 hour discharge call, no answer x 2 contact numbers, CTN left VM with contact information and request for return call. CTN will continue with outreach call attempts.         Follow Up  Future Appointments   Date Time Provider Aria Newtoni   2022  4:45 PM MD SOPHIE GrantALE [facility] [place] - [place]   2022  8:30 AM Mason Collazo MD [address] [place] [address] - DYD   2022  9:30 AM Angi Nguyen MD Deerf Endo [facility]   2022 10:30 AM VON Blas - CNP FF Cardio [facility]       Waleska Capellan RN

## 2022-05-27 ENCOUNTER — CARE COORDINATION (OUTPATIENT)
Dept: CASE MANAGEMENT | Age: 82
End: 2022-05-27

## 2022-05-27 DIAGNOSIS — E10.10 DKA, TYPE 1, NOT AT GOAL (HCC): Primary | ICD-10-CM

## 2022-05-27 PROCEDURE — 1111F DSCHRG MED/CURRENT MED MERGE: CPT | Performed by: FAMILY MEDICINE

## 2022-05-27 NOTE — CARE COORDINATION
[facility] Transitions Initial Follow Up Call    Call within 2 business days of discharge: Yes    Patient: Morteza Zheng Patient : 1940   MRN: [id number]  Reason for Admission:   Discharge Date: 22 RARS: Readmission Risk Score: 12.7 ( )      Last Discharge [facility]       Complaint Diagnosis Description Type Department Provider    22 Chest Pain Diabetic ketoacidosis without coma associated with type 2 diabetes mellitus [facility]) ED to Hosp-Admission (Discharged) (ADMITTED) FARHANA 5C Damion Sheth MD; [facility]. .. Spoke with: Morteza Zheng        Non-face-to-face services provided:  Obtained and reviewed discharge summary and/or continuity of care documents  [id number] completed     Pt states doing well, no issues or concerns. Her BSs are better but still high, 247 this morning. She states she is really working on her meals and snacks. She requested the diabetes educator number, number was given. F/U appts listed below. Agreed to more CTC f/u calls. CTN provided contact information. Plan for follow-up call in 5-7 days based on severity of symptoms and risk factors. Plan for next call: self management-Diabetes, f/u appts      Transitions of Care Initial Call    Was this an external facility discharge? No Discharge Facility:     Challenges to be reviewed by the provider   Additional needs identified to be addressed with provider: No  none             Method of communication with provider : none    Advance Care Planning:   Does patient have an Advance Directive: not on file; education provided. Care Transition Nurse contacted the patient by telephone to perform post hospital discharge assessment. Verified name and  with patient as identifiers. Provided introduction to self, and explanation of the CTN role. CTN reviewed discharge instructions, medical action plan and red flags with patient who verbalized understanding.  Patient given an opportunity to ask questions and does not have any further questions or concerns at this time. Were discharge instructions available to patient? Yes. Reviewed appropriate site of care based on symptoms and resources available to patient including: When to call 911. The patient agrees to contact the PCP office for questions related to their healthcare. Medication reconciliation was performed with patient, who verbalizes understanding of administration of home medications. Advised obtaining a 90-day supply of all daily and as-needed medications. Was patient discharged with a pulse oximeter?  no             Care Transitions 24 Hour Call    Do you have a copy of your discharge instructions?: Yes  Do you have all of your prescriptions and are they filled?: Yes  Have you been contacted by a Maya Webber?: Yes  Have you scheduled your follow up appointment?: Yes  How are you going to get to your appointment?: Car - family or friend to transport  Do you have support at home?: Partner/Spouse/SO  Do you feel like you have everything you need to keep you well at home?: Yes  Are you an active caregiver in your home?: No  Care Transitions Interventions  No Identified Needs         Follow Up  Future Appointments   Date Time Provider Aria Coley   6/1/2022  4:45 PM Britton Cisneros MD [facility]   6/2/2022  9:30 AM MD [facility]ine [facility]   6/13/2022  8:30 AM MD JESICA Snell [place] - [place]   7/11/2022  9:30 AM MD Josephine [facility]   9/14/2022 10:30 AM Galo Rich APRN - CNP FF Cardio [facility]       Zeus Soria RN

## 2022-06-01 ENCOUNTER — OFFICE VISIT (OUTPATIENT)
Dept: FAMILY MEDICINE CLINIC | Age: 82
End: 2022-06-01
Payer: MEDICARE

## 2022-06-01 VITALS
BODY MASS INDEX: 16.75 KG/M2 | SYSTOLIC BLOOD PRESSURE: 135 MMHG | HEART RATE: 64 BPM | DIASTOLIC BLOOD PRESSURE: 68 MMHG | HEIGHT: 62 IN | WEIGHT: 91 LBS | OXYGEN SATURATION: 98 %

## 2022-06-01 DIAGNOSIS — E10.10 DKA, TYPE 1, NOT AT GOAL (HCC): ICD-10-CM

## 2022-06-01 DIAGNOSIS — M81.0 OSTEOPOROSIS, UNSPECIFIED OSTEOPOROSIS TYPE, UNSPECIFIED PATHOLOGICAL FRACTURE PRESENCE: ICD-10-CM

## 2022-06-01 DIAGNOSIS — R09.89 LABILE HYPERTENSION: ICD-10-CM

## 2022-06-01 DIAGNOSIS — Z09 HOSPITAL DISCHARGE FOLLOW-UP: Primary | ICD-10-CM

## 2022-06-01 DIAGNOSIS — I25.10 CORONARY ARTERY DISEASE INVOLVING NATIVE CORONARY ARTERY OF NATIVE HEART WITHOUT ANGINA PECTORIS: ICD-10-CM

## 2022-06-01 DIAGNOSIS — G20 PARKINSON DISEASE (HCC): ICD-10-CM

## 2022-06-01 DIAGNOSIS — E03.9 ACQUIRED HYPOTHYROIDISM: ICD-10-CM

## 2022-06-01 PROCEDURE — 1090F PRES/ABSN URINE INCON ASSESS: CPT | Performed by: FAMILY MEDICINE

## 2022-06-01 PROCEDURE — G8427 DOCREV CUR MEDS BY ELIG CLIN: HCPCS | Performed by: FAMILY MEDICINE

## 2022-06-01 PROCEDURE — 99212 OFFICE O/P EST SF 10 MIN: CPT | Performed by: FAMILY MEDICINE

## 2022-06-01 PROCEDURE — G8418 CALC BMI BLW LOW PARAM F/U: HCPCS | Performed by: FAMILY MEDICINE

## 2022-06-01 PROCEDURE — 1123F ACP DISCUSS/DSCN MKR DOCD: CPT | Performed by: FAMILY MEDICINE

## 2022-06-01 PROCEDURE — 1111F DSCHRG MED/CURRENT MED MERGE: CPT | Performed by: FAMILY MEDICINE

## 2022-06-01 PROCEDURE — 3051F HG A1C>EQUAL 7.0%<8.0%: CPT | Performed by: FAMILY MEDICINE

## 2022-06-01 PROCEDURE — G8399 PT W/DXA RESULTS DOCUMENT: HCPCS | Performed by: FAMILY MEDICINE

## 2022-06-01 PROCEDURE — 1036F TOBACCO NON-USER: CPT | Performed by: FAMILY MEDICINE

## 2022-06-01 ASSESSMENT — PATIENT HEALTH QUESTIONNAIRE - PHQ9
SUM OF ALL RESPONSES TO PHQ QUESTIONS 1-9: 0
SUM OF ALL RESPONSES TO PHQ QUESTIONS 1-9: 0
2. FEELING DOWN, DEPRESSED OR HOPELESS: 0
1. LITTLE INTEREST OR PLEASURE IN DOING THINGS: 0
SUM OF ALL RESPONSES TO PHQ9 QUESTIONS 1 & 2: 0
SUM OF ALL RESPONSES TO PHQ QUESTIONS 1-9: 0
SUM OF ALL RESPONSES TO PHQ QUESTIONS 1-9: 0

## 2022-06-01 NOTE — PROGRESS NOTES
Post-Discharge Transitional Care Follow Up      Ana Downs   YOB: 1940    Date of Office Visit:  6/1/2022  Date of Hospital Admission: 5/21/22  Date of Hospital Discharge: 5/25/22  Readmission Risk Score (high >=14%. Medium >=10%):Readmission Risk Score: 12.7 ( )      Care management risk score Rising risk (score 2-5) and Complex Care (Scores >=6): 2     Non face to face  following discharge, date last encounter closed (first attempt may have been earlier): 5/27/2022 10:02 AM     Call initiated 2 business days of discharge: Yes     Hospital discharge follow-up  -     [facility] DISCHARGE MEDS RECONCILED W/ CURRENT OUTPATIENT MED LIST      Medical Decision Making: moderate complexity  No follow-ups on file. On this date 6/1/2022 I have spent 25 minutes reviewing previous notes, test results and face to face with the patient discussing the diagnosis and importance of compliance with the treatment plan as well as documenting on the day of the visit. Subjective:   HPI    Inpatient course: Discharge summary reviewed- see chart. Reviewed 521 through 525 in DKA due to insulin pump failure and had some chest pain as well. Cath back on 4/18/2022 showed mild nonobstructive coronary artery disease  Interval history/Current status: Since she has been monitoring her blood sugars and blood pressures closely.   Her blood pressure has dropped as low as 84 systolic and is really over 120  Sugars are up and down      Patient Active Problem List   Diagnosis    Diabetic polyneuropathy ([address]    Mixed hyperlipidemia    Hashimoto's thyroiditis    Acquired hypothyroidism    Type 1 diabetes, uncontrolled, with neuropathy ([address]    Chest pain    CAD (coronary artery disease)    Rotator cuff tendonitis    Osteoporosis    Type 1 diabetes, uncontrolled, with retinopathy ([address]    Parkinson disease ([address]    Underweight    Labile hypertension    DKA, type 1, not at goal [facility])       Medications listed as ordered at the time of discharge from hospital     Medication List          Accurate as of June 1, 2022  5:03 PM. If you have any questions, ask your nurse or doctor. CHANGE how you take these medications    pramipexole 0.5 MG tablet  Commonly known as: MIRAPEX  Take 1 tablet by mouth 2 times daily 2 tabs with dinner, 1 tab at HS  What changed: when to take this        CONTINUE taking these medications    aspirin 81 MG EC tablet     * carbidopa-levodopa  MG per extended release tablet  Commonly known as: SINEMET CR     * carbidopa-levodopa  MG per tablet  Commonly known as: SINEMET  Take 1 tablet by mouth 4 times daily     Centrum Silver Tabs     Dexcom G6 Transmitter Misc  Change transmitter every 3 months     insulin aspart 100 UNIT/ML injection vial  Commonly known as: NovoLOG  INJECT TOTAL DAILY DOSE 30 UNITS SUBCUTANEOUSLY WITH INSULIN PUMP     levothyroxine 100 MCG tablet  Commonly known as: SYNTHROID  TAKE 1 TABLET DAILY     PARADIGM INSULIN PUMP     Prolia 60 MG/ML Sosy SC injection  Generic drug: denosumab     simvastatin 20 MG tablet  Commonly known as: ZOCOR  Take 1 tablet by mouth nightly     Vitamin D3 50 MCG (2000 UT) Caps         * This list has 2 medication(s) that are the same as other medications prescribed for you. Read the directions carefully, and ask your doctor or other care provider to review them with you.                  Medications marked \"taking\" at this time  Outpatient Medications Marked as Taking for the 6/1/22 encounter (Office Visit) with Mason Collazo MD   Medication Sig Dispense Refill    denosumab (PROLIA) 60 MG/ML SOSY SC injection Inject 60 mg into the skin every 6 months      insulin aspart (NOVOLOG) 100 UNIT/ML injection vial INJECT TOTAL DAILY DOSE 30 UNITS SUBCUTANEOUSLY WITH INSULIN PUMP 30 mL 3    pramipexole (MIRAPEX) 0.5 MG tablet Take 1 tablet by mouth 2 times daily 2 tabs with dinner, 1 tab at HS (Patient taking differently: Take 0.5 mg by mouth See Admin Instructions 2 tabs with dinner, 1 tab at HS) 90 tablet 3    levothyroxine (SYNTHROID) 100 MCG tablet TAKE 1 TABLET DAILY 90 tablet 3    carbidopa-levodopa (SINEMET)  MG per tablet Take 1 tablet by mouth 4 times daily 120 tablet 5    Continuous Blood Gluc Transmit (DEXCOM G6 TRANSMITTER) MISC Change transmitter every 3 months 4 each 0    carbidopa-levodopa (SINEMET CR)  MG per extended release tablet Take 1 tablet by mouth nightly       simvastatin (ZOCOR) 20 MG tablet Take 1 tablet by mouth nightly 90 tablet 3    Multiple Vitamins-Minerals (CENTRUM SILVER) TABS Take 1 tablet by mouth daily       Cholecalciferol (VITAMIN D3) 2000 UNITS CAPS Take 1 capsule by mouth daily      Insulin Infusion Pump (PARADIGM INSULIN PUMP) by Does not apply route. Medications patient taking as of now reconciled against medications ordered at time of hospital discharge: Yes    Review of Systems    Objective:    BP (!) 154/75   Pulse 64   Ht 5' 2\" (1.575 m)   Wt 91 lb (41.3 kg)   SpO2 98%   BMI 16.64 kg/m²   Physical Exam  General: Weight down 6 pounds  Neck: Supple full range of motion no bruits no thyromegaly  Heart: Regular rate and rhythm without a murmur gallop or rub  Lungs clear to auscultation with no rhonchi rales or wheezes  Extremities: No cyanosis clubbing or edema  Encounter Diagnoses   Name Primary?      Yes                                   Immunization History   Administered Date(s) Administered    COVID-19, Pfizer Purple top, DILUTE for use, 12+ yrs, 30mcg/0.3mL dose 01/28/2021, 02/19/2021, 09/28/2021    Influenza A ([id number]) Vaccine PF IM 01/22/2010    Influenza Vaccine, unspecified formulation 10/24/2016    Influenza Virus Vaccine 09/17/2013    Influenza Whole 09/17/2013    Influenza, High Dose (Fluzone 65 yrs and older) 11/25/2014, 10/30/2017, 09/19/2019, 09/08/2021    Influenza, Quadv, adjuvanted, 65 yrs +, IM, PF (Fluad) 10/05/2020    Pneumococcal Conjugate 13-valent [place]) 10/24/2016    Pneumococcal Conjugate 7-valent (Prevnar7) 09/19/2019    Pneumococcal Polysaccharide ([id number]) 11/12/2008, 09/19/2019    Td, unspecified formulation 06/04/1998, 04/23/2012    Tdap (Boostrix, Adacel) 06/26/2017    Zoster Live (Zostavax) 04/17/2009, 01/17/2013    Zoster Recombinant (Shingrix) 09/19/2019, 01/14/2020     Notes, labs,tests, discharge summary, imaging, procedures from recent hospitalization reviewed     Encounter Diagnoses   Name Primary? [facility] discharge follow-up Yes    DKA, type 1, not at goal [facility])     Parkinson disease ([facility] [address]     Acquired hypothyroidism     Coronary artery disease involving native coronary artery of native heart without angina pectoris     Labile hypertension     Osteoporosis, unspecified osteoporosis type, unspecified pathological fracture presence      An electronic signature was used to authenticate this note.   --Wanda Wilkinson MD    Suggest she drink more fluids  Follow-up with Dr. Alessio Ybarra in 2 days to review her diet and blood sugar readings  Suggest she contact her neurologist to see if the Mirapex or Sinemet might be lowering her blood pressure and if medication adjustments could be made  COVID booster at the pharmacy when available

## 2022-06-02 ENCOUNTER — OFFICE VISIT (OUTPATIENT)
Dept: ENDOCRINOLOGY | Age: 82
End: 2022-06-02
Payer: MEDICARE

## 2022-06-02 VITALS
DIASTOLIC BLOOD PRESSURE: 80 MMHG | BODY MASS INDEX: 16.93 KG/M2 | HEIGHT: 62 IN | SYSTOLIC BLOOD PRESSURE: 126 MMHG | RESPIRATION RATE: 14 BRPM | TEMPERATURE: 98 F | WEIGHT: 92 LBS | HEART RATE: 80 BPM | OXYGEN SATURATION: 99 %

## 2022-06-02 DIAGNOSIS — R63.6 UNDERWEIGHT: ICD-10-CM

## 2022-06-02 DIAGNOSIS — I25.10 CORONARY ARTERY DISEASE INVOLVING NATIVE CORONARY ARTERY OF NATIVE HEART WITHOUT ANGINA PECTORIS: ICD-10-CM

## 2022-06-02 DIAGNOSIS — E78.2 MIXED HYPERLIPIDEMIA: ICD-10-CM

## 2022-06-02 DIAGNOSIS — E03.9 ACQUIRED HYPOTHYROIDISM: ICD-10-CM

## 2022-06-02 DIAGNOSIS — M81.0 OSTEOPOROSIS, UNSPECIFIED OSTEOPOROSIS TYPE, UNSPECIFIED PATHOLOGICAL FRACTURE PRESENCE: ICD-10-CM

## 2022-06-02 DIAGNOSIS — E06.3 HASHIMOTO'S THYROIDITIS: ICD-10-CM

## 2022-06-02 PROCEDURE — G8427 DOCREV CUR MEDS BY ELIG CLIN: HCPCS | Performed by: INTERNAL MEDICINE

## 2022-06-02 PROCEDURE — 1090F PRES/ABSN URINE INCON ASSESS: CPT | Performed by: INTERNAL MEDICINE

## 2022-06-02 PROCEDURE — 3051F HG A1C>EQUAL 7.0%<8.0%: CPT | Performed by: INTERNAL MEDICINE

## 2022-06-02 PROCEDURE — 1123F ACP DISCUSS/DSCN MKR DOCD: CPT | Performed by: INTERNAL MEDICINE

## 2022-06-02 PROCEDURE — G8418 CALC BMI BLW LOW PARAM F/U: HCPCS | Performed by: INTERNAL MEDICINE

## 2022-06-02 PROCEDURE — 1111F DSCHRG MED/CURRENT MED MERGE: CPT | Performed by: INTERNAL MEDICINE

## 2022-06-02 PROCEDURE — 1036F TOBACCO NON-USER: CPT | Performed by: INTERNAL MEDICINE

## 2022-06-02 PROCEDURE — G8399 PT W/DXA RESULTS DOCUMENT: HCPCS | Performed by: INTERNAL MEDICINE

## 2022-06-02 PROCEDURE — 99215 OFFICE O/P EST HI 40 MIN: CPT | Performed by: INTERNAL MEDICINE

## 2022-06-02 RX ORDER — CHLORPHENIR/PHENYLEPH/ASPIRIN 2-7.8-325
TABLET, EFFERVESCENT ORAL
Qty: 100 STRIP | Refills: 3 | Status: SHIPPED | OUTPATIENT
Start: 2022-06-02

## 2022-06-02 NOTE — PROGRESS NOTES
Stephane Parra is a 80 y.o. female who presents for Type 1 diabetes mellitus. Current symptoms/problems include fluctuating BG levels and show no change. 1.  Type 1 diabetes, uncontrolled, with neuropathy (HCC) [E10.40, E10.65]    Diagnosed with Type 1 diabetes mellitus in 1999. Low BG at night. Comorbid conditions: hyperlipidemia, Neuropathy, Retinopathy, Chronic Kidney Disease and Coronary Artery Disease    Current diabetic medications include: Novolog    On Medtronic Minimed insulin pump and Dexcom CGM    Intolerance to diabetes medications: No     Weight trend: stable  Prior visit with dietician: yes  Current diet: on average, 3 meals per day  Current exercise: walks     Current monitoring regimen: home blood tests - 8 times daily  Has brought blood glucose log/meter:  Yes  Home blood sugar records: fasting range: 100-150 and postprandial range:   Any episodes of hypoglycemia? Yes  Hypoglycemia frequency and time(s):  daily  Does patient have Glucagon emergency kit? No  Does patient have rapid acting carbohydrate? Yes  Does patient wear a medic alert bracelet or necklace? No    2. Osteoporosis, unspecified osteoporosis type, unspecified pathological fracture presence  Reclast 5 years. Not on any meds now. On vitamin D 2000 IU, calcium in food    3. Hashimoto's thyroiditis  Has fatigue. 4. Acquired hypothyroidism  On levothyroxine 0.1 mg qd. Has fatigue. 5. Coronary artery disease involving native coronary artery of native heart without angina pectoris  No chest pain. 6. Type 1 diabetes, uncontrolled, with retinopathy ([address]  No recent vision changes. 7.  Underweight  Lost 42 lbs not intentionally. No nausea, vomiting.     8.  Hyperlipidemia  No muscle pain    EXAMINATION:   BONE DENSITOMETRY       3/7/2022 10:07 am       TECHNIQUE:   A bone density DEXA scan was performed of the lumbar spine and bilateral hips   on a Marco Vasco system.       COMPARISON:   August 28, 2019 [facility]     Renal insufficiency     Restless legs syndrome     Type I (juvenile type) diabetes mellitus without mention of complication, not stated as uncontrolled       Patient Active Problem List   Diagnosis    Diabetic polyneuropathy ([facility]    Mixed hyperlipidemia    Hashimoto's thyroiditis    Acquired hypothyroidism    Type 1 diabetes, uncontrolled, with neuropathy ([facility]    Chest pain    CAD (coronary artery disease)    Rotator cuff tendonitis    Osteoporosis    Type 1 diabetes, uncontrolled, with retinopathy ([facility]    Parkinson disease ([facility]    Underweight    Labile hypertension    DKA, type 1, not at goal [facility])     Past Surgical History:   Procedure Laterality Date    CARDIAC CATHETERIZATION  2012    non obstructive CAD    COLONOSCOPY      at age 71    DENTAL SURGERY      EYE SURGERY Bilateral     with lens implants    SKIN BIOPSY Right     neg skin biopsy on right arm    TONSILLECTOMY       Social History     Socioeconomic History    Marital status:      Spouse name: Not on file    Number of children: Not on file    Years of education: Not on file    Highest education level: Not on file   Occupational History    Not on file   Tobacco Use    Smoking status: Former Smoker     Packs/day: 0.25     Years: 4.00     Pack years: 1.00     Types: Cigarettes     Quit date: 1966     Years since quittin.3    Smokeless tobacco: Never Used   Vaping Use    Vaping Use: Never used   Substance and Sexual Activity    Alcohol use: Not Currently     Alcohol/week: 1.0 standard drink     Types: 1 Glasses of wine per week     Comment: social    Drug use: No    Sexual activity: Never     Partners: Male   Other Topics Concern    Not on file   Social History Narrative    Not on file     Social Determinants of Health     Financial Resource Strain: Low Risk     Difficulty of Paying Living Expenses: Not hard at all   Food Insecurity: No Food Insecurity    Worried About Running Out of Food in the Last Year: Never true    Ran Out of Food in the Last Year: Never true   Transportation Needs:     Lack of Transportation (Medical): Not on file    Lack of Transportation (Non-Medical):  Not on file   Physical Activity:     Days of Exercise per Week: Not on file    Minutes of Exercise per Session: Not on file   Stress:     Feeling of Stress : Not on file   Social Connections:     Frequency of Communication with Friends and Family: Not on file    Frequency of Social Gatherings with Friends and Family: Not on file    Attends Taoist Services: Not on file    Active Member of Clubs or Organizations: Not on file    Attends Club or Organization Meetings: Not on file    Marital Status: Not on file   Intimate Partner Violence:     Fear of Current or Ex-Partner: Not on file    Emotionally Abused: Not on file    Physically Abused: Not on file    Sexually Abused: Not on file   Housing Stability:     Unable to Pay for Housing in the Last Year: Not on file    Number of Places Lived in the Last Year: Not on file    Unstable Housing in the Last Year: Not on file     Family History   Problem Relation Age of Onset    Cancer Father     Heart Disease Father     Heart Disease Mother     Cancer Other         sister's daughter - Melanoma    Heart Disease Brother     Arthritis Sister     Diabetes Neg Hx     Stroke Neg Hx     Osteoporosis Neg Hx     Thyroid Disease Neg Hx      Current Outpatient Medications   Medication Sig Dispense Refill    Acetone, Urine, Test (KETONE TEST) STRP Test ketones daily 100 strip 3    denosumab (PROLIA) 60 MG/ML SOSY SC injection Inject 60 mg into the skin every 6 months      insulin aspart (NOVOLOG) 100 UNIT/ML injection vial INJECT TOTAL DAILY DOSE 30 UNITS SUBCUTANEOUSLY WITH INSULIN PUMP 30 mL 3    pramipexole (MIRAPEX) 0.5 MG tablet Take 1 tablet by mouth 2 times daily 2 tabs with dinner, 1 tab at HS (Patient taking differently: Take 0.5 mg by mouth See Admin Instructions 2 tabs with dinner, 1 tab at HS) 90 tablet 3    levothyroxine (SYNTHROID) 100 MCG tablet TAKE 1 TABLET DAILY 90 tablet 3    carbidopa-levodopa (SINEMET)  MG per tablet Take 1 tablet by mouth 4 times daily 120 tablet 5    Continuous Blood Gluc Transmit (DEXCOM G6 TRANSMITTER) MISC Change transmitter every 3 months 4 each 0    carbidopa-levodopa (SINEMET CR)  MG per extended release tablet Take 1 tablet by mouth nightly       aspirin 81 MG EC tablet Take 81 mg by mouth at bedtime       simvastatin (ZOCOR) 20 MG tablet Take 1 tablet by mouth nightly 90 tablet 3    Multiple Vitamins-Minerals (CENTRUM SILVER) TABS Take 1 tablet by mouth daily       Cholecalciferol (VITAMIN D3) 2000 UNITS CAPS Take 1 capsule by mouth daily      Insulin Infusion Pump (PARADIGM INSULIN PUMP) by Does not apply route. (Patient not taking: Reported on 2022)       No current facility-administered medications for this visit.      No Known Allergies  Family Status   Relation Name Status    Father      Mother  Alive    Other niece Alive    Brother  Alive    Sister  Alive    Neg Hx  (Not Specified)       Lab Review:    Lab Results   Component Value Date    WBC 10.8 2022    HGB 10.8 2022    HCT 33.7 2022    MCV 96.9 2022     2022     Lab Results   Component Value Date     2022    K 4.2 2022    K 4.3 2022    CL 98 2022    CO2 29 2022    BUN 30 2022    CREATININE 0.9 2022    GLUCOSE 131 2022    GLUCOSE 152 2012    CALCIUM 9.5 2022    PROT 6.6 2022    PROT 6.9 2012    LABALBU 4.4 2022    BILITOT 0.8 2022    ALKPHOS 69 2022    AST 42 2022    ALT 9 2022    LABGLOM 60 2022    LABGLOM 54 2014    GFRAA >60 2022    GFRAA >60 2012    AGRATIO 2.0 2022    GLOB 2.6 2021     Lab Results   Component Value Date    TSH 3.98 03/11/2022    FT3 2.0 03/11/2022     Lab Results   Component Value Date    LABA1C 7.4 05/21/2022     Lab Results   Component Value Date    .7 05/21/2022     Lab Results   Component Value Date    CHOL 150 03/11/2022     Lab Results   Component Value Date    TRIG 36 03/11/2022     Lab Results   Component Value Date    HDL 91 03/11/2022    HDL 88 01/22/2012     Lab Results   Component Value Date    LDLCALC 52 03/11/2022     Lab Results   Component Value Date    LABVLDL 7 03/11/2022     Lab Results   Component Value Date    CHOLHDLRATIO 2.1 09/12/2013     Lab Results   Component Value Date    LABMICR Not Indicated 05/22/2022     Lab Results   Component Value Date    VITD25 46.1 03/11/2022        Review of Systems:  Constitutional: has fatigue, no fever, no recent weight gain, has recent weight loss, has changes in appetite  Eyes: no eye pain, no change in vision, no eye redness, no eye irritation, no double vision  Ears, nose, throat: no nasal congestion, no sore throat, no earache, no decrease in hearing, no hoarseness, no dry mouth, no sinus problems, no difficulty swallowing, no neck lumps, no dental problems, no mouth sores, no ringing in ears  Pulmonary: sometimes has shortness of breath, no wheezing, has dyspnea on exertion, no cough  Cardiovascular: no chest pain, has lower extremity edema, no orthopnea, no intermittent leg claudication, no palpitations  Gastrointestinal: no abdominal pain, no nausea, no vomiting, no diarrhea, no constipation, no dysphagia, no heartburn, no bloating  Genitourinary: no dysuria, has sometimes urinary incontinence, no urinary hesitancy, no urinary frequency, no feelings of urinary urgency, no nocturia  Musculoskeletal: no joint swelling, no joint stiffness, no joint pain, no muscle cramps, no muscle pain, no bone pain  Integument/Breast: no hair loss, no skin rashes, no skin lesions, no itching, no dry skin  Neurological: no numbness, no tingling, no weakness, no confusion, no headaches, no dizziness, no fainting, no tremors, no decrease in memory, has balance problems  Psychiatric: no anxiety, no depression, no insomnia  Hematologic/Lymphatic: no tendency for easy bleeding, no swollen lymph nodes, no tendency for easy bruising  Immunology: no seasonal allergies, no frequent infections, no frequent illnesses  Endocrine: no temperature intolerance    /80   Pulse 80   Temp 98 °F (36.7 °C)   Resp 14   Ht 5' 2\" (1.575 m)   Wt 92 lb (41.7 kg)   SpO2 99%   BMI 16.83 kg/m²    Wt Readings from Last 3 Encounters:   06/02/22 92 lb (41.7 kg)   06/01/22 91 lb (41.3 kg)   05/25/22 97 lb 9 oz (44.3 kg)     Body mass index is 16.83 kg/m².       OBJECTIVE:  Constitutional: no acute distress, well appearing and well nourished  Psychiatric: oriented to person, place and time, judgement and insight and normal, recent and remote memory and intact and mood and affect are normal  Skin: skin and subcutaneous tissue is normal without mass, normal turgor  Head and Face: examination of head and face revealed no abnormalities  Eyes: no lid or conjunctival swelling, erythema or discharge, pupils are normal, equal, round, reactive to light  Ears/Nose: external inspection of ears and nose revealed no abnormalities, hearing is grossly normal  Oropharynx/Mouth/Face: lips, tongue and gums are normal with no lesions, the voice quality was normal  Neck: neck is supple and symmetric, with midline trachea and no masses, thyroid is normal  Lymphatics: normal cervical lymph nodes, normal supraclavicular nodes  Pulmonary: no increased work of breathing or signs of respiratory distress, lungs are clear to auscultation  Cardiovascular: normal heart rate and rhythm, normal S1 and S2, no murmurs and pedal pulses and 2+ bilaterally, No edema  Abdomen: abdomen is soft, non-tender with no masses  Musculoskeletal: normal gait and station and exam of the digits and nails are normal  Neurological: normal coordination and normal general cortical function    Visual inspection:  Deformity/amputation: absent  Skin lesions/pre-ulcerative calluses: absent  Edema: right- negative, left- negative     Sensory exam:  Monofilament sensation: normal  (minimum of 5 random plantar locations tested, avoiding callused areas - > 1 area with absence of sensation is + for neuropathy)     Plus at least one of the following:  Pulses: normal  Proprioception: Intact  Vibration (128 Hz): Impaired    ASSESSMENT/PLAN:  1. Type 1 diabetes, uncontrolled, with neuropathy ([facility])  Target HbA1C 7.5  Had hospital admission recently for DKA. DKA resolved. Blood glucose remains uncontrolled. Patient has older Medtronic MiniMed insulin pump. She would benefit from closed-loop system. We will address this with Medtronic educator. Answered multiple questions regarding hypoglycemia and hyperglycemia management, bolus injections, brittle diabetes, CGM use, discrepancy between fingersticks and CGM, benefits of CGM, insulin action, water intake, ketone measurement, hyperglycemia troubleshooting, adequate calorie and carbohydrate intake, maintaining healthy weight  Recommend to do boluses before meal  Hypoglycemia management  Do not underestimate carbohydrates  Diabetes educator follow up  On Medtronic Minimed insulin pump and Dexcom CGM  Hemoglobin A1c 7.8-7.1-7.6-7.5-7.6-7.1-7.2-7.0-7.1-7.6-7.4  Average blood glucose 204±69  Bolus amount 60%, basal amount 40%  Carbohydrate intake per day 80±51  Very wide fluctuations of blood glucose anywhere from 70 to 400    Dexcom data:  Average glucose 186, standard deviation 87  Time in range 53%, above target 44%, below target less than 3%. Postprandial hyperglycemia, mostly after breakfast.  Patient tends to be low in the afternoons. Patient stated to that there is discrepancy between fingerstick and CGM. Most of the times when CGM reads very low, fingerstick is appropriate.   Continue close monitoring.  - Hemoglobin A1C; Future  - Comprehensive Metabolic Panel; Future  - Lipid Panel; Future  - Microalbumin / Creatinine Urine Ratio; Future    2. Osteoporosis, unspecified osteoporosis type, unspecified pathological fracture presence  T-score -2.9 in 2007  DXA 2007 Osteoporosis, had Reclast 5 years  No upset stomach  No GERD  3/2022 DEXA similar bone density, no direct comparison, did in different place  Continue Prolia   Started 6/2021  25 hydroxy vitamin D 50-49-47-50.6-52.2-63.8-55.4-53.4-63.3-46. 1  - Vitamin D 25 Hydroxy; Future    3. Hashimoto's thyroiditis  TSH 2.0  - T4, Free; Future  - TSH without Reflex; Future    4. Acquired hypothyroidism  TSH 2.5-3.0-2.38-1.12-2.85-7.29-3.4-5.05-3.98  - levothyroxine (SYNTHROID) 100 MCG tablet; Take 1 tablet by mouth daily      5. Coronary artery disease involving native coronary artery of native heart without angina pectoris  Follow with Cardiology    6. Type 1 diabetes, uncontrolled, with retinopathy ([facility]  - Hemoglobin A1C; Future  - Comprehensive Metabolic Panel; Future  - Lipid Panel; Future  - Microalbumin / Creatinine Urine Ratio; Future    7. Underweight  Diabetes educator  Supplements with protein, adequate caloric intake is recommended  - ACTH; Future  - Cortisol AM, Total; Future  On Glucerna    8.  Mixed hyperlipidemia  LDL 24-83-13-17-29-41-48-42-45-52  Lipid panel  Simvastatin 20 mg daily    Reviewed and/or ordered clinical lab results Yes  Reviewed and/or ordered radiology tests Yes  Reviewed and/or ordered other diagnostic tests No  Discussed test results with performing physician No  Independently reviewed image, tracing, or specimen Yes Medtronic MiniMed insulin pump data, total 7 pages, Dexcom data, total 16 pages  Made a decision to obtain old records No  Reviewed and summarized old records Yes  TSH 3.89, HbA1C 7.7, LDL 49  Obtained history from other than patient yes, patient's     Sandi Carney was counseled regarding symptoms of diabetes diagnosis, course and complications of disease if inadequately treated, side effects of medications, diagnosis, treatment options, and prognosis, risks, benefits, complications, and alternatives of treatment, labs, imaging and other studies and treatment targets and goals, insulin pump adjustments, hypoglycemia management, brittle diabetes, fluctuations of blood glucose, osteoporosis treatment, insulin pump data and interpretation, recommendations, other diabetes related questions and concerns, see above. She understands instructions and counseling. Total time I spent for this encounter 40 minutes      Return in about 6 weeks (around 7/11/2022) for diabetes.

## 2022-06-03 ENCOUNTER — CARE COORDINATION (OUTPATIENT)
Dept: CASE MANAGEMENT | Age: 82
End: 2022-06-03

## 2022-06-03 NOTE — CARE COORDINATION
[facility] Trans[facility] Follow Up Call    6/3/2022    Patient: Maranda Massey  Patient : 1940 MRN: [id number]  Reason for Admission: DKA, type 1, not at goal Discharge Date: 22 RARS: Readmission Risk Score: 12.7 ( )    Spoke with: Maranda Massey who reports that she is doing ok. She states that her BS are still up and down but she is getting there. She states that she has the most problem with over night BS lows, doctor is aware. She states that she is eating her snacks as instructed. Patient states that she does not have any taste or smell so when she eat it is on purpose. She is confident that her numbers will get there. Patient denies cp, sob, cough, dizziness, headache, n/v, diarrhea, abdominal pains, fever, or chills. Patient report that appetite and fluid intake is poor and  denies any problems with bowel or bladder. Patient is taking all medications as ordered. Denies any needs at this time. Patient instructed to continue to monitor s/s, reporting any that may present to MD immediately for early intervention. Agreeable to f/u calls. Care Transitions Follow Up Call    Needs to be reviewed by the provider   Additional needs identified to be addressed with provider: No  none             Method of communication with provider : none      LPN Care Coordinator contacted the patient by telephone to follow up after admission on 2022. Verified name and  with patient as identifiers. Addressed changes since last contact: none  Discussed follow-up appointments. If no appointment was previously scheduled, appointment scheduling offered: Yes. Is follow up appointment scheduled within 7 days of discharge? Yes. LPN CC reviewed discharge instructions, medical action plan and red flags with patient and discussed any barriers to care and/or understanding of plan of care after discharge.  Discussed appropriate site of care based on symptoms and resources available to patient including: PCP  Specialist  When to call 911. The patient agrees to contact the PCP office for questions related to their healthcare. Patients top risk factors for readmission: medical condition-HLD,CKD,CAD  Interventions to address risk factors: Assessment and support for treatment adherence and medication management-. Non-[facility] follow up appointment(s): NA    LPN CC provided contact information for future needs. Plan for follow-up call in 5-7 days based on severity of symptoms and risk factors. Plan for next call: symptom management-.          Care Transitions Subsequent and Final Call    Subsequent and Final Calls  Do you have any ongoing symptoms?: No  Have your medications changed?: No  Do you have any questions related to your medications?: No  Do you currently have any active services?: No  Do you have any needs or concerns that I can assist you with?: No  Identified Barriers: Other  Care Transitions Interventions  No Identified Needs  Other Interventions:            Follow Up  Future Appointments   Date Time Provider Aria Coley   7/11/2022  9:30 AM Waldemar Hilario MD [facility]   9/1/2022  8:30 AM MD [facility] [place] - [facility]   9/14/2022 10:30 AM VON Eason - CNP FF Card[facility]       Carlita Law LPN

## 2022-06-07 NOTE — PROGRESS NOTES
Physician Progress Note      Anuj Hinson  CSN #:                  [id number]  :                       1940  ADMIT DATE:       2022 6:11 PM  [address] DATE:        2022 11:11 AM  RESPONDING  PROVIDER #:        Amina Rajput MD          QUERY TEXT:    Pt admitted with DKA and has malnutrition documented. Please further specify   type of malnutrition with documentation in the medical record. The medical record reflects the following:  Risk Factors: age, DM  Clinical Indicators: BMI- 17.63, Per RD note- \"Mild malnutrition, No   significant weight loss, Mild body fat loss Orbital, Mild muscle mass loss   Clavicles (pectoralis & deltoids),Temples (temporalis),Hand (interosseous). \"  Treatment: Nutrition consult, Reg diet    ASPEN Criteria:    https://aspenjournals. onlinelibrary. ly. com/doi/full/10.1177/185709063655388  5  Options provided:  -- Mild Malnutrition  -- Other - I will add my own diagnosis  -- Disagree - Not applicable / Not valid  -- Disagree - Clinically unable to determine / Unknown  -- Refer to Clinical Documentation Reviewer    PROVIDER RESPONSE TEXT:    This patient has mild malnutrition.     Query created by: Maura Romero on 2022 8:36 AM      Electronically signed by:  Amina Rajput MD 2022 12:24 PM

## 2022-06-08 ENCOUNTER — TELEPHONE (OUTPATIENT)
Dept: ENDOCRINOLOGY | Age: 82
End: 2022-06-08

## 2022-06-10 ENCOUNTER — CARE COORDINATION (OUTPATIENT)
Dept: CASE MANAGEMENT | Age: 82
End: 2022-06-10

## 2022-06-10 NOTE — CARE COORDINATION
[facility] Transitions Follow Up Call    6/10/2022    Patient: Aneta Guillen  Patient : 1940   MRN: [id number]  Reason for Admission: DKA  Discharge Date: 22 RARS: Readmission Risk Score: 12.7 ( )         Spoke with: [address] Transitions Subsequent and Final Call    Subsequent and Final Calls  Do you have any ongoing symptoms?: No  Have your medications changed?: No  Do you have any questions related to your medications?: No  Do you currently have any active services?: No  Do you have any needs or concerns that I can assist you with?: No  Identified Barriers: None  Care Transitions Interventions  Other Interventions: Follow Up: Patient reports that her Dexcom was registering a 300+ BS after eating breakfast.  It documented 55 carbs and a bolus of 4.4 units. Blood sugar was in the 150's prior to breakfast.  Patient has followed up with endocrinologist and PCP, CTN instructed her to call endocrinologist on Monday if readings continue to be high. She is scheduled to meet with the Dexcom rep next week. CTN offered a dietician consult, patient declined stating she will consult with Dr. Ping Prince and see if she can see the one in her office again. CTN will continue with outreach follow up calls.     Future Appointments   Date Time Provider Aria Coley   2022  9:30 AM Aretha Coffman MD [facility]   2022  8:30 AM MD [facility] [place] - D[facility]   2022 10:30 AM VON Chanel - CNP FF Cardio [facility]       Lizabeth Aviles RN

## 2022-06-15 ENCOUNTER — CARE COORDINATION (OUTPATIENT)
Dept: CASE MANAGEMENT | Age: 82
End: 2022-06-15

## 2022-06-15 NOTE — CARE COORDINATION
[facility] Transitions Follow Up Call    6/15/2022    Patient: Angela Kramer  Patient : 1940   MRN: [id number]  Reason for Admission: DKA  Discharge Date: 22 RARS: Readmission Risk Score: 12.7 ( )         Spoke with: [address] Transitions Subsequent and Final Call    Subsequent and Final Calls  Do you have any ongoing symptoms?: No  Have your medications changed?: Yes  Patient Reports: Neurologist prescribed a new medication, patient deleon not know the name of the medication. Do you have any questions related to your medications?: No  Do you currently have any active services?: No  Do you have any needs or concerns that I can assist you with?: No  Identified Barriers: None  Care Transitions Interventions  Other Interventions: Follow Up:Patient has met with Medtronic rep and will be switching to a Medtronic GCM device. The process may take 6-7 weeks according to the rep. Patient is following up with neurologist Dr. Philip Donohue and she is also participating in a research study with Dr. Darshan Mehta. She will follow up with endocrinologist 22. She denies any questions or concerns at this time. CTN will continue with outreach follow up calls.     Future Appointments   Date Time Provider Aria Coley   2022  9:30 AM Pilar Salomon MD [facility]   2022  8:30 AM Merline Less, MD [facility] [place] - [facility]   2022 10:30 AM VON Jara - CNP FF Cardio ANN Little RN

## 2022-06-16 ENCOUNTER — TELEPHONE (OUTPATIENT)
Dept: FAMILY MEDICINE CLINIC | Age: 82
End: 2022-06-16

## 2022-06-16 RX ORDER — PRAMIPEXOLE DIHYDROCHLORIDE 0.5 MG/1
0.5 TABLET ORAL 2 TIMES DAILY
Qty: 270 TABLET | Refills: 3 | Status: SHIPPED | OUTPATIENT
Start: 2022-06-16

## 2022-06-21 ENCOUNTER — OFFICE VISIT (OUTPATIENT)
Dept: CARDIOLOGY CLINIC | Age: 82
End: 2022-06-21
Payer: MEDICARE

## 2022-06-21 ENCOUNTER — TELEPHONE (OUTPATIENT)
Dept: CARDIOLOGY CLINIC | Age: 82
End: 2022-06-21

## 2022-06-21 VITALS
HEIGHT: 62 IN | DIASTOLIC BLOOD PRESSURE: 62 MMHG | SYSTOLIC BLOOD PRESSURE: 124 MMHG | OXYGEN SATURATION: 99 % | BODY MASS INDEX: 17.1 KG/M2 | HEART RATE: 76 BPM | WEIGHT: 92.9 LBS

## 2022-06-21 DIAGNOSIS — R09.89 LABILE HYPERTENSION: Primary | ICD-10-CM

## 2022-06-21 DIAGNOSIS — E78.2 MIXED HYPERLIPIDEMIA: ICD-10-CM

## 2022-06-21 DIAGNOSIS — I25.10 CORONARY ARTERY DISEASE INVOLVING NATIVE CORONARY ARTERY OF NATIVE HEART WITHOUT ANGINA PECTORIS: ICD-10-CM

## 2022-06-21 PROCEDURE — 99214 OFFICE O/P EST MOD 30 MIN: CPT | Performed by: NURSE PRACTITIONER

## 2022-06-21 PROCEDURE — G8418 CALC BMI BLW LOW PARAM F/U: HCPCS | Performed by: NURSE PRACTITIONER

## 2022-06-21 PROCEDURE — 1036F TOBACCO NON-USER: CPT | Performed by: NURSE PRACTITIONER

## 2022-06-21 PROCEDURE — 1111F DSCHRG MED/CURRENT MED MERGE: CPT | Performed by: NURSE PRACTITIONER

## 2022-06-21 PROCEDURE — 1090F PRES/ABSN URINE INCON ASSESS: CPT | Performed by: NURSE PRACTITIONER

## 2022-06-21 PROCEDURE — 1123F ACP DISCUSS/DSCN MKR DOCD: CPT | Performed by: NURSE PRACTITIONER

## 2022-06-21 PROCEDURE — G8427 DOCREV CUR MEDS BY ELIG CLIN: HCPCS | Performed by: NURSE PRACTITIONER

## 2022-06-21 PROCEDURE — G8399 PT W/DXA RESULTS DOCUMENT: HCPCS | Performed by: NURSE PRACTITIONER

## 2022-06-21 RX ORDER — MIDODRINE HYDROCHLORIDE 5 MG/1
2.5 TABLET ORAL 2 TIMES DAILY
COMMUNITY

## 2022-06-21 NOTE — TELEPHONE ENCOUNTER
I spoke with pt, has tightness, dizziness. Had had issues with low BP for a while. She saw another Dr that prescribed amiodarone 5 mg and she picked up yesterday but has not taken yet.  She wanted Cathi to ok

## 2022-06-21 NOTE — PATIENT INSTRUCTIONS
Once you get to the time of taking 5 mg , take it as 1/2 tablet twice a day instead of one tablet once daily    Keep appt in Sept

## 2022-06-21 NOTE — PROGRESS NOTES
[address]     Outpatient Follow Up Note   Acute visit     Harley Faye is 80 y.o. female who presents today with a history of non-obst CAD 40% LAD & OM-1 lesions by cath '12, hyperlipidemia, aortic regurg, labile HTN, TIA and palpitations. CHIEF COMPLAINT / HPI:  Follow Up secondary calling this morning with : low BP 65/43 HR 75 and 79/42 HR 85 and has tightness in her chest and light headed  She saw another Dr that prescribed amiodarone 5 mg and she picked up yesterday but has not taken yet    She was actually given midodrine 5 mg : 1/2 tablet for two weeks then increase to 1 tablet thereafter as told cannot further adjust sinemet without worsening symptoms. She has been checking/recording her BPs which have been : 137/72 at the highest to lowest 65/43    Subjective:     She continues to have chest tight that felt heavier this am. It happened before eating breakfast. It lasted a good 2 hours. She had no associated symptoms. It just went away on its own. She hasn't been bothered by SOB but sometimes she is walking to the mailbox. Her neurologist encouraged her to exercise. She has problems with her balance. The patient denies orthopnea/PND. The patient some swelling in her legs. The patients weight is stable . The patient is not experiencing palpitations     These symptoms show no change since the last OV. With regard to medication therapy the patient has been compliant with prescribed regimen. They have tolerated therapy to date.      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, not stated as uncontrolled      Social History:    Social History     Tobacco Use   Smoking Status Former Smoker    Packs/day: 0.25    Years: 4.00    Pack years: 1.00    Types: Cigarettes    Quit date: 1966    Years since quittin.5   Smokeless Tobacco Never Used     Current Medications:  Current Outpatient Medications   Medication Sig Dispense Refill    pramipexole (MIRAPEX) 0.5 MG tablet Take 1 tablet by mouth 2 times daily 2 tabs with dinner, 1 tab at  tablet 3    Acetone, Urine, Test (KETONE TEST) STRP Test ketones daily 100 strip 3    denosumab (PROLIA) 60 MG/ML SOSY SC injection Inject 60 mg into the skin every 6 months      insulin aspart (NOVOLOG) 100 UNIT/ML injection vial INJECT TOTAL DAILY DOSE 30 UNITS SUBCUTANEOUSLY WITH INSULIN PUMP 30 mL 3    levothyroxine (SYNTHROID) 100 MCG tablet TAKE 1 TABLET DAILY 90 tablet 3    carbidopa-levodopa (SINEMET)  MG per tablet Take 1 tablet by mouth 4 times daily 120 tablet 5    Continuous Blood Gluc Transmit (DEXCOM G6 TRANSMITTER) MISC Change transmitter every 3 months 4 each 0    carbidopa-levodopa (SINEMET CR)  MG per extended release tablet Take 1 tablet by mouth nightly       aspirin 81 MG EC tablet Take 81 mg by mouth at bedtime       simvastatin (ZOCOR) 20 MG tablet Take 1 tablet by mouth nightly 90 tablet 3    Multiple Vitamins-Minerals (CENTRUM SILVER) TABS Take 1 tablet by mouth daily       Cholecalciferol (VITAMIN D3) 2000 UNITS CAPS Take 1 capsule by mouth daily      Insulin Infusion Pump (PARADIGM INSULIN PUMP) by Does not apply route. (Patient not taking: Reported on 2022)       No current facility-administered medications for this visit. REVIEW OF SYSTEMS:    CONSTITUTIONAL: No major weight gain or loss, fatigue, weakness, night sweats or fever. HEENT: No new vision difficulties or ringing in the ears. RESPIRATORY: No new SOB, PND, orthopnea or cough.    CARDIOVASCULAR: See HPI  GI: No nausea, vomiting, diarrhea, constipation, abdominal pain or changes in bowel habits. : No urinary frequency, urgency, incontinence hematuria or dysuria. SKIN: No cyanosis or skin lesions. MUSCULOSKELETAL: No new muscle or joint pain. NEUROLOGICAL: No syncope or TIA-like symptoms. PSYCHIATRIC: No anxiety, pain, insomnia or depression    Objective:   PHYSICAL EXAM:        Vitals:    05/04/22 1104 05/04/22 1108 05/04/22 1124   BP: (!) 130/50 (!) 151/87 (!) 142/72   Site: Right Upper Arm Home cuff: comparison Right Upper Arm   Position: Sitting     Cuff Size: Medium Adult  Medium Adult   Pulse: 81     SpO2: 96%     Weight: 93 lb 6.4 oz (42.4 kg)     Height: 5' 2\" (1.575 m)         Vitals:    06/21/22 1113 06/21/22 1141 06/21/22 1144   BP: 122/60 120/62 124/62   Site: Left Upper Arm Left Upper Arm Left Upper Arm   Position: Sitting Sitting Standing   Cuff Size: Medium Adult Medium Adult Medium Adult   Pulse: 76  Asymptomatic    SpO2: 99%     Weight: 92 lb 14.4 oz (42.1 kg)     Height: 5' 2\" (1.575 m)         VITALS:  /60 (Site: Left Upper Arm, Position: Sitting, Cuff Size: Medium Adult)   Pulse 76   Ht 5' 2\" (1.575 m)   Wt 92 lb 14.4 oz (42.1 kg)   SpO2 99%   BMI 16.99 kg/m²   CONSTITUTIONAL: Cooperative, no apparent distress, and appears well nourished / frail  NEUROLOGIC:  Awake and orientated to person, place and time. PSYCH: Calm affect. SKIN: Warm and dry. HEENT: Sclera non-icteric, normocephalic, neck supple, no elevation of JVP, normal carotid pulses with no bruits and thyroid normal size. LUNGS:  No increased work of breathing and clear to auscultation, no crackles or wheezing  CARDIOVASCULAR:  Regular rate 80 and rhythm with no murmurs, gallops, rubs, or abnormal heart sounds, normal PMI. The apical impulses not displaced  JVP less than 8 cm H2O  Heart tones are crisp and normal  Cervical veins are not engorged  The carotid upstroke is normal in amplitude and contour without delay or bruit  JVP is not elevated  ABDOMEN:  Normal bowel sounds, non-distended and non-tender to palpation  EXT: No edema, no calf tenderness. Pulses are present bilaterally. DATA:    Lab Results   Component Value Date    ALT 9 (L) 05/21/2022    AST 42 (H) 05/21/2022    ALKPHOS 69 05/21/2022    BILITOT 0.8 05/21/2022     Lab Results   Component Value Date    CREATININE 0.9 05/25/2022    BUN 30 (H) 05/25/2022     05/25/2022    K 4.2 05/25/2022    CL 98 (L) 05/25/2022    CO2 29 05/25/2022     Lab Results   Component Value Date    TSH 3.98 03/11/2022    M0VMAPG 7.5 09/12/2013     Lab Results   Component Value Date    WBC 10.8 05/22/2022    HGB 10.8 (L) 05/22/2022    HCT 33.7 (L) 05/22/2022    MCV 96.9 05/22/2022     05/22/2022     No components found for: CHLPL  Lab Results   Component Value Date    TRIG 36 03/11/2022    TRIG 57 12/08/2021    TRIG 45 06/02/2021     Lab Results   Component Value Date    HDL 91 (H) 03/11/2022     (H) 12/08/2021    HDL 93 (H) 09/21/2021     Lab Results   Component Value Date    LDLCALC 52 03/11/2022    LDLCALC 45 12/08/2021    LDLCALC 46 09/21/2021     Lab Results   Component Value Date    LABVLDL 7 03/11/2022    LABVLDL 11 12/08/2021    LABVLDL 9 09/21/2021     Radiology Review:  Pertinent images / reports were reviewed as a part of this visit and reveals the following:    Echo:4/4/2022:   Summary   -Normal left ventricle size, wall thickness and systolic function with an estimated ejection fraction of 55%.  -No regional wall motion abnormalities are seen.   -Diastolic filling parameters suggest grade I diastolic dysfunction. E/e\"=13.75.   -The mitral valve leaflets appear myxomatous.   -Mild mitral regurgitation.   -The aortic valve is mildly thickened/calcified but opens well with normal gradients.   -Mild to moderate aortic regurgitation.   -Mild tricuspid regurgitation.   -Mild pulmonic regurgitation present.   -Estimated pulmonary artery systolic pressure is borderline elevated at 35 mmHg assuming a right atrial pressure of 3 mmHg.   -The left atrium is mildly dilated.   -There is a trivial localized right sided pericardial effusion noted.  -Appears unchanged when compared to previous study dated 10/26/2021.   -The ascending aorta is mildly dilated (3.7 cm).     Last Angiogram: 4/18/22  LM-normal  LAD-40% proximal, FFR 0.99  Cx-30% mid  OM1-patent  RCA-10% mid, dominant  RPDA-normal  LVEF-70%     Impression:  1.  Mild nonobstructive CAD. 2.  Hyperdynamic LV systolic function. 3.  Noncardiac chest pain.  Likely blood pressure related.     Assessment:      Diagnosis Orders   1. Labile hypertension   ~BP controlled today without hypotension  ~continues to show variable BP from home; previous cuff comparisons were comparable  ~averaging 106-120/ by home log ; low 65-91/  ~midodrine recommended by neurology : has not started    2. Coronary artery disease involving native coronary artery of native heart without angina pectoris   ~stable   ~episode of atypical discomfort   ~non-obst disease by cath ; CP at that time   ~had not started metoprolol d/t hx of low BPs    3. Mixed hyperlipidemia   ~controlled on simvastatin  ~high HDL      I had the opportunity to review the clinical symptoms and presentation of Zoe Ceron. Plan:     1. Begin midodrine as recommended   ~continue to take at 2.5 mg bid dosing for better daily coverage  3. F/U in 3 months as scheduled     Overall the patient is stable from CV standpoint    I have addresed the patient's cardiac risk factors and adjusted pharmacologic treatment as needed. In addition, I have reinforced the need for patient directed risk factor modification. Further evaluation will be based upon the patient's clinical course and testing results. All questions and concerns were addressed to the patient/family. Alternatives to my treatment were discussed. The patient is not currently smoking.  The risks related to smoking were reviewed with the patient. Recommend maintaining a smoke-free lifestyle. Patient is not on a beta-blocker : BP does not support; previously stopped d/t fatigue   Patient is not on an ace-i/ARB : neg CHF  Patient is on a statin    Antiplatelet therapy has been recommended / prescribed for this patient. Education conducted on adverse reactions including bleeding was discussed. The patient verbalizes understanding not to stop medications without discussing with us. Discussed exercise: 30-60 minutes 7 days/week  Discussed Low saturated fat diet. Thank you for allowing to us to participate in the care of Nikki Bennett.     Richie Sender, APRN    Documentation of today's visit sent to PCP

## 2022-06-21 NOTE — TELEPHONE ENCOUNTER
Pt states her BP today is low 65/43 HR 75 and 79/42 HR 85 states she has tightness in her chest and light headed. Please call pt to advise.

## 2022-06-24 ENCOUNTER — CARE COORDINATION (OUTPATIENT)
Dept: CASE MANAGEMENT | Age: 82
End: 2022-06-24

## 2022-06-24 NOTE — CARE COORDINATION
[address] Transitions Follow Up Call    2022    Patient: Cecil Victoria  Patient : 1940   MRN: [id number]  Reason for Admission:   Discharge Date: 22 RARS: Readmission Risk Score: 12.7 ( )         Spoke with: [address] Transitions Subsequent and Final Call    Subsequent and Final Calls  Do you have any ongoing symptoms?: No  Have your medications changed?: No  Do you have any questions related to your medications?: No  Do you currently have any active services?: No  Do you have any needs or concerns that I can assist you with?: No  Identified Barriers: None  Care Transitions Interventions  Other Interventions: Follow Up: Final outreach call:  Patient returned call, reports that she is doing very well and is able to control blood sugars. She has not required insulin in the last two days. She denies any questions or concerns at this time. CTN will resolve episode and remain available.   Future Appointments   Date Time Provider Aria Coley   2022  9:30 AM Darryle Lobos, MD [facility]   2022  8:30 AM MD JESICA Salazar [facility] [place] - [facility]   2022 10:30 AM VON Bernard - CNP [facility] ANN Pollard RN

## 2022-07-06 DIAGNOSIS — R63.6 UNDERWEIGHT: ICD-10-CM

## 2022-07-06 DIAGNOSIS — E06.3 HASHIMOTO'S THYROIDITIS: ICD-10-CM

## 2022-07-06 DIAGNOSIS — M81.0 OSTEOPOROSIS, UNSPECIFIED OSTEOPOROSIS TYPE, UNSPECIFIED PATHOLOGICAL FRACTURE PRESENCE: ICD-10-CM

## 2022-07-06 DIAGNOSIS — E03.9 ACQUIRED HYPOTHYROIDISM: ICD-10-CM

## 2022-07-06 DIAGNOSIS — E78.2 MIXED HYPERLIPIDEMIA: ICD-10-CM

## 2022-07-06 LAB
A/G RATIO: 1.9 (ref 1.1–2.2)
ALBUMIN SERPL-MCNC: 4.3 G/DL (ref 3.4–5)
ALP BLD-CCNC: 65 U/L (ref 40–129)
ALT SERPL-CCNC: 9 U/L (ref 10–40)
ANION GAP SERPL CALCULATED.3IONS-SCNC: 17 MMOL/L (ref 3–16)
AST SERPL-CCNC: 31 U/L (ref 15–37)
BILIRUB SERPL-MCNC: 0.7 MG/DL (ref 0–1)
BUN BLDV-MCNC: 21 MG/DL (ref 7–20)
CALCIUM SERPL-MCNC: 9.5 MG/DL (ref 8.3–10.6)
CHLORIDE BLD-SCNC: 98 MMOL/L (ref 99–110)
CHOLESTEROL, TOTAL: 161 MG/DL (ref 0–199)
CO2: 25 MMOL/L (ref 21–32)
CREAT SERPL-MCNC: 0.8 MG/DL (ref 0.6–1.2)
CREATININE URINE: 150 MG/DL (ref 28–259)
GFR AFRICAN AMERICAN: >60
GFR NON-AFRICAN AMERICAN: >60
GLUCOSE BLD-MCNC: 193 MG/DL (ref 70–99)
HDLC SERPL-MCNC: 93 MG/DL (ref 40–60)
LDL CHOLESTEROL CALCULATED: 51 MG/DL
MICROALBUMIN UR-MCNC: 2.9 MG/DL
MICROALBUMIN/CREAT UR-RTO: 19.3 MG/G (ref 0–30)
POTASSIUM SERPL-SCNC: 4.3 MMOL/L (ref 3.5–5.1)
SODIUM BLD-SCNC: 140 MMOL/L (ref 136–145)
T3 FREE: 2.3 PG/ML (ref 2.3–4.2)
T4 FREE: 1.9 NG/DL (ref 0.9–1.8)
TOTAL PROTEIN: 6.6 G/DL (ref 6.4–8.2)
TRIGL SERPL-MCNC: 87 MG/DL (ref 0–150)
TSH SERPL DL<=0.05 MIU/L-ACNC: 3.38 UIU/ML (ref 0.27–4.2)
VITAMIN D 25-HYDROXY: 64.1 NG/ML
VLDLC SERPL CALC-MCNC: 17 MG/DL

## 2022-07-11 ENCOUNTER — OFFICE VISIT (OUTPATIENT)
Dept: ENDOCRINOLOGY | Age: 82
End: 2022-07-11
Payer: MEDICARE

## 2022-07-11 VITALS
RESPIRATION RATE: 14 BRPM | DIASTOLIC BLOOD PRESSURE: 60 MMHG | HEART RATE: 70 BPM | SYSTOLIC BLOOD PRESSURE: 110 MMHG | HEIGHT: 62 IN | OXYGEN SATURATION: 98 % | BODY MASS INDEX: 16.97 KG/M2 | TEMPERATURE: 98 F | WEIGHT: 92.2 LBS

## 2022-07-11 DIAGNOSIS — I25.10 CORONARY ARTERY DISEASE INVOLVING NATIVE CORONARY ARTERY OF NATIVE HEART WITHOUT ANGINA PECTORIS: ICD-10-CM

## 2022-07-11 DIAGNOSIS — R63.6 UNDERWEIGHT: ICD-10-CM

## 2022-07-11 DIAGNOSIS — E03.9 ACQUIRED HYPOTHYROIDISM: ICD-10-CM

## 2022-07-11 DIAGNOSIS — E78.2 MIXED HYPERLIPIDEMIA: ICD-10-CM

## 2022-07-11 DIAGNOSIS — E06.3 HASHIMOTO'S THYROIDITIS: ICD-10-CM

## 2022-07-11 DIAGNOSIS — M81.0 OSTEOPOROSIS, UNSPECIFIED OSTEOPOROSIS TYPE, UNSPECIFIED PATHOLOGICAL FRACTURE PRESENCE: ICD-10-CM

## 2022-07-11 PROCEDURE — 1090F PRES/ABSN URINE INCON ASSESS: CPT | Performed by: INTERNAL MEDICINE

## 2022-07-11 PROCEDURE — G8399 PT W/DXA RESULTS DOCUMENT: HCPCS | Performed by: INTERNAL MEDICINE

## 2022-07-11 PROCEDURE — G8427 DOCREV CUR MEDS BY ELIG CLIN: HCPCS | Performed by: INTERNAL MEDICINE

## 2022-07-11 PROCEDURE — 3051F HG A1C>EQUAL 7.0%<8.0%: CPT | Performed by: INTERNAL MEDICINE

## 2022-07-11 PROCEDURE — 1123F ACP DISCUSS/DSCN MKR DOCD: CPT | Performed by: INTERNAL MEDICINE

## 2022-07-11 PROCEDURE — G8418 CALC BMI BLW LOW PARAM F/U: HCPCS | Performed by: INTERNAL MEDICINE

## 2022-07-11 PROCEDURE — 1036F TOBACCO NON-USER: CPT | Performed by: INTERNAL MEDICINE

## 2022-07-11 PROCEDURE — 99215 OFFICE O/P EST HI 40 MIN: CPT | Performed by: INTERNAL MEDICINE

## 2022-07-11 NOTE — PROGRESS NOTES
Yvon Godoy is a 80 y.o. female who presents for Type 1 diabetes mellitus. Current symptoms/problems include fluctuating BG levels and show no change. 1.  Type 1 diabetes, uncontrolled, with neuropathy (HCC) [E10.40, E10.65]    Diagnosed with Type 1 diabetes mellitus in 1999. Low BG at night. Comorbid conditions: hyperlipidemia, Neuropathy, Retinopathy, Chronic Kidney Disease and Coronary Artery Disease    Current diabetic medications include: Novolog    On Medtronic Minimed insulin pump and Dexcom CGM    Intolerance to diabetes medications: No     Weight trend: stable  Prior visit with dietician: yes  Current diet: on average, 3 meals per day  Current exercise: walks     Current monitoring regimen: home blood tests - 8 times daily  Has brought blood glucose log/meter:  Yes  Home blood sugar records: fasting range: 100-150 and postprandial range:   Any episodes of hypoglycemia? Yes  Hypoglycemia frequency and time(s):  daily  Does patient have Glucagon emergency kit? No  Does patient have rapid acting carbohydrate? Yes  Does patient wear a medic alert bracelet or necklace? No    2. Osteoporosis, unspecified osteoporosis type, unspecified pathological fracture presence  Reclast 5 years. Not on any meds now. On vitamin D 2000 IU, calcium in food    3. Hashimoto's thyroiditis  Has fatigue. 4. Acquired hypothyroidism  On levothyroxine 0.1 mg qd. Has fatigue. 5. Coronary artery disease involving native coronary artery of native heart without angina pectoris  No chest pain. 6. Type 1 diabetes, uncontrolled, with retinopathy ([address]  No recent vision changes. 7.  Underweight  Lost 42 lbs not intentionally. No nausea, vomiting.     8.  Hyperlipidemia  No muscle pain    EXAMINATION:   BONE DENSITOMETRY       3/7/2022 10:07 am       TECHNIQUE:   A bone density DEXA scan was performed of the lumbar spine and bilateral hips   on a [employer] system.       COMPARISON:   August 28, 2019 [facility] disease [facility]     Renal insufficiency     Restless legs syndrome     Type I (juvenile type) diabetes mellitus without mention of complication, not stated as uncontrolled       Patient Active Problem List   Diagnosis    [facility]    Mixed hyperlipidemia    Hashimoto's thyroiditis    Acquired hypothyroidism    Type 1 diabetes, uncontrolled, with neuropathy ([facility]    Chest pain    CAD (coronary artery disease)    Rotator cuff tendonitis    Osteoporosis    Type 1 diabetes, uncontrolled, with retinopathy ([facility]    Parkinson disease ([facility]    Underweight    Labile hypertension    DKA, type 1, not at goal [facility])    Hypothyroidism (acquired)     Past Surgical History:   Procedure Laterality Date    CARDIAC CATHETERIZATION  2012    non obstructive CAD    COLONOSCOPY      at age 71    [address] Bilateral     with lens implants    SKIN BIOPSY Right     neg skin biopsy on right arm    TONSILLECTOMY       Social History     Socioeconomic History    Marital status:      Spouse name: Not on file    Number of children: Not on file    Years of education: Not on file    Highest education level: Not on file   Occupational History    Not on file   Tobacco Use    Smoking status: Former Smoker     Packs/day: 0.25     Years: 4.00     Pack years: 1.00     Types: Cigarettes     Quit date: 1966     Years since quittin.5    Smokeless tobacco: Never Used   Vaping Use    Vaping Use: Never used   Substance and Sexual Activity    Alcohol use: Not Currently     Alcohol/week: 1.0 standard drink     Types: 1 Glasses of wine per week     Comment: social    Drug use: No    Sexual activity: Never     Partners: Male   Other Topics Concern    Not on file   Social History Narrative    Not on file     Social Determinants of Health     Financial Resource Strain: Low Risk     Difficulty of Paying Living Expenses: Not hard at all   Food Insecurity: No Food Insecurity    Worried About Running Out of Food in the Last Year: Never true    Marissa of Food in the Last Year: Never true   Transportation Needs:     Lack of Transportation (Medical): Not on file    Lack of Transportation (Non-Medical):  Not on file   Physical Activity:     Days of Exercise per Week: Not on file    Minutes of Exercise per Session: Not on file   Stress:     Feeling of Stress : Not on file   Social Connections:     Frequency of Communication with Friends and Family: Not on file    Frequency of Social Gatherings with Friends and Family: Not on file    Attends Confucianism Services: Not on file    Active Member of [address] [place] [address] [employer] or Organizations: Not on file    Attends Club or Organization Meetings: Not on file    Marital Status: Not on file   Intimate Partner Violence:     Fear of Current or Ex-Partner: Not on file    Emotionally Abused: Not on file    Physically Abused: Not on file    Sexually Abused: Not on file   Housing Stability:     Unable to Pay for Housing in the Last Year: Not on file    Number of [place] in the Last Year: Not on file    Unstable Housing in the Last Year: Not on file     Family History   Problem Relation Age of Onset    Cancer Father     Heart Disease Father     Heart Disease Mother     Cancer Other         sister's daughter - Melanoma    Heart Disease Brother     Arthritis Sister     Diabetes Neg Hx     Stroke Neg Hx     Osteoporosis Neg Hx     Thyroid Disease Neg Hx      Current Outpatient Medications   Medication Sig Dispense Refill    midodrine (PROAMATINE) 5 MG tablet Take 5 mg by mouth daily       pramipexole (MIRAPEX) 0.5 MG tablet Take 1 tablet by mouth 2 times daily 2 tabs with dinner, 1 tab at  tablet 3    Acetone, Urine, Test (KETONE TEST) STRP Test ketones daily 100 strip 3    denosumab (PROLIA) 60 MG/ML SOSY SC injection Inject 60 mg into the skin every 6 months      insulin aspart (NOVOLOG) 100 UNIT/ML injection vial INJECT TOTAL DAILY DOSE 30 UNITS SUBCUTANEOUSLY WITH INSULIN PUMP 30 mL 3    levothyroxine (SYNTHROID) 100 MCG tablet TAKE 1 TABLET DAILY 90 tablet 3    carbidopa-levodopa (SINEMET)  MG per tablet Take 1 tablet by mouth 4 times daily 120 tablet 5    Continuous Blood Gluc Transmit (DEXCOM G6 TRANSMITTER) MISC Change transmitter every 3 months 4 each 0    carbidopa-levodopa (SINEMET CR)  MG per extended release tablet Take 1 tablet by mouth nightly       aspirin 81 MG EC tablet Take 81 mg by mouth at bedtime       simvastatin (ZOCOR) 20 MG tablet Take 1 tablet by mouth nightly 90 tablet 3    Multiple Vitamins-Minerals (CENTRUM SILVER) TABS Take 1 tablet by mouth daily       Cholecalciferol (VITAMIN D3) 2000 UNITS CAPS Take 1 capsule by mouth daily      Insulin Infusion Pump (PARADIGM INSULIN PUMP) by Does not apply route  (Patient not taking: Reported on 2022)       No current facility-administered medications for this visit.      No Known Allergies  Family Status   Relation Name Status    Father      Mother  Alive    Other niece Alive    Brother  Alive    Sister  Alive    Neg Hx  (Not Specified)       Lab Review:    Lab Results   Component Value Date/Time    WBC 10.8 2022 06:57 AM    HGB 10.8 2022 06:57 AM    HCT 33.7 2022 06:57 AM    MCV 96.9 2022 06:57 AM     2022 06:57 AM     Lab Results   Component Value Date/Time     2022 08:43 AM    K 4.3 2022 08:43 AM    K 4.3 2022 02:20 AM    CL 98 2022 08:43 AM    CO2 25 2022 08:43 AM    BUN 21 2022 08:43 AM    CREATININE 0.8 2022 08:43 AM    GLUCOSE 193 2022 08:43 AM    GLUCOSE 152 2012 07:39 AM    CALCIUM 9.5 2022 08:43 AM    PROT 6.6 2022 08:43 AM    PROT 6.9 2012 07:39 AM    LABALBU 4.3 2022 08:43 AM    BILITOT 0.7 2022 08:43 AM    ALKPHOS 65 2022 08:43 AM    AST 31 2022 08:43 AM    ALT 9 2022 08:43 AM    LABGLOM >60 07/06/2022 08:43 AM    LABGLOM 54 01/16/2014 07:54 AM    GFRAA >60 07/06/2022 08:43 AM    GFRAA >60 01/24/2012 04:00 AM    AGRATIO 1.9 07/06/2022 08:43 AM    GLOB 2.6 06/02/2021 07:43 AM     Lab Results   Component Value Date/Time    TSH 3.38 07/06/2022 08:43 AM    FT3 2.3 07/06/2022 08:43 AM     Lab Results   Component Value Date/Time    LABA1C 7.4 05/21/2022 11:41 PM     Lab Results   Component Value Date/Time    .7 05/21/2022 11:41 PM     Lab Results   Component Value Date/Time    CHOL 161 07/06/2022 08:43 AM     Lab Results   Component Value Date/Time    TRIG 87 07/06/2022 08:43 AM     Lab Results   Component Value Date/Time    HDL 93 07/06/2022 08:43 AM    HDL 88 01/22/2012 01:25 PM     Lab Results   Component Value Date/Time    LDLCALC 51 07/06/2022 08:43 AM     Lab Results   Component Value Date/Time    LABVLDL 17 07/06/2022 08:43 AM     Lab Results   Component Value Date/Time    CHOLHDLRATIO 2.1 09/12/2013 08:52 AM     Lab Results   Component Value Date/Time    LABMICR 2.90 07/06/2022 08:54 AM    LABMICR Not Indicated 05/22/2022 02:50 PM     Lab Results   Component Value Date/Time    VITD25 64.1 07/06/2022 08:43 AM        Review of Systems:  Constitutional: has fatigue, no fever, no recent weight gain, has recent weight loss, has changes in appetite  Eyes: no eye pain, no change in vision, no eye redness, no eye irritation, no double vision  Ears, nose, throat: no nasal congestion, no sore throat, no earache, no decrease in hearing, no hoarseness, no dry mouth, no sinus problems, no difficulty swallowing, no neck lumps, no dental problems, no mouth sores, no ringing in ears  Pulmonary: sometimes has shortness of breath, no wheezing, has dyspnea on exertion, no cough  Cardiovascular: no chest pain, has lower extremity edema, no orthopnea, no intermittent leg claudication, no palpitations  Gastrointestinal: no abdominal pain, no nausea, no vomiting, no diarrhea, no constipation, normal  Lymphatics: normal cervical lymph nodes, normal supraclavicular nodes  Pulmonary: no increased work of breathing or signs of respiratory distress, lungs are clear to auscultation  Cardiovascular: normal heart rate and rhythm, normal S1 and S2, no murmurs and pedal pulses and 2+ bilaterally, No edema  Abdomen: abdomen is soft, non-tender with no masses  Musculoskeletal: normal gait and station and exam of the digits and nails are normal  Neurological: normal coordination and normal general cortical function    Visual inspection:  Deformity/amputation: absent  Skin lesions/pre-ulcerative calluses: absent  Edema: right- negative, left- negative     Sensory exam:  Monofilament sensation: normal  (minimum of 5 random plantar locations tested, avoiding callused areas - > 1 area with absence of sensation is + for neuropathy)     Plus at least one of the following:  Pulses: normal  Proprioception: Intact  Vibration (128 Hz): Impaired    ASSESSMENT/PLAN:  1. Type 1 diabetes, uncontrolled, with neuropathy ([address]   Will do Guardian until 12/2022, then upgrade the pump  Target HbA1C 7.5  Had hospital admission recently for DKA. DKA resolved. Blood glucose remains uncontrolled. Patient has older Medtronic MiniMed insulin pump. She would benefit from closed-loop system. We will address this with Medtronic educator.   Answered multiple questions regarding hypoglycemia and hyperglycemia management, bolus injections, brittle diabetes, CGM use, discrepancy between fingersticks and CGM, benefits of CGM, insulin action, water intake, ketone measurement, hyperglycemia troubleshooting, adequate calorie and carbohydrate intake, maintaining healthy weight  Recommend to do boluses before meal  Hypoglycemia management  Do not underestimate carbohydrates  Diabetes educator follow up    On Medtronic Minimed insulin pump and Dexcom CGM  Hemoglobin A1c 7.8-7.1-7.6-7.5-7.6-7.1-7.2-7.0-7.1-7.6-7.4  Average blood glucose 204±69  Bolus amount 60%, basal amount 40%  Carbohydrate intake per day 80±51  Very wide fluctuations of blood glucose anywhere from 70 to 400    Dexcom data:  Average glucose 165, standard deviation 63  Time in range 63%, above target 34%, below target less than 3%. Postprandial hyperglycemia, mostly after breakfast.  Patient tends to be low in the afternoons. Patient stated to that there is discrepancy between fingerstick and CGM. Most of the times when CGM reads very low, fingerstick is appropriate. Continue close monitoring.  - Hemoglobin A1C; Future  - Comprehensive Metabolic Panel; Future  - Lipid Panel; Future  - Microalbumin / Creatinine Urine Ratio; Future    2. Osteoporosis, unspecified osteoporosis type, unspecified pathological fracture presence  T-score -2.9 in 2007  DXA 2007 Osteoporosis, had Reclast 5 years  No upset stomach  No GERD  Decrease vitamin D 2000 IU to every other day for summer months.  3/2022 DEXA similar bone density, no direct comparison, did in different place  Continue Prolia   Started 6/2021  25 hydroxy vitamin D 50-49-47-50.6-52.2-63.8-55.4-53.4-63.3-46.1-64.1  Calcium 9.5  - Vitamin D 25 Hydroxy; Future    3. Hashimoto's thyroiditis  TSH 2.0  - T4, Free; Future  - TSH without Reflex; Future    4. Acquired hypothyroidism  TSH 2.5-3.0-2.38-1.12-2.85-7.29-3.4-5.05-3.98-3.38  - levothyroxine (SYNTHROID) 100 MCG tablet; Take 1 tablet by mouth daily      5. Coronary artery disease involving native coronary artery of native heart without angina pectoris  Follow with Cardiology    6. Type 1 diabetes, uncontrolled, with retinopathy [facility]  - Hemoglobin A1C; Future  - Comprehensive Metabolic Panel; Future  - Lipid Panel; Future  - Microalbumin / Creatinine Urine Ratio; Future    7. Underweight  Dietician follow up  Supplements with protein, adequate caloric intake is recommended  - ACTH; Future  - Cortisol AM, Total; Future  On Glucerna    8.  Mixed hyperlipidemia  LDL 66-07-73-68-51-58-45-99-78-89-24  Lipid panel  Simvastatin 20 mg daily    Reviewed and/or ordered clinical lab results Yes  Reviewed and/or ordered radiology tests Yes  Reviewed and/or ordered other diagnostic tests No  Discussed test results with performing physician No  Independently reviewed image, tracing, or specimen Yes Medtronic MiniMed insulin pump data, total 7 pages, Dexcom data, total 15 pages  Made a decision to obtain old records No  Reviewed and summarized old records Yes  TSH 3.89, HbA1C 7.7, LDL 49  Obtained history from other than patient yes, patient's     Radha Garcia was counseled regarding symptoms of diabetes diagnosis, course and complications of disease if inadequately treated, side effects of medications, diagnosis, treatment options, and prognosis, risks, benefits, complications, and alternatives of treatment, labs, imaging and other studies and treatment targets and goals, insulin pump adjustments, hypoglycemia management, brittle diabetes, fluctuations of blood glucose, osteoporosis treatment, insulin pump data and interpretation, recommendations, other diabetes related questions and concerns, see above. She understands instructions and counseling. Total time I spent for this encounter 40 minutes    Return in about 3 months (around 10/11/2022) for diabetes.

## 2022-07-12 ENCOUNTER — OFFICE VISIT (OUTPATIENT)
Dept: ENDOCRINOLOGY | Age: 82
End: 2022-07-12
Payer: MEDICARE

## 2022-07-12 PROCEDURE — 97803 MED NUTRITION INDIV SUBSEQ: CPT | Performed by: DIETITIAN, REGISTERED

## 2022-07-12 NOTE — PROGRESS NOTES
Medical Nutrition Therapy for Diabetes    Carlita Oropeza  July 12, 2022      Patient Care Team:  Presley Rowland MD as PCP - Jazmín hCu MD as PCP - [facility]  VON Sotelo - CNP as Nurse Practitioner (Nurse Practitioner)    Reason for visit: Type 1 Diabetes, uncontrolled    ASSESSMENT/PLAN:   NUTRITION DIAGNOSIS  Follow up    #1 Problem: Altered Nutrition-Related Laboratory Values (NC-2.2)  Related to: Endocrine/Diabetes   As Evidenced by: Elevated Plasma glucose and/or HgbA1c levels         #2 Problem: Excessive Carbohydrate Intake (NI-5.8. 2)  Related to: Food-and nutrition-related knowledge deficit concerning appropriate amount of carbohydrate intake  As evidenced by: Estimated carbohydrate intake that is consistently more than the recommended amounts    #3 Problem:  Food-Medication Interaction (NC-2.3)  Related to: Diabetes medications  As evidenced by: Hypoglycemia episodes      NUTRITION INTERVENTION  Nutrition Prescription: 50 grams carbohydrate per meal with protein and non-starch vegetables  15 gram carbohydrate snacks    Diabetes Education/Counseling included:  Reinforced prevention and treatment of hypoglycemia. Reviewed benefit of including protein with each meal.  Discussed sources of fluid counting as water intake. Explained resources to improve carbohydrate count each meal.  Interventions:  Suggested using 4 glucose tablets when glucose is 70 or below. Encouraged to carry glucose tabs when away from home. Recommended having glucose gel for times when  may not be able to awaken patient. Use gel on inside cheek. Also advised consideration of glucagon if severe low blood sugar occurs. Recommended 1-2 ounces protein each breakfast and 2-3 ounces protein with lunch and dinner. Advised reaching 45-50 grams carbohydrate each meal.  Cautioned about under estimating carbohydrate totals. Encouraged continued effort in adequate fluids.   Recomended using [employer] to investigating carbohydrate for homemade recipe nutrition facts.   Handouts:  Planning Healthy Meals-[place]ada  NUTRITION MONITORING AND EVALUATION  Carry glucose tablets and give enough (4 tabs)  45-50 grams carbohydrate and Include protein each meal  Improve calculations of carbohydrate counting  Increase water  Consider glucagon procurement       Patient Active Problem List   Diagnosis    Diabetic polyneuropathy ([address]    Mixed hyperlipidemia    Hashimoto's thyroiditis    Acquired hypothyroidism    Type 1 diabetes, uncontrolled, with neuropathy ([address]    Chest pain    CAD (coronary artery disease)    Rotator cuff tendonitis    Osteoporosis    Type 1 diabetes, uncontrolled, with retinopathy ([address]    Parkinson disease ([address]    Underweight    Labile hypertension    DKA, type 1, not at goal [facility])    Hypothyroidism (acquired)       Current Outpatient Medications   Medication Sig Dispense Refill    midodrine (PROAMATINE) 5 MG tablet Take 5 mg by mouth daily       pramipexole (MIRAPEX) 0.5 MG tablet Take 1 tablet by mouth 2 times daily 2 tabs with dinner, 1 tab at  tablet 3    Acetone, Urine, Test (KETONE TEST) STRP Test ketones daily 100 strip 3    denosumab (PROLIA) 60 MG/ML SOSY SC injection Inject 60 mg into the skin every 6 months      insulin aspart (NOVOLOG) 100 UNIT/ML injection vial INJECT TOTAL DAILY DOSE 30 UNITS SUBCUTANEOUSLY WITH INSULIN PUMP 30 mL 3    levothyroxine (SYNTHROID) 100 MCG tablet TAKE 1 TABLET DAILY 90 tablet 3    carbidopa-levodopa (SINEMET)  MG per tablet Take 1 tablet by mouth 4 times daily 120 tablet 5    Continuous Blood Gluc Transmit (DEXCOM G6 TRANSMITTER) MISC Change transmitter every 3 months 4 each 0    carbidopa-levodopa (SINEMET CR)  MG per extended release tablet Take 1 tablet by mouth nightly       aspirin 81 MG EC tablet Take 81 mg by mouth at bedtime       simvastatin (ZOCOR) 20 MG tablet Take 1 tablet by mouth nightly 90 tablet 3    Multiple Vitamins-Minerals (CENTRUM SILVER) TABS Take 1 tablet by mouth daily       Cholecalciferol (VITAMIN D3) 2000 UNITS CAPS Take 1 capsule by mouth daily      Insulin Infusion Pump (PARADIGM INSULIN PUMP) by Does not apply route  (Patient not taking: Reported on 7/11/2022)       No current facility-administered medications for this visit. NUTRITION ASSESSMENT    Biochemical Data:    Lab Results   Component Value Date    LABA1C 7.4 05/21/2022     Lab Results   Component Value Date    .7 05/21/2022       Lab Results   Component Value Date    CHOL 161 07/06/2022    CHOL 150 03/11/2022    CHOL 158 12/08/2021     Lab Results   Component Value Date    TRIG 87 07/06/2022    TRIG 36 03/11/2022    TRIG 57 12/08/2021     Lab Results   Component Value Date    HDL 93 (H) 07/06/2022    HDL 91 (H) 03/11/2022     (H) 12/08/2021     Lab Results   Component Value Date    LDLCALC 51 07/06/2022    LDLCALC 52 03/11/2022    LDLCALC 45 12/08/2021     Lab Results   Component Value Date    LABVLDL 17 07/06/2022    LABVLDL 7 03/11/2022    LABVLDL 11 12/08/2021     Lab Results   Component Value Date    CHOLHDLRATIO 2.1 09/12/2013    CHOLHDLRATIO 1.9 04/18/2012    CHOLHDLRATIO 2.0 02/22/2012       Lab Results   Component Value Date    WBC 10.8 05/22/2022    HGB 10.8 (L) 05/22/2022    HCT 33.7 (L) 05/22/2022    MCV 96.9 05/22/2022     05/22/2022       Lab Results   Component Value Date    CREATININE 0.8 07/06/2022    BUN 21 (H) 07/06/2022     07/06/2022    K 4.3 07/06/2022    CL 98 (L) 07/06/2022    CO2 25 07/06/2022       Diabetes Medications: Yes  Knows name and dose of prescribed medications Yes  Knows prescribed schedule for medicationsYes  Recent change in medication type/dosage: No  Stores  medications properlyYes  Comments:     Monitoring:   Has BG meter: Yes  Testing frequency:   Recent results:   Hypoglycemia? Anthropometric Measurements:   Wt:   Wt Readings from Last 3 Encounters:   07/11/22 92 lb 3.2 oz (41.8 kg)   06/21/22 92 lb 14.4 oz (42.1 kg)   06/02/22 92 lb (41.7 kg)      BMI:   BMI Readings from Last 3 Encounters:   07/11/22 16.86 kg/m²   06/21/22 16.99 kg/m²   06/02/22 16.83 kg/m²     Patient's stated goal weight:   7% Weight loss goal weight:   Patient needs to gain weight. Physical Activity History:   Physical activity: gardening  Frequency of activity: daily  Duration of activity: variable  Obstacles to activity: none    Sleep: deep sleeper,  can be challenged to wake her    Food and Nutrition History:   Nutrition Awareness/Previous DSMES: yes  Number of people in household: 2  Frequency of Meals Eaten away from home:1/week  Food Availability Problems  Within the past 12 months, have you worried that your food would run out before you got money to buy more? No  Within the past 12 months, has the food you bought not lasted till the end of the month and you didn't have money to get more?  No  Beverage consumption: water w/ flavor - 24 ounces,   Alcohol consumption: 4 ounces wine per week  Usual Food consumption:   3 meals, 2-3 snacks, protein inconsistent each meal, not always adding carbohydrate accurately      Barriers:   -none          Follow Up Plan: 6-8 weeks    Referring Provider: Wayne Lund MD  Time spent with patient: 60 minutes

## 2022-07-25 ENCOUNTER — TELEPHONE (OUTPATIENT)
Dept: ENDOCRINOLOGY | Age: 82
End: 2022-07-25

## 2022-07-25 NOTE — TELEPHONE ENCOUNTER
Call from [facility] asking if we have rec'd form on 7/18/22 for chart notes & C[employer]ptide.  Asked them to fax form again    [address][phone]  Ext [phone]

## 2022-07-27 RX ORDER — SIMVASTATIN 20 MG
TABLET ORAL
Qty: 90 TABLET | Refills: 3 | Status: SHIPPED | OUTPATIENT
Start: 2022-07-27

## 2022-08-19 NOTE — PROGRESS NOTES
Medical Nutrition Therapy for Diabetes    [place]  August 19, 2022      Patient Care Team:  Bobby Mosquera MD as PCP - Roxana Miller MD as PCP - [facility] Empaneled Provider  Sherryl Closs, APRN - CNP as Nurse Practitioner (Nurse Practitioner)    Reason for visit: Type 1 Diabetes    ASSESSMENT/PLAN:   NUTRITION DIAGNOSIS  Follow up    #1 Problem: Altered Nutrition-Related Laboratory Values (NC-2.2)  Related to: Endocrine/Diabetes  As Evidenced by: Elevated Plasma glucose and/or HgbA1c levels         #2 Problem: Excessive Carbohydrate Intake (NI-5.8. 2)  Related to: Food-and nutrition-related knowledge deficit concerning appropriate amount of carbohydrate intake  As evidenced by: Estimated carbohydrate intake that is consistently more than the recommended amounts     #3 Problem:  Food-Medication Interaction (NC-2.3)  Related to: Diabetes medications  As evidenced by: Hypoglycemia episodes    NUTRITION INTERVENTION  Nutrition Prescription: 45 -50 grams carbohydrate per meal with protein and non-starch vegetables                                       Limit snacks to protein sources      Diabetes Education/Counseling included:  Patient reports having many days of extreme difficulty sinking Medtronic sensor with pump. She believes all is resolved currently. Carbohydrate Control, Activity/exercise, Monitoring, and Hypoyglycemia prevention/treatment  Reviewed protein sources and benefits of including protein each meal.  Interventions:  Congratulated for increasing water, achieving improved carbohydrate counting, carrying glucose tablets when away from the house. Encouraged 1-2 ounces protein at breakfast.  Suggested eating a protein source for a snack if 4-5 ounces protein is not achieved at lunch and dinner.     NUTRITION MONITORING AND EVALUATION  3 meals  45 - 50 gram carbohydrate meals   Include protein with each meal and if short have a protein snack between meals  Continue drinking water Patient Active Problem List   Diagnosis    Diabetic polyneuropathy ([facility]    Mixed hyperlipidemia    Hashimoto's thyroiditis    Acquired hypothyroidism    Type 1 diabetes, uncontrolled, with neuropathy (HCC)    Chest pain    CAD (coronary artery disease)    Rotator cuff tendonitis    Osteoporosis    Type 1 diabetes, uncontrolled, with retinopathy ([facility] [address]    [facility] ([facility]    Underweight    Labile hypertension    DKA, type 1, not at goal [facility])    Hypothyroidism (acquired)       Current Outpatient Medications   Medication Sig Dispense Refill    simvastatin (ZOCOR) 20 MG tablet TAKE ONE TABLET BY MOUTH ONCE NIGHTLY 90 tablet 3    midodrine (PROAMATINE) 5 MG tablet Take 5 mg by mouth daily       pramipexole (MIRAPEX) 0.5 MG tablet Take 1 tablet by mouth 2 times daily 2 tabs with dinner, 1 tab at  tablet 3    Acetone, Urine, Test (KETONE TEST) STRP Test ketones daily 100 strip 3    denosumab (PROLIA) 60 MG/ML SOSY SC injection Inject 60 mg into the skin every 6 months      insulin aspart (NOVOLOG) 100 UNIT/ML injection vial INJECT TOTAL DAILY DOSE 30 UNITS SUBCUTANEOUSLY WITH INSULIN PUMP 30 mL 3    levothyroxine (SYNTHROID) 100 MCG tablet TAKE 1 TABLET DAILY 90 tablet 3    carbidopa-levodopa (SINEMET)  MG per tablet Take 1 tablet by mouth 4 times daily 120 tablet 5    Continuous Blood Gluc Transmit (DEXCOM G6 TRANSMITTER) MISC Change transmitter every 3 months 4 each 0    carbidopa-levodopa (SINEMET CR)  MG per extended release tablet Take 1 tablet by mouth nightly       aspirin 81 MG EC tablet Take 81 mg by mouth at bedtime       Multiple Vitamins-Minerals (CENTRUM SILVER) TABS Take 1 tablet by mouth daily       Cholecalciferol (VITAMIN D3) 2000 UNITS CAPS Take 1 capsule by mouth daily      Insulin Infusion Pump (PARADIGM INSULIN PUMP) by Does not apply route  (Patient not taking: Reported on 7/11/2022)       No current facility-administered medications for this visit.          NUTRITION ASSESSMENT    Biochemical Data:    Lab Results   Component Value Date    LABA1C 7.4 05/21/2022     Lab Results   Component Value Date    .7 05/21/2022       Lab Results   Component Value Date    CHOL 161 07/06/2022    CHOL 150 03/11/2022    CHOL 158 12/08/2021     Lab Results   Component Value Date    TRIG 87 07/06/2022    TRIG 36 03/11/2022    TRIG 57 12/08/2021     Lab Results   Component Value Date    HDL 93 (H) 07/06/2022    HDL 91 (H) 03/11/2022     (H) 12/08/2021     Lab Results   Component Value Date    LDLCALC 51 07/06/2022    LDLCALC 52 03/11/2022    LDLCALC 45 12/08/2021     Lab Results   Component Value Date    LABVLDL 17 07/06/2022    LABVLDL 7 03/11/2022    LABVLDL 11 12/08/2021     Lab Results   Component Value Date    CHOLHDLRATIO 2.1 09/12/2013    CHOLHDLRATIO 1.9 04/18/2012    CHOLHDLRATIO 2.0 02/22/2012       Lab Results   Component Value Date    WBC 10.8 05/22/2022    HGB 10.8 (L) 05/22/2022    HCT 33.7 (L) 05/22/2022    MCV 96.9 05/22/2022     05/22/2022       Lab Results   Component Value Date    CREATININE 0.8 07/06/2022    BUN 21 (H) 07/06/2022     07/06/2022    K 4.3 07/06/2022    CL 98 (L) 07/06/2022    CO2 25 07/06/2022       Diabetes Medications: Yes  Knows name and dose of prescribed medications Yes  Knows prescribed schedule for medicationsYes  Recent change in medication type/dosage: No  Stores  medications properlyYes  Comments:     Monitoring:   Has BG meter: Yes  Testing frequency: using Medtronic sensor  Recent results: Patient reported 187 stable graph thou the night  Hypoglycemia? Yes    Anthropometric Measurements:   Wt:   Wt Readings from Last 3 Encounters:   07/11/22 92 lb 3.2 oz (41.8 kg)   06/21/22 92 lb 14.4 oz (42.1 kg)   06/02/22 92 lb (41.7 kg)      BMI:   BMI Readings from Last 3 Encounters:   07/11/22 16.86 kg/m²   06/21/22 16.99 kg/m²   06/02/22 16.83 kg/m²     Patient's stated goal weight:   7% Weight loss goal weight:     Physical Activity History:   Physical activity: gardening  Frequency of activity: most days  Duration of activity: variable  Obstacles to activity: none    Sleep: good    Food and Nutrition History:   Nutrition Awareness/Previous DSMES: yes  Number of people in household: 2  Frequency of Meals Eaten away from home:1/week  Food Availability Problems  Within the past 12 months, have you worried that your food would run out before you got money to buy more? No  Within the past 12 months, has the food you bought not lasted till the end of the month and you didn't have money to get more?  No  Beverage consumption: water - 48-60 ounces/day  Alcohol consumption: 4 ounces wine per week  Usual Food consumption:   3 meals,      Barriers:   -none          Follow Up Plan: 3 months     Referring Provider: John Lopez MD  Time spent with patient: 30 minutes

## 2022-08-23 ENCOUNTER — OFFICE VISIT (OUTPATIENT)
Dept: ENDOCRINOLOGY | Age: 82
End: 2022-08-23
Payer: MEDICARE

## 2022-08-23 PROCEDURE — 97803 MED NUTRITION INDIV SUBSEQ: CPT | Performed by: DIETITIAN, REGISTERED

## 2022-08-23 PROCEDURE — 3051F HG A1C>EQUAL 7.0%<8.0%: CPT | Performed by: DIETITIAN, REGISTERED

## 2022-08-23 PROCEDURE — G8418 CALC BMI BLW LOW PARAM F/U: HCPCS | Performed by: DIETITIAN, REGISTERED

## 2022-08-23 PROCEDURE — G8428 CUR MEDS NOT DOCUMENT: HCPCS | Performed by: DIETITIAN, REGISTERED

## 2022-09-01 ENCOUNTER — OFFICE VISIT (OUTPATIENT)
Dept: FAMILY MEDICINE CLINIC | Age: 82
End: 2022-09-01
Payer: MEDICARE

## 2022-09-01 VITALS
BODY MASS INDEX: 17.45 KG/M2 | SYSTOLIC BLOOD PRESSURE: 130 MMHG | HEART RATE: 72 BPM | WEIGHT: 95.4 LBS | DIASTOLIC BLOOD PRESSURE: 78 MMHG | OXYGEN SATURATION: 98 %

## 2022-09-01 DIAGNOSIS — E10.10 DKA, TYPE 1, NOT AT GOAL (HCC): Primary | ICD-10-CM

## 2022-09-01 DIAGNOSIS — E03.9 ACQUIRED HYPOTHYROIDISM: ICD-10-CM

## 2022-09-01 DIAGNOSIS — I25.10 CORONARY ARTERY DISEASE INVOLVING NATIVE CORONARY ARTERY OF NATIVE HEART WITHOUT ANGINA PECTORIS: ICD-10-CM

## 2022-09-01 DIAGNOSIS — G20 PARKINSON DISEASE (HCC): ICD-10-CM

## 2022-09-01 DIAGNOSIS — M81.0 OSTEOPOROSIS, UNSPECIFIED OSTEOPOROSIS TYPE, UNSPECIFIED PATHOLOGICAL FRACTURE PRESENCE: ICD-10-CM

## 2022-09-01 DIAGNOSIS — R09.89 LABILE HYPERTENSION: ICD-10-CM

## 2022-09-01 LAB — HBA1C MFR BLD: 7.3 %

## 2022-09-01 PROCEDURE — 1036F TOBACCO NON-USER: CPT | Performed by: FAMILY MEDICINE

## 2022-09-01 PROCEDURE — G8427 DOCREV CUR MEDS BY ELIG CLIN: HCPCS | Performed by: FAMILY MEDICINE

## 2022-09-01 PROCEDURE — 99214 OFFICE O/P EST MOD 30 MIN: CPT | Performed by: FAMILY MEDICINE

## 2022-09-01 PROCEDURE — 1123F ACP DISCUSS/DSCN MKR DOCD: CPT | Performed by: FAMILY MEDICINE

## 2022-09-01 PROCEDURE — 83036 HEMOGLOBIN GLYCOSYLATED A1C: CPT | Performed by: FAMILY MEDICINE

## 2022-09-01 PROCEDURE — G8418 CALC BMI BLW LOW PARAM F/U: HCPCS | Performed by: FAMILY MEDICINE

## 2022-09-01 PROCEDURE — G8399 PT W/DXA RESULTS DOCUMENT: HCPCS | Performed by: FAMILY MEDICINE

## 2022-09-01 PROCEDURE — 3051F HG A1C>EQUAL 7.0%<8.0%: CPT | Performed by: FAMILY MEDICINE

## 2022-09-01 PROCEDURE — 1090F PRES/ABSN URINE INCON ASSESS: CPT | Performed by: FAMILY MEDICINE

## 2022-09-01 SDOH — ECONOMIC STABILITY: FOOD INSECURITY: WITHIN THE PAST 12 MONTHS, THE FOOD YOU BOUGHT JUST DIDN'T LAST AND YOU DIDN'T HAVE MONEY TO GET MORE.: NEVER TRUE

## 2022-09-01 SDOH — ECONOMIC STABILITY: FOOD INSECURITY: WITHIN THE PAST 12 MONTHS, YOU WORRIED THAT YOUR FOOD WOULD RUN OUT BEFORE YOU GOT MONEY TO BUY MORE.: NEVER TRUE

## 2022-09-01 ASSESSMENT — SOCIAL DETERMINANTS OF HEALTH (SDOH): HOW HARD IS IT FOR YOU TO PAY FOR THE VERY BASICS LIKE FOOD, HOUSING, MEDICAL CARE, AND HEATING?: NOT HARD AT ALL

## 2022-09-01 NOTE — PROGRESS NOTES
Malinda Temple is a 80 y.o. female. HPI: Here with  for 3-month follow-up  Has enrolled in a Parkinson's study at [facility] under the care of Dr. Sudarshan You  He has recommended physical therapy for balance training at [facility] but she has not started it yet  She did fall 3 times this summer sustained no serious injuries except for some cuts and scrapes  Dr. Conchita King is recommending she increase her midodrine up to 1 tablet 4 times a day and double her carbidopa levodopa up to 2 tablets 4 times a day which she is cautiously trying to do  He is willing to let her blood pressure be elevated from the midodrine to some extent  She is concerned about side effects  She also sees Dr. Shorty Paredes for her diabetes control and now is using a continuous glucose monitor  She is needing much reassurance about these upcoming medication changes  Denies any real numbness or tingling in her feet or any open sores  Immunizations are up-to-date   sees her eye doctor yearly    Meds, vitamins and allergies reviewed with pt    ROS: No TIA's or unusual headaches, no dysphagia. No prolonged cough. No dyspnea or chest pain on exertion. No abdominal pain, change in bowel habits, black or bloody stools. No urinary tract symptoms. No new or unusual musculoskeletal symptoms. Prior to Visit Medications    Medication Sig Taking?  Authorizing Provider   simvastatin (ZOCOR) 20 MG tablet TAKE ONE TABLET BY MOUTH ONCE NIGHTLY Yes Chidi Cramer MD   midodrine (PROAMATINE) 5 MG tablet Take 5 mg by mouth daily  Yes Historical Provider, MD   pramipexole (MIRAPEX) 0.5 MG tablet Take 1 tablet by mouth 2 times daily 2 tabs with dinner, 1 tab at HS Yes Ramon Jimenes MD   Acetone, Urine, Test (KETONE TEST) STRP Test ketones daily Yes Chidi Cramer MD   denosumab (PROLIA) 60 MG/ML SOSY SC injection Inject 60 mg into the skin every 6 months Yes Historical Provider, MD   insulin aspart (NOVOLOG) 100 UNIT/ML injection vial INJECT TOTAL DAILY DOSE 30 UNITS SUBCUTANEOUSLY WITH INSULIN PUMP Yes Inocente Pizarro MD   levothyroxine (SYNTHROID) 100 MCG tablet TAKE 1 TABLET DAILY Yes Marla Vázquez MD   carbidopa-levodopa (SINEMET)  MG per tablet Take 1 tablet by mouth 4 times daily Yes Marla Vázquez MD   Continuous Blood Gluc Transmit (DEXCOM G6 TRANSMITTER) MISC Change transmitter every 3 months Yes Inocente Pizarro MD   carbidopa-levodopa (SINEMET CR)  MG per extended release tablet Take 1 tablet by mouth nightly  Yes Historical Provider, MD   aspirin 81 MG EC tablet Take 81 mg by mouth at bedtime  Yes Historical Provider, MD   Multiple Vitamins-Minerals (CENTRUM SILVER) TABS Take 1 tablet by mouth daily  Yes Historical Provider, MD   Cholecalciferol (VITAMIN D3) 2000 UNITS CAPS Take 1 capsule by mouth daily Yes Historical Provider, MD   Insulin Infusion Pump (PARADIGM INSULIN PUMP) by Does not apply route Yes Historical Provider, MD       Past Medical History:   Diagnosis Date    CAD (coronary artery disease) 2012    non obstructive    Diabetes mellitus with neurological manifestation (HCC)     DKA, type 1, not at goal [facility]) 2022    Hyperlipidemia     Hyperlipidemia     Hypothyroidism (acquired)     Insomnia     Neuropathy     Parkinson disease ([facility] [address]     Parkinson disease ([facility] [address]     Parkinson disease ([facility] [address]     Renal insufficiency     Restless legs syndrome     Type I (juvenile type) diabetes mellitus without mention of complication, not stated as uncontrolled        Social History     Tobacco Use    Smoking status: Former     Packs/day: 0.25     Years: 4.00     Pack years: 1.00     Types: Cigarettes     Quit date: 1966     Years since quittin.7    Smokeless tobacco: Never   Vaping Use    Vaping Use: Never used   Substance Use Topics    Alcohol use: Not Currently     Alcohol/week: 1.0 standard drink     Types: 1 Glasses of wine per week     Comment: social    Drug use: No       Family History   Problem Relation Age of Onset    Cancer Father     Heart Disease Father     Heart Disease Mother     Cancer Other         sister's daughter - Melanoma    Heart Disease Brother     Arthritis Sister     Diabetes Neg Hx     Stroke Neg Hx     Osteoporosis Neg Hx     Thyroid Disease Neg Hx        No Known Allergies    OBJECTIVE:  /78   Pulse 72   Wt 95 lb 6.4 oz (43.3 kg)   SpO2 98%   BMI 17.45 kg/m²   GEN:  in NAD pleasant, no resting tremor  NECK:  Supple without adenopathy. No bruits  CV:  Regular rate and rhythm, S1 and S2 normal, no murmurs, clicks  PULM:  Chest is clear, no wheezing ,  symmetric air entry throughout both lung fields. EXT: No rash or edema  NEURO: nonfocal  A1c - 7.3  Lab Results   Component Value Date    TSH 3.38 07/06/2022    TSHREFLEX 1.56 11/19/2014      ASSESSMENT/PLAN:  1. Diabetes mellitus type [facility], well [facility]  Continue present medication  Follow-up with Dr. Shivani Carlton regularly  - POCT glycosylated hemoglobin (Hb A1C)    2. Parkinson disease ([facility] [address]  Long discussion about the side effects and benefits of her medications and recommend that she follow her new neurologist advice and increase the medicines gradually    3. Acquired hypothyroidism  Euthyroid continue present treatment    4. Coronary artery disease involving native coronary artery of native heart without angina pectoris  Stable, continue aggressive risk factor management    5. Labile hypertension  Stable today may see some elevation with the midodrine and we will have to monitor that carefully    6.  Osteoporosis, unspecified osteoporosis type, unspecified pathological fracture presence  Continue calcium vitamin D and Prolia    7IMM UTDS

## 2022-09-14 ENCOUNTER — OFFICE VISIT (OUTPATIENT)
Dept: CARDIOLOGY CLINIC | Age: 82
End: 2022-09-14
Payer: MEDICARE

## 2022-09-14 VITALS
OXYGEN SATURATION: 99 % | DIASTOLIC BLOOD PRESSURE: 72 MMHG | HEART RATE: 65 BPM | SYSTOLIC BLOOD PRESSURE: 124 MMHG | BODY MASS INDEX: 17.19 KG/M2 | HEIGHT: 62 IN | WEIGHT: 93.4 LBS

## 2022-09-14 DIAGNOSIS — E78.2 MIXED HYPERLIPIDEMIA: ICD-10-CM

## 2022-09-14 DIAGNOSIS — I25.10 CORONARY ARTERY DISEASE INVOLVING NATIVE CORONARY ARTERY OF NATIVE HEART WITHOUT ANGINA PECTORIS: ICD-10-CM

## 2022-09-14 DIAGNOSIS — R09.89 LABILE HYPERTENSION: Primary | ICD-10-CM

## 2022-09-14 PROCEDURE — 1123F ACP DISCUSS/DSCN MKR DOCD: CPT | Performed by: NURSE PRACTITIONER

## 2022-09-14 PROCEDURE — 1090F PRES/ABSN URINE INCON ASSESS: CPT | Performed by: NURSE PRACTITIONER

## 2022-09-14 PROCEDURE — G8418 CALC BMI BLW LOW PARAM F/U: HCPCS | Performed by: NURSE PRACTITIONER

## 2022-09-14 PROCEDURE — G8399 PT W/DXA RESULTS DOCUMENT: HCPCS | Performed by: NURSE PRACTITIONER

## 2022-09-14 PROCEDURE — G8427 DOCREV CUR MEDS BY ELIG CLIN: HCPCS | Performed by: NURSE PRACTITIONER

## 2022-09-14 PROCEDURE — 1036F TOBACCO NON-USER: CPT | Performed by: NURSE PRACTITIONER

## 2022-09-14 PROCEDURE — 99214 OFFICE O/P EST MOD 30 MIN: CPT | Performed by: NURSE PRACTITIONER

## 2022-09-14 NOTE — PROGRESS NOTES
[address]     Outpatient Follow Up Note   Acute visit     Tacho Loo is 80 y.o. female who presents today with a history of non-obst CAD 40% LAD & OM-1 lesions by cath '12, hyperlipidemia, aortic regurg, labile HTN, TIA and palpitations. CHIEF COMPLAINT / HPI:  Follow Up secondary hyper/hypotension : midodrine 2.5 mg bid routinely    Subjective:     Her BP have been averaging 110/80 taking midodrine 2.5 mg bid (8am and noon). She feels fine. She's had a couple of readings in the 90s/ and still feels fine. These readings are few far between. She hasn't gotten down into the 60-70s/ since last seen. She falls asleep easily  She's seeing a neurologist who is doing research on parkinson's    She hasn't been bothered by SOB but sometimes she is walking to the mailbox. The patient denies orthopnea/PND. The patient some swelling in her legs. The patients weight is stable . The patient is not experiencing palpitations     These symptoms show no change since the last OV. With regard to medication therapy the patient has been compliant with prescribed regimen. They have tolerated therapy to date.      Past Medical History:   Diagnosis Date    CAD (coronary artery disease) 2012    non obstructive    Diabetes mellitus with neurological manifestation (HCC)     DKA, type 1, not at goal [facility]) 2022    Hyperlipidemia     Hyperlipidemia     Hypothyroidism (acquired)     Insomnia     Neuropathy     Parkinson disease (HCC)     Parkinson disease ([facility] [address]     Parkinson disease ([facility] [address]     Renal insufficiency     Restless legs syndrome     Type I (juvenile type) diabetes mellitus without mention of complication, not stated as uncontrolled      Social History:    Social History     Tobacco Use   Smoking Status Former    Packs/day: 0.25    Years: 4.00    Pack years: 1.00    Types: Cigarettes    Quit date: 1966    Years since quittin.7   Smokeless Tobacco Never     Current Medications:  Current Outpatient stopped mirapex as inst by neuro; she couldn't sleep d/t RLS so she resumed taking it. NEUROLOGICAL: No syncope or TIA-like symptoms. PSYCHIATRIC: No anxiety, pain, insomnia or depression    Objective:   PHYSICAL EXAM:        Vitals:    09/14/22 1052 09/14/22 1113 09/14/22 1118   BP: (!) 122/90 132/68 124/72   Site: Left Upper Arm Left Upper Arm Left Upper Arm   Position: Sitting Sitting Standing   Cuff Size: Medium Adult Medium Adult Medium Adult   Pulse: 65     SpO2: 99%     Weight: 93 lb 6.4 oz (42.4 kg)     Height: 5' 2\" (1.575 m)         VITALS:  BP (!) 122/90 (Site: Left Upper Arm, Position: Sitting, Cuff Size: Medium Adult)   Pulse 65   Ht 5' 2\" (1.575 m)   Wt 93 lb 6.4 oz (42.4 kg)   SpO2 99%   BMI 17.08 kg/m²   CONSTITUTIONAL: Cooperative, no apparent distress, and appears well nourished / frail  NEUROLOGIC:  Awake and orientated to person, place and time. PSYCH: Calm affect. SKIN: Warm and dry. HEENT: Sclera non-icteric, normocephalic, neck supple, no elevation of JVP, normal carotid pulses with no bruits and thyroid normal size. LUNGS:  No increased work of breathing and clear to auscultation, no crackles or wheezing  CARDIOVASCULAR:  Regular rate 80 and rhythm with no murmurs, gallops, rubs, or abnormal heart sounds, normal PMI. The apical impulses not displaced  JVP less than 8 cm H2O  Heart tones are crisp and normal  Cervical veins are not engorged  The carotid upstroke is normal in amplitude and contour without delay or bruit  JVP is not elevated  ABDOMEN:  Normal bowel sounds, non-distended and non-tender to palpation  EXT: No edema, no calf tenderness. Pulses are present bilaterally.     DATA:    Lab Results   Component Value Date    ALT 9 (L) 07/06/2022    AST 31 07/06/2022    ALKPHOS 65 07/06/2022    BILITOT 0.7 07/06/2022     Lab Results   Component Value Date    CREATININE 0.8 07/06/2022    BUN 21 (H) 07/06/2022     07/06/2022    K 4.3 07/06/2022    CL 98 (L) 07/06/2022    CO2 25 07/06/2022     Lab Results   Component Value Date    TSH 3.38 07/06/2022    W2VSOBF 7.5 09/12/2013     Lab Results   Component Value Date    WBC 10.8 05/22/2022    HGB 10.8 (L) 05/22/2022    HCT 33.7 (L) 05/22/2022    MCV 96.9 05/22/2022     05/22/2022     No components found for: CHLPL  Lab Results   Component Value Date    TRIG 87 07/06/2022    TRIG 36 03/11/2022    TRIG 57 12/08/2021     Lab Results   Component Value Date    HDL 93 (H) 07/06/2022    HDL 91 (H) 03/11/2022     (H) 12/08/2021     Lab Results   Component Value Date    LDLCALC 51 07/06/2022    LDLCALC 52 03/11/2022    LDLCALC 45 12/08/2021     Lab Results   Component Value Date    LABVLDL 17 07/06/2022    LABVLDL 7 03/11/2022    LABVLDL 11 12/08/2021     Radiology Review:  Pertinent images / reports were reviewed as a part of this visit and reveals the following:    Echo:4/4/2022:   Summary   -Normal left ventricle size, wall thickness and systolic function with an estimated ejection fraction of 55%.   -No regional wall motion abnormalities are seen. -Diastolic filling parameters suggest grade I diastolic dysfunction. E/e\"=13.75.   -The mitral valve leaflets appear myxomatous. -Mild mitral regurgitation.   -The aortic valve is mildly thickened/calcified but opens well with normal gradients. -Mild to moderate aortic regurgitation.   -Mild tricuspid regurgitation.   -Mild pulmonic regurgitation present.   -Estimated pulmonary artery systolic pressure is borderline elevated at 35 mmHg assuming a right atrial pressure of 3 mmHg. -The left atrium is mildly dilated. -There is a trivial localized right sided pericardial effusion noted. -Appears unchanged when compared to previous study dated 10/26/2021.   -The ascending aorta is mildly dilated (3.7 cm). Last Angiogram: 4/18/22  LM-normal  LAD-40% proximal, FFR 0.99  Cx-30% mid  OM1-patent  RCA-10% mid, dominant  RPDA-normal  LVEF-70%     Impression:  1.   Mild nonobstructive CAD.  2.  Hyperdynamic LV systolic function. 3.  Noncardiac chest pain. Likely blood pressure related. Assessment:      Diagnosis Orders   1. Labile hypertension   ~improved taking midodrine bid  ~neg orthostasis today  ~stating cannot have down titration/adjustments in sinemet dose; neuro is uptitrating   ~c/o fatigue    2. Coronary artery disease involving native coronary artery of native heart without angina pectoris   ~stable : denies chest discomfort  ~non-obst disease by cath ; CP at that time   ~had not started metoprolol d/t hx of low BPs    3. Mixed hyperlipidemia   ~controlled on simvastatin  ~high HDL      I had the opportunity to review the clinical symptoms and presentation of Edmond Hernandez. Plan:     1. Consider changing midodrine to Northera 100 mg tid (cost dependent)  2. F/U in 4 months    Overall the patient is stable from CV standpoint    I have addresed the patient's cardiac risk factors and adjusted pharmacologic treatment as needed. In addition, I have reinforced the need for patient directed risk factor modification. Further evaluation will be based upon the patient's clinical course and testing results. All questions and concerns were addressed to the patient/family. Alternatives to my treatment were discussed. The patient is not currently smoking. The risks related to smoking were reviewed with the patient. Recommend maintaining a smoke-free lifestyle. Patient is not on a beta-blocker : BP does not support; previously stopped d/t fatigue   Patient is not on an ace-i/ARB : neg CHF  Patient is on a statin    Antiplatelet therapy has been recommended / prescribed for this patient. Education conducted on adverse reactions including bleeding was discussed. The patient verbalizes understanding not to stop medications without discussing with us. Discussed exercise: 30-60 minutes 7 days/week  Discussed Low saturated fat diet.      Thank you for allowing to us to participate in the care of Dale Marrow.     VON Boudreaux    Documentation of today's visit sent to PCP

## 2022-09-23 RX ORDER — DROXIDOPA 100 MG/1
100 CAPSULE ORAL 3 TIMES DAILY
Qty: 90 CAPSULE | Refills: 2 | Status: CANCELLED | OUTPATIENT
Start: 2022-09-23

## 2022-10-05 ENCOUNTER — TELEPHONE (OUTPATIENT)
Dept: ENDOCRINOLOGY | Age: 82
End: 2022-10-05

## 2022-10-05 RX ORDER — IBUPROFEN 600 MG/1
1 TABLET ORAL ONCE
Qty: 1 KIT | Refills: 3 | Status: SHIPPED | OUTPATIENT
Start: 2022-10-05 | End: 2022-10-05

## 2022-10-06 DIAGNOSIS — M81.0 OSTEOPOROSIS, UNSPECIFIED OSTEOPOROSIS TYPE, UNSPECIFIED PATHOLOGICAL FRACTURE PRESENCE: ICD-10-CM

## 2022-10-06 DIAGNOSIS — E06.3 HASHIMOTO'S THYROIDITIS: ICD-10-CM

## 2022-10-06 DIAGNOSIS — E78.2 MIXED HYPERLIPIDEMIA: ICD-10-CM

## 2022-10-06 DIAGNOSIS — E03.9 ACQUIRED HYPOTHYROIDISM: ICD-10-CM

## 2022-10-06 DIAGNOSIS — I25.10 CORONARY ARTERY DISEASE INVOLVING NATIVE CORONARY ARTERY OF NATIVE HEART WITHOUT ANGINA PECTORIS: ICD-10-CM

## 2022-10-06 LAB
A/G RATIO: 2 (ref 1.1–2.2)
ALBUMIN SERPL-MCNC: 4.5 G/DL (ref 3.4–5)
ALP BLD-CCNC: 55 U/L (ref 40–129)
ALT SERPL-CCNC: 6 U/L (ref 10–40)
ANION GAP SERPL CALCULATED.3IONS-SCNC: 13 MMOL/L (ref 3–16)
AST SERPL-CCNC: 25 U/L (ref 15–37)
BILIRUB SERPL-MCNC: 0.6 MG/DL (ref 0–1)
BUN BLDV-MCNC: 29 MG/DL (ref 7–20)
CALCIUM SERPL-MCNC: 10.1 MG/DL (ref 8.3–10.6)
CHLORIDE BLD-SCNC: 100 MMOL/L (ref 99–110)
CHOLESTEROL, TOTAL: 155 MG/DL (ref 0–199)
CO2: 29 MMOL/L (ref 21–32)
CREAT SERPL-MCNC: 1 MG/DL (ref 0.6–1.2)
ESTIMATED AVERAGE GLUCOSE: 162.8 MG/DL
GFR AFRICAN AMERICAN: >60
GFR NON-AFRICAN AMERICAN: 53
GLUCOSE BLD-MCNC: 170 MG/DL (ref 70–99)
HBA1C MFR BLD: 7.3 %
HDLC SERPL-MCNC: 98 MG/DL (ref 40–60)
LDL CHOLESTEROL CALCULATED: 48 MG/DL
POTASSIUM SERPL-SCNC: 4.6 MMOL/L (ref 3.5–5.1)
SODIUM BLD-SCNC: 142 MMOL/L (ref 136–145)
T3 FREE: 2 PG/ML (ref 2.3–4.2)
T4 FREE: 1.8 NG/DL (ref 0.9–1.8)
TOTAL PROTEIN: 6.7 G/DL (ref 6.4–8.2)
TRIGL SERPL-MCNC: 46 MG/DL (ref 0–150)
TSH SERPL DL<=0.05 MIU/L-ACNC: 1.82 UIU/ML (ref 0.27–4.2)
VITAMIN D 25-HYDROXY: 59.2 NG/ML
VLDLC SERPL CALC-MCNC: 9 MG/DL

## 2022-10-06 NOTE — TELEPHONE ENCOUNTER
Samantha Rosenbaum, please ask patient if she is going to use inpen for emergencies? If there is additional device and additional insulin for her insurance to pay. Let me know if that is what she wants and what she will be using it for, and I will order it. Sent prescription for glucagon.

## 2022-10-13 ENCOUNTER — OFFICE VISIT (OUTPATIENT)
Dept: ENDOCRINOLOGY | Age: 82
End: 2022-10-13
Payer: MEDICARE

## 2022-10-13 VITALS
DIASTOLIC BLOOD PRESSURE: 70 MMHG | BODY MASS INDEX: 16.89 KG/M2 | OXYGEN SATURATION: 98 % | RESPIRATION RATE: 14 BRPM | TEMPERATURE: 98 F | HEIGHT: 62 IN | HEART RATE: 64 BPM | SYSTOLIC BLOOD PRESSURE: 118 MMHG | WEIGHT: 91.8 LBS

## 2022-10-13 DIAGNOSIS — I25.10 CORONARY ARTERY DISEASE INVOLVING NATIVE CORONARY ARTERY OF NATIVE HEART WITHOUT ANGINA PECTORIS: ICD-10-CM

## 2022-10-13 DIAGNOSIS — I10 ESSENTIAL HYPERTENSION: ICD-10-CM

## 2022-10-13 DIAGNOSIS — E78.2 MIXED HYPERLIPIDEMIA: ICD-10-CM

## 2022-10-13 DIAGNOSIS — E03.9 ACQUIRED HYPOTHYROIDISM: ICD-10-CM

## 2022-10-13 DIAGNOSIS — E10.65 POORLY CONTROLLED TYPE 1 DIABETES MELLITUS WITH NEUROPATHY (HCC): Primary | ICD-10-CM

## 2022-10-13 DIAGNOSIS — R63.6 UNDERWEIGHT: ICD-10-CM

## 2022-10-13 DIAGNOSIS — M81.0 OSTEOPOROSIS, UNSPECIFIED OSTEOPOROSIS TYPE, UNSPECIFIED PATHOLOGICAL FRACTURE PRESENCE: ICD-10-CM

## 2022-10-13 DIAGNOSIS — E10.40 POORLY CONTROLLED TYPE 1 DIABETES MELLITUS WITH NEUROPATHY (HCC): Primary | ICD-10-CM

## 2022-10-13 DIAGNOSIS — E06.3 HASHIMOTO'S THYROIDITIS: ICD-10-CM

## 2022-10-13 PROCEDURE — 99215 OFFICE O/P EST HI 40 MIN: CPT | Performed by: INTERNAL MEDICINE

## 2022-10-13 PROCEDURE — 1123F ACP DISCUSS/DSCN MKR DOCD: CPT | Performed by: INTERNAL MEDICINE

## 2022-10-13 PROCEDURE — 1090F PRES/ABSN URINE INCON ASSESS: CPT | Performed by: INTERNAL MEDICINE

## 2022-10-13 PROCEDURE — 1036F TOBACCO NON-USER: CPT | Performed by: INTERNAL MEDICINE

## 2022-10-13 PROCEDURE — G8484 FLU IMMUNIZE NO ADMIN: HCPCS | Performed by: INTERNAL MEDICINE

## 2022-10-13 PROCEDURE — 3051F HG A1C>EQUAL 7.0%<8.0%: CPT | Performed by: INTERNAL MEDICINE

## 2022-10-13 PROCEDURE — G8427 DOCREV CUR MEDS BY ELIG CLIN: HCPCS | Performed by: INTERNAL MEDICINE

## 2022-10-13 PROCEDURE — G8399 PT W/DXA RESULTS DOCUMENT: HCPCS | Performed by: INTERNAL MEDICINE

## 2022-10-13 PROCEDURE — G8418 CALC BMI BLW LOW PARAM F/U: HCPCS | Performed by: INTERNAL MEDICINE

## 2022-10-13 NOTE — PROGRESS NOTES
Nawaf Pratt is a 80 y.o. female who presents for Type 1 diabetes mellitus. Current symptoms/problems include  fluctuating BG levels  and show no change. 1.  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 Low BG at night. Comorbid conditions:  hyperlipidemia, Neuropathy, Retinopathy, Chronic Kidney Disease and Coronary Artery Disease    Current diabetic medications include: Novolog    On Medtronic Minimed insulin pump and Dexcom CGM    Intolerance to diabetes medications: No     Weight trend: stable  Prior visit with dietician: yes  Current diet: on average, 3 meals per day  Current exercise: walks     Current monitoring regimen: home blood tests - 8 times daily  Has brought blood glucose log/meter:  Yes  Home blood sugar records: fasting range: 100-150 and postprandial range:   Any episodes of hypoglycemia? Yes  Hypoglycemia frequency and time(s):  daily  Does patient have Glucagon emergency kit? No  Does patient have rapid acting carbohydrate? Yes  Does patient wear a medic alert bracelet or necklace? No    2. Osteoporosis, unspecified osteoporosis type, unspecified pathological fracture presence  Reclast 5 years. Not on any meds now. On vitamin D 2000 IU, calcium in food    3. Hashimoto's thyroiditis  Has fatigue. 4. Acquired hypothyroidism  On levothyroxine 0.1 mg qd. Has fatigue. 5. Coronary artery disease involving native coronary artery of native heart without angina pectoris  No chest pain. 6. Type 1 diabetes, poorly controlled, with retinopathy ([address]  No recent vision changes. 7.  Underweight  Lost 42 lbs not intentionally. No nausea, vomiting. 8.  Hyperlipidemia  No muscle pain    EXAMINATION:   BONE DENSITOMETRY       3/7/2022 10:07 am       TECHNIQUE:   A bone density DEXA scan was performed of the lumbar spine and bilateral hips   on a [employer] system.        COMPARISON: August 28, 2019 study was performed on a separate workstation, limiting   direct comparison. HISTORY:   ORDERING SYSTEM PROVIDED HISTORY: Osteoporosis, unspecified osteoporosis   type, unspecified pathological fracture presence       Baseline on this machine. FINDINGS:   LUMBAR SPINE:       The bone mineral density in the lumbar spine including the L1 through L4   levels is measured at 0.767 g/cm2, which corresponds to a T-score of -2.5 and   a Z-score of 0.2. This is within the osteoporotic range by WHO criteria. Findings similar to prior where T-score was -2.4. LEFT HIP:       The bone mineral density in the total hip is measured at 0.696 g/cm2   corresponding to a T-score of -2.0 and a Z-score of 0.2. This is within the   osteopenic range by WHO criteria. The bone mineral density of the femoral neck is measured at 0.652 g/cm2   corresponding to a T-score of -1.8 and a Z-score of 0.6. This is within the   osteopenic range by WHO criteria. RIGHT HIP:       The bone mineral density in the total hip is measured at 0.716 g/cm2   corresponding to a T-score of -1.9 and a Z-score of 0.3. This is within the   osteopenic range by WHO criteria. The bone mineral density of the femoral neck is measured at 0.692 g/cm2   corresponding to a T-score of -1.4 and a Z-score of 1.0. This is within the   osteopenic range by WHO criteria. No left hip comparison is available. No substantial change in the right hip   with prior T-score -2.0. FRAX analysis was not reported because some T-scores are at or below -2.5. Impression   Osteoporosis by WHO criteria.          Past Medical History:   Diagnosis Date    CAD (coronary artery disease) 1/2012    non obstructive    Diabetes mellitus with neurological manifestation (HCC)     DKA, type 1, not at goal [facility]) 5/21/2022    Hyperlipidemia     Hyperlipidemia     Hypothyroidism (acquired)     Insomnia     Neuropathy     Parkinson disease ([facility]     Parkinson disease ([facility]     Parkinson disease ([facility]     Renal insufficiency     Restless legs syndrome     Type I (juvenile type) diabetes mellitus without mention of complication, not stated as uncontrolled       Patient Active Problem List   Diagnosis    Diabetic polyneuropathy (HCC)    Mixed hyperlipidemia    Hashimoto's thyroiditis    Acquired hypothyroidism    Chest pain    CAD (coronary artery disease)    Rotator cuff tendonitis    Osteoporosis    Parkinson disease ([facility]    Underweight    Labile hypertension    DKA, type 1, not at goal [facility])    Hypothyroidism (acquired)    Poorly controlled type 1 diabetes mellitus with neuropathy ([facility]    Essential hypertension    Coronary artery disease involving native coronary artery of native heart without angina pectoris     Past Surgical History:   Procedure Laterality Date    CARDIAC CATHETERIZATION  2012    non obstructive CAD    COLONOSCOPY      at age 71    DENTAL SURGERY      EYE SURGERY Bilateral     with lens implants    SKIN BIOPSY Right     neg skin biopsy on right arm    TONSILLECTOMY       Social History     Socioeconomic History    Marital status:      Spouse name: Not on file    Number of children: Not on file    Years of education: Not on file    Highest education level: Not on file   Occupational History    Not on file   Tobacco Use    Smoking status: Former     Packs/day: 0.25     Years: 4.00     Pack years: 1.00     Types: Cigarettes     Quit date: 1966     Years since quittin.8    Smokeless tobacco: Never   Vaping Use    Vaping Use: Never used   Substance and Sexual Activity    Alcohol use: Not Currently     Alcohol/week: 1.0 standard drink     Types: 1 Glasses of wine per week     Comment: social    Drug use: No    Sexual activity: Never     Partners: Male   Other Topics Concern    Not on file   Social History Narrative    Not on file     Social Determinants of Health     Financial Resource Strain: Low Risk Difficulty of Paying Living Expenses: Not hard at all   Food Insecurity: No Food Insecurity    Worried About Running Out of Food in the Last Year: Never true    Ran Out of Food in the Last Year: Never true   Transportation Needs: Not on file   Physical Activity: Not on file   Stress: Not on file   Social Connections: Not on file   Intimate Partner Violence: Not on file   Housing Stability: Not on file     Family History   Problem Relation Age of Onset    Cancer Father     Heart Disease Father     Heart Disease Mother     Cancer Other         sister's daughter - Melanoma    Heart Disease Brother     Arthritis Sister     Diabetes Neg Hx     Stroke Neg Hx     Osteoporosis Neg Hx     Thyroid Disease Neg Hx      Current Outpatient Medications   Medication Sig Dispense Refill    simvastatin (ZOCOR) 20 MG tablet TAKE ONE TABLET BY MOUTH ONCE NIGHTLY 90 tablet 3    midodrine (PROAMATINE) 5 MG tablet Take 2.5 mg by mouth in the morning and at bedtime      pramipexole (MIRAPEX) 0.5 MG tablet Take 1 tablet by mouth 2 times daily 2 tabs with dinner, 1 tab at HS (Patient taking differently: Take 0.5 mg by mouth 2 tabs with dinner, 1 tab at HS) 270 tablet 3    Acetone, Urine, Test (KETONE TEST) STRP Test ketones daily 100 strip 3    denosumab (PROLIA) 60 MG/ML SOSY SC injection Inject 60 mg into the skin every 6 months      insulin aspart (NOVOLOG) 100 UNIT/ML injection vial INJECT TOTAL DAILY DOSE 30 UNITS SUBCUTANEOUSLY WITH INSULIN PUMP 30 mL 3    levothyroxine (SYNTHROID) 100 MCG tablet TAKE 1 TABLET DAILY 90 tablet 3    carbidopa-levodopa (SINEMET)  MG per tablet Take 1 tablet by mouth 4 times daily (Patient taking differently: Take 1 tablet by mouth 2 tablets 4 times a day) 120 tablet 5    carbidopa-levodopa (SINEMET CR)  MG per extended release tablet Take 1 tablet by mouth nightly       aspirin 81 MG EC tablet Take 81 mg by mouth at bedtime       Multiple Vitamins-Minerals (CENTRUM SILVER) TABS Take 1 tablet Results   Component Value Date/Time    CHOL 155 10/06/2022 08:08 AM     Lab Results   Component Value Date/Time    TRIG 46 10/06/2022 08:08 AM     Lab Results   Component Value Date/Time    HDL 98 10/06/2022 08:08 AM    HDL 88 01/22/2012 01:25 PM     Lab Results   Component Value Date/Time    LDLCALC 48 10/06/2022 08:08 AM     Lab Results   Component Value Date/Time    LABVLDL 9 10/06/2022 08:08 AM     Lab Results   Component Value Date/Time    CHOLHDLRATIO 2.1 09/12/2013 08:52 AM     Lab Results   Component Value Date/Time    LABMICR 2.90 07/06/2022 08:54 AM    LABMICR Not Indicated 05/22/2022 02:50 PM     Lab Results   Component Value Date/Time    VITD25 59.2 10/06/2022 08:08 AM        Review of Systems:  Constitutional: has fatigue, no fever, no recent weight gain, has recent weight loss, has changes in appetite  Eyes: no eye pain, no change in vision, no eye redness, no eye irritation, no double vision  Ears, nose, throat: no nasal congestion, no sore throat, no earache, no decrease in hearing, no hoarseness, no dry mouth, no sinus problems, no difficulty swallowing, no neck lumps, no dental problems, no mouth sores, no ringing in ears  Pulmonary: sometimes has shortness of breath, no wheezing, has dyspnea on exertion, no cough  Cardiovascular: no chest pain, has lower extremity edema, no orthopnea, no intermittent leg claudication, no palpitations  Gastrointestinal: no abdominal pain, no nausea, no vomiting, no diarrhea, no constipation, no dysphagia, no heartburn, no bloating  Genitourinary: no dysuria, has sometimes urinary incontinence, no urinary hesitancy, no urinary frequency, no feelings of urinary urgency, no nocturia  Musculoskeletal: no joint swelling, no joint stiffness, no joint pain, no muscle cramps, no muscle pain, no bone pain  Integument/Breast: no hair loss, no skin rashes, no skin lesions, no itching, no dry skin  Neurological: no numbness, no tingling, no weakness, no confusion, no headaches, no dizziness, no fainting, no tremors, no decrease in memory, has balance problems  Psychiatric: no anxiety, no depression, no insomnia  Hematologic/Lymphatic: no tendency for easy bleeding, no swollen lymph nodes, no tendency for easy bruising  Immunology: no seasonal allergies, no frequent infections, no frequent illnesses  Endocrine: no temperature intolerance    /70   Pulse 64   Temp 98 °F (36.7 °C)   Resp 14   Ht 5' 2\" (1.575 m)   Wt 91 lb 12.8 oz (41.6 kg)   SpO2 98%   BMI 16.79 kg/m²    Wt Readings from Last 3 Encounters:   10/13/22 91 lb 12.8 oz (41.6 kg)   09/14/22 93 lb 6.4 oz (42.4 kg)   09/01/22 95 lb 6.4 oz (43.3 kg)     Body mass index is 16.79 kg/m².       OBJECTIVE:  Constitutional: no acute distress, well appearing and well nourished  Psychiatric: oriented to person, place and time, judgement and insight and normal, recent and remote memory and intact and mood and affect are normal  Skin: skin and subcutaneous tissue is normal without mass, normal turgor  Head and Face: examination of head and face revealed no abnormalities  Eyes: no lid or conjunctival swelling, erythema or discharge, pupils are normal, equal, round, reactive to light  Ears/Nose: external inspection of ears and nose revealed no abnormalities, hearing is grossly normal  Oropharynx/Mouth/Face: lips, tongue and gums are normal with no lesions, the voice quality was normal  Neck: neck is supple and symmetric, with midline trachea and no masses, thyroid is normal  Lymphatics: normal cervical lymph nodes, normal supraclavicular nodes  Pulmonary: no increased work of breathing or signs of respiratory distress, lungs are clear to auscultation  Cardiovascular: normal heart rate and rhythm, normal S1 and S2, no murmurs and pedal pulses and 2+ bilaterally, No edema  Abdomen: abdomen is soft, non-tender with no masses  Musculoskeletal: normal gait and station and exam of the digits and nails are normal  Neurological: normal coordination and normal general cortical function    Visual inspection:  Deformity/amputation: absent  Skin lesions/pre-ulcerative calluses: absent  Edema: right- negative, left- negative     Sensory exam:  Monofilament sensation: normal  (minimum of 5 random plantar locations tested, avoiding callused areas - > 1 area with absence of sensation is + for neuropathy)     Plus at least one of the following:  Pulses: normal  Proprioception: Intact  Vibration (128 Hz): Impaired    ASSESSMENT/PLAN:  1. Type 1 diabetes, poorly controlled, with neuropathy ([address]   Will do Guardian until 12/2022, then upgrade the pump  Target HbA1C 7.5  Had hospital admission recently for DKA. DKA resolved. Blood glucose remains uncontrolled. Patient has older Medtronic MiniMed insulin pump. She would benefit from closed-loop system. We will address this with Medtronic educator. Answered multiple questions regarding hypoglycemia and hyperglycemia management, bolus injections, brittle diabetes, CGM use, discrepancy between fingersticks and CGM, benefits of CGM, insulin action, water intake, ketone measurement, hyperglycemia troubleshooting, adequate calorie and carbohydrate intake, maintaining healthy weight  Recommend to do boluses before meal  Hypoglycemia management  Do not underestimate carbohydrates  Diabetes educator follow up    On Medtronic Minimed insulin pump and Dexcom CGM  Hemoglobin A1c 7.8-7.1-7.6-7.5-7.6-7.1-7.2-7.0-7.1-7.6-7.4-7.3  Average blood glucose 162±58  Bolus amount 61%, basal amount 39%  Carbohydrate intake per day 122±22  Very wide fluctuations of blood glucose anywhere from 70 to 400  Dexcom data:  Average glucose 165, standard deviation 63  Time in range 63%, above target 34%, below target less than 3%. Postprandial hyperglycemia, mostly after breakfast.  Continue close monitoring.  - Hemoglobin A1C; Future  - Comprehensive Metabolic Panel; Future  - Lipid Panel;  Future  - Microalbumin / Creatinine Urine Ratio; Future    2. Osteoporosis, unspecified osteoporosis type, unspecified pathological fracture presence  T-score -2.9 in 2007  DXA 2007 Osteoporosis, had Reclast 5 years  No upset stomach  No GERD  Decrease vitamin D 2000 IU to every other day for summer months.  3/2022 DEXA similar bone density, no direct comparison, did in different place  Continue Prolia   Started 6/2021  25 hydroxy vitamin D 50-49-47-50.6-52.2-63.8-55.4-53.4-63.3-46.1-64.1  Calcium 9.5  - Vitamin D 25 Hydroxy; Future    3. Hashimoto's thyroiditis  TSH 2.0  - T4, Free; Future  - TSH without Reflex; Future    4. Acquired hypothyroidism  TSH 2.5-3.0-2.38-1.12-2.85-7.29-3.4-5.05-3.98-3.38-1.82  - levothyroxine (SYNTHROID) 100 MCG tablet; Take 1 tablet by mouth daily      5. Coronary artery disease involving native coronary artery of native heart without angina pectoris  Follow with Cardiology    6. Type 1 diabetes, poorly controlled, with retinopathy ([facility]  - Hemoglobin A1C; Future  - Comprehensive Metabolic Panel; Future  - Lipid Panel; Future  - Microalbumin / Creatinine Urine Ratio; Future    7. Underweight  Dietician follow up  Supplements with protein, adequate caloric intake is recommended  - ACTH; Future  - Cortisol AM, Total; Future  On Glucerna    8.  Mixed hyperlipidemia  LDL 37-62-85-25-18-59-56-89-52-52-51-48  Lipid panel  Simvastatin 20 mg daily    Reviewed and/or ordered clinical lab results Yes  Reviewed and/or ordered radiology tests Yes  Reviewed and/or ordered other diagnostic tests No  Discussed test results with performing physician No  Independently reviewed image, tracing, or specimen Yes Medtronic MiniMed insulin pump data, total 7 pages, Dexcom data, total 15 pages  Made a decision to obtain old records No  Reviewed and summarized old records Yes  TSH 3.89, HbA1C 7.7, LDL 49  Obtained history from other than patient yes, patient's     Sue Rice was counseled regarding symptoms of diabetes diagnosis, course and complications of disease if inadequately treated, side effects of medications, diagnosis, treatment options, and prognosis, risks, benefits, complications, and alternatives of treatment, labs, imaging and other studies and treatment targets and goals, insulin pump adjustments, hypoglycemia management, brittle diabetes, fluctuations of blood glucose, osteoporosis treatment, insulin pump data and interpretation, recommendations, other diabetes related questions and concerns, see above. She understands instructions and counseling. Total time I spent for this encounter 40 minutes    Return in about 3 months (around 1/13/2023) for diabetes.

## 2022-10-16 ENCOUNTER — PATIENT MESSAGE (OUTPATIENT)
Dept: FAMILY MEDICINE CLINIC | Age: 82
End: 2022-10-16

## 2022-10-17 NOTE — TELEPHONE ENCOUNTER
From: Janie De La Cruz  To: Dr. Chantell Burroughs: 10/16/2022 12:42 PM EDT  Subject: flu shot    Dr. Sheri Angel,    I got my flu shot yesterday, October 15, at [facility] on [address] [facility]. Please update \"My Chart\" with this information. Thanks.     Janie De La Cruz  : [birth year]

## 2022-11-03 ENCOUNTER — TELEPHONE (OUTPATIENT)
Dept: ENDOCRINOLOGY | Age: 82
End: 2022-11-03

## 2022-11-03 NOTE — TELEPHONE ENCOUNTER
[facility] medical calling to request written order be sent to them for patient's dm supplies.         Fax#     [phone]

## 2022-11-07 ENCOUNTER — PATIENT MESSAGE (OUTPATIENT)
Dept: FAMILY MEDICINE CLINIC | Age: 82
End: 2022-11-07

## 2022-11-08 ENCOUNTER — OFFICE VISIT (OUTPATIENT)
Dept: ENDOCRINOLOGY | Age: 82
End: 2022-11-08
Payer: MEDICARE

## 2022-11-08 DIAGNOSIS — E10.65 POORLY CONTROLLED TYPE 1 DIABETES MELLITUS WITH NEUROPATHY (HCC): Primary | ICD-10-CM

## 2022-11-08 DIAGNOSIS — E10.40 POORLY CONTROLLED TYPE 1 DIABETES MELLITUS WITH NEUROPATHY (HCC): Primary | ICD-10-CM

## 2022-11-08 PROCEDURE — G8428 CUR MEDS NOT DOCUMENT: HCPCS | Performed by: DIETITIAN, REGISTERED

## 2022-11-08 PROCEDURE — 3051F HG A1C>EQUAL 7.0%<8.0%: CPT | Performed by: DIETITIAN, REGISTERED

## 2022-11-08 PROCEDURE — 97803 MED NUTRITION INDIV SUBSEQ: CPT | Performed by: DIETITIAN, REGISTERED

## 2022-11-08 PROCEDURE — G8418 CALC BMI BLW LOW PARAM F/U: HCPCS | Performed by: DIETITIAN, REGISTERED

## 2022-11-08 NOTE — TELEPHONE ENCOUNTER
From: Rocael Mcpherson  To: Dr. Varma Sero: 11/7/2022 5:11 PM EST  Subject: Bailey Flores, For the record, I received my COVID Booster on 10/27/2022. Attached is a copy of my Record Card for this booster.     Thanks  Rocael Mcpherson

## 2022-11-08 NOTE — PROGRESS NOTES
Medical Nutrition Therapy for Diabetes    Harriet Chairez  November 8, 2022      Patient Care Team:  Ilya Fish MD as PCP - Hemanth Soto MD as PCP - [facility] Empaneled Provider  VON Wise - CNP as Nurse Practitioner (Nurse Practitioner)    Reason for visit: Type 1 Diabetes    ASSESSMENT/PLAN:   NUTRITION DIAGNOSIS  Follow up    #1 Problem: Altered Nutrition-Related Laboratory Values (NC-2.2)  Related to: Endocrine/Diabetes  As Evidenced by: Elevated Plasma glucose and/or HgbA1c levels         #2 Problem: Excessive Carbohydrate Intake (NI-5.8. 2)  Related to: Food-and nutrition-related knowledge deficit concerning appropriate amount of carbohydrate intake  As evidenced by: Estimated carbohydrate intake that is consistently more than the recommended amounts     #3 Problem:  Food-Medication Interaction (NC-2.3)  Related to: Diabetes medications  As evidenced by: Hypoglycemia episodes    NUTRITION INTERVENTION  Nutrition Prescription: 45-50 grams carbohydrate per meals, include protein with each meal    Diabetes Education/Counseling included:  Carbohydrate Control and Hypoyglycemia prevention/treatment  Explained benefits of including protein with each meal.  Reviewed the slow response of glucose when large amount of fat consumed. Discussed strategies toward weight gain. Reinforced rational for taking fingerstick when CGM reports low readings. Interventions:  Recommended having 3 - 4 ounces protein each meal.  Suggested using light weights to promote increased muscle mass/gain. Advised considering personal training sessions. Suggested 1 unit reduction of bolus insulin when high fat meal consumed. Encouraged taking fingerstick when CGM alerts for hypoglycemia.   Handouts:  Managing and Preventing hypoglycemia-AND, Planning Healthy Meals-[employer]   NUTRITION MONITORING AND EVALUATION  45-50 gram carbohydrate meals  Include protein each meal and snack as needed  Check fingerstick when CGM indicates glucose is low  Continue drinking water       Patient Active Problem List   Diagnosis    Diabetic polyneuropathy (HCC)    Mixed hyperlipidemia    Hashimoto's thyroiditis    Acquired hypothyroidism    Chest pain    CAD (coronary artery disease)    Rotator cuff tendonitis    Osteoporosis    Parkinson disease ([facility] [address]    Underweight    Labile hypertension    DKA, type 1, not at goal [facility])    Hypothyroidism (acquired)    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 May repeat in 15 minutes.  1 kit 3    simvastatin (ZOCOR) 20 MG tablet TAKE ONE TABLET BY MOUTH ONCE NIGHTLY 90 tablet 3    midodrine (PROAMATINE) 5 MG tablet Take 2.5 mg by mouth in the morning and at bedtime      pramipexole (MIRAPEX) 0.5 MG tablet Take 1 tablet by mouth 2 times daily 2 tabs with dinner, 1 tab at HS (Patient taking differently: Take 0.5 mg by mouth 2 tabs with dinner, 1 tab at HS) 270 tablet 3    Acetone, Urine, Test (KETONE TEST) STRP Test ketones daily 100 strip 3    denosumab (PROLIA) 60 MG/ML SOSY SC injection Inject 60 mg into the skin every 6 months      insulin aspart (NOVOLOG) 100 UNIT/ML injection vial INJECT TOTAL DAILY DOSE 30 UNITS SUBCUTANEOUSLY WITH INSULIN PUMP 30 mL 3    levothyroxine (SYNTHROID) 100 MCG tablet TAKE 1 TABLET DAILY 90 tablet 3    carbidopa-levodopa (SINEMET)  MG per tablet Take 1 tablet by mouth 4 times daily (Patient taking differently: Take 1 tablet by mouth 2 tablets 4 times a day) 120 tablet 5    Continuous Blood Gluc Transmit (DEXCOM G6 TRANSMITTER) MISC Change transmitter every 3 months (Patient not taking: Reported on 10/13/2022) 4 each 0 carbidopa-levodopa (SINEMET CR)  MG per extended release tablet Take 1 tablet by mouth nightly       aspirin 81 MG EC tablet Take 81 mg by mouth at bedtime       Multiple Vitamins-Minerals (CENTRUM SILVER) TABS Take 1 tablet by mouth daily       Cholecalciferol (VITAMIN D3) 2000 UNITS CAPS Take 1 capsule by mouth daily       No current facility-administered medications for this visit.          NUTRITION ASSESSMENT    Biochemical Data:    Lab Results   Component Value Date    LABA1C 7.3 10/06/2022     Lab Results   Component Value Date    .8 10/06/2022       Lab Results   Component Value Date    CHOL 155 10/06/2022    CHOL 161 07/06/2022    CHOL 150 03/11/2022     Lab Results   Component Value Date    TRIG 46 10/06/2022    TRIG 87 07/06/2022    TRIG 36 03/11/2022     Lab Results   Component Value Date    HDL 98 (H) 10/06/2022    HDL 93 (H) 07/06/2022    HDL 91 (H) 03/11/2022     Lab Results   Component Value Date    LDLCALC 48 10/06/2022    LDLCALC 51 07/06/2022    LDLCALC 52 03/11/2022     Lab Results   Component Value Date    LABVLDL 9 10/06/2022    LABVLDL 17 07/06/2022    LABVLDL 7 03/11/2022     Lab Results   Component Value Date    CHOLHDLRATIO 2.1 09/12/2013    CHOLHDLRATIO 1.9 04/18/2012    CHOLHDLRATIO 2.0 02/22/2012       Lab Results   Component Value Date    WBC 10.8 05/22/2022    HGB 10.8 (L) 05/22/2022    HCT 33.7 (L) 05/22/2022    MCV 96.9 05/22/2022     05/22/2022       Lab Results   Component Value Date    CREATININE 1.0 10/06/2022    BUN 29 (H) 10/06/2022     10/06/2022    K 4.6 10/06/2022     10/06/2022    CO2 29 10/06/2022       Diabetes Medications: Yes, [employer]tr[employer] pump  Knows name and dose of prescribed medications Yes  Knows prescribed schedule for medicationsYes  Recent change in medication type/dosage: No  Stores  medications properlyYes  Comments:     Monitoring:   Has BG meter: Yes, CGM Guardian  Testing frequency: multiple  Recent results: 180-200s  Hypoglycemia? Yes, CGM indicated glucose at 40 mg/dl, no fingerstick taken to confirm,,no symptoms noted    Anthropometric Measurements: Wt:   Wt Readings from Last 3 Encounters:   10/13/22 91 lb 12.8 oz (41.6 kg)   09/14/22 93 lb 6.4 oz (42.4 kg)   09/01/22 95 lb 6.4 oz (43.3 kg)      BMI:   BMI Readings from Last 3 Encounters:   10/13/22 16.79 kg/m²   09/14/22 17.08 kg/m²   09/01/22 17.45 kg/m²     Patient's stated goal weight:   7% Weight loss goal weight:     Physical Activity History:   Physical activity: house and yard work  Frequency of activity: daily  Duration of activity: 45-60 minutes  Obstacles to activity: none    Sleep: good, seems to be have increased frequency of hypoglycemia    Food and Nutrition History:   Nutrition Awareness/Previous DSMES: yes  Number of people in household: 2  Frequency of Meals Eaten away from home:1/week  Food Availability Problems  Within the past 12 months, have you worried that your food would run out before you got money to buy more? No  Within the past 12 months, has the food you bought not lasted till the end of the month and you didn't have money to get more? No  Beverage consumption: water - 48-60 ounces/day  Alcohol consumption: 4 ounces wine per week  Usual Food consumption:   3 meals,  1-2 snacks, 40-50 gram carbohydrate meals, protein not always included    Barriers:   -none          Follow Up Plan: Will remain available. Contact information given.      Referring Provider: Sadie Sandoval MD  Time spent with patient: 30 minutes

## 2022-12-05 ENCOUNTER — TELEPHONE (OUTPATIENT)
Dept: ENDOCRINOLOGY | Age: 82
End: 2022-12-05

## 2022-12-05 DIAGNOSIS — E06.3 HASHIMOTO'S THYROIDITIS: ICD-10-CM

## 2022-12-05 DIAGNOSIS — E10.40 POORLY CONTROLLED TYPE 1 DIABETES MELLITUS WITH NEUROPATHY (HCC): Primary | ICD-10-CM

## 2022-12-05 DIAGNOSIS — I10 ESSENTIAL HYPERTENSION: ICD-10-CM

## 2022-12-05 DIAGNOSIS — M81.0 OSTEOPOROSIS, UNSPECIFIED OSTEOPOROSIS TYPE, UNSPECIFIED PATHOLOGICAL FRACTURE PRESENCE: ICD-10-CM

## 2022-12-05 DIAGNOSIS — E10.65 POORLY CONTROLLED TYPE 1 DIABETES MELLITUS WITH NEUROPATHY (HCC): Primary | ICD-10-CM

## 2022-12-05 NOTE — TELEPHONE ENCOUNTER
Pt would like to speak to nurse. She has questions about starting prolia and getting lab work for her next appt in February.     Would like a call back after 2pm.  Call back # [phone]

## 2022-12-05 NOTE — TELEPHONE ENCOUNTER
Spoke w/ pt. Scheduled prolia INJ    Please place new orders for labs, whatever you would like her to have done before January visit.

## 2022-12-16 ENCOUNTER — OFFICE VISIT (OUTPATIENT)
Dept: FAMILY MEDICINE CLINIC | Age: 82
End: 2022-12-16
Payer: MEDICARE

## 2022-12-16 VITALS
WEIGHT: 95.4 LBS | SYSTOLIC BLOOD PRESSURE: 118 MMHG | DIASTOLIC BLOOD PRESSURE: 68 MMHG | HEART RATE: 67 BPM | OXYGEN SATURATION: 95 % | BODY MASS INDEX: 17.45 KG/M2

## 2022-12-16 DIAGNOSIS — I25.10 CORONARY ARTERY DISEASE INVOLVING NATIVE CORONARY ARTERY OF NATIVE HEART WITHOUT ANGINA PECTORIS: ICD-10-CM

## 2022-12-16 DIAGNOSIS — Z00.00 MEDICARE ANNUAL WELLNESS VISIT, SUBSEQUENT: Primary | ICD-10-CM

## 2022-12-16 DIAGNOSIS — R09.89 LABILE HYPERTENSION: ICD-10-CM

## 2022-12-16 DIAGNOSIS — E78.2 MIXED HYPERLIPIDEMIA: ICD-10-CM

## 2022-12-16 DIAGNOSIS — R39.9 UTI SYMPTOMS: ICD-10-CM

## 2022-12-16 DIAGNOSIS — E03.9 HYPOTHYROIDISM (ACQUIRED): ICD-10-CM

## 2022-12-16 DIAGNOSIS — M81.0 OSTEOPOROSIS, UNSPECIFIED OSTEOPOROSIS TYPE, UNSPECIFIED PATHOLOGICAL FRACTURE PRESENCE: ICD-10-CM

## 2022-12-16 DIAGNOSIS — E10.10 DKA, TYPE 1, NOT AT GOAL (HCC): ICD-10-CM

## 2022-12-16 DIAGNOSIS — R63.6 UNDERWEIGHT: ICD-10-CM

## 2022-12-16 DIAGNOSIS — G20 PARKINSON DISEASE (HCC): ICD-10-CM

## 2022-12-16 LAB
BILIRUBIN, POC: NORMAL
BLOOD URINE, POC: NORMAL
CLARITY, POC: CLEAR
COLOR, POC: YELLOW
GLUCOSE URINE, POC: NORMAL
KETONES, POC: 15
LEUKOCYTE EST, POC: NORMAL
NITRITE, POC: NORMAL
PH, POC: 5.5
PROTEIN, POC: NORMAL
SPECIFIC GRAVITY, POC: 1.02
UROBILINOGEN, POC: 1

## 2022-12-16 PROCEDURE — 3078F DIAST BP <80 MM HG: CPT | Performed by: FAMILY MEDICINE

## 2022-12-16 PROCEDURE — G8484 FLU IMMUNIZE NO ADMIN: HCPCS | Performed by: FAMILY MEDICINE

## 2022-12-16 PROCEDURE — 81002 URINALYSIS NONAUTO W/O SCOPE: CPT | Performed by: FAMILY MEDICINE

## 2022-12-16 PROCEDURE — 3074F SYST BP LT 130 MM HG: CPT | Performed by: FAMILY MEDICINE

## 2022-12-16 PROCEDURE — G0439 PPPS, SUBSEQ VISIT: HCPCS | Performed by: FAMILY MEDICINE

## 2022-12-16 PROCEDURE — 3051F HG A1C>EQUAL 7.0%<8.0%: CPT | Performed by: FAMILY MEDICINE

## 2022-12-16 PROCEDURE — 1123F ACP DISCUSS/DSCN MKR DOCD: CPT | Performed by: FAMILY MEDICINE

## 2022-12-16 ASSESSMENT — PATIENT HEALTH QUESTIONNAIRE - PHQ9
SUM OF ALL RESPONSES TO PHQ QUESTIONS 1-9: 0
2. FEELING DOWN, DEPRESSED OR HOPELESS: 0
1. LITTLE INTEREST OR PLEASURE IN DOING THINGS: 0
SUM OF ALL RESPONSES TO PHQ QUESTIONS 1-9: 0
SUM OF ALL RESPONSES TO PHQ9 QUESTIONS 1 & 2: 0
SUM OF ALL RESPONSES TO PHQ QUESTIONS 1-9: 0
SUM OF ALL RESPONSES TO PHQ QUESTIONS 1-9: 0

## 2022-12-16 ASSESSMENT — LIFESTYLE VARIABLES: HOW OFTEN DO YOU HAVE A DRINK CONTAINING ALCOHOL: NEVER

## 2022-12-16 NOTE — PATIENT INSTRUCTIONS
Learning About Being Active as an Older Adult  Why is being active important as you get older? Being active is one of the best things you can do for your health. And it's never too late to start. Being active--or getting active, if you aren't already--has definite benefits. It can:  Give you more energy,  Keep your mind sharp. Improve balance to reduce your risk of falls. Help you manage chronic illness with fewer medicines. No matter how old you are, how fit you are, or what health problems you have, there is a form of activity that will work for you. And the more physical activity you can do, the better your overall health will be. What kinds of activity can help you stay healthy? Being more active will make your daily activities easier. Physical activity includes planned exercise and things you do in daily life. There are four types of activity:  Aerobic. Doing aerobic activity makes your heart and lungs strong. Includes walking, dancing, and gardening. Aim for at least 2½ hours spread throughout the week. It improves your energy and can help you sleep better. Muscle-strengthening. This type of activity can help maintain muscle and strengthen bones. Includes climbing stairs, using resistance bands, and lifting or carrying heavy loads. Aim for at least twice a week. It can help protect the knees and other joints. Stretching. Stretching gives you better range of motion in joints and muscles. Includes upper arm stretches, calf stretches, and gentle yoga. Aim for at least twice a week, preferably after your muscles are warmed up from other activities. It can help you function better in daily life. Balancing. This helps you stay coordinated and have good posture. Includes heel-to-toe walking, crystal chi, and certain types of yoga. Aim for at least 3 days a week. It can reduce your risk of falling.   Even if you have a hard time meeting the recommendations, it's better to be more active than less active. All activity done in each category counts toward your weekly total. You'd be surprised how daily things like carrying groceries, keeping up with grandchildren, and taking the stairs can add up. What keeps you from being active? If you've had a hard time being more active, you're not alone. Maybe you remember being able to do more. Or maybe you've never thought of yourself as being active. It's frustrating when you can't do the things you want. Being more active can help. What's holding you back? Getting started. Have a goal, but break it into easy tasks. Small steps build into big accomplishments. Staying motivated. If you feel like skipping your activity, remember your goal. Maybe you want to move better and stay independent. Every activity gets you one step closer. Not feeling your best.  Start with 5 minutes of an activity you enjoy. Prove to yourself you can do it. As you get comfortable, increase your time. You may not be where you want to be. But you're in the process of getting there. Everyone starts somewhere. How can you find safe ways to stay active? Talk with your doctor about any physical challenges you're facing. Make a plan with your doctor if you have a health problem or aren't sure how to get started with activity. If you're already active, ask your doctor if there is anything you should change to stay safe as your body and health change. If you tend to feel dizzy after you take medicine, avoid activity at that time. Try being active before you take your medicine. This will reduce your risk of falls. If you plan to be active at home, make sure to clear your space before you get started. Remove things like TV cords, coffee tables, and throw rugs. It's safest to have plenty of space to move freely. The key to getting more active is to take it slow and steady. Try to improve only a little bit at a time.  Pick just one area to improve on at first. And if an activity hurts, your test results and keep a list of the medicines you take. What should you include in an advance directive? Many states have a unique advance directive form. (It may ask you to address specific issues.) Or you might use a universal form that's approved by many states. If your form doesn't tell you what to address, it may be hard to know what to include in your advance directive. Use the questions below to help you get started. Who do you want to make decisions about your medical care if you are not able to? What life-support measures do you want if you have a serious illness that gets worse over time or can't be cured? What are you most afraid of that might happen? (Maybe you're afraid of having pain, losing your independence, or being kept alive by machines.)  Where would you prefer to die? (Your home? A hospital? A nursing home?)  Do you want to donate your organs when you die? Do you want certain Scientology practices performed before you die? When should you call for help? Be sure to contact your doctor if you have any questions. Where can you learn more? Go to http://[employer].matias.com/ and enter R264 to learn more about \"Advance Directives: Care Instructions. \"  Current as of: June 16, 2022               Content Version: 13.5  © 5804-0602 Healthwise, Incorporated. Care instructions adapted under license by [facility] ([facility]). If you have questions about a medical condition or this instruction, always ask your healthcare professional. [place] [address] any warranty or liability for your use of this information. Personalized Preventive Plan for Leno Erb - 12/16/2022  Medicare offers a range of preventive health benefits. Some of the tests and screenings are paid in full while other may be subject to a deductible, co-insurance, and/or copay.     Some of these benefits include a comprehensive review of your medical history including lifestyle, illnesses that may run in your family, and various assessments and screenings as appropriate. After reviewing your medical record and screening and assessments performed today your provider may have ordered immunizations, labs, imaging, and/or referrals for you. A list of these orders (if applicable) as well as your Preventive Care list are included within your After Visit Summary for your review. Other Preventive Recommendations:    A preventive eye exam performed by an eye specialist is recommended every 1-2 years to screen for glaucoma; cataracts, macular degeneration, and other eye disorders. A preventive dental visit is recommended every 6 months. Try to get at least 150 minutes of exercise per week or 10,000 steps per day on a pedometer . Order or download the FREE \"Exercise & Physical Activity: Your Everyday Guide\" from [employer]. Call 9-986.372.1488 or search [employer] online. You need 0953-6688 mg of calcium and 7686-6637 IU of vitamin D per day. It is possible to meet your calcium requirement with diet alone, but a vitamin D supplement is usually necessary to meet this goal.  When exposed to the sun, use a sunscreen that protects against both UVA and UVB radiation with an SPF of 30 or greater. Reapply every 2 to 3 hours or after sweating, drying off with a towel, or swimming. Always wear a seat belt when traveling in a car. Always wear a helmet when riding a bicycle or motorcycle.

## 2022-12-16 NOTE — PROGRESS NOTES
Medicare Annual Wellness Visit    Leia Agarwal is here for Medicare AWV    Assessment & Plan   Medicare annual wellness visit, subsequent      Recommendations for Preventive Services Due: see orders and patient instructions/AVS.  Recommended screening schedule for the next 5-10 years is provided to the patient in written form: see Patient Instructions/AVS.     Return for Medicare Annual Wellness Visit in 1 year. Subjective   Has trouble maintainning wt  Lives with   Still drives, mild constipation  Has urinary incontinence  No falls, no assistive divices, no hearing aids, wears glasses  Patient's complete Health Risk Assessment and screening values have been reviewed and are found in Flowsheets. The following problems were reviewed today and where indicated follow up appointments were made and/or referrals ordered. Positive Risk Factor Screenings with Interventions:                 Weight and Activity:  Physical Activity: Inactive    Days of Exercise per Week: 0 days    Minutes of Exercise per Session: 0 min     On average, how many days per week do you engage in moderate to strenuous exercise (like a brisk walk)?: 0 days  Have you lost any weight without trying in the past 3 months?: No         Inactivity Interventions:  Patient declined any further interventions or treatment  Underweight Interventions:  Patient declines any further evaluation or treatment             CV Risk Counseling:  Patient was asked about her current diet and exercise habits, and personalized advice was provided regarding recommended lifestyle changes. Patient's individual cardiovascular disease risk factors, including advanced age (> 54 for men, > 65 for women) and sedentary lifestyle, were discussed, as well as the likely benefits of lifestyle changes.  Based upon patient's motivation to change her behavior, the following plan was agreed upon to work toward lowering cardiovascular disease risk: increase physical activity, as tolerated. Aspirin use for primary prevention of cardiovascular disease for men 45-79 and women 55-79: Not indicated. Educational materials for lifestyle changes were provided. Patient will follow-up in 6 month(s) with cardiologist. Provider spent 5 minutes counseling patient. Objective   Vitals:    12/16/22 1035   BP: 118/68   Pulse: 67   SpO2: 95%   Weight: 95 lb 6.4 oz (43.3 kg)      Body mass index is 17.45 kg/m². General Appearance: alert and oriented to person, place and time, well-developed and well-nourished, in no acute distress  ENT: tympanic membrane, external ear and ear canal normal bilaterally, oropharynx clear and moist with normal mucous membranes  Neck: neck supple and non tender without mass, no thyromegaly or thyroid nodules, no cervical lymphadenopathy   Pulmonary/Chest: clear to auscultation bilaterally- no wheezes, rales or rhonchi, normal air movement, no respiratory distress  Cardiovascular: normal rate, normal S1 and S2, no gallops, intact distal pulses, and no carotid bruits  Abdomen: soft, non-tender, non-distended, normal bowel sounds, no masses or organomegaly  Extremities: no cyanosis and no clubbing       No Known Allergies  Prior to Visit Medications    Medication Sig Taking?  Authorizing Provider   simvastatin (ZOCOR) 20 MG tablet TAKE ONE TABLET BY MOUTH ONCE NIGHTLY Yes Riley Hale MD   midodrine (PROAMATINE) 5 MG tablet Take 2.5 mg by mouth in the morning and at bedtime Yes Historical Provider, MD   pramipexole (MIRAPEX) 0.5 MG tablet Take 1 tablet by mouth 2 times daily 2 tabs with dinner, 1 tab at HS  Patient taking differently: Take 0.5 mg by mouth 2 tabs with dinner, 1 tab at HS Yes Ilya Fish MD   Acetone, Urine, Test (KETONE TEST) STRP Test ketones daily Yes Riley Hale MD   denosumab (PROLIA) 60 MG/ML SOSY SC injection Inject 60 mg into the skin every 6 months Yes Historical Provider, MD   insulin aspart (NOVOLOG) 100 UNIT/ML injection vial INJECT TOTAL DAILY DOSE 30 UNITS SUBCUTANEOUSLY WITH INSULIN PUMP Yes Sruthi Ansari MD   levothyroxine (SYNTHROID) 100 MCG tablet TAKE 1 TABLET DAILY Yes Margaux Hamilton MD   carbidopa-levodopa (SINEMET)  MG per tablet Take 1 tablet by mouth 4 times daily  Patient taking differently: Take 1 tablet by mouth 2 tablets 4 times a day Yes Margaux Hamilton MD   Continuous Blood Gluc Transmit (DEXCOM G6 TRANSMITTER) MISC Change transmitter every 3 months Yes Sruthi Ansari MD   carbidopa-levodopa (SINEMET CR)  MG per extended release tablet Take 1 tablet by mouth nightly  Yes Historical Provider, MD   aspirin 81 MG EC tablet Take 81 mg by mouth at bedtime  Yes Historical Provider, MD   Multiple Vitamins-Minerals (CENTRUM SILVER) TABS Take 1 tablet by mouth daily  Yes Historical Provider, MD   Cholecalciferol (VITAMIN D3) 2000 UNITS CAPS Take 1 capsule by mouth daily Yes Historical Provider, MD   Glucagon, rDNA, (GLUCAGON EMERGENCY) 1 MG KIT Infuse 1 mg intravenously once for 1 dose Use for low blood glucose emergencies. May repeat in 15 minutes. Sruthi Ansari MD     Encounter Diagnoses   Name Primary?     Medicare annual wellness visit, subsequent Yes    Parkinson disease ([facility] [address]     DKA, type 1, not at goal [facility])     Underweight     Osteoporosis, unspecified osteoporosis type, unspecified pathological fracture presence     Mixed hyperlipidemia     Hypothyroidism (acquired)     Low bp - on midodrine     Coronary artery disease involving native coronary artery of native heart without angina pectoris       Lab Results   Component Value Date     10/06/2022    K 4.6 10/06/2022     10/06/2022    CO2 29 10/06/2022    BUN 29 (H) 10/06/2022    CREATININE 1.0 10/06/2022    GLUCOSE 170 (H) 10/06/2022    CALCIUM 10.1 10/06/2022    PROT 6.7 10/06/2022    LABALBU 4.5 10/06/2022    BILITOT 0.6 10/06/2022    ALKPHOS 55 10/06/2022    AST 25 10/06/2022    ALT 6 (L) 10/06/2022    LABGLOM 53 (A) 10/06/2022 GFRAA >60 10/06/2022    AGRATIO 2.0 10/06/2022    GLOB 2.6 06/02/2021     Lab Results   Component Value Date    CHOL 155 10/06/2022    CHOL 161 07/06/2022    CHOL 150 03/11/2022     Lab Results   Component Value Date    TRIG 46 10/06/2022    TRIG 87 07/06/2022    TRIG 36 03/11/2022     Lab Results   Component Value Date    HDL 98 (H) 10/06/2022    HDL 93 (H) 07/06/2022    HDL 91 (H) 03/11/2022     Lab Results   Component Value Date    LDLCALC 48 10/06/2022    LDLCALC 51 07/06/2022    LDLCALC 52 03/11/2022     Lab Results   Component Value Date    LABVLDL 9 10/06/2022    LABVLDL 17 07/06/2022    LABVLDL 7 03/11/2022     Lab Results   Component Value Date    CHOLHDLRATIO 2.1 09/12/2013    CHOLHDLRATIO 1.9 04/18/2012    CHOLHDLRATIO 2.0 02/22/2012      Lab Results   Component Value Date    TSH 1.82 10/06/2022    TSHREFLEX 1.56 11/19/2014      Hemoglobin A1C   Date Value Ref Range Status   10/06/2022 7.3 See comment % Final     Comment:     Comment:  Diagnosis of Diabetes: > or = 6.5%  Increased risk of diabetes (Prediabetes): 5.7-6.4%  Glycemic Control: Nonpregnant Adults: <7.0%                    Pregnant: <6.0%             Lab Results   Component Value Date    WBC 10.8 05/22/2022    HGB 10.8 (L) 05/22/2022    HCT 33.7 (L) 05/22/2022    MCV 96.9 05/22/2022     05/22/2022      CareTeam (Including outside providers/suppliers regularly involved in providing care):   Patient Care Team:  Adan Veornica MD as PCP - Lynnette Delarosa MD as PCP - [facility] Empan[facility]  VON Pinzon - CNP as Nurse Practitioner (Nurse Practitioner)[place]  Neuro - [place]  Opthal - [place]  Podiatrist - Ashly DOAN [address] [place] [address]   Dentist  periodontist   Reviewed and updated this visit:  Tobacco  Allergies  Meds  Med Hx  Surg Hx  Soc Hx  Fam Hx      Overall health is good.   Encouraged her to eat more protein take more Glucerna and try to get her weight up 200 pounds

## 2023-01-12 ENCOUNTER — NURSE ONLY (OUTPATIENT)
Dept: ENDOCRINOLOGY | Age: 83
End: 2023-01-12
Payer: MEDICARE

## 2023-01-12 DIAGNOSIS — M81.0 AGE-RELATED OSTEOPOROSIS WITHOUT CURRENT PATHOLOGICAL FRACTURE: Primary | ICD-10-CM

## 2023-01-12 PROCEDURE — 96372 THER/PROPH/DIAG INJ SC/IM: CPT | Performed by: INTERNAL MEDICINE

## 2023-01-12 NOTE — PROGRESS NOTES
Informed patient if any signs of redness,rash,swelling or unusual symptoms occur, please contact the office. Prolia given per physician order.     Office supplied, subq to VISHNU

## 2023-01-23 ENCOUNTER — TELEPHONE (OUTPATIENT)
Dept: CARDIOLOGY CLINIC | Age: 83
End: 2023-01-23

## 2023-01-23 NOTE — TELEPHONE ENCOUNTER
Takes midodrine twice a day: breakfast (7am - 9am) and lunch    Her pressure was very low yesterday and didn't know what to do. She felt terrible : no energy. She felt better after taking her midodrine. She didn't recheck her BP thereafter     Her BP today was (before meds) 127/66 (60)    She will resume checking her BP for the next week and when she feels bad.   She can take an extra midodrine later in the day if she needs to (no more than tid)

## 2023-01-23 NOTE — TELEPHONE ENCOUNTER
Pt called asking for call back. She is concerned as to what to do if her BP gives her problems again. Please advise.

## 2023-01-23 NOTE — TELEPHONE ENCOUNTER
Pt states when she woke up yesterday 1/22/23 she did not feel well. She took her BP starting at 9:01 am - 926 am these number are before medication 77/56  92,  97/58  86, 101/54   92,  73/59 91. Pt did not take again after meds. This morning before meds 8:20 am 127/60  65 and 129/66  68. Pt states she does not feel bad this morning.    Please call pt to advise

## 2023-01-24 ENCOUNTER — TELEPHONE (OUTPATIENT)
Dept: CARDIOLOGY CLINIC | Age: 83
End: 2023-01-24

## 2023-01-24 DIAGNOSIS — M81.0 OSTEOPOROSIS, UNSPECIFIED OSTEOPOROSIS TYPE, UNSPECIFIED PATHOLOGICAL FRACTURE PRESENCE: ICD-10-CM

## 2023-01-24 DIAGNOSIS — E06.3 HASHIMOTO'S THYROIDITIS: ICD-10-CM

## 2023-01-24 DIAGNOSIS — E10.40 POORLY CONTROLLED TYPE 1 DIABETES MELLITUS WITH NEUROPATHY (HCC): ICD-10-CM

## 2023-01-24 DIAGNOSIS — E10.65 POORLY CONTROLLED TYPE 1 DIABETES MELLITUS WITH NEUROPATHY (HCC): ICD-10-CM

## 2023-01-24 DIAGNOSIS — I10 ESSENTIAL HYPERTENSION: ICD-10-CM

## 2023-01-24 LAB
CHOLESTEROL, TOTAL: 159 MG/DL (ref 0–199)
ESTIMATED AVERAGE GLUCOSE: 165.7 MG/DL
HBA1C MFR BLD: 7.4 %
HDLC SERPL-MCNC: 84 MG/DL (ref 40–60)
LDL CHOLESTEROL CALCULATED: 63 MG/DL
T3 FREE: 1.9 PG/ML (ref 2.3–4.2)
T4 FREE: 1.7 NG/DL (ref 0.9–1.8)
TRIGL SERPL-MCNC: 60 MG/DL (ref 0–150)
TSH SERPL DL<=0.05 MIU/L-ACNC: 2.07 UIU/ML (ref 0.27–4.2)
VITAMIN D 25-HYDROXY: 46.6 NG/ML
VLDLC SERPL CALC-MCNC: 12 MG/DL

## 2023-01-24 NOTE — TELEPHONE ENCOUNTER
Called the pt, pt will leaving for [place] on 2/4 is there another date that the pt can get scheduled to see LES?

## 2023-01-24 NOTE — TELEPHONE ENCOUNTER
Please call and schedule patient for 8:15 am on 2/9/23 with LES at , per NP TS recommendation. Thanks.

## 2023-01-25 DIAGNOSIS — I10 ESSENTIAL HYPERTENSION: ICD-10-CM

## 2023-01-25 DIAGNOSIS — E10.40 POORLY CONTROLLED TYPE 1 DIABETES MELLITUS WITH NEUROPATHY (HCC): ICD-10-CM

## 2023-01-25 DIAGNOSIS — M81.0 OSTEOPOROSIS, UNSPECIFIED OSTEOPOROSIS TYPE, UNSPECIFIED PATHOLOGICAL FRACTURE PRESENCE: ICD-10-CM

## 2023-01-25 DIAGNOSIS — E06.3 HASHIMOTO'S THYROIDITIS: ICD-10-CM

## 2023-01-25 DIAGNOSIS — E10.65 POORLY CONTROLLED TYPE 1 DIABETES MELLITUS WITH NEUROPATHY (HCC): ICD-10-CM

## 2023-01-25 LAB
CREATININE URINE: 80.3 MG/DL (ref 28–259)
MICROALBUMIN UR-MCNC: <1.2 MG/DL
MICROALBUMIN/CREAT UR-RTO: NORMAL MG/G (ref 0–30)

## 2023-01-25 NOTE — TELEPHONE ENCOUNTER
Pt called back and ask to speak to NPTS said she was returning her call. Stated she is rearranging her [place] schedule so she would like to talk to NPTS first before scheduling anything. Please advise.

## 2023-01-26 NOTE — PROGRESS NOTES
[facility]   Cardiac f/up    Referring Provider:  Naheed Lund MD     Chief Complaint   Patient presents with    Coronary Artery Disease    Hypertension    Hyperlipidemia          History of Present Illness:  Alanis Cavanaugh is 80 y.o. 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 She continues to see Dr. Altamirano Monday for Parkinson's. 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 She has been taking midodrine twice daily for labile blood pressure. Today, she is here for follow up. She is with her . Her weight is stable at 90 pounds. Sometimes she states she is not capable of taking deep breath because it causes pain to her left chest wall, describes it \"gets stuck. \" This pain does not get worse with exertion. To help this pain, she keeps trying to take deep breath and \"it loosens. \" Some days she is free of this discomfort. When she walks, she does not feel as \"stable,\" she explains she sometimes can not walk in a straight line. She reports she has lost control of bladder. Dr Jimmie Hastings is her neurologist and he recently increased her sinemet. She takes midodrine with breakfast and lunch. Endocrinology (Dr Pollard) checks her labs every 3 mos. She reports she does not like to drink water. She has dry mouth and constipation often. Past Medical History:   has a past medical history of CAD (coronary artery disease), Diabetes mellitus with neurological manifestation ([address] DKA, type 1, not at goal [facility]), Hyperlipidemia, Hyperlipidemia, Hypothyroidism (acquired), Insomnia, Neuropathy, Parkinson disease ([address] Parkinson disease ([address] Parkinson disease ([address] Renal insufficiency, Restless legs syndrome, and Type I (juvenile type) diabetes mellitus without mention of complication, not stated as uncontrolled.     Surgical History:   has a past surgical history that includes Dental surgery; Tonsillectomy; Cardiac catheterization (1/24/2012); Colonoscopy; eye surgery (Bilateral); and skin biopsy (Right). Social History:   reports that she quit smoking about 57 years ago. Her smoking use included cigarettes. She has a 1.00 pack-year smoking history. She has never used smokeless tobacco. She reports that she does not currently use alcohol after a past usage of about 1.0 standard drink per week. She reports that she does not use drugs. Family History:  family history includes Arthritis in her sister; Cancer in her father and another family member; Heart Disease in her brother, father, and mother. Home Medications:  Prior to Admission medications    Medication Sig Start Date End Date Taking?  Authorizing Provider   simvastatin (ZOCOR) 20 MG tablet TAKE ONE TABLET BY MOUTH ONCE NIGHTLY 7/27/22  Yes Macie Orellana MD   pramipexole (MIRAPEX) 0.5 MG tablet Take 1 tablet by mouth 2 times daily 2 tabs with dinner, 1 tab at [facility]  Patient taking differently: Take 0.5 mg by mouth 2 tabs with dinner, 1 tab at [facility] 6/16/22  Yes Naheed Lund MD   denosumab (PROLIA) 60 MG/ML SOSY SC injection Inject 60 mg into the skin every 6 months   Yes Historical Provider, MD   insulin aspart (NOVOLOG) 100 UNIT/ML injection vial INJECT TOTAL DAILY DOSE 30 UNITS SUBCUTANEOUSLY WITH INSULIN PUMP 3/21/22  Yes Macie Orellana MD   levothyroxine (SYNTHROID) 100 MCG tablet TAKE 1 TABLET DAILY 1/11/22  Yes Naheed Lund MD   carbidopa-levodopa (SINEMET)  MG per tablet Take 1 tablet by mouth 4 times daily  Patient taking differently: Take 2 tablets by mouth 4 times daily 1/10/22  Yes Naheed Lund MD   carbidopa-levodopa (SINEMET CR)  MG per extended release tablet Take 1 tablet by mouth nightly  11/5/21  Yes Historical Provider, MD   aspirin 81 MG EC tablet Take 81 mg by mouth at bedtime    Yes Historical Provider, MD   Multiple Vitamins-Minerals (CENTRUM SILVER) TABS Take 1 tablet by mouth daily    Yes Historical Provider, MD   Cholecalciferol (VITAMIN D3) 2000 UNITS CAPS Take 1 capsule by mouth daily   Yes Historical Provider, MD   Glucagon rDNA, (GLUCAGON EMERGENCY) 1 MG KIT Infuse 1 mg intravenously once for 1 dose Use for low blood glucose emergencies. May repeat in 15 minutes. 10/5/22 10/5/22  Jerardo Bravo MD   midodrine (PROAMATINE) 5 MG tablet Take 5 mg by mouth in the morning and at bedtime    Historical Provider, MD   Acetone, Urine, Test (KETONE TEST) STRP Test ketones daily 6/2/22   Jerardo Bravo MD   Continuous Blood Gluc Transmit (DEXCOM G6 TRANSMITTER) MISC Change transmitter every 3 months  Patient not taking: Reported on 2/2/2023 12/21/21   Jerardo Bravo MD        Allergies:  Patient has no known allergies. Review of Systems:   Constitutional: there has been no unanticipated weight loss. There's been no change in energy level, sleep pattern, or activity level. Eyes: No visual changes or diplopia. No scleral icterus. ENT: No Headaches, hearing loss or vertigo. No mouth sores or sore throat. Cardiovascular: Reviewed in HPI  Respiratory: No cough or wheezing, no sputum production. No hematemesis. Gastrointestinal: No abdominal pain, appetite loss, blood in stools. No change in bowel or bladder habits. Genitourinary: No dysuria, trouble voiding, or hematuria. Musculoskeletal:  No gait disturbance, weakness or joint complaints. Integumentary: No rash or pruritis. Neurological: No headache, diplopia, change in muscle strength, numbness or tingling. No change in gait, balance, coordination, mood, affect, memory, mentation, behavior. Psychiatric: No anxiety, no depression. Endocrine: No malaise, fatigue or temperature intolerance. No excessive thirst, fluid intake, or urination. No tremor. Hematologic/Lymphatic: No abnormal bruising or bleeding, blood clots or swollen lymph nodes.   Allergic/Immunologic: No nasal congestion or hives.    Physical Examination:    Vitals:    02/03/23 0815   BP: (!) 144/76   Pulse:    SpO2:             Constitutional and General Appearance: No acute distress, thin build  Skin:good turgor,intact without lesions  HEENT: EOMI ,normal  Neck:no JVD  Respiratory:  Normal excursion and expansion without use of accessory muscles  Resp Auscultation: Normal breath sounds without dullness  Cardiovascular: The apical impulses not displaced  Heart tones are crisp and normal  Cervical veins are not engorged  The carotid upstroke is normal in amplitude and contour without delay or bruit  Peripheral pulses are symmetrical and full  There is no clubbing, cyanosis of the extremities. No edema  Femoral Arteries: 2+ and equal  Pedal Pulses: 2+ and equal   Abdomen:  No masses or tenderness  Liver/Spleen: No Abnormalities Noted  Neurological/Psychiatric:  Alert and oriented in all spheres  Moves all extremities well  Exhibits normal gait balance and coordination  No abnormalities of mood, affect, memory, mentation, or behavior are noted       Last  Angiogram: 1/23/12: Nonobstructive CAD , Normal LVEF >> Med mgmt    Last Echocardiogram 10/26/21:  Summary   -Left ventricular cavity size is normal.   -Ejection fraction is visually estimated to be 60%. -No regional wall motion abnormalities are noted. -Grade II diastolic dysfunction with elevated LV filling pressures. E/e' = 11.1.   -The aortic valve appears sclerotic but opens well. -Mild to moderate aortic regurgitation.   -Mild tricuspid regurgitation. -RVSP = 35 mmHG. Echo 4/4/22  Summary  -Normal left ventricle size, wall thickness and systolic function with an estimated ejection fraction of 55%.  -No regional wall motion abnormalities are seen. -Diastolic filling parameters suggest grade I diastolic dysfunction. E/e\"=13.75.  -The mitral valve leaflets appear myxomatous.   -Mild mitral regurgitation.  -The aortic valve is mildly thickened/calcified but opens well with normal gradients. -Mild to moderate aortic regurgitation.  -Mild tricuspid regurgitation.  -Mild pulmonic regurgitation present.  -Estimated pulmonary artery systolic pressure is borderline elevated at 35 mmHg assuming a right atrial pressure of 3 mmHg. -The left atrium is mildly dilated. -There is a trivial localized right sided pericardial effusion noted. -Appears unchanged when compared to previous study dated 10/26/2021.  -The ascending aorta is mildly dilated (3.7 cm). Consider cross-sectional imaging of the aorta if clinically indicated. Last Angiogram: 4/18/22  LM-normal  LAD-40% proximal, FFR 0.99  Cx-30% mid  OM1-patent  RCA-10% mid, dominant  RPDA-normal  LVEF-70%     Impression:  1. Mild nonobstructive CAD. 2.  Hyperdynamic LV systolic function. 3.  Noncardiac chest pain. Likely blood pressure related. Assessment/Plan:    1. Coronary artery disease involving native coronary artery of native heart without angina pectoris -   Denies anginal symptoms   Had chest tightness prior to 2012 angiogram  Reviewed 2012 angiogram during the visit. She may have had progression of CAD  Prescribe NTG sl tabs prn chest tightness  Lexiscan myoview 2/25/22 > stable  Non-obst CAD 40% LAD & OM-1 lesions by cath '12  ~mild, non-obst disease by cath : 4/18/22  Continue statin/ASA     2. Other hyperlipidemia -   12/8/21 - TC- 158, TG- 57, HDL- 102, LDL- 45.   1/24/23  TG 60 HDL 84  LDL 63   Well controlled   Continue with Zocor 20 mg        3. Tricuspid regurgitation-   7/2017 Echo - mild-moderate TR, RVSP 33 mmHg. 10/2021 Echo - mild TR, RVSP 35 mmHg  4/4/22 echo - Mild tricuspid regurgitation. 4.       Type 1 diabetes, uncontrolled, with retinopathy ([address]   12/8/21 - Hgb A1C - 7.1, 1/24/23 A1c 7.4  Follows with endocrinologist, Dr. Vanda Atwood    5.         Acquired hypothyroidism  Stable, managed by PCP     6. Parkinson's   Follows with neurology     Poor appetite and weight loss with loss of taste and smell sensation    Bp rechecked by me > not orthostatic upon standing, 120/76 (she has not taken midodrine yet today)    Plan:  Cardiac test and lab results personally reviewed by me during this office visit and discussed. Home BP log reviewed by me. Echocardiogram  Take midodrine if AM BP is less than 110 or if evening BP is less than 110  Take BP in morning and dinner time and if you feel dizzy . Prefer she use midodrine as needed morning and around 6 pm  If BP remains below 90 after taking midodrine, could consider evaluation in ER  Fluid intake encouraged (No sugar Gatorade or Powerade or Glucerna) - take 2.5-3 L per day  Continue risk factor modifications. Call for any change in symptoms, call to report any changes in shortness of breath or development of chest pain with activity. Follow up in June as scheduled      I appreciate the opportunity of cooperating in the care of this individual.    Daniel Posadas M.D., [facility] [place]    Patient's problem list, medications, allergies, past medical, surgical, social and family histories were reviewed and updated as appropriate. Scribe's attestation: This note was scribed in the presence of Dr Mally Posadas by Clyde Jacob RN. The scribe's documentation has been prepared under my direction and personally reviewed by me in its entirety. I confirm that the note above accurately reflects all work, treatment, procedures, and medical decision making performed by me.

## 2023-01-27 ENCOUNTER — TELEPHONE (OUTPATIENT)
Dept: ENDOCRINOLOGY | Age: 83
End: 2023-01-27

## 2023-01-27 NOTE — TELEPHONE ENCOUNTER
Patient would like a call back from [facility] today. She did not wish to state the reason for the call.

## 2023-01-27 NOTE — TELEPHONE ENCOUNTER
Spoke w/ pt. She had information to relay to MD.    Pt will have it sent to our office.   And will reroute to MD

## 2023-02-02 ENCOUNTER — OFFICE VISIT (OUTPATIENT)
Dept: ENDOCRINOLOGY | Age: 83
End: 2023-02-02

## 2023-02-02 VITALS
SYSTOLIC BLOOD PRESSURE: 138 MMHG | DIASTOLIC BLOOD PRESSURE: 70 MMHG | BODY MASS INDEX: 17.12 KG/M2 | HEART RATE: 69 BPM | OXYGEN SATURATION: 99 % | WEIGHT: 93.6 LBS

## 2023-02-02 DIAGNOSIS — E10.40 POORLY CONTROLLED TYPE 1 DIABETES MELLITUS WITH NEUROPATHY (HCC): Primary | ICD-10-CM

## 2023-02-02 DIAGNOSIS — R63.6 UNDERWEIGHT: ICD-10-CM

## 2023-02-02 DIAGNOSIS — M81.0 OSTEOPOROSIS, UNSPECIFIED OSTEOPOROSIS TYPE, UNSPECIFIED PATHOLOGICAL FRACTURE PRESENCE: ICD-10-CM

## 2023-02-02 DIAGNOSIS — E10.65 POORLY CONTROLLED TYPE 1 DIABETES MELLITUS WITH NEUROPATHY (HCC): Primary | ICD-10-CM

## 2023-02-02 DIAGNOSIS — I25.10 CORONARY ARTERY DISEASE INVOLVING NATIVE CORONARY ARTERY OF NATIVE HEART WITHOUT ANGINA PECTORIS: ICD-10-CM

## 2023-02-02 DIAGNOSIS — N18.30 STAGE 3 CHRONIC KIDNEY DISEASE, UNSPECIFIED WHETHER STAGE 3A OR 3B CKD (HCC): ICD-10-CM

## 2023-02-02 DIAGNOSIS — E03.9 ACQUIRED HYPOTHYROIDISM: ICD-10-CM

## 2023-02-02 DIAGNOSIS — E78.2 MIXED HYPERLIPIDEMIA: ICD-10-CM

## 2023-02-02 DIAGNOSIS — E06.3 HASHIMOTO'S THYROIDITIS: ICD-10-CM

## 2023-02-02 PROBLEM — R63.4 WEIGHT LOSS, ABNORMAL: Status: ACTIVE | Noted: 2023-02-02

## 2023-02-02 NOTE — PROGRESS NOTES
Leno Delaney is a 80 y.o. female who presents for Type 1 diabetes mellitus. Current symptoms/problems include  fluctuating BG levels  and show no change. 1.  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 Low BG at night. Comorbid conditions:  hyperlipidemia, Neuropathy, Retinopathy, Chronic Kidney Disease and Coronary Artery Disease    Current diabetic medications include: Novolog    On Medtronic Minimed insulin pump and Dexcom CGM    Intolerance to diabetes medications: No     Weight trend: stable  Prior visit with dietician: yes  Current diet: on average, 3 meals per day  Current exercise: walks     Current monitoring regimen: home blood tests - 8 times daily  Has brought blood glucose log/meter:  Yes  Home blood sugar records: fasting range: 100-150 and postprandial range:   Any episodes of hypoglycemia? Yes  Hypoglycemia frequency and time(s):  daily  Does patient have Glucagon emergency kit? No  Does patient have rapid acting carbohydrate? Yes  Does patient wear a medic alert bracelet or necklace? No    2. Osteoporosis, unspecified osteoporosis type, unspecified pathological fracture presence  Reclast 5 years. Not on any meds now. On vitamin D 2000 IU, calcium in food    3. Hashimoto's thyroiditis  Has fatigue. 4. Acquired hypothyroidism  On levothyroxine 0.1 mg qd. Has fatigue. 5. Coronary artery disease involving native coronary artery of native heart without angina pectoris  No chest pain. 6. Type 1 diabetes, poorly controlled, with retinopathy ([facility]  No recent vision changes. 7.  Underweight  Lost 42 lbs not intentionally. No nausea, vomiting. 8.  Hyperlipidemia  No muscle pain    EXAMINATION:   BONE DENSITOMETRY       3/7/2022 10:07 am       TECHNIQUE:   A bone density DEXA scan was performed of the lumbar spine and bilateral hips   on a [employer] system.        COMPARISON: August 28, 2019 study was performed on a separate workstation, limiting   direct comparison. HISTORY:   ORDERING SYSTEM PROVIDED HISTORY: Osteoporosis, unspecified osteoporosis   type, unspecified pathological fracture presence       Baseline on this machine. FINDINGS:   LUMBAR SPINE:       The bone mineral density in the lumbar spine including the L1 through L4   levels is measured at 0.767 g/cm2, which corresponds to a T-score of -2.5 and   a Z-score of 0.2. This is within the osteoporotic range by WHO criteria. Findings similar to prior where T-score was -2.4. LEFT HIP:       The bone mineral density in the total hip is measured at 0.696 g/cm2   corresponding to a T-score of -2.0 and a Z-score of 0.2. This is within the   osteopenic range by WHO criteria. The bone mineral density of the femoral neck is measured at 0.652 g/cm2   corresponding to a T-score of -1.8 and a Z-score of 0.6. This is within the   osteopenic range by WHO criteria. RIGHT HIP:       The bone mineral density in the total hip is measured at 0.716 g/cm2   corresponding to a T-score of -1.9 and a Z-score of 0.3. This is within the   osteopenic range by WHO criteria. The bone mineral density of the femoral neck is measured at 0.692 g/cm2   corresponding to a T-score of -1.4 and a Z-score of 1.0. This is within the   osteopenic range by WHO criteria. No left hip comparison is available. No substantial change in the right hip   with prior T-score -2.0. FRAX analysis was not reported because some T-scores are at or below -2.5. Impression   Osteoporosis by WHO criteria.          Past Medical History:   Diagnosis Date    CAD (coronary artery disease) 1/2012    non obstructive    Diabetes mellitus with neurological manifestation (HCC)     DKA, type 1, not at goal [facility]) 5/21/2022    Hyperlipidemia     Hyperlipidemia     Hypothyroidism (acquired)     Insomnia     Neuropathy     Parkinson disease ([facility]     Parkinson disease ([facility]     Parkinson disease ([facility]     Renal insufficiency     Restless legs syndrome     Type I (juvenile type) diabetes mellitus without mention of complication, not stated as uncontrolled       Patient Active Problem List   Diagnosis    Diabetic polyneuropathy (HCC)    Mixed hyperlipidemia    Hashimoto's thyroiditis    Acquired hypothyroidism    Chest pain    CAD (coronary artery disease)    Rotator cuff tendonitis    Osteoporosis    Parkinson disease (HCC)    Underweight    Labile hypertension    DKA, type 1, not at goal [facility])    Hypothyroidism (acquired)    Poorly controlled type 1 diabetes mellitus with neuropathy ([facility]    Essential hypertension    Coronary artery disease involving native coronary artery of native heart without angina pectoris    Weight loss, abnormal    Stage 3 chronic kidney disease (HCC)     Past Surgical History:   Procedure Laterality Date    CARDIAC CATHETERIZATION  2012    non obstructive CAD    COLONOSCOPY      at age 71    DENTAL SURGERY      EYE SURGERY Bilateral     with lens implants    SKIN BIOPSY Right     neg skin biopsy on right arm    TONSILLECTOMY       Social History     Socioeconomic History    Marital status:      Spouse name: Not on file    Number of children: Not on file    Years of education: Not on file    Highest education level: Not on file   Occupational History    Not on file   Tobacco Use    Smoking status: Former     Packs/day: 0.25     Years: 4.00     Pack years: 1.00     Types: Cigarettes     Quit date: 1966     Years since quittin.1    Smokeless tobacco: Never   Vaping Use    Vaping Use: Never used   Substance and Sexual Activity    Alcohol use: Not Currently     Alcohol/week: 1.0 standard drink     Types: 1 Glasses of wine per week     Comment: social    Drug use: No    Sexual activity: Never     Partners: Male   Other Topics Concern    Not on file   Social History Narrative    Not on file     Social Determinants of Health     Financial Resource Strain: Low Risk     Difficulty of Paying Living Expenses: Not hard at all   Food Insecurity: No Food Insecurity    Worried About Running Out of Food in the Last Year: Never true    Ran Out of Food in the Last Year: Never true   Transportation Needs: Not on file   Physical Activity: Inactive    Days of Exercise per Week: 0 days    Minutes of Exercise per Session: 0 min   Stress: Not on file   Social Connections: Not on file   Intimate Partner Violence: Not on file   Housing Stability: Not on file     Family History   Problem Relation Age of Onset    Cancer Father     Heart Disease Father     Heart Disease Mother     Cancer Other         sister's daughter - Melanoma    Heart Disease Brother     Arthritis Sister     Diabetes Neg Hx     Stroke Neg Hx     Osteoporosis Neg Hx     Thyroid Disease Neg Hx      Current Outpatient Medications   Medication Sig Dispense Refill    simvastatin (ZOCOR) 20 MG tablet TAKE ONE TABLET BY MOUTH ONCE NIGHTLY 90 tablet 3    midodrine (PROAMATINE) 5 MG tablet Take 2.5 mg by mouth in the morning and at bedtime      pramipexole (MIRAPEX) 0.5 MG tablet Take 1 tablet by mouth 2 times daily 2 tabs with dinner, 1 tab at HS (Patient taking differently: Take 0.5 mg by mouth 2 tabs with dinner, 1 tab at HS) 270 tablet 3    Acetone, Urine, Test (KETONE TEST) STRP Test ketones daily 100 strip 3    denosumab (PROLIA) 60 MG/ML SOSY SC injection Inject 60 mg into the skin every 6 months      insulin aspart (NOVOLOG) 100 UNIT/ML injection vial INJECT TOTAL DAILY DOSE 30 UNITS SUBCUTANEOUSLY WITH INSULIN PUMP 30 mL 3    levothyroxine (SYNTHROID) 100 MCG tablet TAKE 1 TABLET DAILY 90 tablet 3    carbidopa-levodopa (SINEMET)  MG per tablet Take 1 tablet by mouth 4 times daily 120 tablet 5    carbidopa-levodopa (SINEMET CR)  MG per extended release tablet Take 1 tablet by mouth nightly       aspirin 81 MG EC tablet Take 81 mg by mouth at bedtime Multiple Vitamins-Minerals (CENTRUM SILVER) TABS Take 1 tablet by mouth daily       Cholecalciferol (VITAMIN D3) 2000 UNITS CAPS Take 1 capsule by mouth daily      Glucagon, rDNA, (GLUCAGON EMERGENCY) 1 MG KIT Infuse 1 mg intravenously once for 1 dose Use for low blood glucose emergencies. May repeat in 15 minutes. 1 kit 3    Continuous Blood Gluc Transmit (DEXCOM G6 TRANSMITTER) MISC Change transmitter every 3 months (Patient not taking: Reported on 2023) 4 each 0     No current facility-administered medications for this visit.      No Known Allergies  Family Status   Relation Name Status    Father      Mother  Alive    Other niece Alive    Brother  Alive    Sister  Alive    Neg Hx  (Not Specified)       Lab Review:    Lab Results   Component Value Date/Time    WBC 10.8 2022 06:57 AM    HGB 10.8 2022 06:57 AM    HCT 33.7 2022 06:57 AM    MCV 96.9 2022 06:57 AM     2022 06:57 AM     Lab Results   Component Value Date/Time     10/06/2022 08:08 AM    K 4.6 10/06/2022 08:08 AM    K 4.3 2022 02:20 AM     10/06/2022 08:08 AM    CO2 29 10/06/2022 08:08 AM    BUN 29 10/06/2022 08:08 AM    CREATININE 1.0 10/06/2022 08:08 AM    GLUCOSE 170 10/06/2022 08:08 AM    GLUCOSE 152 2012 07:39 AM    CALCIUM 10.1 10/06/2022 08:08 AM    PROT 6.7 10/06/2022 08:08 AM    PROT 6.9 2012 07:39 AM    LABALBU 4.5 10/06/2022 08:08 AM    BILITOT 0.6 10/06/2022 08:08 AM    ALKPHOS 55 10/06/2022 08:08 AM    AST 25 10/06/2022 08:08 AM    ALT 6 10/06/2022 08:08 AM    LABGLOM 53 10/06/2022 08:08 AM    LABGLOM 54 2014 07:54 AM    GFRAA >60 10/06/2022 08:08 AM    GFRAA >60 2012 04:00 AM    AGRATIO 2.0 10/06/2022 08:08 AM    GLOB 2.6 2021 07:43 AM     Lab Results   Component Value Date/Time    TSH 2.07 2023 08:56 AM    FT3 1.9 2023 08:56 AM     Lab Results   Component Value Date/Time    LABA1C 7.4 2023 08:56 AM     Lab Results Component Value Date/Time    .7 01/24/2023 08:56 AM     Lab Results   Component Value Date/Time    CHOL 159 01/24/2023 08:56 AM     Lab Results   Component Value Date/Time    TRIG 60 01/24/2023 08:56 AM     Lab Results   Component Value Date/Time    HDL 84 01/24/2023 08:56 AM    HDL 88 01/22/2012 01:25 PM     Lab Results   Component Value Date/Time    LDLCALC 63 01/24/2023 08:56 AM     Lab Results   Component Value Date/Time    LABVLDL 12 01/24/2023 08:56 AM     Lab Results   Component Value Date/Time    CHOLHDLRATIO 2.1 09/12/2013 08:52 AM     Lab Results   Component Value Date/Time    LABMICR <1.20 01/25/2023 02:40 PM    LABMICR Not Indicated 05/22/2022 02:50 PM     Lab Results   Component Value Date/Time    VITD25 46.6 01/24/2023 08:56 AM        Review of Systems:  Constitutional: has fatigue, no fever, no recent weight gain, has recent weight loss, has changes in appetite  Eyes: no eye pain, no change in vision, no eye redness, no eye irritation, no double vision  Ears, nose, throat: no nasal congestion, no sore throat, no earache, no decrease in hearing, no hoarseness, no dry mouth, no sinus problems, no difficulty swallowing, no neck lumps, no dental problems, no mouth sores, no ringing in ears  Pulmonary: sometimes has shortness of breath, no wheezing, has dyspnea on exertion, no cough  Cardiovascular: no chest pain, has lower extremity edema, no orthopnea, no intermittent leg claudication, no palpitations  Gastrointestinal: no abdominal pain, no nausea, no vomiting, no diarrhea, no constipation, no dysphagia, no heartburn, no bloating  Genitourinary: no dysuria, has sometimes urinary incontinence, no urinary hesitancy, no urinary frequency, no feelings of urinary urgency, no nocturia  Musculoskeletal: no joint swelling, no joint stiffness, no joint pain, no muscle cramps, no muscle pain, no bone pain  Integument/Breast: no hair loss, no skin rashes, no skin lesions, no itching, no dry skin  Neurological: no numbness, no tingling, no weakness, no confusion, no headaches, no dizziness, no fainting, no tremors, no decrease in memory, has balance problems  Psychiatric: no anxiety, no depression, no insomnia  Hematologic/Lymphatic: no tendency for easy bleeding, no swollen lymph nodes, no tendency for easy bruising  Immunology: no seasonal allergies, no frequent infections, no frequent illnesses  Endocrine: no temperature intolerance    /70   Pulse 69   Wt 93 lb 9.6 oz (42.5 kg)   SpO2 99%   BMI 17.12 kg/m²    Wt Readings from Last 3 Encounters:   02/02/23 93 lb 9.6 oz (42.5 kg)   12/16/22 95 lb 6.4 oz (43.3 kg)   10/13/22 91 lb 12.8 oz (41.6 kg)     Body mass index is 17.12 kg/m².       OBJECTIVE:  Constitutional: no acute distress, well appearing and well nourished  Psychiatric: oriented to person, place and time, judgement and insight and normal, recent and remote memory and intact and mood and affect are normal  Skin: skin and subcutaneous tissue is normal without mass, normal turgor  Head and Face: examination of head and face revealed no abnormalities  Eyes: no lid or conjunctival swelling, erythema or discharge, pupils are normal, equal, round, reactive to light  Ears/Nose: external inspection of ears and nose revealed no abnormalities, hearing is grossly normal  Oropharynx/Mouth/Face: lips, tongue and gums are normal with no lesions, the voice quality was normal  Neck: neck is supple and symmetric, with midline trachea and no masses, thyroid is normal  Lymphatics: normal cervical lymph nodes, normal supraclavicular nodes  Pulmonary: no increased work of breathing or signs of respiratory distress, lungs are clear to auscultation  Cardiovascular: normal heart rate and rhythm, normal S1 and S2, no murmurs and pedal pulses and 2+ bilaterally, No edema  Abdomen: abdomen is soft, non-tender with no masses  Musculoskeletal: normal gait and station and exam of the digits and nails are normal  Neurological: normal coordination and normal general cortical function    Visual inspection:  Deformity/amputation: absent  Skin lesions/pre-ulcerative calluses: absent  Edema: right- negative, left- negative     Sensory exam:  Monofilament sensation: normal  (minimum of 5 random plantar locations tested, avoiding callused areas - > 1 area with absence of sensation is + for neuropathy)     Plus at least one of the following:  Pulses: normal  Proprioception: Intact  Vibration (128 Hz): Impaired    ASSESSMENT/PLAN:  1. Type 1 diabetes, poorly controlled, with neuropathy (HCC)   Medtronic MiniMed insulin pump 770G/Guardian  Target HbA1C 7.5  Patient is having high blood glucose levels in the mornings  Change carb ratio: Midnight-6 AM 20, 6 AM-10 AM 15, 10 AM-12 midnight 20. Had hospital admission for DKA. DKA resolved. Blood glucose remains uncontrolled. Patient has older Medtronic MiniMed insulin pump. She would benefit from closed-loop system. We will address this with Medtronic educator. Answered multiple questions regarding hypoglycemia and hyperglycemia management, bolus injections, brittle diabetes, CGM use, discrepancy between fingersticks and CGM, benefits of CGM, insulin action, water intake, ketone measurement, hyperglycemia troubleshooting, adequate calorie and carbohydrate intake, maintaining healthy weight    Hemoglobin A1c 7.8-7.1-7.6-7.5-7.6-7.1-7.2-7.0-7.1-7.6-7.4-7.3-7.4    Continue close monitoring.  - Hemoglobin A1C; Future  - Comprehensive Metabolic Panel; Future  - Lipid Panel; Future  - Microalbumin / Creatinine Urine Ratio; Future    2.  Osteoporosis, unspecified osteoporosis type, unspecified pathological fracture presence  T-score -2.9 in 2007  DXA 2007 Osteoporosis, had Reclast 5 years  No upset stomach  No GERD  Decrease vitamin D 2000 IU to every other day for summer months.  3/2022 DEXA similar bone density, no direct comparison, did in different place  Continue Prolia Started 6/2021  25 hydroxy vitamin D 50-49-47-50.6-52.2-63.8-55.4-53.4-63.3-46.1-64.1-46.6  Calcium 9.5  - Vitamin D 25 Hydroxy; Future    3. Hashimoto's thyroiditis  TSH 2.0  - T4, Free; Future  - TSH without Reflex; Future    4. Acquired hypothyroidism  TSH 2.5-3.0-2.38-1.12-2.85-7.29-3.4-5.05-3.98-3.38-1.82-2.07  - levothyroxine (SYNTHROID) 100 MCG tablet; Take 1 tablet by mouth daily      5. Coronary artery disease involving native coronary artery of native heart without angina pectoris  Follow with Cardiology    6. Type 1 diabetes, poorly controlled, with retinopathy ([facility]  - Hemoglobin A1C; Future  - Comprehensive Metabolic Panel; Future  - Lipid Panel; Future  - Microalbumin / Creatinine Urine Ratio; Future    7. Underweight  Dietician follow up  Supplements with protein, adequate caloric intake is recommended  - ACTH; Future  - Cortisol AM, Total; Future  On Glucerna    8.  Mixed hyperlipidemia  LDL 94-27-36-43-79-76-66-44-51-56-12-80-17  Lipid panel  Simvastatin 20 mg daily    Reviewed and/or ordered clinical lab results Yes  Reviewed and/or ordered radiology tests Yes  Reviewed and/or ordered other diagnostic tests No  Discussed test results with performing physician No  Independently reviewed image, tracing, or specimen Yes Medtronic MiniMed insulin pump data, total 7 pages, Dexcom data, total 15 pages  Made a decision to obtain old records No  Reviewed and summarized old records Yes  TSH 3.89, HbA1C 7.7, LDL 49  Obtained history from other than patient yes, patient's     David Mckeon was counseled regarding symptoms of diabetes diagnosis, course and complications of disease if inadequately treated, side effects of medications, diagnosis, treatment options, and prognosis, risks, benefits, complications, and alternatives of treatment, labs, imaging and other studies and treatment targets and goals, insulin pump adjustments, hypoglycemia management, brittle diabetes, fluctuations of blood glucose, osteoporosis treatment, insulin pump data and interpretation, recommendations, other diabetes related questions and concerns, see above. She understands instructions and counseling. CGMS Download Review and Recommendations  See scanned document for blood glucose tracing documentation  Guardian personal CGMS data downloaded and reviewed. This was separate service provided-CGM interpretation    Average glucose 160± 57 SD  Time in range: 68%  Time above 180: 31%  Time under 70: 1    Basal pattern review: Basal normoglycemia  Postprandial pattern review: Postprandial hyperglycemia, mostly after breakfast  Hypoglycemia review: Rare, occasionally before dinner  Activity related review: Not reported      Based on the data, I recommend:    1. Do insulin boluses 0 to 15 minutes before meals. 2.  Do not underestimate carbohydrates    3. Adjust insulin to carb ratio for breakfast.  Patient has high insulin resistance at breakfast.    4.  Pump educator follow-up    5. Hypoglycemia management    6. Stay in auto mode as much as possible. Current use of auto mode 88%. Total time I spent for this encounter 40 minutes    Return in about 3 months (around 5/2/2023) for diabetes.

## 2023-02-03 ENCOUNTER — OFFICE VISIT (OUTPATIENT)
Dept: CARDIOLOGY CLINIC | Age: 83
End: 2023-02-03
Payer: MEDICARE

## 2023-02-03 VITALS
BODY MASS INDEX: 17.11 KG/M2 | SYSTOLIC BLOOD PRESSURE: 120 MMHG | HEIGHT: 62 IN | WEIGHT: 93 LBS | HEART RATE: 70 BPM | DIASTOLIC BLOOD PRESSURE: 76 MMHG | OXYGEN SATURATION: 99 %

## 2023-02-03 DIAGNOSIS — I25.10 CORONARY ARTERY DISEASE INVOLVING NATIVE CORONARY ARTERY OF NATIVE HEART WITHOUT ANGINA PECTORIS: Primary | ICD-10-CM

## 2023-02-03 DIAGNOSIS — E78.2 MIXED HYPERLIPIDEMIA: ICD-10-CM

## 2023-02-03 DIAGNOSIS — I36.1 NONRHEUMATIC TRICUSPID VALVE REGURGITATION: ICD-10-CM

## 2023-02-03 PROCEDURE — 99214 OFFICE O/P EST MOD 30 MIN: CPT | Performed by: INTERNAL MEDICINE

## 2023-02-03 PROCEDURE — G8418 CALC BMI BLW LOW PARAM F/U: HCPCS | Performed by: INTERNAL MEDICINE

## 2023-02-03 PROCEDURE — G8484 FLU IMMUNIZE NO ADMIN: HCPCS | Performed by: INTERNAL MEDICINE

## 2023-02-03 PROCEDURE — 3078F DIAST BP <80 MM HG: CPT | Performed by: INTERNAL MEDICINE

## 2023-02-03 PROCEDURE — G8427 DOCREV CUR MEDS BY ELIG CLIN: HCPCS | Performed by: INTERNAL MEDICINE

## 2023-02-03 PROCEDURE — 1123F ACP DISCUSS/DSCN MKR DOCD: CPT | Performed by: INTERNAL MEDICINE

## 2023-02-03 PROCEDURE — G8399 PT W/DXA RESULTS DOCUMENT: HCPCS | Performed by: INTERNAL MEDICINE

## 2023-02-03 PROCEDURE — 1090F PRES/ABSN URINE INCON ASSESS: CPT | Performed by: INTERNAL MEDICINE

## 2023-02-03 PROCEDURE — 1036F TOBACCO NON-USER: CPT | Performed by: INTERNAL MEDICINE

## 2023-02-03 PROCEDURE — 3074F SYST BP LT 130 MM HG: CPT | Performed by: INTERNAL MEDICINE

## 2023-02-03 RX ORDER — MIDODRINE HYDROCHLORIDE 5 MG/1
TABLET ORAL
Qty: 90 TABLET | Refills: 3
Start: 2023-02-03

## 2023-02-03 NOTE — PATIENT INSTRUCTIONS
Echocardiogram  Take midodrine if AM BP is less than 110 or if evening BP is less than 110  Take BP in morning and dinner time and if you feel dizzy   If BP remains below 90 after taking midodrine, could consider evaluation in ER  Fluid intake encouraged (No sugar Gatorade or Powerade or Glucerna) - take in 2.5-3 L per day  Continue risk factor modifications. Call for any change in symptoms, call to report any changes in shortness of breath or development of chest pain with activity.     Follow up in [place] as scheduled

## 2023-02-09 ENCOUNTER — PROCEDURE VISIT (OUTPATIENT)
Dept: CARDIOLOGY CLINIC | Age: 83
End: 2023-02-09

## 2023-02-09 DIAGNOSIS — I36.1 NONRHEUMATIC TRICUSPID VALVE REGURGITATION: ICD-10-CM

## 2023-02-09 LAB
LV EF: 58 %
LVEF MODALITY: NORMAL

## 2023-02-12 ENCOUNTER — TELEPHONE (OUTPATIENT)
Dept: ENDOCRINOLOGY | Age: 83
End: 2023-02-12

## 2023-02-13 NOTE — TELEPHONE ENCOUNTER
Follow-up phone call with the patient regarding her concerns about Medtronic insulin pump/guardian. Please ask patient:  - Did she meet with German Armas, diabetes educator for [employer]? If not, does she want us to arrange a meeting? If yes, please email Mitra.  -Is she considering tandem/Dexcom? If answer is yes, which she will like to meet with tandem educator to discuss tandem/Dexcom with her? Let me know.

## 2023-02-13 NOTE — TELEPHONE ENCOUNTER
Spoke w/ mario alberto. She states that Romayne Klinefelter was busy last week and was unable to meet with them. They are on there way to [place] right now for 2 weeks. They state they will meet w/ her upon there return. She is considering Tandem/dexcom still but has not made a final decision.

## 2023-02-23 ENCOUNTER — TELEPHONE (OUTPATIENT)
Dept: CARDIOLOGY CLINIC | Age: 83
End: 2023-02-23

## 2023-02-23 NOTE — TELEPHONE ENCOUNTER
----- Message from Berry Nyhan, MD sent at 2/23/2023  4:01 PM EST -----  Let he rknow echo findings are stable with strong heart function

## 2023-03-03 DIAGNOSIS — I25.10 CORONARY ARTERY DISEASE INVOLVING NATIVE CORONARY ARTERY OF NATIVE HEART WITHOUT ANGINA PECTORIS: ICD-10-CM

## 2023-03-03 DIAGNOSIS — E78.2 MIXED HYPERLIPIDEMIA: ICD-10-CM

## 2023-03-03 DIAGNOSIS — E03.9 ACQUIRED HYPOTHYROIDISM: ICD-10-CM

## 2023-03-03 RX ORDER — LEVOTHYROXINE SODIUM 0.1 MG/1
TABLET ORAL
Qty: 90 TABLET | Refills: 3 | Status: SHIPPED | OUTPATIENT
Start: 2023-03-03

## 2023-03-03 NOTE — TELEPHONE ENCOUNTER
Medication:   Requested Prescriptions     Pending Prescriptions Disp Refills    levothyroxine (SYNTHROID) 100 MCG tablet [Pharmacy Med Name: LEVOTHYROXINE 100 MCG TABLET] 90 tablet 3     Sig: TAKE ONE TABLET BY MOUTH DAILY       Last Filled:  1/11/2022    Patient Phone Number: [phone] (home)     Last appt: 12/16/2022   Next appt: 6/19/2023    Last Thyroid:   Lab Results   Component Value Date/Time    TSH 2.07 01/24/2023 08:56 AM    FT3 1.9 01/24/2023 08:56 AM    T4FREE 1.7 01/24/2023 08:56 AM    P7JSDBY 7.5 09/12/2013 08:57 AM

## 2023-03-06 RX ORDER — INSULIN ASPART 100 [IU]/ML
INJECTION, SOLUTION INTRAVENOUS; SUBCUTANEOUS
Qty: 30 ML | Refills: 0 | Status: SHIPPED | OUTPATIENT
Start: 2023-03-06

## 2023-04-27 ENCOUNTER — TELEPHONE (OUTPATIENT)
Dept: FAMILY MEDICINE CLINIC | Age: 83
End: 2023-04-27

## 2023-04-27 NOTE — TELEPHONE ENCOUNTER
----- Message from Khan Krystal sent at 4/27/2023  9:17 AM EDT -----  Subject: Message to Provider    QUESTIONS  Information for Provider? Patient would like to know when lady who does   bloodwork in office is coming back  ---------------------------------------------------------------------------  --------------  [id number] [employer]  [phone]; OK to leave message on voicemail  ---------------------------------------------------------------------------  --------------  SCRIPT ANSWERS  Relationship to Patient?  Self

## 2023-04-29 ENCOUNTER — HOSPITAL ENCOUNTER (OUTPATIENT)
Age: 83
Discharge: HOME | End: 2023-04-29
Payer: MEDICARE

## 2023-04-29 DIAGNOSIS — E10.65 POORLY CONTROLLED TYPE 1 DIABETES MELLITUS WITH NEUROPATHY (HCC): ICD-10-CM

## 2023-04-29 DIAGNOSIS — E78.2 MIXED HYPERLIPIDEMIA: ICD-10-CM

## 2023-04-29 DIAGNOSIS — M81.0 OSTEOPOROSIS, UNSPECIFIED OSTEOPOROSIS TYPE, UNSPECIFIED PATHOLOGICAL FRACTURE PRESENCE: ICD-10-CM

## 2023-04-29 DIAGNOSIS — E03.9 ACQUIRED HYPOTHYROIDISM: ICD-10-CM

## 2023-04-29 DIAGNOSIS — E06.3 HASHIMOTO'S THYROIDITIS: ICD-10-CM

## 2023-04-29 DIAGNOSIS — E10.40 POORLY CONTROLLED TYPE 1 DIABETES MELLITUS WITH NEUROPATHY (HCC): ICD-10-CM

## 2023-04-29 LAB
25(OH)D3 SERPL-MCNC: 46.1 NG/ML
ALBUMIN SERPL-MCNC: 4.2 G/DL (ref 3.4–5)
ALBUMIN/GLOB SERPL: 1.8 {RATIO} (ref 1.1–2.2)
ALP SERPL-CCNC: 55 U/L (ref 40–129)
ALT SERPL-CCNC: 6 U/L (ref 10–40)
ANION GAP SERPL CALCULATED.3IONS-SCNC: 11 MMOL/L (ref 3–16)
AST SERPL-CCNC: 27 U/L (ref 15–37)
BILIRUB SERPL-MCNC: 0.5 MG/DL (ref 0–1)
BUN SERPL-MCNC: 22 MG/DL (ref 7–20)
CALCIUM SERPL-MCNC: 9.5 MG/DL (ref 8.3–10.6)
CHLORIDE SERPL-SCNC: 98 MMOL/L (ref 99–110)
CHOLEST SERPL-MCNC: 149 MG/DL (ref 0–199)
CO2 SERPL-SCNC: 28 MMOL/L (ref 21–32)
CREAT SERPL-MCNC: 0.8 MG/DL (ref 0.6–1.2)
CREAT UR-MCNC: 100.2 MG/DL (ref 28–259)
GFR SERPLBLD CREATININE-BSD FMLA CKD-EPI: >60 ML/MIN/{1.73_M2}
GLUCOSE SERPL-MCNC: 117 MG/DL (ref 70–99)
HDLC SERPL-MCNC: 88 MG/DL (ref 40–60)
LDL CHOLESTEROL CALCULATED: 50 MG/DL
MICROALBUMIN UR DL<=1MG/L-MCNC: <1.2 MG/DL
MICROALBUMIN/CREAT UR: NORMAL MG/G (ref 0–30)
POTASSIUM SERPL-SCNC: 4 MMOL/L (ref 3.5–5.1)
PROT SERPL-MCNC: 6.6 G/DL (ref 6.4–8.2)
SODIUM SERPL-SCNC: 137 MMOL/L (ref 136–145)
T3FREE SERPL-MCNC: 2 PG/ML (ref 2.3–4.2)
T4 FREE SERPL-MCNC: 1.7 NG/DL (ref 0.9–1.8)
TRIGL SERPL-MCNC: 54 MG/DL (ref 0–150)
TSH SERPL DL<=0.005 MIU/L-ACNC: 2.04 UIU/ML (ref 0.27–4.2)
VLDLC SERPL CALC-MCNC: 11 MG/DL

## 2023-04-29 PROCEDURE — 80053 COMPREHEN METABOLIC PANEL: CPT

## 2023-04-29 PROCEDURE — 82306 VITAMIN D 25 HYDROXY: CPT

## 2023-04-29 PROCEDURE — 83036 HEMOGLOBIN GLYCOSYLATED A1C: CPT

## 2023-04-29 PROCEDURE — 80061 LIPID PANEL: CPT

## 2023-04-29 PROCEDURE — 84481 FREE ASSAY (FT-3): CPT

## 2023-04-29 PROCEDURE — 84439 ASSAY OF FREE THYROXINE: CPT

## 2023-04-29 PROCEDURE — 82043 UR ALBUMIN QUANTITATIVE: CPT

## 2023-04-29 PROCEDURE — 36415 COLL VENOUS BLD VENIPUNCTURE: CPT

## 2023-04-29 PROCEDURE — 82570 ASSAY OF URINE CREATININE: CPT

## 2023-04-29 PROCEDURE — 84443 ASSAY THYROID STIM HORMONE: CPT

## 2023-04-30 LAB
EST. AVERAGE GLUCOSE BLD GHB EST-MCNC: 159.9 MG/DL
HBA1C MFR BLD: 7.2 %

## 2023-05-04 ENCOUNTER — OFFICE VISIT (OUTPATIENT)
Dept: ENDOCRINOLOGY | Age: 83
End: 2023-05-04

## 2023-05-04 VITALS
HEART RATE: 61 BPM | WEIGHT: 96 LBS | OXYGEN SATURATION: 98 % | RESPIRATION RATE: 14 BRPM | HEIGHT: 62 IN | SYSTOLIC BLOOD PRESSURE: 106 MMHG | BODY MASS INDEX: 17.66 KG/M2 | DIASTOLIC BLOOD PRESSURE: 55 MMHG | TEMPERATURE: 98 F

## 2023-05-04 DIAGNOSIS — I25.10 CORONARY ARTERY DISEASE INVOLVING NATIVE CORONARY ARTERY OF NATIVE HEART WITHOUT ANGINA PECTORIS: ICD-10-CM

## 2023-05-04 DIAGNOSIS — E06.3 HASHIMOTO'S THYROIDITIS: ICD-10-CM

## 2023-05-04 DIAGNOSIS — R63.6 UNDERWEIGHT: ICD-10-CM

## 2023-05-04 DIAGNOSIS — M81.0 OSTEOPOROSIS, UNSPECIFIED OSTEOPOROSIS TYPE, UNSPECIFIED PATHOLOGICAL FRACTURE PRESENCE: ICD-10-CM

## 2023-05-04 DIAGNOSIS — E10.65 POORLY CONTROLLED TYPE 1 DIABETES MELLITUS WITH NEUROPATHY (HCC): Primary | ICD-10-CM

## 2023-05-04 DIAGNOSIS — E03.9 ACQUIRED HYPOTHYROIDISM: ICD-10-CM

## 2023-05-04 DIAGNOSIS — E78.2 MIXED HYPERLIPIDEMIA: ICD-10-CM

## 2023-05-04 DIAGNOSIS — E10.65 POORLY CONTROLLED TYPE 1 DIABETES MELLITUS WITH RETINOPATHY (HCC): ICD-10-CM

## 2023-05-04 DIAGNOSIS — E10.319 POORLY CONTROLLED TYPE 1 DIABETES MELLITUS WITH RETINOPATHY (HCC): ICD-10-CM

## 2023-05-04 DIAGNOSIS — E10.40 POORLY CONTROLLED TYPE 1 DIABETES MELLITUS WITH NEUROPATHY (HCC): Primary | ICD-10-CM

## 2023-05-04 NOTE — PROGRESS NOTES
AM    GLOB 2.6 06/02/2021 07:43 AM     Lab Results   Component Value Date/Time    TSH 2.04 04/29/2023 08:20 AM    FT3 2.0 04/29/2023 08:20 AM     Lab Results   Component Value Date/Time    LABA1C 7.2 04/29/2023 08:20 AM     Lab Results   Component Value Date/Time    .9 04/29/2023 08:20 AM     Lab Results   Component Value Date/Time    CHOL 159 01/24/2023 08:56 AM     Lab Results   Component Value Date/Time    TRIG 60 01/24/2023 08:56 AM     Lab Results   Component Value Date/Time    HDL 88 04/29/2023 08:20 AM    HDL 88 01/22/2012 01:25 PM     Lab Results   Component Value Date/Time    LDLCALC 50 04/29/2023 08:20 AM     Lab Results   Component Value Date/Time    LABVLDL 11 04/29/2023 08:20 AM     Lab Results   Component Value Date/Time    CHOLHDLRATIO 2.1 09/12/2013 08:52 AM     Lab Results   Component Value Date/Time    LABMICR <1.20 04/29/2023 12:51 PM    LABMICR Not Indicated 05/22/2022 02:50 PM     Lab Results   Component Value Date/Time    VITD25 46.1 04/29/2023 08:20 AM        Review of Systems:  Constitutional: has fatigue, no fever, no recent weight gain, has recent weight loss, has changes in appetite  Eyes: no eye pain, no change in vision, no eye redness, no eye irritation, no double vision  Ears, nose, throat: no nasal congestion, no sore throat, no earache, no decrease in hearing, no hoarseness, no dry mouth, no sinus problems, no difficulty swallowing, no neck lumps, no dental problems, no mouth sores, no ringing in ears  Pulmonary: sometimes has shortness of breath, no wheezing, has dyspnea on exertion, no cough  Cardiovascular: no chest pain, has lower extremity edema, no orthopnea, no intermittent leg claudication, no palpitations  Gastrointestinal: no abdominal pain, no nausea, no vomiting, no diarrhea, no constipation, no dysphagia, no heartburn, no bloating  Genitourinary: no dysuria, has sometimes urinary incontinence, no urinary hesitancy, no urinary frequency, no feelings of urinary

## 2023-05-15 ENCOUNTER — APPOINTMENT (OUTPATIENT)
Dept: GENERAL RADIOLOGY | Age: 83
End: 2023-05-15
Payer: MEDICARE

## 2023-05-15 ENCOUNTER — HOSPITAL ENCOUNTER (EMERGENCY)
Age: 83
Discharge: HOME | End: 2023-05-15
Payer: MEDICARE

## 2023-05-15 VITALS
BODY MASS INDEX: 17.33 KG/M2 | DIASTOLIC BLOOD PRESSURE: 67 MMHG | HEIGHT: 61 IN | SYSTOLIC BLOOD PRESSURE: 147 MMHG | HEART RATE: 68 BPM | TEMPERATURE: 97.9 F | OXYGEN SATURATION: 99 % | WEIGHT: 91.8 LBS | RESPIRATION RATE: 18 BRPM

## 2023-05-15 DIAGNOSIS — R73.9 HYPERGLYCEMIA: Primary | ICD-10-CM

## 2023-05-15 LAB
ALBUMIN SERPL-MCNC: 4.1 G/DL (ref 3.4–5)
ALBUMIN/GLOB SERPL: 1.7 {RATIO} (ref 1.1–2.2)
ALP SERPL-CCNC: 53 U/L (ref 40–129)
ALT SERPL-CCNC: <5 U/L (ref 10–40)
ANION GAP SERPL CALCULATED.3IONS-SCNC: 15 MMOL/L (ref 3–16)
AST SERPL-CCNC: 29 U/L (ref 15–37)
BASE EXCESS BLDV CALC-SCNC: -1.7 MMOL/L (ref -3–3)
BASOPHILS # BLD: 0 K/UL (ref 0–0.2)
BASOPHILS NFR BLD: 0.2 %
BETA-HYDROXYBUTYRATE: 2.9 MMOL/L (ref 0–0.27)
BILIRUB SERPL-MCNC: 0.8 MG/DL (ref 0–1)
BILIRUB UR QL STRIP.AUTO: NEGATIVE
BUN SERPL-MCNC: 28 MG/DL (ref 7–20)
CALCIUM SERPL-MCNC: 9.4 MG/DL (ref 8.3–10.6)
CHLORIDE SERPL-SCNC: 98 MMOL/L (ref 99–110)
CLARITY UR: CLEAR
CO2 BLDV-SCNC: 53 MMOL/L
CO2 SERPL-SCNC: 22 MMOL/L (ref 21–32)
COHGB MFR BLDV: 5.9 % (ref 0–1.5)
COLOR UR: YELLOW
CREAT SERPL-MCNC: 1 MG/DL (ref 0.6–1.2)
DEPRECATED RDW RBC AUTO: 14.5 % (ref 12.4–15.4)
EOSINOPHIL # BLD: 0 K/UL (ref 0–0.6)
EOSINOPHIL NFR BLD: 0.2 %
GFR SERPLBLD CREATININE-BSD FMLA CKD-EPI: 56 ML/MIN/{1.73_M2}
GLUCOSE BLD-MCNC: 262 MG/DL (ref 70–99)
GLUCOSE BLD-MCNC: 426 MG/DL (ref 70–99)
GLUCOSE BLD-MCNC: 479 MG/DL (ref 70–99)
GLUCOSE SERPL-MCNC: 520 MG/DL (ref 70–99)
GLUCOSE UR STRIP.AUTO-MCNC: 500 MG/DL
HCO3 BLDV-SCNC: 22.7 MMOL/L (ref 23–29)
HCT VFR BLD AUTO: 37.8 % (ref 36–48)
HGB BLD-MCNC: 12.2 G/DL (ref 12–16)
HGB UR QL STRIP.AUTO: NEGATIVE
KETONES UR STRIP.AUTO-MCNC: 40 MG/DL
LACTATE BLDV-SCNC: 1.7 MMOL/L (ref 0.4–2)
LEUKOCYTE ESTERASE UR QL STRIP.AUTO: NEGATIVE
LYMPHOCYTES # BLD: 0.5 K/UL (ref 1–5.1)
LYMPHOCYTES NFR BLD: 6 %
MCH RBC QN AUTO: 30.6 PG (ref 26–34)
MCHC RBC AUTO-ENTMCNC: 32.3 G/DL (ref 31–36)
MCV RBC AUTO: 94.8 FL (ref 80–100)
METHGB MFR BLDV: 0.7 %
MONOCYTES # BLD: 0.4 K/UL (ref 0–1.3)
MONOCYTES NFR BLD: 4.3 %
NEUTROPHILS # BLD: 7.4 K/UL (ref 1.7–7.7)
NEUTROPHILS NFR BLD: 89.3 %
NITRITE UR QL STRIP.AUTO: NEGATIVE
O2 CT VFR BLDV CALC: 17 VOL %
O2 THERAPY: ABNORMAL
PCO2 BLDV: 36.7 MMHG (ref 40–50)
PERFORMED ON: ABNORMAL
PH BLDV: 7.4 [PH] (ref 7.35–7.45)
PH UR STRIP.AUTO: 5 [PH] (ref 5–8)
PLATELET # BLD AUTO: 193 K/UL (ref 135–450)
PMV BLD AUTO: 9.6 FL (ref 5–10.5)
PO2 BLDV: 128 MMHG (ref 25–40)
POTASSIUM SERPL-SCNC: 5.2 MMOL/L (ref 3.5–5.1)
PROT SERPL-MCNC: 6.5 G/DL (ref 6.4–8.2)
PROT UR STRIP.AUTO-MCNC: NEGATIVE MG/DL
RBC # BLD AUTO: 3.99 M/UL (ref 4–5.2)
REASON FOR REJECTION: NORMAL
REJECTED TEST: NORMAL
SAO2 % BLDV: 99 %
SODIUM SERPL-SCNC: 135 MMOL/L (ref 136–145)
SP GR UR STRIP.AUTO: 1.01 (ref 1–1.03)
UA COMPLETE W REFLEX CULTURE PNL UR: ABNORMAL
UA DIPSTICK W REFLEX MICRO PNL UR: ABNORMAL
URN SPEC COLLECT METH UR: ABNORMAL
UROBILINOGEN UR STRIP-ACNC: 0.2 E.U./DL
WBC # BLD AUTO: 8.3 K/UL (ref 4–11)

## 2023-05-15 PROCEDURE — 82010 KETONE BODYS QUAN: CPT

## 2023-05-15 PROCEDURE — 83605 ASSAY OF LACTIC ACID: CPT

## 2023-05-15 PROCEDURE — 6370000000 HC RX 637 (ALT 250 FOR IP): Performed by: PHYSICIAN ASSISTANT

## 2023-05-15 PROCEDURE — 85025 COMPLETE CBC W/AUTO DIFF WBC: CPT

## 2023-05-15 PROCEDURE — 96361 HYDRATE IV INFUSION ADD-ON: CPT

## 2023-05-15 PROCEDURE — 36415 COLL VENOUS BLD VENIPUNCTURE: CPT

## 2023-05-15 PROCEDURE — 81003 URINALYSIS AUTO W/O SCOPE: CPT

## 2023-05-15 PROCEDURE — 71045 X-RAY EXAM CHEST 1 VIEW: CPT

## 2023-05-15 PROCEDURE — 96360 HYDRATION IV INFUSION INIT: CPT

## 2023-05-15 PROCEDURE — 82803 BLOOD GASES ANY COMBINATION: CPT

## 2023-05-15 PROCEDURE — 80053 COMPREHEN METABOLIC PANEL: CPT

## 2023-05-15 PROCEDURE — 96372 THER/PROPH/DIAG INJ SC/IM: CPT

## 2023-05-15 PROCEDURE — 2580000003 HC RX 258: Performed by: PHYSICIAN ASSISTANT

## 2023-05-15 PROCEDURE — 99284 EMERGENCY DEPT VISIT MOD MDM: CPT

## 2023-05-15 RX ORDER — 0.9 % SODIUM CHLORIDE 0.9 %
500 INTRAVENOUS SOLUTION INTRAVENOUS ONCE
Status: COMPLETED | OUTPATIENT
Start: 2023-05-15 | End: 2023-05-15

## 2023-05-15 RX ADMIN — INSULIN HUMAN 8 UNITS: 100 INJECTION, SOLUTION PARENTERAL at 13:20

## 2023-05-15 RX ADMIN — SODIUM CHLORIDE 500 ML: 9 INJECTION, SOLUTION INTRAVENOUS at 13:22

## 2023-05-15 RX ADMIN — SODIUM CHLORIDE 500 ML: 9 INJECTION, SOLUTION INTRAVENOUS at 12:35

## 2023-05-15 ASSESSMENT — PAIN - FUNCTIONAL ASSESSMENT: PAIN_FUNCTIONAL_ASSESSMENT: NONE - DENIES PAIN

## 2023-05-15 NOTE — ED PROVIDER NOTES
05/15/2023 12:49, by CALIN YU JR. [facility]   LACTIC ACID       When ordered only abnormal lab results are displayed. All other labs were within normal range or not returned as of this dictation. EKG: When ordered, EKG's are interpreted by the Emergency Department Physician in the absence of a cardiologist.  Please see their note for interpretation of EKG. RADIOLOGY:   Non-plain film images such as CT, Ultrasound and MRI are read by the radiologist. Plain radiographic images are visualized and preliminarily interpreted by the ED Provider with the below findings:        Interpretation per the Radiologist below, if available at the time of this note:    XR CHEST PORTABLE   Final Result   No acute process. No results found. No results found. PROCEDURES   Unless otherwise noted below, none     Procedures    CRITICAL CARE TIME (.cctime)       PAST MEDICAL HISTORY      has a past medical history of CAD (coronary artery disease) (1/2012), Diabetes mellitus with neurological manifestation ([facility] [address] DKA, type 1, not at goal [facility]) (5/21/2022), Hyperlipidemia, Hyperlipidemia, Hypothyroidism (acquired), Insomnia, Neuropathy, Parkinson disease ([address] Parkinson disease ([facility] [address] Parkinson disease ([facility] [address] Renal insufficiency, Restless legs syndrome, and Type I (juvenile type) diabetes mellitus without mention of complication, not stated as uncontrolled.      EMERGENCY DEPARTMENT COURSE and DIFFERENTIAL DIAGNOSIS/MDM:   Vitals:    Vitals:    05/15/23 1325 05/15/23 1345 05/15/23 1430 05/15/23 1445   BP: (!) 146/68 (!) 110/92 (!) 155/72 (!) 147/67   Pulse: 80  77 68   Resp: 24  19 18   Temp:       TempSrc:       SpO2: 94%  94% 99%   Weight:       Height:           Patient was given the following medications:  Medications   0.9 % sodium chloride bolus (0 mLs IntraVENous Stopped 5/15/23 1408)   insulin regular (HUMULIN R;NOVOLIN R) injection 8 Units (8 Units IntraVENous Given 5/15/23 1320)   0.9 % sodium chloride bolus (0 mLs IntraVENous

## 2023-05-15 NOTE — ED NOTES
Pt able to ambulate to restroom with no difficulties. Urine specimen sent to lab.       Tino Loaiza RN  05/15/23 1430

## 2023-05-16 ENCOUNTER — PATIENT MESSAGE (OUTPATIENT)
Dept: ENDOCRINOLOGY | Age: 83
End: 2023-05-16

## 2023-05-16 NOTE — TELEPHONE ENCOUNTER
From: Junie Peralta  To: Dr. Morris Pantego: 2023 2:19 PM EDT  Subject: High BG    I was in the [facility] on May 15th from 9:30 am  4 pm because of my high BG reading of 590. They ran many tests and the PA told me that everything looked good, and a nurse gave me 5 units of insulin. This morning, when I looked at the test results on My Chart, I noted Ketone was flagged A indicating my ketone was high (40). Is there anything I should do based upon this high number? We believe the escalating BG numbers developed because the infusion patch had broken away from my body. We noticed this at the hospital when we began to put on a new infusion set. The BG number was down to 100 this morning. Another reason why we missed the rapid BG increase was because the [employer] and [employer] [facility] apps both failed. As you know, we rely on the ruben data regularly. The apps still have not been fixed after 5 days.      Hany Young  1940

## 2023-05-16 NOTE — TELEPHONE ENCOUNTER
I spoke with patient. She is doing well now. Explained about ketones being cleared by the body with adequate hydration and insulin. Patient is communicating with [facility] regarding connection issues.

## 2023-05-19 ENCOUNTER — OFFICE VISIT (OUTPATIENT)
Dept: FAMILY MEDICINE CLINIC | Age: 83
End: 2023-05-19
Payer: MEDICARE

## 2023-05-19 VITALS
SYSTOLIC BLOOD PRESSURE: 150 MMHG | HEART RATE: 66 BPM | DIASTOLIC BLOOD PRESSURE: 80 MMHG | WEIGHT: 95.2 LBS | RESPIRATION RATE: 16 BRPM | BODY MASS INDEX: 17.97 KG/M2 | OXYGEN SATURATION: 99 % | HEIGHT: 61 IN

## 2023-05-19 DIAGNOSIS — R73.9 ELEVATED BLOOD SUGAR LEVEL: ICD-10-CM

## 2023-05-19 DIAGNOSIS — I25.10 CORONARY ARTERY DISEASE INVOLVING NATIVE CORONARY ARTERY OF NATIVE HEART WITHOUT ANGINA PECTORIS: ICD-10-CM

## 2023-05-19 DIAGNOSIS — G20 PARKINSON DISEASE (HCC): ICD-10-CM

## 2023-05-19 DIAGNOSIS — I10 ESSENTIAL HYPERTENSION: Primary | ICD-10-CM

## 2023-05-19 PROCEDURE — 1036F TOBACCO NON-USER: CPT | Performed by: FAMILY MEDICINE

## 2023-05-19 PROCEDURE — G8427 DOCREV CUR MEDS BY ELIG CLIN: HCPCS | Performed by: FAMILY MEDICINE

## 2023-05-19 PROCEDURE — 1123F ACP DISCUSS/DSCN MKR DOCD: CPT | Performed by: FAMILY MEDICINE

## 2023-05-19 PROCEDURE — G8399 PT W/DXA RESULTS DOCUMENT: HCPCS | Performed by: FAMILY MEDICINE

## 2023-05-19 PROCEDURE — 3078F DIAST BP <80 MM HG: CPT | Performed by: FAMILY MEDICINE

## 2023-05-19 PROCEDURE — 99213 OFFICE O/P EST LOW 20 MIN: CPT | Performed by: FAMILY MEDICINE

## 2023-05-19 PROCEDURE — 3077F SYST BP >= 140 MM HG: CPT | Performed by: FAMILY MEDICINE

## 2023-05-19 PROCEDURE — G8418 CALC BMI BLW LOW PARAM F/U: HCPCS | Performed by: FAMILY MEDICINE

## 2023-05-19 PROCEDURE — 1090F PRES/ABSN URINE INCON ASSESS: CPT | Performed by: FAMILY MEDICINE

## 2023-05-19 SDOH — ECONOMIC STABILITY: FOOD INSECURITY: WITHIN THE PAST 12 MONTHS, YOU WORRIED THAT YOUR FOOD WOULD RUN OUT BEFORE YOU GOT MONEY TO BUY MORE.: NEVER TRUE

## 2023-05-19 SDOH — ECONOMIC STABILITY: HOUSING INSECURITY
IN THE LAST 12 MONTHS, WAS THERE A TIME WHEN YOU DID NOT HAVE A STEADY PLACE TO SLEEP OR SLEPT IN A SHELTER (INCLUDING NOW)?: NO

## 2023-05-19 SDOH — ECONOMIC STABILITY: INCOME INSECURITY: HOW HARD IS IT FOR YOU TO PAY FOR THE VERY BASICS LIKE FOOD, HOUSING, MEDICAL CARE, AND HEATING?: NOT HARD AT ALL

## 2023-05-19 SDOH — ECONOMIC STABILITY: FOOD INSECURITY: WITHIN THE PAST 12 MONTHS, THE FOOD YOU BOUGHT JUST DIDN'T LAST AND YOU DIDN'T HAVE MONEY TO GET MORE.: NEVER TRUE

## 2023-05-19 ASSESSMENT — PATIENT HEALTH QUESTIONNAIRE - PHQ9
SUM OF ALL RESPONSES TO PHQ QUESTIONS 1-9: 0
SUM OF ALL RESPONSES TO PHQ QUESTIONS 1-9: 0
1. LITTLE INTEREST OR PLEASURE IN DOING THINGS: 0
2. FEELING DOWN, DEPRESSED OR HOPELESS: 0
SUM OF ALL RESPONSES TO PHQ QUESTIONS 1-9: 0
SUM OF ALL RESPONSES TO PHQ QUESTIONS 1-9: 0
SUM OF ALL RESPONSES TO PHQ9 QUESTIONS 1 & 2: 0

## 2023-05-19 NOTE — PROGRESS NOTES
Leia Agarwal is a 80 y.o. female. HPI:  Accompanied by her   ED follow-up for elevated blood sugar, her insulin pump had disconnected  She was given insulin with no fluids in the ER    Urine shows ketones, recommend hydration orally    Blood sugar this morning 128  Urine without evidence of infection  Follows up with Endo    Blood pressure has been elevated, recommend she hold off on her midodrine at this time and monitor blood pressure    Meds, vitamins and allergies reviewed with pt    Labs and imaging reviewed in detail    Wt Readings from Last 3 Encounters:   05/19/23 95 lb 3.2 oz (43.2 kg)   05/15/23 91 lb 12.8 oz (41.6 kg)   05/04/23 96 lb (43.5 kg)       REVIEW OF SYSTEMS:   CONSTITUTIONAL: See history of present illness,   Weight noted/stable  HEENT: No new vision difficulties or ringing in the ears. RESPIRATORY: No new SOB, PND, orthopnea or cough. CARDIOVASCULAR: no CP, palpitations or SOB with exertion  GI: No nausea, vomiting, diarrhea, constipation, abdominal pain or changes in bowel habits. : No urinary frequency, urgency, incontinence hematuria or dysuria. SKIN: No cyanosis or skin lesions. MUSCULOSKELETAL: No new muscle or joint pain. NEUROLOGICAL: No syncope or TIA-like symptoms. PSYCHIATRIC: No anxiety, insomnia or depression     No Known Allergies    Prior to Visit Medications    Medication Sig Taking?  Authorizing Provider   insulin aspart (NOVOLOG) 100 UNIT/ML injection vial INJECT 30 UNITS UNDER THE SKIN DAILY WITH INSULIN PUMP Yes Vernon Smith MD   levothyroxine (SYNTHROID) 100 MCG tablet TAKE ONE TABLET BY MOUTH DAILY Yes Kristin Flannery MD   midodrine (PROAMATINE) 5 MG tablet Take midodrine if AM BP is less than 110 or if evening BP is less than 110  Patient taking differently: Take 0.5 tablets by mouth in the morning and at bedtime Take midodrine if AM BP is less than 110 or if evening BP is less than 110 Yes Flower Ricardo MD   simvastatin (ZOCOR) 20 MG

## 2023-05-24 ENCOUNTER — TELEPHONE (OUTPATIENT)
Dept: CARDIOLOGY CLINIC | Age: 83
End: 2023-05-24

## 2023-05-24 DIAGNOSIS — R07.89 CHEST TIGHTNESS: ICD-10-CM

## 2023-05-24 DIAGNOSIS — I25.10 CORONARY ARTERY DISEASE INVOLVING NATIVE CORONARY ARTERY OF NATIVE HEART WITHOUT ANGINA PECTORIS: ICD-10-CM

## 2023-05-24 RX ORDER — NITROGLYCERIN 0.4 MG/1
0.4 TABLET SUBLINGUAL EVERY 5 MIN PRN
Qty: 25 TABLET | Refills: 3 | Status: SHIPPED | OUTPATIENT
Start: 2023-05-24

## 2023-05-24 NOTE — TELEPHONE ENCOUNTER
She feels better now and her BP is back up to 148/73    She didn't feel well this morning ; her BP was low 80s/ ; it gradually started to get better    She is concerned about midodrine : the color of the tablet changed from white to red so she didn't take them  She finally took one    She'd rather not have to take one routinely.   She can tell when something is not right     ~will continue to take prn and keep appt as scheduled 6/2/23 with LES

## 2023-05-24 NOTE — TELEPHONE ENCOUNTER
Pt called to inform npst that her bp is running low. Please call to discuss what she need to do. Please advise.   Thank you      82/55               05/24     7:30am  71/45     68      05/24     7:45am  73/43     63      0524      8:15am  68/47               05/24     8;20am

## 2023-05-24 NOTE — TELEPHONE ENCOUNTER
Spoke with pt and she states that she has felt slightly lightheaded but no other symptoms. Pt reports that she was instructed by LES and NPTS to take Midodrin as needed but her \"park[address] her to take it every day at 8 am, 12, and 4 pm. Pt took a dose this morning but the last time she took it prior to today was over a week ago. Please advise.  Thanks

## 2023-06-02 ENCOUNTER — OFFICE VISIT (OUTPATIENT)
Dept: CARDIOLOGY CLINIC | Age: 83
End: 2023-06-02
Payer: MEDICARE

## 2023-06-02 VITALS
WEIGHT: 93 LBS | OXYGEN SATURATION: 98 % | HEIGHT: 61 IN | DIASTOLIC BLOOD PRESSURE: 72 MMHG | SYSTOLIC BLOOD PRESSURE: 132 MMHG | HEART RATE: 79 BPM | BODY MASS INDEX: 17.56 KG/M2

## 2023-06-02 DIAGNOSIS — I25.10 CORONARY ARTERY DISEASE INVOLVING NATIVE CORONARY ARTERY OF NATIVE HEART WITHOUT ANGINA PECTORIS: Primary | ICD-10-CM

## 2023-06-02 DIAGNOSIS — I36.1 NONRHEUMATIC TRICUSPID VALVE REGURGITATION: ICD-10-CM

## 2023-06-02 DIAGNOSIS — E10.40 POORLY CONTROLLED TYPE 1 DIABETES MELLITUS WITH NEUROPATHY (HCC): ICD-10-CM

## 2023-06-02 DIAGNOSIS — E10.65 POORLY CONTROLLED TYPE 1 DIABETES MELLITUS WITH NEUROPATHY (HCC): ICD-10-CM

## 2023-06-02 DIAGNOSIS — G20 PARKINSON DISEASE (HCC): ICD-10-CM

## 2023-06-02 DIAGNOSIS — E78.2 MIXED HYPERLIPIDEMIA: ICD-10-CM

## 2023-06-02 DIAGNOSIS — E03.9 ACQUIRED HYPOTHYROIDISM: ICD-10-CM

## 2023-06-02 PROCEDURE — 1036F TOBACCO NON-USER: CPT | Performed by: INTERNAL MEDICINE

## 2023-06-02 PROCEDURE — G8427 DOCREV CUR MEDS BY ELIG CLIN: HCPCS | Performed by: INTERNAL MEDICINE

## 2023-06-02 PROCEDURE — 99214 OFFICE O/P EST MOD 30 MIN: CPT | Performed by: INTERNAL MEDICINE

## 2023-06-02 PROCEDURE — 3074F SYST BP LT 130 MM HG: CPT | Performed by: INTERNAL MEDICINE

## 2023-06-02 PROCEDURE — 1090F PRES/ABSN URINE INCON ASSESS: CPT | Performed by: INTERNAL MEDICINE

## 2023-06-02 PROCEDURE — 1123F ACP DISCUSS/DSCN MKR DOCD: CPT | Performed by: INTERNAL MEDICINE

## 2023-06-02 PROCEDURE — G8418 CALC BMI BLW LOW PARAM F/U: HCPCS | Performed by: INTERNAL MEDICINE

## 2023-06-02 PROCEDURE — 3051F HG A1C>EQUAL 7.0%<8.0%: CPT | Performed by: INTERNAL MEDICINE

## 2023-06-02 PROCEDURE — G8399 PT W/DXA RESULTS DOCUMENT: HCPCS | Performed by: INTERNAL MEDICINE

## 2023-06-02 PROCEDURE — 3078F DIAST BP <80 MM HG: CPT | Performed by: INTERNAL MEDICINE

## 2023-06-02 RX ORDER — MIDODRINE HYDROCHLORIDE 2.5 MG/1
TABLET ORAL
Qty: 120 TABLET | Refills: 3 | Status: SHIPPED | OUTPATIENT
Start: 2023-06-02

## 2023-06-05 RX ORDER — INSULIN ASPART 100 [IU]/ML
INJECTION, SOLUTION INTRAVENOUS; SUBCUTANEOUS
Qty: 30 ML | Refills: 0 | Status: SHIPPED | OUTPATIENT
Start: 2023-06-05

## 2023-07-13 ENCOUNTER — NURSE ONLY (OUTPATIENT)
Dept: ENDOCRINOLOGY | Age: 83
End: 2023-07-13
Payer: MEDICARE

## 2023-07-13 DIAGNOSIS — M81.0 AGE-RELATED OSTEOPOROSIS WITHOUT CURRENT PATHOLOGICAL FRACTURE: ICD-10-CM

## 2023-07-13 DIAGNOSIS — E10.65 POORLY CONTROLLED TYPE 1 DIABETES MELLITUS WITH NEUROPATHY (HCC): Primary | ICD-10-CM

## 2023-07-13 DIAGNOSIS — E10.40 POORLY CONTROLLED TYPE 1 DIABETES MELLITUS WITH NEUROPATHY (HCC): Primary | ICD-10-CM

## 2023-07-13 PROCEDURE — 96372 THER/PROPH/DIAG INJ SC/IM: CPT | Performed by: INTERNAL MEDICINE

## 2023-07-13 RX ORDER — INSULIN ASPART 100 [IU]/ML
INJECTION, SOLUTION INTRAVENOUS; SUBCUTANEOUS
Qty: 30 ML | Refills: 1 | Status: SHIPPED | OUTPATIENT
Start: 2023-07-13

## 2023-07-13 NOTE — PROGRESS NOTES
Informed patient if any signs of redness,rash,swelling or unusual symptoms occur, please contact the office. Prolia given per physician order. Office supplied    [facility] to [address] .

## 2023-07-27 ENCOUNTER — OFFICE VISIT (OUTPATIENT)
Dept: FAMILY MEDICINE CLINIC | Age: 83
End: 2023-07-27

## 2023-07-27 VITALS
BODY MASS INDEX: 17.57 KG/M2 | HEART RATE: 89 BPM | OXYGEN SATURATION: 98 % | SYSTOLIC BLOOD PRESSURE: 119 MMHG | DIASTOLIC BLOOD PRESSURE: 53 MMHG | WEIGHT: 93 LBS

## 2023-07-27 DIAGNOSIS — E03.9 HYPOTHYROIDISM (ACQUIRED): ICD-10-CM

## 2023-07-27 DIAGNOSIS — E78.2 MIXED HYPERLIPIDEMIA: ICD-10-CM

## 2023-07-27 DIAGNOSIS — M81.0 OSTEOPOROSIS, UNSPECIFIED OSTEOPOROSIS TYPE, UNSPECIFIED PATHOLOGICAL FRACTURE PRESENCE: ICD-10-CM

## 2023-07-27 DIAGNOSIS — E10.319 POORLY CONTROLLED TYPE 1 DIABETES MELLITUS WITH RETINOPATHY (HCC): ICD-10-CM

## 2023-07-27 DIAGNOSIS — I25.10 CORONARY ARTERY DISEASE INVOLVING NATIVE CORONARY ARTERY OF NATIVE HEART WITHOUT ANGINA PECTORIS: ICD-10-CM

## 2023-07-27 DIAGNOSIS — R63.6 UNDERWEIGHT: ICD-10-CM

## 2023-07-27 DIAGNOSIS — G20 PARKINSON DISEASE (HCC): ICD-10-CM

## 2023-07-27 DIAGNOSIS — N39.46 MIXED STRESS AND URGE URINARY INCONTINENCE: ICD-10-CM

## 2023-07-27 DIAGNOSIS — I10 ESSENTIAL HYPERTENSION: ICD-10-CM

## 2023-07-27 DIAGNOSIS — E10.65 POORLY CONTROLLED TYPE 1 DIABETES MELLITUS WITH RETINOPATHY (HCC): ICD-10-CM

## 2023-07-27 DIAGNOSIS — E10.10 DKA, TYPE 1, NOT AT GOAL (HCC): Primary | ICD-10-CM

## 2023-07-27 LAB — HBA1C MFR BLD: 6.8 %

## 2023-07-27 NOTE — PROGRESS NOTES
Jeana Ervin is a 80 y.o. female. HPI: Here with  for complex medical visit  Struggling to maintain her weight, stop drinking Glucerna in the summer but does drink in the winter  Very miserable with her urinary incontinence, usually cannot get to the bathroom in time and has to wear depends  Insulin pump has been driving her crazy is the alarm to go off in the middle the night all the time  Waiting for the newest model to be available  Try to maintain blood pressure she takes midodrine 2.5 twice a day but thinks that time she needs a bigger dose and is getting conflicting recommendations from her cardiologist and her neurologist  Meds, vitamins and allergies reviewed with pt    ROS: No TIA's or unusual headaches, no dysphagia. No prolonged cough. No dyspnea or chest pain on exertion. No abdominal pain, change in bowel habits, black or bloody stools. No urinary tract symptoms. No new or unusual musculoskeletal symptoms. Prior to Visit Medications    Medication Sig Taking? Authorizing Provider   insulin aspart (NOVOLOG) 100 UNIT/ML injection vial INJECT 30 UNITS UNDER THE SKIN WITH INSULIN PUMP Yes Puja Warren MD   midodrine (PROAMATINE) 2.5 MG tablet Take 1 tablet by mouth in the morning. You may take 5 mg if bp drops.  Yes Jasmeet Nicholson MD   nitroGLYCERIN (NITROSTAT) 0.4 MG SL tablet Place 1 tablet under the tongue every 5 minutes as needed for Chest pain Yes Earlene Irizarry APRN - CNP   levothyroxine (SYNTHROID) 100 MCG tablet TAKE ONE TABLET BY MOUTH DAILY Yes Michael Ribeiro MD   pramipexole (MIRAPEX) 0.5 MG tablet Take 1 tablet by mouth 2 times daily 2 tabs with dinner, 1 tab at HS  Patient taking differently: Take 1 tablet by mouth 2 tabs with dinner, 1 tab at HS Yes Michael Ribeiro MD   Acetone, Urine, Test (KETONE TEST) STRP Test ketones daily Yes Puja Warren MD   denosumab (PROLIA) 60 MG/ML SOSY SC injection Inject 1 mL into the skin every 6 months Yes Historical

## 2023-08-03 DIAGNOSIS — E10.65 POORLY CONTROLLED TYPE 1 DIABETES MELLITUS WITH NEUROPATHY (HCC): ICD-10-CM

## 2023-08-03 DIAGNOSIS — E78.2 MIXED HYPERLIPIDEMIA: ICD-10-CM

## 2023-08-03 DIAGNOSIS — E10.40 POORLY CONTROLLED TYPE 1 DIABETES MELLITUS WITH NEUROPATHY (HCC): ICD-10-CM

## 2023-08-03 DIAGNOSIS — M81.0 OSTEOPOROSIS, UNSPECIFIED OSTEOPOROSIS TYPE, UNSPECIFIED PATHOLOGICAL FRACTURE PRESENCE: ICD-10-CM

## 2023-08-03 DIAGNOSIS — E03.9 ACQUIRED HYPOTHYROIDISM: ICD-10-CM

## 2023-08-03 LAB
ALBUMIN SERPL-MCNC: 4.4 G/DL (ref 3.4–5)
ALBUMIN/GLOB SERPL: 2.2 {RATIO} (ref 1.1–2.2)
ALP SERPL-CCNC: 59 U/L (ref 40–129)
ALT SERPL-CCNC: 13 U/L (ref 10–40)
ANION GAP SERPL CALCULATED.3IONS-SCNC: 12 MMOL/L (ref 3–16)
AST SERPL-CCNC: 24 U/L (ref 15–37)
BILIRUB SERPL-MCNC: 0.6 MG/DL (ref 0–1)
BUN SERPL-MCNC: 27 MG/DL (ref 7–20)
CALCIUM SERPL-MCNC: 9.6 MG/DL (ref 8.3–10.6)
CHLORIDE SERPL-SCNC: 102 MMOL/L (ref 99–110)
CHOLEST SERPL-MCNC: 166 MG/DL (ref 0–199)
CO2 SERPL-SCNC: 29 MMOL/L (ref 21–32)
CREAT SERPL-MCNC: 1 MG/DL (ref 0.6–1.2)
GFR SERPLBLD CREATININE-BSD FMLA CKD-EPI: 56 ML/MIN/{1.73_M2}
GLUCOSE SERPL-MCNC: 237 MG/DL (ref 70–99)
HDLC SERPL-MCNC: 92 MG/DL (ref 40–60)
LDL CHOLESTEROL CALCULATED: 64 MG/DL
POTASSIUM SERPL-SCNC: 4.8 MMOL/L (ref 3.5–5.1)
PROT SERPL-MCNC: 6.4 G/DL (ref 6.4–8.2)
SODIUM SERPL-SCNC: 143 MMOL/L (ref 136–145)
T4 FREE SERPL-MCNC: 1.7 NG/DL (ref 0.9–1.8)
TRIGL SERPL-MCNC: 51 MG/DL (ref 0–150)
TSH SERPL DL<=0.005 MIU/L-ACNC: 2.37 UIU/ML (ref 0.27–4.2)
VLDLC SERPL CALC-MCNC: 10 MG/DL

## 2023-08-04 LAB
EST. AVERAGE GLUCOSE BLD GHB EST-MCNC: 151.3 MG/DL
HBA1C MFR BLD: 6.9 %

## 2023-08-08 ENCOUNTER — OFFICE VISIT (OUTPATIENT)
Dept: ENDOCRINOLOGY | Age: 83
End: 2023-08-08
Payer: MEDICARE

## 2023-08-08 VITALS
TEMPERATURE: 98 F | WEIGHT: 95 LBS | BODY MASS INDEX: 17.94 KG/M2 | OXYGEN SATURATION: 95 % | HEART RATE: 73 BPM | SYSTOLIC BLOOD PRESSURE: 123 MMHG | DIASTOLIC BLOOD PRESSURE: 59 MMHG | RESPIRATION RATE: 14 BRPM | HEIGHT: 61 IN

## 2023-08-08 DIAGNOSIS — E78.2 MIXED HYPERLIPIDEMIA: ICD-10-CM

## 2023-08-08 DIAGNOSIS — E10.40 POORLY CONTROLLED TYPE 1 DIABETES MELLITUS WITH NEUROPATHY (HCC): Primary | ICD-10-CM

## 2023-08-08 DIAGNOSIS — E10.65 POORLY CONTROLLED TYPE 1 DIABETES MELLITUS WITH NEUROPATHY (HCC): Primary | ICD-10-CM

## 2023-08-08 DIAGNOSIS — M81.0 OSTEOPOROSIS, UNSPECIFIED OSTEOPOROSIS TYPE, UNSPECIFIED PATHOLOGICAL FRACTURE PRESENCE: ICD-10-CM

## 2023-08-08 DIAGNOSIS — I25.10 CORONARY ARTERY DISEASE INVOLVING NATIVE CORONARY ARTERY OF NATIVE HEART WITHOUT ANGINA PECTORIS: ICD-10-CM

## 2023-08-08 DIAGNOSIS — E06.3 HASHIMOTO'S THYROIDITIS: ICD-10-CM

## 2023-08-08 DIAGNOSIS — R63.6 UNDERWEIGHT: ICD-10-CM

## 2023-08-08 DIAGNOSIS — E03.9 ACQUIRED HYPOTHYROIDISM: ICD-10-CM

## 2023-08-08 DIAGNOSIS — I10 ESSENTIAL HYPERTENSION: ICD-10-CM

## 2023-08-08 PROCEDURE — 3074F SYST BP LT 130 MM HG: CPT | Performed by: INTERNAL MEDICINE

## 2023-08-08 PROCEDURE — G8427 DOCREV CUR MEDS BY ELIG CLIN: HCPCS | Performed by: INTERNAL MEDICINE

## 2023-08-08 PROCEDURE — 1123F ACP DISCUSS/DSCN MKR DOCD: CPT | Performed by: INTERNAL MEDICINE

## 2023-08-08 PROCEDURE — 1090F PRES/ABSN URINE INCON ASSESS: CPT | Performed by: INTERNAL MEDICINE

## 2023-08-08 PROCEDURE — 3078F DIAST BP <80 MM HG: CPT | Performed by: INTERNAL MEDICINE

## 2023-08-08 PROCEDURE — 95251 CONT GLUC MNTR ANALYSIS I&R: CPT | Performed by: INTERNAL MEDICINE

## 2023-08-08 PROCEDURE — 99214 OFFICE O/P EST MOD 30 MIN: CPT | Performed by: INTERNAL MEDICINE

## 2023-08-08 PROCEDURE — G8399 PT W/DXA RESULTS DOCUMENT: HCPCS | Performed by: INTERNAL MEDICINE

## 2023-08-08 PROCEDURE — G8418 CALC BMI BLW LOW PARAM F/U: HCPCS | Performed by: INTERNAL MEDICINE

## 2023-08-08 PROCEDURE — 1036F TOBACCO NON-USER: CPT | Performed by: INTERNAL MEDICINE

## 2023-08-08 PROCEDURE — 3044F HG A1C LEVEL LT 7.0%: CPT | Performed by: INTERNAL MEDICINE

## 2023-08-08 RX ORDER — BLOOD SUGAR DIAGNOSTIC
STRIP MISCELLANEOUS
Qty: 600 EACH | Refills: 3 | Status: SHIPPED | OUTPATIENT
Start: 2023-08-08

## 2023-08-08 NOTE — PROGRESS NOTES
Fuentes Aragon is a 80 y.o. female who presents for Type 1 diabetes mellitus. Current symptoms/problems include  fluctuating BG levels  and show no change. 1.  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 Comorbid conditions:  hyperlipidemia, Neuropathy, Retinopathy, Chronic Kidney Disease and Coronary Artery Disease    Current diabetic medications include: Novolog    On Medtronic Minimed insulin pump and CGM    Intolerance to diabetes medications: No     Weight trend: stable  Prior visit with dietician: yes  Current diet: on average, 3 meals per day  Current exercise: walks     Current monitoring regimen: home blood tests - 8 times daily  Has brought blood glucose log/meter:  Yes  Home blood sugar records: fasting range: 100-150 and postprandial range:   Any episodes of hypoglycemia? Yes  Hypoglycemia frequency and time(s):  daily  Does patient have Glucagon emergency kit? No  Does patient have rapid acting carbohydrate? Yes  Does patient wear a medic alert bracelet or necklace? No    2. Osteoporosis, unspecified osteoporosis type, unspecified pathological fracture presence  Reclast 5 years. Not on any meds now. On vitamin D 2000 IU, calcium in food    3. Hashimoto's thyroiditis  Has fatigue. 4. Acquired hypothyroidism  On levothyroxine 0.1 mg qd. Has fatigue. 5. Coronary artery disease involving native coronary artery of native heart without angina pectoris  No chest pain. 6. Type 1 diabetes, poorly controlled, with retinopathy ([address])  No recent vision changes. 7.  Underweight  Lost 42 lbs not intentionally. No nausea, vomiting. 8.  Hyperlipidemia  No muscle pain          EXAMINATION:   BONE DENSITOMETRY       3/7/2022 10:07 am       TECHNIQUE:   A bone density DEXA scan was performed of the lumbar spine and bilateral hips   on a app2you system.        COMPARISON:   August 28, 2019 study

## 2023-08-14 ENCOUNTER — TELEPHONE (OUTPATIENT)
Dept: ENDOCRINOLOGY | Age: 83
End: 2023-08-14

## 2023-08-14 NOTE — TELEPHONE ENCOUNTER
[employer] PWO received for 770G. Has been filled and faxed.  To be scanned today upon fax confirmation

## 2023-09-05 ENCOUNTER — OFFICE VISIT (OUTPATIENT)
Dept: CARDIOLOGY CLINIC | Age: 83
End: 2023-09-05
Payer: MEDICARE

## 2023-09-05 ENCOUNTER — TELEPHONE (OUTPATIENT)
Dept: CARDIOLOGY CLINIC | Age: 83
End: 2023-09-05

## 2023-09-05 VITALS
SYSTOLIC BLOOD PRESSURE: 100 MMHG | HEART RATE: 70 BPM | DIASTOLIC BLOOD PRESSURE: 52 MMHG | HEIGHT: 61 IN | BODY MASS INDEX: 16.99 KG/M2 | WEIGHT: 90 LBS | OXYGEN SATURATION: 95 %

## 2023-09-05 DIAGNOSIS — I35.1 NONRHEUMATIC AORTIC VALVE INSUFFICIENCY: ICD-10-CM

## 2023-09-05 DIAGNOSIS — I25.10 CORONARY ARTERY DISEASE INVOLVING NATIVE CORONARY ARTERY OF NATIVE HEART WITHOUT ANGINA PECTORIS: ICD-10-CM

## 2023-09-05 DIAGNOSIS — R09.89 LABILE HYPERTENSION: Primary | ICD-10-CM

## 2023-09-05 DIAGNOSIS — E78.2 MIXED HYPERLIPIDEMIA: ICD-10-CM

## 2023-09-05 PROCEDURE — G8399 PT W/DXA RESULTS DOCUMENT: HCPCS | Performed by: NURSE PRACTITIONER

## 2023-09-05 PROCEDURE — 99214 OFFICE O/P EST MOD 30 MIN: CPT | Performed by: NURSE PRACTITIONER

## 2023-09-05 PROCEDURE — 1036F TOBACCO NON-USER: CPT | Performed by: NURSE PRACTITIONER

## 2023-09-05 PROCEDURE — 3078F DIAST BP <80 MM HG: CPT | Performed by: NURSE PRACTITIONER

## 2023-09-05 PROCEDURE — G8418 CALC BMI BLW LOW PARAM F/U: HCPCS | Performed by: NURSE PRACTITIONER

## 2023-09-05 PROCEDURE — G8427 DOCREV CUR MEDS BY ELIG CLIN: HCPCS | Performed by: NURSE PRACTITIONER

## 2023-09-05 PROCEDURE — 1090F PRES/ABSN URINE INCON ASSESS: CPT | Performed by: NURSE PRACTITIONER

## 2023-09-05 PROCEDURE — 1123F ACP DISCUSS/DSCN MKR DOCD: CPT | Performed by: NURSE PRACTITIONER

## 2023-09-05 PROCEDURE — 3074F SYST BP LT 130 MM HG: CPT | Performed by: NURSE PRACTITIONER

## 2023-09-05 RX ORDER — MIRABEGRON 25 MG/1
25 TABLET, FILM COATED, EXTENDED RELEASE ORAL DAILY
COMMUNITY
Start: 2023-08-28

## 2023-09-05 RX ORDER — LACTULOSE 10 G/15ML
SOLUTION ORAL
COMMUNITY
Start: 2023-08-28

## 2023-09-05 NOTE — TELEPHONE ENCOUNTER
Spk to pt, she is unable to do 1145 on 11/17. If there is an appt available later in the day, that would be fine. She has an eye appt at 10:45 that she worries would go over.    Please advise, Thank you!!!!

## 2023-09-05 NOTE — PROGRESS NOTES
myxomatous. -Mild mitral regurgitation.   -The aortic valve is mildly thickened/calcified but opens well with normal gradients. -Mild to moderate aortic regurgitation.   -Mild tricuspid regurgitation.   -Mild pulmonic regurgitation present.   -Estimated pulmonary artery systolic pressure is borderline elevated at 35 mmHg assuming a right atrial pressure of 3 mmHg. -The left atrium is mildly dilated. -There is a trivial localized right sided pericardial effusion noted. -Appears unchanged when compared to previous study dated 10/26/2021.   -The ascending aorta is mildly dilated (3.7 cm). Angiogram: 4/18/22  LM-normal  LAD-40% proximal, FFR 0.99  Cx-30% mid  OM1-patent  RCA-10% mid, dominant  RPDA-normal  LVEF-70%     Impression:  1. Mild nonobstructive CAD. 2.  Hyperdynamic LV systolic function. 3.  Noncardiac chest pain. Likely blood pressure related. Echo: 2/9/23:   Summary   -Normal left ventricle size, mild concentric wall thickness, and systolic   function with an estimated ejection fraction of 55-60%. -No regional wall motion abnormalities are seen. -Diastolic filling parameters suggest grade I-II diastolic   dysfunction. E/e\"=12.75.   -Mild thickening of the mitral valve noted. -Mild mitral regurgitation.   -The aortic valve appears sclerotic but opens well. -Mild to moderate aortic regurgitation. ERO 0.19 cm2.   -Mild to moderate tricuspid regurgitation.   -Estimated pulmonary artery systolic pressure is mildly elevated at 40 mmHg   assuming a right atrial pressure of 8 mmHg. -Mild bilateral atria enlargement. Assessment:      Diagnosis Orders   1. Labile hypertension   ~essentially stable : maintaining on midodrine 2.5 mg bid  ~c/o fatigue    2. Coronary artery disease involving native coronary artery of native heart without angina pectoris   ~stable : denies chest discomfort  ~non-obst disease by cath   ~ASA    3.  Mixed hyperlipidemia   ~controlled with high HDL  ~off

## 2023-09-05 NOTE — TELEPHONE ENCOUNTER
Pt seen in office. Current apt with LES scheduled for 10/24/23. Pt was advised to reschedule appt for late Nov, early Dec. Please advise. Pt prefers mornings, and only able to come to [facility].

## 2023-09-28 NOTE — PROGRESS NOTES
[facility]   Cardiac f/up    Referring Provider:  Mihaela Engel MD     Chief Complaint   Patient presents with    Shortness of Breath    Coronary Artery Disease    Hyperlipidemia    Hypertension    6 Month Follow-Up          History of Present Illness:  Mateo Hennessy is 80 y.o. 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 She continues to see Dr. Zachary Krause for Parkinson's. 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 She has been taking midodrine twice daily for labile blood pressure, with breakfast and lunch. 5/15/23 she was evaluated in ER for hyperglycemia and some polydipsia. Her initial glucose was 479 on fingerstick. On blood work it is 520 with some ketones in her urine. Has slightly elevated BUN at 28. She received 1 L of IV fluid and 8 units IV insulin and glucose prior to d/c was in 200s. She was not acidotic on VBG and has no anion gap and normal CO2. Did not suspect DKA. She was released home. Today, she is here for 6 mos follow up. Home bp log reviewed by me. She takes 2.5 mg midodrine twice a day and sometimes takes extra one if bp is too low. She feels bad when bp drops, however she has never fallen. Over last couple of weeks, her BPs have been more stable. She has had some constipation lately and is taking lactulose for it , she now takes it 3 times per day. Over last year and a half she has lost a lot of weight. She has ice cream nightly. In general, she does not have a big appetite. She likes drinking Glycerna.  also voices concern about altered mental status intermittently.     Past Medical History:   has a past medical history of CAD (coronary artery disease), Diabetes mellitus with neurological manifestation ([address]), DKA, type 1, not at goal [facility]), Hyperlipidemia, Hyperlipidemia, Hypothyroidism

## 2023-10-20 ENCOUNTER — HOSPITAL ENCOUNTER (OUTPATIENT)
Dept: WOMENS IMAGING | Age: 83
Discharge: HOME | End: 2023-10-20
Payer: MEDICARE

## 2023-10-20 DIAGNOSIS — Z12.31 VISIT FOR SCREENING MAMMOGRAM: ICD-10-CM

## 2023-10-20 PROCEDURE — 77063 BREAST TOMOSYNTHESIS BI: CPT

## 2023-10-24 ENCOUNTER — OFFICE VISIT (OUTPATIENT)
Dept: CARDIOLOGY CLINIC | Age: 83
End: 2023-10-24
Payer: MEDICARE

## 2023-10-24 VITALS
HEIGHT: 61 IN | HEART RATE: 71 BPM | SYSTOLIC BLOOD PRESSURE: 100 MMHG | DIASTOLIC BLOOD PRESSURE: 56 MMHG | WEIGHT: 93.6 LBS | BODY MASS INDEX: 17.67 KG/M2 | OXYGEN SATURATION: 99 %

## 2023-10-24 DIAGNOSIS — I25.10 CORONARY ARTERY DISEASE INVOLVING NATIVE CORONARY ARTERY OF NATIVE HEART WITHOUT ANGINA PECTORIS: Primary | ICD-10-CM

## 2023-10-24 DIAGNOSIS — E03.9 ACQUIRED HYPOTHYROIDISM: ICD-10-CM

## 2023-10-24 DIAGNOSIS — E78.2 MIXED HYPERLIPIDEMIA: ICD-10-CM

## 2023-10-24 DIAGNOSIS — E10.40 POORLY CONTROLLED TYPE 1 DIABETES MELLITUS WITH NEUROPATHY (HCC): ICD-10-CM

## 2023-10-24 DIAGNOSIS — I36.1 NONRHEUMATIC TRICUSPID VALVE REGURGITATION: ICD-10-CM

## 2023-10-24 DIAGNOSIS — E10.65 POORLY CONTROLLED TYPE 1 DIABETES MELLITUS WITH NEUROPATHY (HCC): ICD-10-CM

## 2023-10-24 PROCEDURE — 3078F DIAST BP <80 MM HG: CPT | Performed by: INTERNAL MEDICINE

## 2023-10-24 PROCEDURE — 1123F ACP DISCUSS/DSCN MKR DOCD: CPT | Performed by: INTERNAL MEDICINE

## 2023-10-24 PROCEDURE — G8484 FLU IMMUNIZE NO ADMIN: HCPCS | Performed by: INTERNAL MEDICINE

## 2023-10-24 PROCEDURE — G8418 CALC BMI BLW LOW PARAM F/U: HCPCS | Performed by: INTERNAL MEDICINE

## 2023-10-24 PROCEDURE — 99214 OFFICE O/P EST MOD 30 MIN: CPT | Performed by: INTERNAL MEDICINE

## 2023-10-24 PROCEDURE — 1036F TOBACCO NON-USER: CPT | Performed by: INTERNAL MEDICINE

## 2023-10-24 PROCEDURE — 1090F PRES/ABSN URINE INCON ASSESS: CPT | Performed by: INTERNAL MEDICINE

## 2023-10-24 PROCEDURE — G8399 PT W/DXA RESULTS DOCUMENT: HCPCS | Performed by: INTERNAL MEDICINE

## 2023-10-24 PROCEDURE — 3044F HG A1C LEVEL LT 7.0%: CPT | Performed by: INTERNAL MEDICINE

## 2023-10-24 PROCEDURE — G8427 DOCREV CUR MEDS BY ELIG CLIN: HCPCS | Performed by: INTERNAL MEDICINE

## 2023-10-24 PROCEDURE — 3074F SYST BP LT 130 MM HG: CPT | Performed by: INTERNAL MEDICINE

## 2023-10-24 RX ORDER — SIMVASTATIN 20 MG
20 TABLET ORAL NIGHTLY
COMMUNITY

## 2023-10-24 NOTE — PATIENT INSTRUCTIONS
Ok to consume foods daily with salt to help with low bp  Continue risk factor modifications. Call for any change in symptoms, call to report any changes in shortness of breath or development of chest pain with activity.     Follow up in 6 mos

## 2023-11-06 DIAGNOSIS — E10.40 POORLY CONTROLLED TYPE 1 DIABETES MELLITUS WITH NEUROPATHY (HCC): ICD-10-CM

## 2023-11-06 DIAGNOSIS — E10.65 POORLY CONTROLLED TYPE 1 DIABETES MELLITUS WITH NEUROPATHY (HCC): ICD-10-CM

## 2023-11-06 DIAGNOSIS — E06.3 HASHIMOTO'S THYROIDITIS: ICD-10-CM

## 2023-11-06 DIAGNOSIS — E78.2 MIXED HYPERLIPIDEMIA: ICD-10-CM

## 2023-11-06 DIAGNOSIS — E03.9 ACQUIRED HYPOTHYROIDISM: ICD-10-CM

## 2023-11-06 LAB
ALBUMIN SERPL-MCNC: 4 G/DL (ref 3.4–5)
ALBUMIN/GLOB SERPL: 1.8 {RATIO} (ref 1.1–2.2)
ALP SERPL-CCNC: 52 U/L (ref 40–129)
ALT SERPL-CCNC: <5 U/L (ref 10–40)
ANION GAP SERPL CALCULATED.3IONS-SCNC: 14 MMOL/L (ref 3–16)
AST SERPL-CCNC: 25 U/L (ref 15–37)
BILIRUB SERPL-MCNC: 0.8 MG/DL (ref 0–1)
BUN SERPL-MCNC: 21 MG/DL (ref 7–20)
CALCIUM SERPL-MCNC: 9.5 MG/DL (ref 8.3–10.6)
CHLORIDE SERPL-SCNC: 100 MMOL/L (ref 99–110)
CHOLEST SERPL-MCNC: 137 MG/DL (ref 0–199)
CO2 SERPL-SCNC: 28 MMOL/L (ref 21–32)
CREAT SERPL-MCNC: 1 MG/DL (ref 0.6–1.2)
GFR SERPLBLD CREATININE-BSD FMLA CKD-EPI: 56 ML/MIN/{1.73_M2}
GLUCOSE SERPL-MCNC: 107 MG/DL (ref 70–99)
HDLC SERPL-MCNC: 85 MG/DL (ref 40–60)
LDL CHOLESTEROL CALCULATED: 41 MG/DL
POTASSIUM SERPL-SCNC: 4.2 MMOL/L (ref 3.5–5.1)
PROT SERPL-MCNC: 6.2 G/DL (ref 6.4–8.2)
SODIUM SERPL-SCNC: 142 MMOL/L (ref 136–145)
T3FREE SERPL-MCNC: 2 PG/ML (ref 2.3–4.2)
T4 FREE SERPL-MCNC: 1.7 NG/DL (ref 0.9–1.8)
TRIGL SERPL-MCNC: 56 MG/DL (ref 0–150)
TSH SERPL DL<=0.005 MIU/L-ACNC: 1.47 UIU/ML (ref 0.27–4.2)
VLDLC SERPL CALC-MCNC: 11 MG/DL

## 2023-11-07 ENCOUNTER — OFFICE VISIT (OUTPATIENT)
Dept: ENDOCRINOLOGY | Age: 83
End: 2023-11-07

## 2023-11-07 VITALS
OXYGEN SATURATION: 95 % | HEIGHT: 61 IN | WEIGHT: 93.4 LBS | DIASTOLIC BLOOD PRESSURE: 61 MMHG | HEART RATE: 79 BPM | RESPIRATION RATE: 14 BRPM | TEMPERATURE: 98 F | SYSTOLIC BLOOD PRESSURE: 121 MMHG | BODY MASS INDEX: 17.64 KG/M2

## 2023-11-07 DIAGNOSIS — E10.319 CONTROLLED TYPE 1 DIABETES MELLITUS WITH RETINOPATHY, MACULAR EDEMA PRESENCE UNSPECIFIED, UNSPECIFIED LATERALITY, UNSPECIFIED RETINOPATHY SEVERITY (HCC): ICD-10-CM

## 2023-11-07 DIAGNOSIS — M81.0 OSTEOPOROSIS, UNSPECIFIED OSTEOPOROSIS TYPE, UNSPECIFIED PATHOLOGICAL FRACTURE PRESENCE: ICD-10-CM

## 2023-11-07 DIAGNOSIS — I25.10 CORONARY ARTERY DISEASE INVOLVING NATIVE CORONARY ARTERY OF NATIVE HEART WITHOUT ANGINA PECTORIS: ICD-10-CM

## 2023-11-07 DIAGNOSIS — R63.6 UNDERWEIGHT: ICD-10-CM

## 2023-11-07 DIAGNOSIS — E03.9 ACQUIRED HYPOTHYROIDISM: ICD-10-CM

## 2023-11-07 DIAGNOSIS — E10.40 TYPE 1 DIABETES, CONTROLLED, WITH NEUROPATHY (HCC): Primary | ICD-10-CM

## 2023-11-07 DIAGNOSIS — E78.2 MIXED HYPERLIPIDEMIA: ICD-10-CM

## 2023-11-07 DIAGNOSIS — E06.3 HASHIMOTO'S THYROIDITIS: ICD-10-CM

## 2023-11-07 DIAGNOSIS — N18.30 STAGE 3 CHRONIC KIDNEY DISEASE, UNSPECIFIED WHETHER STAGE 3A OR 3B CKD (HCC): ICD-10-CM

## 2023-11-07 LAB
CREAT UR-MCNC: 265.2 MG/DL (ref 28–259)
EST. AVERAGE GLUCOSE BLD GHB EST-MCNC: 137 MG/DL
HBA1C MFR BLD: 6.4 %
MICROALBUMIN UR DL<=1MG/L-MCNC: 3.7 MG/DL
MICROALBUMIN/CREAT UR: 14 MG/G (ref 0–30)

## 2023-11-07 NOTE — PROGRESS NOTES
Mateo Hennessy is a 80 y.o. female who presents for Type 1 diabetes mellitus. Current symptoms/problems include  fluctuating BG levels  and show no change. 1.  Type 1 diabetes, controlled, with neuropathy ([address]) [E10.40]    Diagnosed with Type 1 diabetes mellitus in 1999. Comorbid conditions:  hyperlipidemia, Neuropathy, Retinopathy, Chronic Kidney Disease and Coronary Artery Disease    Current diabetic medications include: Novolog    On Medtronic Minimed insulin pump and CGM    Intolerance to diabetes medications: No     Weight trend: stable  Prior visit with dietician: yes  Current diet: on average, 3 meals per day  Current exercise: walks     Current monitoring regimen: home blood tests - 8 times daily  Has brought blood glucose log/meter:  Yes  Home blood sugar records: fasting range: 100-150 and postprandial range:   Any episodes of hypoglycemia? Yes  Hypoglycemia frequency and time(s):  daily  Does patient have Glucagon emergency kit? No  Does patient have rapid acting carbohydrate? Yes  Does patient wear a medic alert bracelet or necklace? No    2. Osteoporosis, unspecified osteoporosis type, unspecified pathological fracture presence  Reclast 5 years. Not on any meds now. On vitamin D 2000 IU, calcium in food    3. Hashimoto's thyroiditis  Has fatigue. 4. Acquired hypothyroidism  On levothyroxine 0.1 mg qd. Has fatigue. 5. Coronary artery disease involving native coronary artery of native heart without angina pectoris  No chest pain. 6. Type 1 diabetes, poorly controlled, with retinopathy ([address])  No recent vision changes. 7.  Underweight  Lost 42 lbs not intentionally. No nausea, vomiting. 8.  Hyperlipidemia  No muscle pain    9.  Chronic kidney disease, stage 3  GFR 56-56-56  No urination problems      EXAMINATION:   BONE DENSITOMETRY       3/7/2022 10:07 am       TECHNIQUE:   A bone density DEXA scan was performed of the lumbar spine and bilateral hips   on a [employer]

## 2023-12-07 LAB — DIABETIC RETINOPATHY: NEGATIVE

## 2023-12-08 LAB
CATARACTS: NEGATIVE
DIABETIC RETINOPATHY: NEGATIVE
GLAUCOMA: NEGATIVE
INTRAOCULAR PRESSURE EYE: 10
INTRAOCULAR PRESSURE EYE: 10
VISUAL ACUITY DISTANCE LEFT EYE: NORMAL
VISUAL ACUITY DISTANCE RIGHT EYE: NORMAL

## 2023-12-24 ASSESSMENT — LIFESTYLE VARIABLES
HOW OFTEN DO YOU HAVE SIX OR MORE DRINKS ON ONE OCCASION: 1
HOW OFTEN DO YOU HAVE A DRINK CONTAINING ALCOHOL: 2

## 2023-12-27 ENCOUNTER — TELEPHONE (OUTPATIENT)
Dept: FAMILY MEDICINE CLINIC | Age: 83
End: 2023-12-27

## 2023-12-27 ENCOUNTER — OFFICE VISIT (OUTPATIENT)
Dept: FAMILY MEDICINE CLINIC | Age: 83
End: 2023-12-27
Payer: MEDICARE

## 2023-12-27 VITALS — DIASTOLIC BLOOD PRESSURE: 68 MMHG | SYSTOLIC BLOOD PRESSURE: 100 MMHG | WEIGHT: 90.4 LBS | BODY MASS INDEX: 17.09 KG/M2

## 2023-12-27 DIAGNOSIS — Z00.00 MEDICARE ANNUAL WELLNESS VISIT, SUBSEQUENT: Primary | ICD-10-CM

## 2023-12-27 DIAGNOSIS — I10 ESSENTIAL HYPERTENSION: ICD-10-CM

## 2023-12-27 DIAGNOSIS — E03.9 ACQUIRED HYPOTHYROIDISM: ICD-10-CM

## 2023-12-27 DIAGNOSIS — M81.0 OSTEOPOROSIS, UNSPECIFIED OSTEOPOROSIS TYPE, UNSPECIFIED PATHOLOGICAL FRACTURE PRESENCE: ICD-10-CM

## 2023-12-27 DIAGNOSIS — E78.2 MIXED HYPERLIPIDEMIA: ICD-10-CM

## 2023-12-27 DIAGNOSIS — E10.319 CONTROLLED TYPE 1 DIABETES MELLITUS WITH RETINOPATHY, MACULAR EDEMA PRESENCE UNSPECIFIED, UNSPECIFIED LATERALITY, UNSPECIFIED RETINOPATHY SEVERITY (HCC): ICD-10-CM

## 2023-12-27 PROCEDURE — 3078F DIAST BP <80 MM HG: CPT | Performed by: FAMILY MEDICINE

## 2023-12-27 PROCEDURE — 1123F ACP DISCUSS/DSCN MKR DOCD: CPT | Performed by: FAMILY MEDICINE

## 2023-12-27 PROCEDURE — G0439 PPPS, SUBSEQ VISIT: HCPCS | Performed by: FAMILY MEDICINE

## 2023-12-27 PROCEDURE — 3044F HG A1C LEVEL LT 7.0%: CPT | Performed by: FAMILY MEDICINE

## 2023-12-27 PROCEDURE — 3074F SYST BP LT 130 MM HG: CPT | Performed by: FAMILY MEDICINE

## 2023-12-27 PROCEDURE — G8484 FLU IMMUNIZE NO ADMIN: HCPCS | Performed by: FAMILY MEDICINE

## 2023-12-27 RX ORDER — SIMVASTATIN 20 MG
20 TABLET ORAL NIGHTLY
Qty: 90 TABLET | Refills: 3 | Status: SHIPPED | OUTPATIENT
Start: 2023-12-27

## 2023-12-27 NOTE — PATIENT INSTRUCTIONS
like heart disease, depression, chronic pain, and cancer. How do you practice mindfulness? To be mindful is to pay attention, to be present, and to be accepting. Like any new skill or habit, being mindful can take practice. When you're mindful, you do just one thing and you pay close attention to that one thing. For example, you may sit quietly and notice your emotions or how your food tastes and smells. When you're present, you focus on the things that are happening right now. You let go of your thoughts about the past and the future. When you dwell on the past or the future, you miss moments that can heal and strengthen you. You may miss moments like hearing a child laugh or seeing a friendly face when you think you're all alone. When you're accepting, you don't  the present moment. Instead you accept your thoughts and feelings as they come. You can practice anytime, anywhere, and in any way you choose. You can practice in many ways. Here are a few ideas:  While doing your chores, like washing the dishes, let your mind focus on what's in your hand. What does the dish feel like? Is the water warm or cold? Go outside and take a few deep breaths. What is the air like? Is it warm or cold? When you can, take some time at the start of your day to sit alone and think. Take a slow walk by yourself. Count your steps while you breathe in and out. Try yoga breathing exercises, stretches, and poses to strengthen and relax your muscles. At work, if you can, try to stop for a few moments each hour. Note how your body feels. Let yourself regroup and let your mind settle before you return to what you were doing. If you struggle with anxiety or \"worry thoughts,\" imagine your mind as a blue radha and your worry thoughts as clouds. Now imagine those worry thoughts floating across your mind's radha. Just let them pass by as you watch. Follow-up care is a key part of your treatment and safety.  Be sure to make and go to

## 2023-12-27 NOTE — PROGRESS NOTES
Meds  Problems  Med Hx  Surg Hx  Soc Hx  Fam Hx        Encounter Diagnoses   Name Primary?     Medicare annual wellness visit, subsequent Yes    Controlled type 1 diabetes mellitus with retinopathy, macular edema presence unspecified, unspecified laterality, unspecified retinopathy severity ([address])     Acquired hypothyroidism     Essential hypertension     Mixed hyperlipidemia     Osteoporosis, unspecified osteoporosis type, unspecified pathological fracture presence     Wt loss     ?antidepressant, patient wishes to defer  Counseling  More water-shoot for 32 ounces a day as a minimum  Avoid nsaids, Tylenol is fine  Discuss lessening insulin and thyroid with endoin an effort to help with weight gain  No ground level gardening due to fall  rsv at pharmacy

## 2023-12-27 NOTE — TELEPHONE ENCOUNTER
Dr Yañez has referred this patient to Dr Huang  the sooner the better     No en appoints to meet this patient needs     Please call and advise

## 2024-01-04 NOTE — TELEPHONE ENCOUNTER
1/4/24 at 1:50 pm called and spoke with patient, scheduled new patient appointment for e 1/23/24 at 10 am with Dr. Huang.  Dr. Huang

## 2024-01-15 ENCOUNTER — NURSE ONLY (OUTPATIENT)
Dept: ENDOCRINOLOGY | Age: 84
End: 2024-01-15
Payer: MEDICARE

## 2024-01-15 DIAGNOSIS — M81.0 AGE-RELATED OSTEOPOROSIS WITHOUT CURRENT PATHOLOGICAL FRACTURE: Primary | ICD-10-CM

## 2024-01-15 DIAGNOSIS — E10.65 POORLY CONTROLLED TYPE 1 DIABETES MELLITUS WITH NEUROPATHY (HCC): ICD-10-CM

## 2024-01-15 DIAGNOSIS — E10.40 POORLY CONTROLLED TYPE 1 DIABETES MELLITUS WITH NEUROPATHY (HCC): ICD-10-CM

## 2024-01-15 PROCEDURE — 96372 THER/PROPH/DIAG INJ SC/IM: CPT | Performed by: INTERNAL MEDICINE

## 2024-01-15 RX ORDER — BLOOD SUGAR DIAGNOSTIC
STRIP MISCELLANEOUS
Qty: 600 EACH | Refills: 3 | Status: SHIPPED | OUTPATIENT
Start: 2024-01-15

## 2024-01-15 RX ORDER — BLOOD SUGAR DIAGNOSTIC
STRIP MISCELLANEOUS
Qty: 600 EACH | Refills: 3 | Status: SHIPPED | OUTPATIENT
Start: 2024-01-15 | End: 2024-01-15 | Stop reason: SDUPTHER

## 2024-01-15 RX ORDER — INSULIN ASPART 100 [IU]/ML
INJECTION, SOLUTION INTRAVENOUS; SUBCUTANEOUS
Qty: 30 ML | Refills: 3 | Status: SHIPPED | OUTPATIENT
Start: 2024-01-15 | End: 2024-01-15 | Stop reason: SDUPTHER

## 2024-01-15 RX ORDER — INSULIN ASPART 100 [IU]/ML
INJECTION, SOLUTION INTRAVENOUS; SUBCUTANEOUS
Qty: 30 ML | Refills: 3 | Status: SHIPPED | OUTPATIENT
Start: 2024-01-15

## 2024-01-15 RX ORDER — IBUPROFEN 600 MG/1
1 TABLET ORAL ONCE
Qty: 1 KIT | Refills: 3 | Status: SHIPPED | OUTPATIENT
Start: 2024-01-15 | End: 2024-01-15

## 2024-01-15 RX ORDER — PROCHLORPERAZINE 25 MG/1
SUPPOSITORY RECTAL
Qty: 1 EACH | Refills: 3 | Status: SHIPPED | OUTPATIENT
Start: 2024-01-15

## 2024-01-15 RX ORDER — PROCHLORPERAZINE 25 MG/1
SUPPOSITORY RECTAL
Qty: 1 EACH | Refills: 3 | Status: SHIPPED | OUTPATIENT
Start: 2024-01-15 | End: 2024-01-15 | Stop reason: SDUPTHER

## 2024-01-15 NOTE — PROGRESS NOTES
Informed patient if any signs of redness,rash,swelling or unusual symptoms occur, please contact the office. Prolia given per physician order.    Office supplied    Subq to [facility]

## 2024-01-18 ENCOUNTER — TELEPHONE (OUTPATIENT)
Dept: ENDOCRINOLOGY | Age: 84
End: 2024-01-18

## 2024-01-18 NOTE — TELEPHONE ENCOUNTER
Submitted PA for Insulin aspart  [facility]: [id number] STATUS: PENDING.    Follow up done daily; if no decision with in three days we will refax.  If another three days goes by with no decision will call the insurance for status.

## 2024-01-19 NOTE — TELEPHONE ENCOUNTER
Please review attached. We got a denial back stating that it was denied due to not sufficient support for medical necessity. #1 states if pt is using pump, 2 medicare, pt using Minimed 780G. Please advise?

## 2024-01-19 NOTE — TELEPHONE ENCOUNTER
Because pt is on a insulin pump and has medicare, it needs to be billed as a medical supply. The pharmacy needs to be billing it to the pts part b. Thanks!

## 2024-01-22 NOTE — TELEPHONE ENCOUNTER
Spoke w/ pt. She states that a week ago she went to go get her insulin and they stated that they didn't have any Novolog. They found another location that had a vial and they purchased it for $35. She got another vial today. She will contact insurance to see if they cover anything else.

## 2024-01-23 ENCOUNTER — OFFICE VISIT (OUTPATIENT)
Dept: PSYCHOLOGY | Age: 84
End: 2024-01-23
Payer: MEDICARE

## 2024-01-23 DIAGNOSIS — F43.23 ADJUSTMENT REACTION WITH ANXIETY AND DEPRESSION: Primary | ICD-10-CM

## 2024-01-23 PROCEDURE — 1036F TOBACCO NON-USER: CPT | Performed by: PSYCHOLOGIST

## 2024-01-23 PROCEDURE — 90791 PSYCH DIAGNOSTIC EVALUATION: CPT | Performed by: PSYCHOLOGIST

## 2024-01-23 PROCEDURE — 1123F ACP DISCUSS/DSCN MKR DOCD: CPT | Performed by: PSYCHOLOGIST

## 2024-01-30 ENCOUNTER — HOSPITAL ENCOUNTER (INPATIENT)
Age: 84
LOS: 1 days | Discharge: HOME | DRG: 919 | End: 2024-01-31
Attending: EMERGENCY MEDICINE | Admitting: INTERNAL MEDICINE
Payer: MEDICARE

## 2024-01-30 DIAGNOSIS — E10.10 DIABETIC KETOACIDOSIS WITHOUT COMA ASSOCIATED WITH TYPE 1 DIABETES MELLITUS (HCC): Primary | ICD-10-CM

## 2024-01-30 LAB
ALBUMIN SERPL-MCNC: 4.4 G/DL (ref 3.4–5)
ALBUMIN/GLOB SERPL: 1.4 {RATIO} (ref 1.1–2.2)
ALP SERPL-CCNC: 63 U/L (ref 40–129)
ALT SERPL-CCNC: 13 U/L (ref 10–40)
ANION GAP SERPL CALCULATED.3IONS-SCNC: 22 MMOL/L (ref 3–16)
AST SERPL-CCNC: 38 U/L (ref 15–37)
BASE EXCESS BLDV CALC-SCNC: -6.9 MMOL/L (ref -3–3)
BASOPHILS # BLD: 0 K/UL (ref 0–0.2)
BASOPHILS NFR BLD: 0.4 %
BETA-HYDROXYBUTYRATE: 2.5 MMOL/L (ref 0–0.27)
BETA-HYDROXYBUTYRATE: 3.1 MMOL/L (ref 0–0.27)
BILIRUB SERPL-MCNC: 0.9 MG/DL (ref 0–1)
BUN SERPL-MCNC: 33 MG/DL (ref 7–20)
CALCIUM SERPL-MCNC: 9.1 MG/DL (ref 8.3–10.6)
CHLORIDE SERPL-SCNC: 89 MMOL/L (ref 99–110)
CO2 BLDV-SCNC: 45 MMOL/L
CO2 SERPL-SCNC: 17 MMOL/L (ref 21–32)
COHGB MFR BLDV: 2.5 % (ref 0–1.5)
CREAT SERPL-MCNC: 0.9 MG/DL (ref 0.6–1.2)
DEPRECATED RDW RBC AUTO: 14.7 % (ref 12.4–15.4)
DO-HGB MFR BLDV: 7 %
EOSINOPHIL # BLD: 0 K/UL (ref 0–0.6)
EOSINOPHIL NFR BLD: 0 %
GFR SERPLBLD CREATININE-BSD FMLA CKD-EPI: >60 ML/MIN/{1.73_M2}
GLUCOSE BLD-MCNC: 202 MG/DL (ref 70–99)
GLUCOSE BLD-MCNC: 277 MG/DL (ref 70–99)
GLUCOSE BLD-MCNC: 399 MG/DL (ref 70–99)
GLUCOSE BLD-MCNC: 412 MG/DL (ref 70–99)
GLUCOSE BLD-MCNC: 423 MG/DL (ref 70–99)
GLUCOSE BLD-MCNC: 446 MG/DL (ref 70–99)
GLUCOSE BLD-MCNC: 561 MG/DL (ref 70–99)
GLUCOSE BLD-MCNC: 594 MG/DL (ref 70–99)
GLUCOSE SERPL-MCNC: 650 MG/DL (ref 70–99)
HCO3 BLDV-SCNC: 18.7 MMOL/L (ref 23–29)
HCT VFR BLD AUTO: 38.7 % (ref 36–48)
HGB BLD-MCNC: 12.7 G/DL (ref 12–16)
LIPASE SERPL-CCNC: 11 U/L (ref 13–60)
LYMPHOCYTES # BLD: 0.3 K/UL (ref 1–5.1)
LYMPHOCYTES NFR BLD: 4.5 %
MAGNESIUM SERPL-MCNC: 2 MG/DL (ref 1.8–2.4)
MCH RBC QN AUTO: 31.7 PG (ref 26–34)
MCHC RBC AUTO-ENTMCNC: 32.7 G/DL (ref 31–36)
MCV RBC AUTO: 97 FL (ref 80–100)
METHGB MFR BLDV: 0.3 %
MONOCYTES # BLD: 0.2 K/UL (ref 0–1.3)
MONOCYTES NFR BLD: 3.3 %
NEUTROPHILS # BLD: 6.6 K/UL (ref 1.7–7.7)
NEUTROPHILS NFR BLD: 91.8 %
O2 CT VFR BLDV CALC: 16 VOL %
O2 THERAPY: ABNORMAL
PCO2 BLDV: 37.2 MMHG (ref 40–50)
PERFORMED ON: ABNORMAL
PH BLDV: 7.31 [PH] (ref 7.35–7.45)
PLATELET # BLD AUTO: 253 K/UL (ref 135–450)
PMV BLD AUTO: 9.6 FL (ref 5–10.5)
PO2 BLDV: 73.6 MMHG (ref 25–40)
POTASSIUM SERPL-SCNC: 4.4 MMOL/L (ref 3.5–5.1)
POTASSIUM SERPL-SCNC: 6.2 MMOL/L (ref 3.5–5.1)
POTASSIUM SERPL-SCNC: 6.4 MMOL/L (ref 3.5–5.1)
PROT SERPL-MCNC: 7.6 G/DL (ref 6.4–8.2)
RBC # BLD AUTO: 3.99 M/UL (ref 4–5.2)
SAO2 % BLDV: 93 %
SODIUM SERPL-SCNC: 128 MMOL/L (ref 136–145)
WBC # BLD AUTO: 7.1 K/UL (ref 4–11)

## 2024-01-30 PROCEDURE — 96375 TX/PRO/DX INJ NEW DRUG ADDON: CPT

## 2024-01-30 PROCEDURE — 96361 HYDRATE IV INFUSION ADD-ON: CPT

## 2024-01-30 PROCEDURE — 6370000000 HC RX 637 (ALT 250 FOR IP): Performed by: PHYSICIAN ASSISTANT

## 2024-01-30 PROCEDURE — 85025 COMPLETE CBC W/AUTO DIFF WBC: CPT

## 2024-01-30 PROCEDURE — 83735 ASSAY OF MAGNESIUM: CPT

## 2024-01-30 PROCEDURE — 2580000003 HC RX 258: Performed by: PHYSICIAN ASSISTANT

## 2024-01-30 PROCEDURE — 36415 COLL VENOUS BLD VENIPUNCTURE: CPT

## 2024-01-30 PROCEDURE — 2500000003 HC RX 250 WO HCPCS: Performed by: PHYSICIAN ASSISTANT

## 2024-01-30 PROCEDURE — 2580000003 HC RX 258: Performed by: INTERNAL MEDICINE

## 2024-01-30 PROCEDURE — 99285 EMERGENCY DEPT VISIT HI MDM: CPT

## 2024-01-30 PROCEDURE — 93005 ELECTROCARDIOGRAM TRACING: CPT | Performed by: PHYSICIAN ASSISTANT

## 2024-01-30 PROCEDURE — 96365 THER/PROPH/DIAG IV INF INIT: CPT

## 2024-01-30 PROCEDURE — 83036 HEMOGLOBIN GLYCOSYLATED A1C: CPT

## 2024-01-30 PROCEDURE — 83690 ASSAY OF LIPASE: CPT

## 2024-01-30 PROCEDURE — 6370000000 HC RX 637 (ALT 250 FOR IP): Performed by: INTERNAL MEDICINE

## 2024-01-30 PROCEDURE — 82010 KETONE BODYS QUAN: CPT

## 2024-01-30 PROCEDURE — 80053 COMPREHEN METABOLIC PANEL: CPT

## 2024-01-30 PROCEDURE — 2000000000 HC ICU R&B

## 2024-01-30 PROCEDURE — 82803 BLOOD GASES ANY COMBINATION: CPT

## 2024-01-30 PROCEDURE — 84132 ASSAY OF SERUM POTASSIUM: CPT

## 2024-01-30 RX ORDER — PRAMIPEXOLE DIHYDROCHLORIDE 0.25 MG/1
0.5 TABLET ORAL SEE ADMIN INSTRUCTIONS
Status: DISCONTINUED | OUTPATIENT
Start: 2024-01-30 | End: 2024-01-30 | Stop reason: SDUPTHER

## 2024-01-30 RX ORDER — ATORVASTATIN CALCIUM 10 MG/1
10 TABLET, FILM COATED ORAL NIGHTLY
Status: DISCONTINUED | OUTPATIENT
Start: 2024-01-30 | End: 2024-01-31 | Stop reason: HOSPADM

## 2024-01-30 RX ORDER — CARBIDOPA AND LEVODOPA 50; 200 MG/1; MG/1
1 TABLET, EXTENDED RELEASE ORAL NIGHTLY
Status: DISCONTINUED | OUTPATIENT
Start: 2024-01-30 | End: 2024-01-31 | Stop reason: HOSPADM

## 2024-01-30 RX ORDER — MIDODRINE HYDROCHLORIDE 5 MG/1
5 TABLET ORAL 2 TIMES DAILY WITH MEALS
Status: DISCONTINUED | OUTPATIENT
Start: 2024-01-31 | End: 2024-01-31 | Stop reason: HOSPADM

## 2024-01-30 RX ORDER — LEVOTHYROXINE SODIUM 0.1 MG/1
100 TABLET ORAL DAILY
Status: DISCONTINUED | OUTPATIENT
Start: 2024-01-31 | End: 2024-01-31 | Stop reason: HOSPADM

## 2024-01-30 RX ORDER — PRAMIPEXOLE DIHYDROCHLORIDE 0.25 MG/1
0.5 TABLET ORAL
Status: DISCONTINUED | OUTPATIENT
Start: 2024-01-31 | End: 2024-01-31 | Stop reason: HOSPADM

## 2024-01-30 RX ORDER — 0.9 % SODIUM CHLORIDE 0.9 %
1000 INTRAVENOUS SOLUTION INTRAVENOUS ONCE
Status: COMPLETED | OUTPATIENT
Start: 2024-01-30 | End: 2024-01-30

## 2024-01-30 RX ORDER — DEXTROSE AND SODIUM CHLORIDE 5; .45 G/100ML; G/100ML
INJECTION, SOLUTION INTRAVENOUS CONTINUOUS PRN
Status: DISCONTINUED | OUTPATIENT
Start: 2024-01-30 | End: 2024-01-31

## 2024-01-30 RX ORDER — PRAMIPEXOLE DIHYDROCHLORIDE 0.25 MG/1
1 TABLET ORAL
Status: DISCONTINUED | OUTPATIENT
Start: 2024-01-30 | End: 2024-01-31 | Stop reason: HOSPADM

## 2024-01-30 RX ORDER — SODIUM CHLORIDE 450 MG/100ML
INJECTION, SOLUTION INTRAVENOUS CONTINUOUS
Status: DISCONTINUED | OUTPATIENT
Start: 2024-01-30 | End: 2024-01-30 | Stop reason: ALTCHOICE

## 2024-01-30 RX ORDER — VITAMIN B COMPLEX
2000 TABLET ORAL DAILY
Status: DISCONTINUED | OUTPATIENT
Start: 2024-01-31 | End: 2024-01-31 | Stop reason: HOSPADM

## 2024-01-30 RX ORDER — ASPIRIN 81 MG/1
81 TABLET ORAL NIGHTLY
Status: DISCONTINUED | OUTPATIENT
Start: 2024-01-30 | End: 2024-01-31 | Stop reason: HOSPADM

## 2024-01-30 RX ORDER — POLYETHYLENE GLYCOL 3350 17 G/17G
17 POWDER, FOR SOLUTION ORAL DAILY PRN
Status: DISCONTINUED | OUTPATIENT
Start: 2024-01-30 | End: 2024-01-31

## 2024-01-30 RX ORDER — MAGNESIUM SULFATE IN WATER 40 MG/ML
2000 INJECTION, SOLUTION INTRAVENOUS PRN
Status: DISCONTINUED | OUTPATIENT
Start: 2024-01-30 | End: 2024-01-31

## 2024-01-30 RX ORDER — TROSPIUM CHLORIDE 20 MG/1
20 TABLET, FILM COATED ORAL DAILY
Status: DISCONTINUED | OUTPATIENT
Start: 2024-01-31 | End: 2024-01-31 | Stop reason: HOSPADM

## 2024-01-30 RX ORDER — FUROSEMIDE 10 MG/ML
40 INJECTION INTRAMUSCULAR; INTRAVENOUS ONCE
Status: DISCONTINUED | OUTPATIENT
Start: 2024-01-30 | End: 2024-01-30

## 2024-01-30 RX ORDER — ENOXAPARIN SODIUM 100 MG/ML
30 INJECTION SUBCUTANEOUS DAILY
Status: DISCONTINUED | OUTPATIENT
Start: 2024-01-31 | End: 2024-01-31 | Stop reason: HOSPADM

## 2024-01-30 RX ORDER — M-VIT,TX,IRON,MINS/CALC/FOLIC 27MG-0.4MG
1 TABLET ORAL DAILY
Status: DISCONTINUED | OUTPATIENT
Start: 2024-01-31 | End: 2024-01-31 | Stop reason: HOSPADM

## 2024-01-30 RX ORDER — NITROGLYCERIN 0.4 MG/1
0.4 TABLET SUBLINGUAL EVERY 5 MIN PRN
Status: DISCONTINUED | OUTPATIENT
Start: 2024-01-30 | End: 2024-01-31 | Stop reason: HOSPADM

## 2024-01-30 RX ORDER — POTASSIUM CHLORIDE 7.45 MG/ML
10 INJECTION INTRAVENOUS PRN
Status: DISCONTINUED | OUTPATIENT
Start: 2024-01-30 | End: 2024-01-31

## 2024-01-30 RX ADMIN — SODIUM CHLORIDE 7.04 UNITS/HR: 9 INJECTION, SOLUTION INTRAVENOUS at 20:33

## 2024-01-30 RX ADMIN — ASPIRIN 81 MG: 81 TABLET, COATED ORAL at 21:59

## 2024-01-30 RX ADMIN — HUMAN INSULIN 4 UNITS: 100 INJECTION, SOLUTION SUBCUTANEOUS at 16:26

## 2024-01-30 RX ADMIN — ATORVASTATIN CALCIUM 10 MG: 10 TABLET, FILM COATED ORAL at 21:59

## 2024-01-30 RX ADMIN — PRAMIPEXOLE DIHYDROCHLORIDE 1 MG: 0.25 TABLET ORAL at 21:59

## 2024-01-30 RX ADMIN — SODIUM CHLORIDE 1000 ML: 9 INJECTION, SOLUTION INTRAVENOUS at 16:26

## 2024-01-30 RX ADMIN — SODIUM CHLORIDE: 4.5 INJECTION, SOLUTION INTRAVENOUS at 20:35

## 2024-01-30 RX ADMIN — CARBIDOPA AND LEVODOPA 1 TABLET: 50; 200 TABLET, EXTENDED RELEASE ORAL at 21:58

## 2024-01-30 RX ADMIN — DEXTROSE AND SODIUM CHLORIDE: 5; 450 INJECTION, SOLUTION INTRAVENOUS at 23:36

## 2024-01-30 RX ADMIN — SODIUM BICARBONATE: 84 INJECTION, SOLUTION INTRAVENOUS at 18:32

## 2024-01-30 ASSESSMENT — ENCOUNTER SYMPTOMS
SHORTNESS OF BREATH: 0
COUGH: 0
VOMITING: 0
CHEST TIGHTNESS: 0
ABDOMINAL PAIN: 0
DIARRHEA: 0
NAUSEA: 0

## 2024-01-30 ASSESSMENT — PAIN SCALES - GENERAL
PAINLEVEL_OUTOF10: 0
PAINLEVEL_OUTOF10: 0

## 2024-01-30 NOTE — ED PROVIDER NOTES
This patient was seen by the Mid-Level Provider. I have seen and examined the patient, agree with the workup, evaluation, management and diagnosis. Care plan has been discussed. My assessment reveals an 83-year-old female who presents with high blood sugar.  This is a 83-year-old female with history of diabetes who presents with \"I am dehydrated\".  The patient states she does not think she is drinking enough.  She also states has been having some high blood sugars at home.          Radiology results:    No orders to display         LABS:    Labs Reviewed   CBC WITH AUTO DIFFERENTIAL - Abnormal; Notable for the following components:       Result Value    RBC 3.99 (*)     Lymphocytes Absolute 0.3 (*)     All other components within normal limits   COMPREHENSIVE METABOLIC PANEL - Abnormal; Notable for the following components:    Sodium 128 (*)     Potassium 6.2 (*)     Chloride 89 (*)     CO2 17 (*)     Anion Gap 22 (*)     Glucose 650 (*)     BUN 33 (*)     AST 38 (*)     All other components within normal limits    Narrative:     CALL  [facility] tel. [phone],  Chemistry results called to and read back by FUAD Bernal, 01/30/2024  15:11, by [employer]   LIPASE - Abnormal; Notable for the following components:    Lipase 11.0 (*)     All other components within normal limits    Narrative:     CALL  [facility] tel. [phone],  Chemistry results called to and read back by FUAD Bernal, 01/30/2024  15:11, by [employer]   BETA-HYDROXYBUTYRATE - Abnormal; Notable for the following components:    Beta-Hydroxybutyrate 3.10 (*)     All other components within normal limits    Narrative:     CALL  [facility] tel. [phone],  Chemistry results called to and read back by FUAD Bernal, 01/30/2024  15:11, by [employer]   BLOOD GAS, VENOUS - Abnormal; Notable for the following components:    pH, [place] 7.310 (*)     pCO2, [place] 37.2 (*)     pO2, [place] 73.6 (*)     HCO3, Venous 18.7 (*)     Base Excess, [place] -6.9 (*)     Carboxyhemoglobin

## 2024-01-30 NOTE — ED PROVIDER NOTES
[facility] EMERGENCY DEPARTMENT  EMERGENCY DEPARTMENT ENCOUNTER        Pt Name: Aileen Foster  MRN: [id number]  Birthdate 1940  Date of evaluation: 1/30/2024  Provider: Delonte Brower PA-C  PCP: Pankaj Yañez MD  Note Started: 2:38 PM EST 1/30/24       I have seen and evaluated this patient with my supervising physician Ceferino Hinds MD.      CHIEF COMPLAINT       Chief Complaint   Patient presents with    Hyperglycemia     Pt sts DM 1, pt sts fasting glucose reading of 500 this morning. PT sts \"I am dehydrated\". \"I don't drink enough\".        HISTORY OF PRESENT ILLNESS: 1 or more Elements     History from : Patient    Limitations to history : None    Aileen Foster is a 83 y.o. female with a history of hypertension, hyperlipidemia, CAD, type 1 diabetes, CKD and Parkinson's disease who presents to the emergency department today stating her [employer] insulin pump has been malfunctioning since yesterday.  She states her glucose has been over 500.  Her glucose is 594 on arrival.  She has no other complaints on arrival.  She denies having any pain.  She denies lightheadedness or dizziness.  She does feel she is dehydrated and states she normally does not drink enough water.      Nursing Notes were all reviewed and agreed with or any disagreements were addressed in the HPI.    REVIEW OF SYSTEMS :      Review of Systems   Constitutional:  Negative for chills and fever.   Respiratory:  Negative for cough, chest tightness and shortness of breath.    Cardiovascular:  Negative for chest pain and palpitations.   Gastrointestinal:  Negative for abdominal pain, diarrhea, nausea and vomiting.   Genitourinary:  Negative for difficulty urinating, dysuria, flank pain, frequency, hematuria and urgency.   Neurological:  Negative for dizziness, facial asymmetry, weakness, light-headedness, numbness and headaches.   All other systems reviewed and are negative.      Positives and Pertinent

## 2024-01-31 VITALS
HEIGHT: 61 IN | WEIGHT: 98.11 LBS | BODY MASS INDEX: 18.52 KG/M2 | DIASTOLIC BLOOD PRESSURE: 46 MMHG | TEMPERATURE: 98 F | HEART RATE: 90 BPM | OXYGEN SATURATION: 99 % | SYSTOLIC BLOOD PRESSURE: 95 MMHG | RESPIRATION RATE: 18 BRPM

## 2024-01-31 PROBLEM — I95.9 HYPOTENSION: Status: ACTIVE | Noted: 2022-10-13

## 2024-01-31 LAB
ANION GAP SERPL CALCULATED.3IONS-SCNC: 7 MMOL/L (ref 3–16)
ANION GAP SERPL CALCULATED.3IONS-SCNC: 8 MMOL/L (ref 3–16)
ANION GAP SERPL CALCULATED.3IONS-SCNC: 8 MMOL/L (ref 3–16)
BILIRUB UR QL STRIP.AUTO: NEGATIVE
BUN SERPL-MCNC: 19 MG/DL (ref 7–20)
BUN SERPL-MCNC: 23 MG/DL (ref 7–20)
BUN SERPL-MCNC: 28 MG/DL (ref 7–20)
CALCIUM SERPL-MCNC: 7.6 MG/DL (ref 8.3–10.6)
CALCIUM SERPL-MCNC: 7.7 MG/DL (ref 8.3–10.6)
CALCIUM SERPL-MCNC: 7.9 MG/DL (ref 8.3–10.6)
CHLORIDE SERPL-SCNC: 101 MMOL/L (ref 99–110)
CHLORIDE SERPL-SCNC: 102 MMOL/L (ref 99–110)
CHLORIDE SERPL-SCNC: 102 MMOL/L (ref 99–110)
CLARITY UR: CLEAR
CO2 SERPL-SCNC: 27 MMOL/L (ref 21–32)
CO2 SERPL-SCNC: 27 MMOL/L (ref 21–32)
CO2 SERPL-SCNC: 28 MMOL/L (ref 21–32)
COLOR UR: YELLOW
CREAT SERPL-MCNC: 0.7 MG/DL (ref 0.6–1.2)
CREAT SERPL-MCNC: 0.7 MG/DL (ref 0.6–1.2)
CREAT SERPL-MCNC: 0.8 MG/DL (ref 0.6–1.2)
EKG ATRIAL RATE: 95 BPM
EKG DIAGNOSIS: NORMAL
EKG P AXIS: 77 DEGREES
EKG P-R INTERVAL: 200 MS
EKG Q-T INTERVAL: 368 MS
EKG QRS DURATION: 72 MS
EKG QTC CALCULATION (BAZETT): 462 MS
EKG R AXIS: 60 DEGREES
EKG T AXIS: 64 DEGREES
EKG VENTRICULAR RATE: 95 BPM
EST. AVERAGE GLUCOSE BLD GHB EST-MCNC: 142.7 MG/DL
GFR SERPLBLD CREATININE-BSD FMLA CKD-EPI: >60 ML/MIN/{1.73_M2}
GLUCOSE BLD-MCNC: 141 MG/DL (ref 70–99)
GLUCOSE BLD-MCNC: 143 MG/DL (ref 70–99)
GLUCOSE BLD-MCNC: 145 MG/DL (ref 70–99)
GLUCOSE BLD-MCNC: 152 MG/DL (ref 70–99)
GLUCOSE BLD-MCNC: 162 MG/DL (ref 70–99)
GLUCOSE BLD-MCNC: 174 MG/DL (ref 70–99)
GLUCOSE BLD-MCNC: 181 MG/DL (ref 70–99)
GLUCOSE BLD-MCNC: 184 MG/DL (ref 70–99)
GLUCOSE BLD-MCNC: 200 MG/DL (ref 70–99)
GLUCOSE BLD-MCNC: 206 MG/DL (ref 70–99)
GLUCOSE SERPL-MCNC: 168 MG/DL (ref 70–99)
GLUCOSE SERPL-MCNC: 178 MG/DL (ref 70–99)
GLUCOSE SERPL-MCNC: 222 MG/DL (ref 70–99)
GLUCOSE UR STRIP.AUTO-MCNC: 100 MG/DL
HBA1C MFR BLD: 6.6 %
HGB UR QL STRIP.AUTO: NEGATIVE
KETONES UR STRIP.AUTO-MCNC: ABNORMAL MG/DL
LEUKOCYTE ESTERASE UR QL STRIP.AUTO: NEGATIVE
MAGNESIUM SERPL-MCNC: 1.7 MG/DL (ref 1.8–2.4)
MAGNESIUM SERPL-MCNC: 1.7 MG/DL (ref 1.8–2.4)
MAGNESIUM SERPL-MCNC: 1.8 MG/DL (ref 1.8–2.4)
NITRITE UR QL STRIP.AUTO: NEGATIVE
PERFORMED ON: ABNORMAL
PH UR STRIP.AUTO: 6.5 [PH] (ref 5–8)
PHOSPHATE SERPL-MCNC: 1.9 MG/DL (ref 2.5–4.9)
PHOSPHATE SERPL-MCNC: 2.5 MG/DL (ref 2.5–4.9)
PHOSPHATE SERPL-MCNC: 3.1 MG/DL (ref 2.5–4.9)
POTASSIUM SERPL-SCNC: 3.4 MMOL/L (ref 3.5–5.1)
POTASSIUM SERPL-SCNC: 4.2 MMOL/L (ref 3.5–5.1)
POTASSIUM SERPL-SCNC: 4.3 MMOL/L (ref 3.5–5.1)
PROT UR STRIP.AUTO-MCNC: NEGATIVE MG/DL
SODIUM SERPL-SCNC: 136 MMOL/L (ref 136–145)
SODIUM SERPL-SCNC: 137 MMOL/L (ref 136–145)
SODIUM SERPL-SCNC: 137 MMOL/L (ref 136–145)
SP GR UR STRIP.AUTO: <=1.005 (ref 1–1.03)
UA COMPLETE W REFLEX CULTURE PNL UR: ABNORMAL
UA DIPSTICK W REFLEX MICRO PNL UR: ABNORMAL
URN SPEC COLLECT METH UR: ABNORMAL
UROBILINOGEN UR STRIP-ACNC: 0.2 E.U./DL

## 2024-01-31 PROCEDURE — 2580000003 HC RX 258: Performed by: NURSE PRACTITIONER

## 2024-01-31 PROCEDURE — 84100 ASSAY OF PHOSPHORUS: CPT

## 2024-01-31 PROCEDURE — 83735 ASSAY OF MAGNESIUM: CPT

## 2024-01-31 PROCEDURE — 2500000003 HC RX 250 WO HCPCS: Performed by: PHYSICIAN ASSISTANT

## 2024-01-31 PROCEDURE — 2580000003 HC RX 258: Performed by: INTERNAL MEDICINE

## 2024-01-31 PROCEDURE — 80048 BASIC METABOLIC PNL TOTAL CA: CPT

## 2024-01-31 PROCEDURE — 6360000002 HC RX W HCPCS: Performed by: INTERNAL MEDICINE

## 2024-01-31 PROCEDURE — 2500000003 HC RX 250 WO HCPCS: Performed by: INTERNAL MEDICINE

## 2024-01-31 PROCEDURE — 94760 N-INVAS EAR/PLS OXIMETRY 1: CPT

## 2024-01-31 PROCEDURE — 6370000000 HC RX 637 (ALT 250 FOR IP): Performed by: INTERNAL MEDICINE

## 2024-01-31 PROCEDURE — 93010 ELECTROCARDIOGRAM REPORT: CPT | Performed by: INTERNAL MEDICINE

## 2024-01-31 PROCEDURE — 81003 URINALYSIS AUTO W/O SCOPE: CPT

## 2024-01-31 PROCEDURE — 2580000003 HC RX 258: Performed by: PHYSICIAN ASSISTANT

## 2024-01-31 PROCEDURE — 36415 COLL VENOUS BLD VENIPUNCTURE: CPT

## 2024-01-31 RX ORDER — GLUCAGON 1 MG/ML
1 KIT INJECTION PRN
Status: DISCONTINUED | OUTPATIENT
Start: 2024-01-31 | End: 2024-01-31 | Stop reason: HOSPADM

## 2024-01-31 RX ORDER — 0.9 % SODIUM CHLORIDE 0.9 %
500 INTRAVENOUS SOLUTION INTRAVENOUS ONCE
Status: COMPLETED | OUTPATIENT
Start: 2024-01-31 | End: 2024-01-31

## 2024-01-31 RX ORDER — DEXTROSE MONOHYDRATE 100 MG/ML
INJECTION, SOLUTION INTRAVENOUS CONTINUOUS PRN
Status: DISCONTINUED | OUTPATIENT
Start: 2024-01-31 | End: 2024-01-31 | Stop reason: HOSPADM

## 2024-01-31 RX ADMIN — POTASSIUM CHLORIDE 10 MEQ: 7.46 INJECTION, SOLUTION INTRAVENOUS at 01:41

## 2024-01-31 RX ADMIN — SODIUM PHOSPHATE, MONOBASIC, MONOHYDRATE AND SODIUM PHOSPHATE, DIBASIC, ANHYDROUS 15 MMOL: 142; 276 INJECTION, SOLUTION INTRAVENOUS at 03:57

## 2024-01-31 RX ADMIN — ENOXAPARIN SODIUM 30 MG: 100 INJECTION SUBCUTANEOUS at 09:22

## 2024-01-31 RX ADMIN — Medication 2000 UNITS: at 09:23

## 2024-01-31 RX ADMIN — POTASSIUM CHLORIDE 10 MEQ: 7.46 INJECTION, SOLUTION INTRAVENOUS at 04:01

## 2024-01-31 RX ADMIN — Medication 1 TABLET: at 09:24

## 2024-01-31 RX ADMIN — TROSPIUM CHLORIDE 20 MG: 20 TABLET, FILM COATED ORAL at 09:23

## 2024-01-31 RX ADMIN — CARBIDOPA AND LEVODOPA 2.5 TABLET: 25; 100 TABLET ORAL at 13:44

## 2024-01-31 RX ADMIN — POTASSIUM CHLORIDE 10 MEQ: 7.46 INJECTION, SOLUTION INTRAVENOUS at 08:55

## 2024-01-31 RX ADMIN — MIDODRINE HYDROCHLORIDE 5 MG: 5 TABLET ORAL at 09:23

## 2024-01-31 RX ADMIN — DEXTROSE AND SODIUM CHLORIDE: 5; 450 INJECTION, SOLUTION INTRAVENOUS at 05:52

## 2024-01-31 RX ADMIN — POTASSIUM CHLORIDE 10 MEQ: 7.46 INJECTION, SOLUTION INTRAVENOUS at 02:57

## 2024-01-31 RX ADMIN — LEVOTHYROXINE SODIUM 100 MCG: 0.1 TABLET ORAL at 06:56

## 2024-01-31 RX ADMIN — SODIUM BICARBONATE: 84 INJECTION, SOLUTION INTRAVENOUS at 09:28

## 2024-01-31 RX ADMIN — POTASSIUM CHLORIDE 10 MEQ: 7.46 INJECTION, SOLUTION INTRAVENOUS at 05:51

## 2024-01-31 RX ADMIN — SODIUM CHLORIDE 500 ML: 9 INJECTION, SOLUTION INTRAVENOUS at 00:29

## 2024-01-31 RX ADMIN — POTASSIUM CHLORIDE 10 MEQ: 7.46 INJECTION, SOLUTION INTRAVENOUS at 07:12

## 2024-01-31 ASSESSMENT — PAIN SCALES - GENERAL
PAINLEVEL_OUTOF10: 0

## 2024-01-31 NOTE — H&P
Social Determinants of Health     Financial Resource Strain: Low Risk  (5/19/2023)    Overall Financial Resource Strain (CARDIA)     Difficulty of Paying Living Expenses: Not hard at all   Transportation Needs: Unknown (5/19/2023)    PRAPARE - Transportation     Lack of Transportation (Non-Medical): No   Physical Activity: Insufficiently Active (12/24/2023)    Exercise Vital Sign     Days of Exercise per Week: 2 days     Minutes of Exercise per Session: 30 min   Housing Stability: Unknown (5/19/2023)    Housing Stability Vital Sign     Unstable Housing in the Last Year: No       Medications:   Medications:    [START ON 1/31/2024] enoxaparin  30 mg SubCUTAneous Daily    carbidopa-levodopa  1 tablet Oral Nightly    aspirin  81 mg Oral Nightly    [START ON 1/31/2024] Vitamin D  2,000 Units Oral Daily    [START ON 1/31/2024] levothyroxine  100 mcg Oral Daily    [START ON 1/31/2024] midodrine  5 mg Oral BID with meals    [START ON 1/31/2024] therapeutic multivitamin-minerals  1 tablet Oral Daily    [START ON 1/31/2024] trospium  20 mg Oral Daily    [START ON 1/31/2024] atorvastatin  10 mg Oral Daily    pramipexole  1 mg Oral Dinner    And    [START ON 1/31/2024] pramipexole  0.5 mg Oral Dinner      Infusions:    sodium bicarbonate 100 mEq in sodium chloride 0.45 % 1,000 mL infusion 75 mL/hr at 01/30/24 1832    dextrose 5 % and 0.45 % NaCl      sodium chloride 250 mL/hr at 01/30/24 2035    insulin 10.89 Units/hr (01/30/24 2136)     PRN Meds: dextrose bolus, 125 mL, PRN   Or  dextrose bolus, 250 mL, PRN  potassium chloride, 10 mEq, PRN  magnesium sulfate, 2,000 mg, PRN  sodium phosphate 15 mmol in sodium chloride 0.9 % 250 mL IVPB, 15 mmol, PRN  polyethylene glycol, 17 g, Daily PRN  dextrose 5 % and 0.45 % NaCl, , Continuous PRN  nitroGLYCERIN, 0.4 mg, Q5 Min PRN        Labs      CBC:   Recent Labs     01/30/24  1440   WBC 7.1   HGB 12.7        BMP:    Recent Labs     01/30/24  1440 01/30/24  1625 01/30/24  173

## 2024-01-31 NOTE — PROGRESS NOTES
Hospitalist Progress Note      PCP: Pankaj Yañez MD    Date of Admission: 1/30/2024    LOS: 1    Chief Complaint:   Chief Complaint   Patient presents with    Hyperglycemia     Pt sts DM 1, pt sts fasting glucose reading of 500 this morning. PT sts \"I am dehydrated\". \"I don't drink enough\".        Case Summary:   83-year-old lady with history of CAD, hypertension, hypothyroidism, hyperlipidemia, Parkinson's disease, type 1 diabetes who presented with DKA due to malfunctioning insulin pump yesterday.      Active Hospital Problems    Diagnosis Date Noted    CAD (coronary artery disease) [I25.10] 01/23/2012     Priority: High    Hypotension [I95.9] 10/13/2022     Priority: Medium    Hypothyroidism (acquired) [E03.9]      Priority: Medium    DKA, type 1, not at goal (HCC) [E10.10] 05/21/2022     Priority: Medium    Mixed hyperlipidemia [E78.2] 04/19/2010     Priority: Medium    Parkinson disease [G20.A1]     Acquired hypothyroidism [E03.9] 04/19/2010         Principal Problem:    DKA, type 1, not at goal (HCC): DKA resolved.  She received IV hydration.  - Will transition to oral intake and home insulin pump  - Monitor glycemic levels over the next couple of hours if stable then will consider discharge planing with home insulin pump  - Endocrine consult for pump management      Active Problems:    CAD (coronary artery disease): No chest pains or shortness of breath.  Stable.  On statin    Mixed hyperlipidemia: On statin    Hypothyroidism (acquired): On levothyroxine    Hypotension: Improved.  Continue midodrine.  May be secondary to Parkinson's disease    Parkinson disease: On Sinemet.  Will monitor symptoms for any need for dose adjustments      Medications:  Reviewed  Infusion Medications    sodium bicarbonate 100 mEq in sodium chloride 0.45 % 1,000 mL infusion Stopped (01/31/24 1115)    dextrose 5 % and 0.45 % NaCl Stopped (01/31/24 1114)    insulin Stopped (01/31/24 1114)     Scheduled Medications    
From ER Nephrology Consult received - full consult note to follow.   Fup recheck K if above K consider Lokelma 15gx1   Give iv insulin   Start bicarb infusion     D/W ER team   .   Thank you for allowing us to participate in this patient’s care. Please do not hesitate to contact, if any questions or concerns. We will follow along with you.     Bertram Whelan MD  [facility]   [phone] or MARIELLE Lockett Ninfa.   
Pharmacy Home Medication Reconciliation Note    A medication reconciliation has been completed for Aileen Foster 1940    Pharmacy: [facility] [id number] [place], [place]  Information provided by: patient w/med list    The patient's home medication list is as follows:  No current facility-administered medications on file prior to encounter.     Current Outpatient Medications on File Prior to Encounter   Medication Sig Dispense Refill    ACCU-CHEK GUIDE strip Test 7 times daily 600 each 3    Continuous Blood Gluc Transmit (DEXCOM G6 TRANSMITTER) MISC Change transmitter every 3 months 1 each 3    Glucagon, rDNA, (GLUCAGON EMERGENCY) 1 MG KIT Infuse 1 mg intravenously once for 1 dose Use for low blood glucose emergencies.  May repeat in 15 minutes. 1 kit 3    Glucose (TRUEPLUS) 15 GM/32ML GEL Take 32 mLs by mouth See Admin Instructions TAKE 1 PACKET PO FOR HYPOGLYCEMIC EPISODES (Patient not taking: Reported on 1/30/2024) 45 g 2    insulin aspart (NOVOLOG) 100 UNIT/ML injection vial INJECT 30 UNITS UNDER THE SKIN WITH INSULIN PUMP (Patient taking differently: 30 Units continuous INJECT UP TO 30 UNITS UNDER THE SKIN WITH INSULIN PUMP DAILY.) 30 mL 3    simvastatin (ZOCOR) 20 MG tablet Take 1 tablet by mouth nightly 90 tablet 3    lactulose (CHRONULAC) 10 GM/15ML solution 3 times daily (Patient not taking: Reported on 1/30/2024)      MYRBETRIQ 25 MG TB24 Take 1 tablet by mouth nightly      midodrine (PROAMATINE) 2.5 MG tablet Take 1 tablet by mouth in the morning. You may take 5 mg if bp drops. (Patient taking differently: Take 2 tablets by mouth 2 times daily Take 1 tablet by mouth in the morning and at lunch. You may take 5 mg if bp drops.) 120 tablet 3    nitroGLYCERIN (NITROSTAT) 0.4 MG SL tablet Place 1 tablet under the tongue every 5 minutes as needed for Chest pain 25 tablet 3    levothyroxine (SYNTHROID) 100 MCG tablet TAKE ONE TABLET BY MOUTH DAILY (Patient taking differently: Take 1 tablet by mouth 
Pt discharged to home with son and . Pt's home insulin pump attached with stable glucose. Pt has follow-up appointment with endocrinology February 8. Discharge instructions reviewed with pt and family prior to discharge.  
mellitus with retinopathy (HCC) E10.319, E10.65    Stage 3 chronic kidney disease (HCC) N18.30    Type 1 diabetes, controlled, with neuropathy (HCC) E10.40    Controlled type 1 diabetes mellitus with retinopathy (HCC) E10.319        /61   Pulse 79   Temp 98 °F (36.7 °C) (Temporal)   Resp 17   Ht 1.549 m (5' 0.98\")   Wt 44.5 kg (98 lb 1.7 oz)   SpO2 99%   BMI 18.55 kg/m²     HgBA1c:    Lab Results   Component Value Date/Time    LABA1C 6.6 01/30/2024 09:19 PM       Recent Labs     01/31/24  0700 01/31/24  0803 01/31/24  0910 01/31/24  1024   POCGLU 200* 206* 184* 145*       BUN/Creatinine:    Lab Results   Component Value Date/Time    BUN 19 01/31/2024 07:47 AM    CREATININE 0.7 01/31/2024 07:47 AM       Assessment        Diabetes Management and Education    Does the patient have a Primary Care Physician? Yes.  Also sees Dr. Shepherd for endocrinology.     Does the patient require new medication instruction? No    Person responsible for administration of Insulin/Medication:   [x] Self     [] Caregiver       [x] Spouse       [x] Other Family Member   []  Other    Insulin Instruction:  insulin pump    Does the patient/caregiver monitor Blood Glucoses? Yes, uses CGM    Plan        Pt is off of insulin gtt and has resumed insulin pump with new pump site.  Spouse and son at bedside assisting.  Insulin pump responsibility form has been signed by pt and placed on paper chart.  Insulin dose communication tool reviewed with pt and left at bedside with pen.  Pt hopes to go home today.    Discharge Plan:  Recommend following up with endocrinology per routine     Teaching Time Diabetes Education:  10 minutes     Electronically signed by Elisa Tompkins RN [facility] on 1/31/2024 at 1:54 PM

## 2024-01-31 NOTE — CONSULTS
Living Expenses: Not hard at all   Food Insecurity: Not on file (5/19/2023)   Transportation Needs: Unknown (5/19/2023)    PRAPARE - Transportation     Lack of Transportation (Medical): Not on file     Lack of Transportation (Non-Medical): No   Physical Activity: Insufficiently Active (12/24/2023)    Exercise Vital Sign     Days of Exercise per Week: 2 days     Minutes of Exercise per Session: 30 min   Stress: Not on file   Social Connections: Not on file   Intimate Partner Violence: Not on file   Housing Stability: Unknown (5/19/2023)    Housing Stability Vital Sign     Unable to Pay for Housing in the Last Year: Not on file     Number of Places Lived in the Last Year: Not on file     Unstable Housing in the Last Year: No       Family History    Family History   Problem Relation Age of Onset    Cancer Father     Heart Disease Father     Heart Disease Mother     Cancer Other         sister's daughter - Melanoma    Heart Disease Brother     Arthritis Sister     Diabetes Neg Hx     Stroke Neg Hx     Osteoporosis Neg Hx     Thyroid Disease Neg Hx        Review of Systems.     Complains of generalized weakness   I have reviewed the 10 system review with admitting physician and other consultants on the case       PHYSICAL EXAMINATION.         /62   Pulse 75   Temp 98.1 °F (36.7 °C) (Temporal)   Resp 20   Ht 1.549 m (5' 0.98\")   Wt 44.5 kg (98 lb 1.7 oz)   SpO2 99%   BMI 18.55 kg/m²   No intake or output data in the 24 hours ending 01/31/24 1220    INTAKE/OUTPUT:  No intake/output data recorded.  No intake/output data recorded.       Ill Appearing.   mild respiratory distress.   more awake and alert.   no hypotension    Labs reviewed by me       CBC:   Recent Labs     01/30/24  1440   WBC 7.1   HGB 12.7   HCT 38.7   MCV 97.0        BMP:   Recent Labs     01/31/24  0046 01/31/24  0436 01/31/24  0747    137 137   K 3.4* 4.2 4.3    102 102   CO2 27 27 28   PHOS 1.9* 2.5 3.1   BUN 28* 23* 19

## 2024-01-31 NOTE — CARE COORDINATION
Discharge Planning Note:    Chart reviewed and it appears that patient has minimal needs for discharge at this time. Risk Score 13 %     Primary Care Physician is AMELIA AMARO   Primary insurance is Medicare    Please notify case management if any discharge needs are identified.      Case management will continue to follow progress and update discharge plan as needed.

## 2024-01-31 NOTE — ED NOTES
ED TO INPATIENT SBAR HANDOFF    Patient Name: Aileen Foster   :  1940  83 y.o.   MRN:  [id number]  Preferred Name  [facility]  ED Room #:  ED-0019/19  Family/Caregiver Present no   Restraints no   Sitter no   Sepsis Risk Score Sepsis Risk Score: 3.53    Situation  Code Status: Prior No additional code details.    Allergies: Patient has no known allergies.  Weight: Patient Vitals for the past 96 hrs (Last 3 readings):   Weight   24 1404 41.6 kg (91 lb 12.8 oz)     Arrived from: home  Chief Complaint:   Chief Complaint   Patient presents with    Hyperglycemia     Pt sts DM 1, pt sts fasting glucose reading of 500 this morning. PT sts \"I am dehydrated\". \"I don't drink enough\".      Hospital Problem/Diagnosis:  Active Problems:    * No active hospital problems. *  Resolved Problems:    * No resolved hospital problems. *    Imaging:   No orders to display     Abnormal labs:   Abnormal Labs Reviewed   CBC WITH AUTO DIFFERENTIAL - Abnormal; Notable for the following components:       Result Value    RBC 3.99 (*)     Lymphocytes Absolute 0.3 (*)     All other components within normal limits   COMPREHENSIVE METABOLIC PANEL - Abnormal; Notable for the following components:    Sodium 128 (*)     Potassium 6.2 (*)     Chloride 89 (*)     CO2 17 (*)     Anion Gap 22 (*)     Glucose 650 (*)     BUN 33 (*)     AST 38 (*)     All other components within normal limits    Narrative:     CALL  [facility] tel. [phone],  Chemistry results called to and read back by FUAD Bernal, 2024  15:11, by [employer]   LIPASE - Abnormal; Notable for the following components:    Lipase 11.0 (*)     All other components within normal limits    Narrative:     CALL  [facility] tel. [phone],  Chemistry results called to and read back by FUAD Bernal, 2024  15:11, by [employer]   BETA-HYDROXYBUTYRATE - Abnormal; Notable for the following components:    Beta-Hydroxybutyrate 3.10 (*)     All other components within normal limits

## 2024-01-31 NOTE — PLAN OF CARE
Problem: Discharge Planning  Goal: Discharge to home or other facility with appropriate resources  Outcome: Progressing  Flowsheets (Taken 1/30/2024 2200)  Discharge to home or other facility with appropriate resources:   Identify barriers to discharge with patient and caregiver   Arrange for needed discharge resources and transportation as appropriate   Identify discharge learning needs (meds, wound care, etc)   Refer to discharge planning if patient needs post-hospital services based on physician order or complex needs related to functional status, cognitive ability or social support system     Problem: Pain  Goal: Verbalizes/displays adequate comfort level or baseline comfort level  Outcome: Progressing

## 2024-02-01 ENCOUNTER — CARE COORDINATION (OUTPATIENT)
Dept: CASE MANAGEMENT | Age: 84
End: 2024-02-01

## 2024-02-01 DIAGNOSIS — E10.65 POORLY CONTROLLED TYPE 1 DIABETES MELLITUS WITH RETINOPATHY (HCC): Primary | ICD-10-CM

## 2024-02-01 DIAGNOSIS — E10.319 POORLY CONTROLLED TYPE 1 DIABETES MELLITUS WITH RETINOPATHY (HCC): Primary | ICD-10-CM

## 2024-02-01 PROCEDURE — 1111F DSCHRG MED/CURRENT MED MERGE: CPT | Performed by: FAMILY MEDICINE

## 2024-02-01 NOTE — CARE COORDINATION
Care Transitions Initial Follow Up Call    Call within 2 business days of discharge: Yes    Patient Current Location:  Home: [address] [place] [id number]    Care Transition Nurse contacted the patient by telephone to perform post hospital discharge assessment. Verified name and  with patient as identifiers. Provided introduction to self, and explanation of the Care Transition Nurse role.     Patient: Aileen Foster Patient : 1940   MRN: [id number]  Reason for Admission:   Discharge Date: 24 RARS: Readmission Risk Score: 12.8      Last Discharge Facility       Date Complaint Diagnosis Description Type Department Provider    24 Hyperglycemia Diabetic ketoacidosis without coma associated with type 1 diabetes mellitus (HCC) ED to Hosp-Admission (Discharged) (ADMITTED) [facility] ICU Josh Alarcon MD; Lizet...            Was this an external facility discharge? No Discharge Facility:     Challenges to be reviewed by the provider   Additional needs identified to be addressed with provider: No  none               Method of communication with provider: none.    Pt states doing well, no issues or concerns. BS was 114 this morning. Pt states she will make her own f/u appt with her PCP, politely declined this nurse's assistance. Agreed to more CTC f/u calls.      Care Transition Nurse reviewed discharge instructions with patient who verbalized understanding. The patient was given an opportunity to ask questions and does not have any further questions or concerns at this time. Were discharge instructions available to patient? Yes. Reviewed appropriate site of care based on symptoms and resources available to patient including: When to call 911. The patient agrees to contact the PCP office for questions related to their healthcare.     Advance Care Planning:   Does patient have an Advance Directive: not on file.    Medication reconciliation was performed with patient, who verbalizes

## 2024-02-02 NOTE — DISCHARGE SUMMARY
Hospital Medicine Discharge Summary    Patient ID: Aileen Foster      Patient's PCP: Pankaj Yañez MD    Admit Date: 1/30/2024     Discharge Date: 1/31/2024      Admitting Provider: Marley Crutis MD     Discharge Provider: Josh Alarcon MD     Discharge Diagnoses:       Active Hospital Problems    Diagnosis     CAD (coronary artery disease) [I25.10]      Priority: High    Hypotension [I95.9]      Priority: Medium    Hypothyroidism (acquired) [E03.9]      Priority: Medium    DKA, type 1, not at goal (HCC) [E10.10]      Priority: Medium    Mixed hyperlipidemia [E78.2]      Priority: Medium    Parkinson disease [G20.A1]     Acquired hypothyroidism [E03.9]        The patient was seen and examined on day of discharge and this discharge summary is in conjunction with any daily progress note from day of discharge.    Hospital Course:   The patient is a 83-year-old lady with history of CAD, hypertension, hypothyroidism, hyperlipidemia, Parkinson's disease, type 1 diabetes who presented with DKA due to malfunctioning insulin pump. Following discussion with patient's son, the pump needle came of the skin and insulin was not deliver for an extended period hence she tipped into DKA. She received DKA protocol with rapid resolution of acidosis, she was transitioned to diet and started on home insulin pump. She tolerated transition and oral intake. Will discharge home to follow up with PCP in 1-2 weeks          Physical Exam Performed:     BP (!) 95/46   Pulse 90   Temp 98 °F (36.7 °C) (Temporal)   Resp 18   Ht 1.549 m (5' 0.98\")   Wt 44.5 kg (98 lb 1.7 oz)   SpO2 99%   BMI 18.55 kg/m²     General appearance:  No apparent distress, appears stated age and cooperative.  HEENT:  Normal cephalic, atraumatic without obvious deformity. Pupils equal, round, and reactive to light.  Extra ocular muscles intact. Conjunctivae/corneas clear.  Neck: Supple, with full range of motion. No jugular venous

## 2024-02-05 DIAGNOSIS — E78.2 MIXED HYPERLIPIDEMIA: ICD-10-CM

## 2024-02-05 DIAGNOSIS — M81.0 OSTEOPOROSIS, UNSPECIFIED OSTEOPOROSIS TYPE, UNSPECIFIED PATHOLOGICAL FRACTURE PRESENCE: ICD-10-CM

## 2024-02-05 DIAGNOSIS — E03.9 ACQUIRED HYPOTHYROIDISM: ICD-10-CM

## 2024-02-05 DIAGNOSIS — N18.30 STAGE 3 CHRONIC KIDNEY DISEASE, UNSPECIFIED WHETHER STAGE 3A OR 3B CKD (HCC): ICD-10-CM

## 2024-02-05 DIAGNOSIS — I25.10 CORONARY ARTERY DISEASE INVOLVING NATIVE CORONARY ARTERY OF NATIVE HEART WITHOUT ANGINA PECTORIS: ICD-10-CM

## 2024-02-05 DIAGNOSIS — E06.3 HASHIMOTO'S THYROIDITIS: ICD-10-CM

## 2024-02-05 LAB
25(OH)D3 SERPL-MCNC: 39.4 NG/ML
ALBUMIN SERPL-MCNC: 4 G/DL (ref 3.4–5)
ALBUMIN/GLOB SERPL: 2 {RATIO} (ref 1.1–2.2)
ALP SERPL-CCNC: 58 U/L (ref 40–129)
ALT SERPL-CCNC: <5 U/L (ref 10–40)
ANION GAP SERPL CALCULATED.3IONS-SCNC: 8 MMOL/L (ref 3–16)
AST SERPL-CCNC: 20 U/L (ref 15–37)
BILIRUB SERPL-MCNC: 0.7 MG/DL (ref 0–1)
BUN SERPL-MCNC: 19 MG/DL (ref 7–20)
CALCIUM SERPL-MCNC: 9.1 MG/DL (ref 8.3–10.6)
CHLORIDE SERPL-SCNC: 100 MMOL/L (ref 99–110)
CHOLEST SERPL-MCNC: 137 MG/DL (ref 0–199)
CO2 SERPL-SCNC: 33 MMOL/L (ref 21–32)
CREAT SERPL-MCNC: 0.8 MG/DL (ref 0.6–1.2)
CREAT UR-MCNC: 163.2 MG/DL (ref 28–259)
GFR SERPLBLD CREATININE-BSD FMLA CKD-EPI: >60 ML/MIN/{1.73_M2}
GLUCOSE SERPL-MCNC: 65 MG/DL (ref 70–99)
HDLC SERPL-MCNC: 81 MG/DL (ref 40–60)
LDL CHOLESTEROL CALCULATED: 48 MG/DL
MICROALBUMIN UR DL<=1MG/L-MCNC: 2 MG/DL
MICROALBUMIN/CREAT UR: 12.3 MG/G (ref 0–30)
POTASSIUM SERPL-SCNC: 4.2 MMOL/L (ref 3.5–5.1)
PROT SERPL-MCNC: 6 G/DL (ref 6.4–8.2)
SODIUM SERPL-SCNC: 141 MMOL/L (ref 136–145)
T4 FREE SERPL-MCNC: 1.5 NG/DL (ref 0.9–1.8)
TRIGL SERPL-MCNC: 40 MG/DL (ref 0–150)
TSH SERPL DL<=0.005 MIU/L-ACNC: 3.77 UIU/ML (ref 0.27–4.2)
VLDLC SERPL CALC-MCNC: 8 MG/DL

## 2024-02-06 LAB
EST. AVERAGE GLUCOSE BLD GHB EST-MCNC: 148.5 MG/DL
HBA1C MFR BLD: 6.8 %

## 2024-02-07 ENCOUNTER — OFFICE VISIT (OUTPATIENT)
Dept: FAMILY MEDICINE CLINIC | Age: 84
End: 2024-02-07

## 2024-02-07 VITALS
HEART RATE: 70 BPM | BODY MASS INDEX: 17.51 KG/M2 | DIASTOLIC BLOOD PRESSURE: 68 MMHG | SYSTOLIC BLOOD PRESSURE: 110 MMHG | WEIGHT: 92.6 LBS | OXYGEN SATURATION: 98 %

## 2024-02-07 DIAGNOSIS — Z09 HOSPITAL DISCHARGE FOLLOW-UP: Primary | ICD-10-CM

## 2024-02-07 DIAGNOSIS — N18.30 STAGE 3 CHRONIC KIDNEY DISEASE, UNSPECIFIED WHETHER STAGE 3A OR 3B CKD (HCC): ICD-10-CM

## 2024-02-07 ASSESSMENT — PATIENT HEALTH QUESTIONNAIRE - PHQ9
SUM OF ALL RESPONSES TO PHQ QUESTIONS 1-9: 0
1. LITTLE INTEREST OR PLEASURE IN DOING THINGS: 0
2. FEELING DOWN, DEPRESSED OR HOPELESS: 0
SUM OF ALL RESPONSES TO PHQ QUESTIONS 1-9: 0
SUM OF ALL RESPONSES TO PHQ9 QUESTIONS 1 & 2: 0

## 2024-02-07 NOTE — PROGRESS NOTES
Strp  Test ketones daily     Myrbetriq 25 MG Tb24  Generic drug: mirabegron     nitroGLYCERIN 0.4 MG SL tablet  Commonly known as: Nitrostat  Place 1 tablet under the tongue every 5 minutes as needed for Chest pain     Prolia 60 MG/ML Sosy SC injection  Generic drug: denosumab     simvastatin 20 MG tablet  Commonly known as: ZOCOR  Take 1 tablet by mouth nightly     Vitamin D3 50 MCG (2000 UT) Caps               Medications marked \"taking\" at this time  Outpatient Medications Marked as Taking for the 2/7/24 encounter (Office Visit) with Pankaj Yañez MD   Medication Sig Dispense Refill    ACCU-CHEK GUIDE strip Test 7 times daily 600 each 3    Continuous Blood Gluc Transmit (DEXCOM G6 TRANSMITTER) MISC Change transmitter every 3 months 1 each 3    Glucose (TRUEPLUS) 15 GM/32ML GEL Take 32 mLs by mouth See Admin Instructions TAKE 1 PACKET PO FOR HYPOGLYCEMIC EPISODES 45 g 2    insulin aspart (NOVOLOG) 100 UNIT/ML injection vial INJECT 30 UNITS UNDER THE SKIN WITH INSULIN PUMP (Patient taking differently: 30 Units continuous INJECT UP TO 30 UNITS UNDER THE SKIN WITH INSULIN PUMP DAILY.) 30 mL 3    simvastatin (ZOCOR) 20 MG tablet Take 1 tablet by mouth nightly 90 tablet 3    MYRBETRIQ 25 MG TB24 Take 1 tablet by mouth nightly      midodrine (PROAMATINE) 2.5 MG tablet Take 1 tablet by mouth in the morning. You may take 5 mg if bp drops. (Patient taking differently: Take 2 tablets by mouth 2 times daily Take 1 tablet by mouth in the morning and at lunch. You may take 5 mg if bp drops.) 120 tablet 3    nitroGLYCERIN (NITROSTAT) 0.4 MG SL tablet Place 1 tablet under the tongue every 5 minutes as needed for Chest pain 25 tablet 3    levothyroxine (SYNTHROID) 100 MCG tablet TAKE ONE TABLET BY MOUTH DAILY (Patient taking differently: Take 1 tablet by mouth Daily TAKE ONE TABLET BY MOUTH DAILY) 90 tablet 3    pramipexole (MIRAPEX) 0.5 MG tablet Take 1 tablet by mouth 2 times daily 2 tabs with dinner, 1 tab at HS (Patient

## 2024-02-08 ENCOUNTER — OFFICE VISIT (OUTPATIENT)
Dept: ENDOCRINOLOGY | Age: 84
End: 2024-02-08

## 2024-02-08 ENCOUNTER — OFFICE VISIT (OUTPATIENT)
Dept: PSYCHOLOGY | Age: 84
End: 2024-02-08
Payer: MEDICARE

## 2024-02-08 ENCOUNTER — CARE COORDINATION (OUTPATIENT)
Dept: CASE MANAGEMENT | Age: 84
End: 2024-02-08

## 2024-02-08 VITALS
HEART RATE: 77 BPM | DIASTOLIC BLOOD PRESSURE: 83 MMHG | OXYGEN SATURATION: 100 % | HEIGHT: 61 IN | TEMPERATURE: 98 F | RESPIRATION RATE: 14 BRPM | WEIGHT: 93 LBS | BODY MASS INDEX: 17.56 KG/M2 | SYSTOLIC BLOOD PRESSURE: 163 MMHG

## 2024-02-08 DIAGNOSIS — M81.0 AGE-RELATED OSTEOPOROSIS WITHOUT CURRENT PATHOLOGICAL FRACTURE: ICD-10-CM

## 2024-02-08 DIAGNOSIS — E06.3 HASHIMOTO'S THYROIDITIS: ICD-10-CM

## 2024-02-08 DIAGNOSIS — R63.6 UNDERWEIGHT: ICD-10-CM

## 2024-02-08 DIAGNOSIS — E03.9 ACQUIRED HYPOTHYROIDISM: ICD-10-CM

## 2024-02-08 DIAGNOSIS — F43.23 ADJUSTMENT REACTION WITH ANXIETY AND DEPRESSION: Primary | ICD-10-CM

## 2024-02-08 DIAGNOSIS — E10.40 TYPE 1 DIABETES, CONTROLLED, WITH NEUROPATHY (HCC): Primary | ICD-10-CM

## 2024-02-08 DIAGNOSIS — I10 ESSENTIAL HYPERTENSION: ICD-10-CM

## 2024-02-08 DIAGNOSIS — E10.319 CONTROLLED TYPE 1 DIABETES MELLITUS WITH RETINOPATHY, MACULAR EDEMA PRESENCE UNSPECIFIED, UNSPECIFIED LATERALITY, UNSPECIFIED RETINOPATHY SEVERITY (HCC): ICD-10-CM

## 2024-02-08 DIAGNOSIS — N18.30 STAGE 3 CHRONIC KIDNEY DISEASE, UNSPECIFIED WHETHER STAGE 3A OR 3B CKD (HCC): ICD-10-CM

## 2024-02-08 DIAGNOSIS — I25.10 CORONARY ARTERY DISEASE INVOLVING NATIVE CORONARY ARTERY OF NATIVE HEART WITHOUT ANGINA PECTORIS: ICD-10-CM

## 2024-02-08 PROCEDURE — 1036F TOBACCO NON-USER: CPT | Performed by: PSYCHOLOGIST

## 2024-02-08 PROCEDURE — 1123F ACP DISCUSS/DSCN MKR DOCD: CPT | Performed by: PSYCHOLOGIST

## 2024-02-08 PROCEDURE — 90837 PSYTX W PT 60 MINUTES: CPT | Performed by: PSYCHOLOGIST

## 2024-02-08 NOTE — CARE COORDINATION
Care Transitions Follow Up Call    Patient Current Location:  Home: [address] [place] [id number]    [facility] Care Coordinator contacted the patient by telephone to follow up after admission on 2024.  Verified name and  with patient as identifiers.    Patient: Aileen Foster  Patient : 1940   MRN: [id number]  Reason for Admission: DKA  Discharge Date: 24 RARS: Readmission Risk Score: 12.8      Needs to be reviewed by the provider   Additional needs identified to be addressed with provider: No  none             Method of communication with provider: none.    Patient reports she is doing pretty good. She went to appointments today and yesterday. Taking medication as prescribed. Denies cp, sob, chills, fever, cough, weakness, nvd or fatigue. Patient has swelling in her legs. She has back pain from the bed in the hospital. She will try Tylenol. Appetite, bladder and bowel are good. No questions or needs. Will continue to follow.      Addressed changes since last contact:  none  Discussed follow-up appointments. If no appointment was previously scheduled, appointment scheduling offered: na.   Is follow up appointment scheduled within 7 days of discharge? Yes.    Follow Up  Future Appointments   Date Time Provider Department Center   2024  4:00 PM Kimberlee Huang, PhD [facility]   2024  9:30 AM Pankaj Yañez MD [facility] [place] [facility]   2024  9:30 AM Presley Toro MD [facility] Card[facility]   2024 10:10 AM Ambreen Shepherd MD [facility]   2024 10:00 AM ADRIANA MCKEONf [facility]   2024 10:10 AM Ambreen Shepherd MD [facility]   2024 10:10 AM Ambreen Shepherd MD [facility]   2/10/2025 10:10 AM Ambreen Shepherd MD [facility]   2025 10:10 AM Ambreen Shepherd MD [facility]     External follow up appointment(s):     [facility] Care Coordinator reviewed medical action plan and red flags with patient and discussed any barriers to care and/or

## 2024-02-08 NOTE — PROGRESS NOTES
Aileen Foster is a 84 y.o. female who presents for Type 1 diabetes mellitus.     Current symptoms/problems include  fluctuating BG levels  and show no change.     1.  Type 1 diabetes, controlled, with neuropathy (HCC) [E10.40]    Diagnosed with Type 1 diabetes mellitus in 1999.    Comorbid conditions:  hyperlipidemia, Neuropathy, Retinopathy, Chronic Kidney Disease and Coronary Artery Disease    Current diabetic medications include: Novolog    On Medtronic Minimed insulin pump and CGM    Intolerance to diabetes medications: No     Weight trend: stable  Prior visit with dietician: yes  Current diet: on average, 3 meals per day  Current exercise: walks     Current monitoring regimen: home blood tests - 8 times daily  Has brought blood glucose log/meter:  Yes  Home blood sugar records: fasting range: 100-150 and postprandial range:   Any episodes of hypoglycemia? Yes  Hypoglycemia frequency and time(s):  daily  Does patient have Glucagon emergency kit? No  Does patient have rapid acting carbohydrate?  Yes  Does patient wear a medic alert bracelet or necklace?  No    2. Osteoporosis, unspecified osteoporosis type, unspecified pathological fracture presence  Reclast 5 years. Not on any meds now.  On vitamin D 2000 IU, calcium in food    3. Hashimoto's thyroiditis  Has fatigue.    4. Acquired hypothyroidism  On levothyroxine 0.1 mg qd.  Has fatigue.    5. Coronary artery disease involving native coronary artery of native heart without angina pectoris  No chest pain.    6. Type 1 diabetes, poorly controlled, with retinopathy ([facility])  No recent vision changes.    7.  Underweight  Lost 42 lbs not intentionally.   No nausea, vomiting.    8.  Hyperlipidemia  No muscle pain    9. Chronic kidney disease, stage 3  GFR 56-56-56  No urination problems      EXAMINATION:   BONE DENSITOMETRY       3/7/2022 10:07 am       TECHNIQUE:   A bone density DEXA scan was performed of the lumbar spine and bilateral hips   on a [employer]

## 2024-02-15 ENCOUNTER — HOSPITAL ENCOUNTER (OUTPATIENT)
Age: 84
Discharge: HOME | End: 2024-02-15
Payer: MEDICARE

## 2024-02-15 ENCOUNTER — TELEPHONE (OUTPATIENT)
Dept: FAMILY MEDICINE CLINIC | Age: 84
End: 2024-02-15

## 2024-02-15 ENCOUNTER — OFFICE VISIT (OUTPATIENT)
Dept: FAMILY MEDICINE CLINIC | Age: 84
End: 2024-02-15

## 2024-02-15 ENCOUNTER — HOSPITAL ENCOUNTER (OUTPATIENT)
Dept: GENERAL RADIOLOGY | Age: 84
Discharge: HOME | End: 2024-02-15
Payer: MEDICARE

## 2024-02-15 ENCOUNTER — CARE COORDINATION (OUTPATIENT)
Dept: CARE COORDINATION | Age: 84
End: 2024-02-15

## 2024-02-15 VITALS
SYSTOLIC BLOOD PRESSURE: 152 MMHG | TEMPERATURE: 96.9 F | WEIGHT: 92 LBS | BODY MASS INDEX: 18.06 KG/M2 | DIASTOLIC BLOOD PRESSURE: 82 MMHG | HEIGHT: 60 IN | RESPIRATION RATE: 16 BRPM | OXYGEN SATURATION: 100 % | HEART RATE: 76 BPM

## 2024-02-15 DIAGNOSIS — M54.50 ACUTE BILATERAL LOW BACK PAIN WITHOUT SCIATICA: Primary | ICD-10-CM

## 2024-02-15 DIAGNOSIS — G20.B1 PARKINSON'S DISEASE WITH DYSKINESIA, UNSPECIFIED WHETHER MANIFESTATIONS FLUCTUATE: Chronic | ICD-10-CM

## 2024-02-15 DIAGNOSIS — M54.50 ACUTE MIDLINE LOW BACK PAIN WITHOUT SCIATICA: ICD-10-CM

## 2024-02-15 DIAGNOSIS — M54.50 ACUTE MIDLINE LOW BACK PAIN WITHOUT SCIATICA: Primary | ICD-10-CM

## 2024-02-15 DIAGNOSIS — K59.09 OTHER CONSTIPATION: ICD-10-CM

## 2024-02-15 PROBLEM — G20.A1 PARKINSON'S DISEASE: Chronic | Status: ACTIVE | Noted: 2024-02-15

## 2024-02-15 PROBLEM — G20.A1 PARKINSON'S DISEASE: Status: ACTIVE | Noted: 2024-02-15

## 2024-02-15 PROCEDURE — 72100 X-RAY EXAM L-S SPINE 2/3 VWS: CPT

## 2024-02-15 RX ORDER — LIDOCAINE 4 G/G
1 PATCH TOPICAL DAILY
Qty: 30 PATCH | Refills: 0 | Status: SHIPPED | OUTPATIENT
Start: 2024-02-15 | End: 2024-03-16

## 2024-02-15 NOTE — PROGRESS NOTES
X-ray results called to patient and her   MRI of lumbar spine ordered  They wish to proceed through [facility]... [phone]  Please fax order

## 2024-02-15 NOTE — TELEPHONE ENCOUNTER
PT called and was advised that Dr Piper needs to fax over the MRI Order to [facility] at  FAX # [phone]

## 2024-02-15 NOTE — CARE COORDINATION
Care Transitions Outreach Attempt    Attempted to reach patient for transitions of care follow up. Unable to reach patient.    Patient's spouse states patient is showering & requests call back later in the day.     Basia Dumont LPN CC  Care Transitions  [phone]    Patient: Aileen Foster Patient : 1940 MRN: [id number]    Last Discharge Facility       Date Complaint Diagnosis Description Type Department Provider    24 Hyperglycemia Diabetic ketoacidosis without coma associated with type 1 diabetes mellitus (HCC) ED to Hosp-Admission (Discharged) (ADMITTED) [facility] ICU Josh Alarcon MD; Lizet...          Noted following upcoming appointments from discharge chart review:   [facility] follow up appointment(s):   Future Appointments   Date Time Provider Department Center   2/15/2024 11:30 AM Richelle Piper MD [facility] [place] [facility]   3/7/2024  4:00 PM Kimberlee Huang, PhD [facility]   2024  9:30 AM Pankaj Yañez MD [facility] [place] [facility]   2024  9:30 AM Presley Toro MD [facility] Card[facility]   2024 10:10 AM Ambreen Shepherd MD [facility]   2024 10:00 AM ADRIANA MCKEON [facility]   2024 10:10 AM Ambreen Shepherd MD [facility]   2024 10:10 AM Ambreen Shepherd MD [facility]   2/10/2025 10:10 AM Ambreen Shepherd MD [facility]   2025 10:10 AM Ambreen Shepherd MD [facility]     Non-[facility]  follow up appointment(s): NA

## 2024-02-15 NOTE — PROGRESS NOTES
2000 UNITS CAPS Take 1 capsule by mouth daily Yes Provider, MD Lulu   Glucagon, rDNA, (GLUCAGON EMERGENCY) 1 MG KIT Infuse 1 mg intravenously once for 1 dose Use for low blood glucose emergencies.  May repeat in 15 minutes.  Ambreen Shepherd MD       Past Medical History:   Diagnosis Date    Acquired hypothyroidism 2010    CAD (coronary artery disease) 2012    non obstructive    Diabetes mellitus with neurological manifestation (HCC)     DKA, type 1, not at goal (HCC) 2022    Hyperlipidemia     Hyperlipidemia     Hypothyroidism (acquired)     Insomnia     Neuropathy     Osteoporosis 2014    Parkinson disease     Parkinson disease     Parkinson disease     Renal insufficiency     Restless legs syndrome     Type I (juvenile type) diabetes mellitus without mention of complication, not stated as uncontrolled        Social History     Tobacco Use    Smoking status: Former     Current packs/day: 0.00     Average packs/day: 0.3 packs/day for 4.0 years (1.0 ttl pk-yrs)     Types: Cigarettes     Start date: 1962     Quit date: 1966     Years since quittin.1    Smokeless tobacco: Never   Substance Use Topics    Alcohol use: Not Currently     Alcohol/week: 1.0 standard drink of alcohol     Types: 1 Glasses of wine per week     Comment: social       Family History   Problem Relation Age of Onset    Cancer Father     Heart Disease Father     Heart Disease Mother     Cancer Other         sister's daughter - Melanoma    Heart Disease Brother     Arthritis Sister     Diabetes Neg Hx     Stroke Neg Hx     Osteoporosis Neg Hx     Thyroid Disease Neg Hx        OBJECTIVE:  BP (!) 152/82   Pulse 76   Temp 96.9 °F (36.1 °C)   Resp 16   Ht 1.524 m (5')   Wt 41.7 kg (92 lb)   SpO2 100%   BMI 17.97 kg/m²   GEN: Low back discomfort, worse with extension and lateral bending, blood pressure elevated due to discomfort, slow-moving  HEENT:  NCAT  NECK:  Supple without adenopathy.  CV:  Regular rate and

## 2024-02-16 ENCOUNTER — CARE COORDINATION (OUTPATIENT)
Dept: CASE MANAGEMENT | Age: 84
End: 2024-02-16

## 2024-02-16 DIAGNOSIS — E10.40 POORLY CONTROLLED TYPE 1 DIABETES MELLITUS WITH NEUROPATHY (HCC): ICD-10-CM

## 2024-02-16 DIAGNOSIS — E10.65 POORLY CONTROLLED TYPE 1 DIABETES MELLITUS WITH NEUROPATHY (HCC): ICD-10-CM

## 2024-02-16 NOTE — CARE COORDINATION
Care Transitions Follow Up Call    Patient Current Location:  Home: [address] [place] [address]    Care Transition Nurse contacted the patient by telephone to follow up after admission.  Verified name and  with patient as identifiers.    Patient: Aileen Foster  Patient : 1940   MRN: [id number]  Reason for Admission:   Discharge Date: 24 RARS: Readmission Risk Score: 12.8      Needs to be reviewed by the provider   Additional needs identified to be addressed with provider: No  none             Method of communication with provider: none.    Pt states doing well, no issues or concerns. States BSs are fine. Has had some back pain, getting an MRI scheduled. F/U appts listed below. Agreed to more CTC f/u calls.      Addressed changes since last contact:  none  Discussed follow-up appointments. If no appointment was previously scheduled, appointment scheduling offered: No.   Is follow up appointment scheduled within 7 days of discharge? Yes.    Follow Up  Future Appointments   Date Time Provider Department Center   3/7/2024  4:00 PM Kimberlee Huang, PhD [facility]   2024  9:30 AM Pankaj Yañez MD [facility] [place] [facility]   2024  9:30 AM Presley Toro MD [facility]   2024 10:10 AM Ambreen Shepherd MD [facility]   2024 10:00 AM ADRIANA MCKEON [facility]   2024 10:10 AM Ambreen Shepherd MD [facility]   2024 10:10 AM Ambreen Shepherd MD [facility]   2/10/2025 10:10 AM Ambreen Shepherd MD [facility]   2025 10:10 AM Ambreen Shepherd MD [facility]     External follow up appointment(s):     Care Transition Nurse reviewed medical action plan with patient and discussed any barriers to care and/or understanding of plan of care after discharge. Discussed appropriate site of care based on symptoms and resources available to patient including: When to call 911. The patient agrees to contact the PCP office for questions related to

## 2024-02-19 ENCOUNTER — TELEPHONE (OUTPATIENT)
Dept: FAMILY MEDICINE CLINIC | Age: 84
End: 2024-02-19

## 2024-02-19 RX ORDER — INSULIN ASPART 100 [IU]/ML
INJECTION, SOLUTION INTRAVENOUS; SUBCUTANEOUS
Qty: 30 ML | Refills: 5 | Status: SHIPPED | OUTPATIENT
Start: 2024-02-19

## 2024-02-19 NOTE — TELEPHONE ENCOUNTER
[facility] scheduling called and needs to know     MRI LUMBAR SPINE WO CONTRAST [[id number]] (Order [id number])  on 02/15/2024 it was put in as stat     They need to know if it really is stat or reg.     Either way will need to be reordered and Gian randolph Scheduling needs a return call on orders     Please call gian at [phone]

## 2024-02-20 ENCOUNTER — HOSPITAL ENCOUNTER (OUTPATIENT)
Dept: MRI IMAGING | Age: 84
Discharge: HOME | End: 2024-02-20
Attending: FAMILY MEDICINE
Payer: MEDICARE

## 2024-02-20 DIAGNOSIS — M54.50 ACUTE BILATERAL LOW BACK PAIN WITHOUT SCIATICA: ICD-10-CM

## 2024-02-20 PROCEDURE — 72148 MRI LUMBAR SPINE W/O DYE: CPT

## 2024-02-22 DIAGNOSIS — S32.020A CLOSED COMPRESSION FRACTURE OF L2 LUMBAR VERTEBRA, INITIAL ENCOUNTER (HCC): Primary | ICD-10-CM

## 2024-02-22 DIAGNOSIS — M54.50 ACUTE MIDLINE LOW BACK PAIN WITHOUT SCIATICA: ICD-10-CM

## 2024-02-22 DIAGNOSIS — I25.10 CORONARY ARTERY DISEASE INVOLVING NATIVE CORONARY ARTERY OF NATIVE HEART WITHOUT ANGINA PECTORIS: ICD-10-CM

## 2024-02-22 DIAGNOSIS — R93.7 ABNORMAL MRI, LUMBAR SPINE: Primary | ICD-10-CM

## 2024-02-22 DIAGNOSIS — E03.9 ACQUIRED HYPOTHYROIDISM: ICD-10-CM

## 2024-02-22 DIAGNOSIS — E78.2 MIXED HYPERLIPIDEMIA: ICD-10-CM

## 2024-02-22 NOTE — PROGRESS NOTES
Called radiology  Order sent to interventional radiology for possible kyphoplasty for acute L2 compression fracture  Patient aware of MRI results  Unclear edematous area of L1... Will discuss with radiologist  Dr. Piper

## 2024-02-23 ENCOUNTER — CARE COORDINATION (OUTPATIENT)
Dept: CASE MANAGEMENT | Age: 84
End: 2024-02-23

## 2024-02-23 DIAGNOSIS — M54.50 LUMBAR PAIN: ICD-10-CM

## 2024-02-23 DIAGNOSIS — E78.2 MIXED HYPERLIPIDEMIA: Primary | ICD-10-CM

## 2024-02-23 RX ORDER — LEVOTHYROXINE SODIUM 0.1 MG/1
TABLET ORAL
Qty: 90 TABLET | Refills: 3 | Status: SHIPPED | OUTPATIENT
Start: 2024-02-23

## 2024-02-23 NOTE — TELEPHONE ENCOUNTER
Lov 2/15/24  Lrf 90 3 3/3/23 Medication:   Requested Prescriptions     Pending Prescriptions Disp Refills    levothyroxine (SYNTHROID) 100 MCG tablet [Pharmacy Med Name: LEVOTHYROXINE 100 MCG TABLET] 90 tablet 3     Sig: TAKE ONE TABLET BY MOUTH DAILY       Last Filled:      Patient Phone Number: [phone] (home)     Last appt: 2/15/2024   Next appt: 4/1/2024    Last Labs DM:   Lab Results   Component Value Date/Time    VITD25 39.4 02/05/2024 08:56 AM    VITD25 46.1 04/29/2023 08:20 AM         Medication:   Requested Prescriptions     Pending Prescriptions Disp Refills    levothyroxine (SYNTHROID) 100 MCG tablet [Pharmacy Med Name: LEVOTHYROXINE 100 MCG TABLET] 90 tablet 3     Sig: TAKE ONE TABLET BY MOUTH DAILY       Last Filled:      Patient Phone Number: [phone] (home)     Last appt: 2/15/2024   Next appt: 4/1/2024    Last Thyroid:   Lab Results   Component Value Date/Time    TSH 3.77 02/05/2024 08:56 AM    FT3 2.0 11/06/2023 07:55 AM    T4FREE 1.5 02/05/2024 08:56 AM    J4SGCHV 7.5 09/12/2013 08:57 AM

## 2024-02-23 NOTE — CARE COORDINATION
Care Transitions Follow Up Call    Patient Current Location:  Home: [address] [place] [address]    Care Transition Nurse contacted the patient by telephone to follow up after admission.  Verified name and  with patient as identifiers.    Patient: Aileen Foster  Patient : 1940   MRN: [id number]  Reason for Admission:   Discharge Date: 24 RARS: Readmission Risk Score: 12.8      Needs to be reviewed by the provider   Additional needs identified to be addressed with provider: No  none             Method of communication with provider: none.    Pt states doing well, no issues or concerns. BSs stable. No need for further f/u CTC calls.    Addressed changes since last contact:  none  Discussed follow-up appointments. If no appointment was previously scheduled, appointment scheduling offered: No.   Is follow up appointment scheduled within 7 days of discharge? Yes.    Follow Up  Future Appointments   Date Time Provider Department Center   3/7/2024  4:00 PM Kimberlee Huang, PhD [facility]   2024  9:30 AM Pankaj Yañez MD [facility] [place] [facility]   2024  9:30 AM Presley Toro MD [facility]   2024 10:10 AM Ambreen Shepherd MD [facility]   2024 10:00 AM ADRIANA MCKEONf [facility]   2024 10:10 AM Ambreen Shepherd MD [facility]   2024 10:10 AM Ambreen Shepherd MD [facility]   2/10/2025 10:10 AM Ambreen Shepherd MD [facility]   2025 10:10 AM Ambreen Shepherd MD [facility]     External follow up appointment(s):     Care Transition Nurse reviewed medical action plan with patient and discussed any barriers to care and/or understanding of plan of care after discharge. Discussed appropriate site of care based on symptoms and resources available to patient including: When to call 911. The patient agrees to contact the PCP office for questions related to their healthcare.     Advance Care Planning:   not on file.     Patients top risk

## 2024-02-24 ENCOUNTER — HOSPITAL ENCOUNTER (EMERGENCY)
Age: 84
Discharge: HOME | End: 2024-02-24
Payer: MEDICARE

## 2024-02-24 ENCOUNTER — HOSPITAL ENCOUNTER (OUTPATIENT)
Dept: MRI IMAGING | Age: 84
Discharge: HOME | End: 2024-02-24
Attending: FAMILY MEDICINE
Payer: MEDICARE

## 2024-02-24 ENCOUNTER — APPOINTMENT (OUTPATIENT)
Dept: GENERAL RADIOLOGY | Age: 84
End: 2024-02-24
Payer: MEDICARE

## 2024-02-24 VITALS
WEIGHT: 94.3 LBS | BODY MASS INDEX: 18.42 KG/M2 | RESPIRATION RATE: 16 BRPM | HEART RATE: 68 BPM | TEMPERATURE: 97.8 F | DIASTOLIC BLOOD PRESSURE: 78 MMHG | OXYGEN SATURATION: 100 % | SYSTOLIC BLOOD PRESSURE: 186 MMHG

## 2024-02-24 DIAGNOSIS — T80.818A EXTRAVASATION OF INTRAVENOUS CONTRAST MEDIUM: Primary | ICD-10-CM

## 2024-02-24 DIAGNOSIS — R93.7 ABNORMAL MRI, LUMBAR SPINE: ICD-10-CM

## 2024-02-24 PROCEDURE — 99283 EMERGENCY DEPT VISIT LOW MDM: CPT

## 2024-02-24 PROCEDURE — A9577 INJ MULTIHANCE: HCPCS | Performed by: FAMILY MEDICINE

## 2024-02-24 PROCEDURE — 6360000004 HC RX CONTRAST MEDICATION: Performed by: FAMILY MEDICINE

## 2024-02-24 PROCEDURE — 73130 X-RAY EXAM OF HAND: CPT

## 2024-02-24 PROCEDURE — 96374 THER/PROPH/DIAG INJ IV PUSH: CPT

## 2024-02-24 PROCEDURE — 72149 MRI LUMBAR SPINE W/DYE: CPT

## 2024-02-24 RX ADMIN — GADOBENATE DIMEGLUMINE 10 ML: 529 INJECTION, SOLUTION INTRAVENOUS at 08:02

## 2024-02-24 ASSESSMENT — ENCOUNTER SYMPTOMS
COLOR CHANGE: 0
SHORTNESS OF BREATH: 0

## 2024-02-24 ASSESSMENT — PAIN - FUNCTIONAL ASSESSMENT: PAIN_FUNCTIONAL_ASSESSMENT: NONE - DENIES PAIN

## 2024-02-24 NOTE — ED NOTES
Pt discharged home, verbalized discharge instructions.  Ambulated independently to exit without difficulty.

## 2024-02-24 NOTE — PROGRESS NOTES
Injected 10ml Multihance via IV Left hand.  Contrast went in without any difficulty. As soon as removed IV, pt's hand was swollen near her knuckles away from the IV.  Consulted [facility] radiologist, no need to reinject contrast and concern that her hand could get worse so she should go to the ER for her hand to be evaluated.  Patient and  made aware.

## 2024-02-24 NOTE — ED PROVIDER NOTES
[facility] EMERGENCY DEPARTMENT  EMERGENCY DEPARTMENT ENCOUNTER        Pt Name: Aileen Foster  MRN: [id number]  Birthdate 1940  Date of evaluation: 2/24/2024  Provider: Manoj Haider PA-C  PCP: Pankaj Yañez MD  Note Started: 8:22 AM EST 2/24/24      JAUN. I have evaluated this patient.        CHIEF COMPLAINT       Chief Complaint   Patient presents with    Swelling     PT reports MRI with contrast  attempt this morning, but unable to complete d/t IV to right hand infiltration. Pt wants hand to be looked at by ER       HISTORY OF PRESENT ILLNESS: 1 or more Elements     History from : Patient    Limitations to history : None    Aileen Foster is a 84 y.o. female who presents to the emergency department with swelling to left hand after receiving IV contrast for an MRI this morning.  The IV was removed because it appeared to have infiltrated.  She was sent to the emergency department for further management and treatment.  She reports very little more pain to left hand.    Nursing Notes were all reviewed and agreed with or any disagreements were addressed in the HPI.    REVIEW OF SYSTEMS :      Review of Systems   Constitutional:  Negative for chills and fever.   HENT: Negative.     Eyes:  Negative for visual disturbance.   Respiratory:  Negative for shortness of breath.    Cardiovascular:  Negative for chest pain.   Musculoskeletal:  Positive for myalgias. Back pain: chronic.  Skin:  Negative for color change, pallor, rash and wound.   Neurological:  Negative for weakness, light-headedness and numbness.   All other systems reviewed and are negative.      Positives and Pertinent negatives as per HPI.     SURGICAL HISTORY     Past Surgical History:   Procedure Laterality Date    CARDIAC CATHETERIZATION  1/24/2012    non obstructive CAD    COLONOSCOPY      at age 69    DENTAL SURGERY      EYE SURGERY Bilateral     with lens implants    SKIN BIOPSY Right     neg skin biopsy on right  recognition program.  Efforts were made to edit the dictations but occasionally words are mis-transcribed.)    Manoj Haider PA-C (electronically signed)            Manoj Haider PA-C  02/24/24 0801

## 2024-02-27 ENCOUNTER — TELEPHONE (OUTPATIENT)
Dept: INTERVENTIONAL RADIOLOGY/VASCULAR | Age: 84
End: 2024-02-27

## 2024-03-04 ENCOUNTER — TELEPHONE (OUTPATIENT)
Dept: FAMILY MEDICINE CLINIC | Age: 84
End: 2024-03-04

## 2024-03-05 ENCOUNTER — HOSPITAL ENCOUNTER (OUTPATIENT)
Dept: INTERVENTIONAL RADIOLOGY/VASCULAR | Age: 84
Discharge: HOME | End: 2024-03-05
Payer: MEDICARE

## 2024-03-05 VITALS
HEIGHT: 61 IN | HEART RATE: 67 BPM | DIASTOLIC BLOOD PRESSURE: 86 MMHG | SYSTOLIC BLOOD PRESSURE: 152 MMHG | WEIGHT: 90 LBS | RESPIRATION RATE: 16 BRPM | BODY MASS INDEX: 16.99 KG/M2 | TEMPERATURE: 97.3 F | OXYGEN SATURATION: 100 %

## 2024-03-05 DIAGNOSIS — M54.50 ACUTE MIDLINE LOW BACK PAIN WITHOUT SCIATICA: ICD-10-CM

## 2024-03-05 DIAGNOSIS — S32.020A CLOSED COMPRESSION FRACTURE OF L2 LUMBAR VERTEBRA, INITIAL ENCOUNTER (HCC): ICD-10-CM

## 2024-03-05 LAB
ANION GAP SERPL CALCULATED.3IONS-SCNC: 10 MMOL/L (ref 3–16)
APTT BLD: 28.8 SEC (ref 22.7–35.9)
BASOPHILS # BLD: 0.1 K/UL (ref 0–0.2)
BASOPHILS NFR BLD: 1.1 %
BUN SERPL-MCNC: 28 MG/DL (ref 7–20)
CALCIUM SERPL-MCNC: 9.6 MG/DL (ref 8.3–10.6)
CHLORIDE SERPL-SCNC: 100 MMOL/L (ref 99–110)
CO2 SERPL-SCNC: 30 MMOL/L (ref 21–32)
CREAT SERPL-MCNC: 0.8 MG/DL (ref 0.6–1.2)
DEPRECATED RDW RBC AUTO: 14.2 % (ref 12.4–15.4)
EOSINOPHIL # BLD: 0.2 K/UL (ref 0–0.6)
EOSINOPHIL NFR BLD: 3.2 %
GFR SERPLBLD CREATININE-BSD FMLA CKD-EPI: >60 ML/MIN/{1.73_M2}
GLUCOSE SERPL-MCNC: 138 MG/DL (ref 70–99)
HCT VFR BLD AUTO: 36.6 % (ref 36–48)
HGB BLD-MCNC: 12 G/DL (ref 12–16)
INR PPP: 0.94 (ref 0.84–1.16)
LYMPHOCYTES # BLD: 1.2 K/UL (ref 1–5.1)
LYMPHOCYTES NFR BLD: 16.4 %
MCH RBC QN AUTO: 30.9 PG (ref 26–34)
MCHC RBC AUTO-ENTMCNC: 32.7 G/DL (ref 31–36)
MCV RBC AUTO: 94.6 FL (ref 80–100)
MONOCYTES # BLD: 0.7 K/UL (ref 0–1.3)
MONOCYTES NFR BLD: 9.1 %
NEUTROPHILS # BLD: 5 K/UL (ref 1.7–7.7)
NEUTROPHILS NFR BLD: 70.2 %
PLATELET # BLD AUTO: 300 K/UL (ref 135–450)
PMV BLD AUTO: 8.6 FL (ref 5–10.5)
POTASSIUM SERPL-SCNC: 4.7 MMOL/L (ref 3.5–5.1)
PROTHROMBIN TIME: 12.5 SEC (ref 11.5–14.8)
RBC # BLD AUTO: 3.87 M/UL (ref 4–5.2)
SODIUM SERPL-SCNC: 140 MMOL/L (ref 136–145)
WBC # BLD AUTO: 7.2 K/UL (ref 4–11)

## 2024-03-05 PROCEDURE — 6360000002 HC RX W HCPCS: Performed by: RADIOLOGY

## 2024-03-05 PROCEDURE — 7100000011 HC PHASE II RECOVERY - ADDTL 15 MIN: Performed by: RADIOLOGY

## 2024-03-05 PROCEDURE — 7100000010 HC PHASE II RECOVERY - FIRST 15 MIN: Performed by: RADIOLOGY

## 2024-03-05 PROCEDURE — 2580000003 HC RX 258: Performed by: RADIOLOGY

## 2024-03-05 PROCEDURE — 2500000003 HC RX 250 WO HCPCS: Performed by: RADIOLOGY

## 2024-03-05 PROCEDURE — 85025 COMPLETE CBC W/AUTO DIFF WBC: CPT

## 2024-03-05 PROCEDURE — 80048 BASIC METABOLIC PNL TOTAL CA: CPT

## 2024-03-05 PROCEDURE — 22514 PERQ VERTEBRAL AUGMENTATION: CPT

## 2024-03-05 PROCEDURE — 85730 THROMBOPLASTIN TIME PARTIAL: CPT

## 2024-03-05 PROCEDURE — 6360000004 HC RX CONTRAST MEDICATION: Performed by: RADIOLOGY

## 2024-03-05 PROCEDURE — C1713 ANCHOR/SCREW BN/BN,TIS/BN: HCPCS

## 2024-03-05 PROCEDURE — 85610 PROTHROMBIN TIME: CPT

## 2024-03-05 PROCEDURE — 99152 MOD SED SAME PHYS/QHP 5/>YRS: CPT

## 2024-03-05 PROCEDURE — 36415 COLL VENOUS BLD VENIPUNCTURE: CPT

## 2024-03-05 RX ORDER — KETOROLAC TROMETHAMINE 30 MG/ML
INJECTION, SOLUTION INTRAMUSCULAR; INTRAVENOUS PRN
Status: COMPLETED | OUTPATIENT
Start: 2024-03-05 | End: 2024-03-05

## 2024-03-05 RX ORDER — ONDANSETRON 2 MG/ML
4 INJECTION INTRAMUSCULAR; INTRAVENOUS EVERY 8 HOURS PRN
Status: DISCONTINUED | OUTPATIENT
Start: 2024-03-05 | End: 2024-03-06 | Stop reason: HOSPADM

## 2024-03-05 RX ORDER — IOPAMIDOL 408 MG/ML
30 INJECTION, SOLUTION INTRATHECAL
Status: COMPLETED | OUTPATIENT
Start: 2024-03-05 | End: 2024-03-05

## 2024-03-05 RX ORDER — LIDOCAINE HYDROCHLORIDE 10 MG/ML
10 INJECTION, SOLUTION EPIDURAL; INFILTRATION; INTRACAUDAL; PERINEURAL ONCE
Status: COMPLETED | OUTPATIENT
Start: 2024-03-05 | End: 2024-03-05

## 2024-03-05 RX ORDER — MIDAZOLAM HYDROCHLORIDE 5 MG/ML
INJECTION, SOLUTION INTRAMUSCULAR; INTRAVENOUS PRN
Status: COMPLETED | OUTPATIENT
Start: 2024-03-05 | End: 2024-03-05

## 2024-03-05 RX ORDER — FENTANYL CITRATE 50 UG/ML
INJECTION, SOLUTION INTRAMUSCULAR; INTRAVENOUS PRN
Status: COMPLETED | OUTPATIENT
Start: 2024-03-05 | End: 2024-03-05

## 2024-03-05 RX ORDER — BUPIVACAINE HYDROCHLORIDE 5 MG/ML
30 INJECTION, SOLUTION EPIDURAL; INTRACAUDAL ONCE
Status: COMPLETED | OUTPATIENT
Start: 2024-03-05 | End: 2024-03-05

## 2024-03-05 RX ADMIN — CEFAZOLIN 2000 MG: 1 INJECTION, POWDER, FOR SOLUTION INTRAVENOUS at 11:07

## 2024-03-05 RX ADMIN — IOPAMIDOL 30 ML: 408 INJECTION, SOLUTION INTRATHECAL at 11:51

## 2024-03-05 RX ADMIN — MIDAZOLAM HYDROCHLORIDE 1 MG: 5 INJECTION, SOLUTION INTRAMUSCULAR; INTRAVENOUS at 11:11

## 2024-03-05 RX ADMIN — BUPIVACAINE HYDROCHLORIDE 10 ML: 5 INJECTION, SOLUTION EPIDURAL; INTRACAUDAL at 11:50

## 2024-03-05 RX ADMIN — LIDOCAINE HYDROCHLORIDE 10 ML: 10 INJECTION, SOLUTION EPIDURAL; INFILTRATION; INTRACAUDAL; PERINEURAL at 11:51

## 2024-03-05 RX ADMIN — FENTANYL CITRATE 25 MCG: 50 INJECTION, SOLUTION INTRAMUSCULAR; INTRAVENOUS at 11:29

## 2024-03-05 RX ADMIN — FENTANYL CITRATE 25 MCG: 50 INJECTION, SOLUTION INTRAMUSCULAR; INTRAVENOUS at 11:14

## 2024-03-05 RX ADMIN — KETOROLAC TROMETHAMINE 30 MG: 30 INJECTION, SOLUTION INTRAMUSCULAR; INTRAVENOUS at 11:28

## 2024-03-05 ASSESSMENT — PAIN SCALES - GENERAL
PAINLEVEL_OUTOF10: 0

## 2024-03-05 ASSESSMENT — PAIN - FUNCTIONAL ASSESSMENT: PAIN_FUNCTIONAL_ASSESSMENT: 0-10

## 2024-03-05 NOTE — PROGRESS NOTES
PT DISCHARGED HOME WITH FAMILY, PT IN STABLE CONDITION, TRANSPORTED TO CAR VIA WHEELCHAIR WITH Chelsea dorantes

## 2024-03-05 NOTE — PRE SEDATION
Sedation Pre-Procedure Note    Patient Name: Aileen Foster   YOB: 1940  Room/Bed: Room/bed info not found  Medical Record Number: [id number]  Date: 3/5/2024   Time: 12:43 PM       Indication:  L2 Kyphoplasty    Consent: I have discussed with the patient and/or the patient representative the indication, alternatives, and the possible risks and/or complications of the planned procedure and the anesthesia methods. The patient and/or patient representative appear to understand and agree to proceed.    Vital Signs:   Vitals:    03/05/24 1156   BP: (!) 164/84   Pulse: 66   Resp: 16   Temp: 97.3 °F (36.3 °C)   SpO2: 100%       Past Medical History:   has a past medical history of Acquired hypothyroidism, CAD (coronary artery disease), Diabetes mellitus with neurological manifestation (HCC), DKA, type 1, not at goal (HCC), Hyperlipidemia, Hyperlipidemia, Hypothyroidism (acquired), Insomnia, Neuropathy, Osteoporosis, Parkinson disease, Parkinson disease, Parkinson disease, Renal insufficiency, Restless legs syndrome, and Type I (juvenile type) diabetes mellitus without mention of complication, not stated as uncontrolled.    Past Surgical History:   has a past surgical history that includes Dental surgery; Tonsillectomy; Cardiac catheterization (1/24/2012); Colonoscopy; eye surgery (Bilateral); and skin biopsy (Right).    Medications:   Scheduled Meds:   Continuous Infusions:   PRN Meds: ondansetron  Home Meds:   Prior to Admission medications    Medication Sig Start Date End Date Taking? Authorizing Provider   levothyroxine (SYNTHROID) 100 MCG tablet TAKE ONE TABLET BY MOUTH DAILY 2/23/24   Pankaj Yañez MD   insulin aspart (NOVOLOG) 100 UNIT/ML injection vial INJECT 30 UNITS UNDER THE SKIN WITH INSULIN PUMP 2/19/24   Ambreen Shepherd MD   lidocaine 4 % external patch Place 1 patch onto the skin daily 2/15/24 3/16/24  Richelle Piper MD   Heat Wraps (THERMACARE BACK PAIN THERAPY) [facility] Alternate with sarah beth

## 2024-03-05 NOTE — BRIEF OP NOTE
Brief Postoperative Note      Patient: Aileen Foster  YOB: 1940  MRN: [id number]    Date of Procedure: 3/5/2024    L2 Compression Fracture    Post-Op Diagnosis: Same       L2 Kyphoplasty    Surgeon(s):  Skylar Meehan MD      Anesthesia: Moderate Sedation    Estimated Blood Loss (mL): Minimal    Complications: None      Findings: Successful L2 kyphoplasty      Electronically signed by Skylar Meehan MD on 3/5/2024 at 11:50 AM

## 2024-03-05 NOTE — DISCHARGE INSTRUCTIONS
Follow up with any questions, concerns, results, and an appointment after your procedure in the time period recommended.         Kyphoplasty Instructions       Fatigue is common and expected.  Let pain be your guide.  Walking or standing is encouraged every hour that you are awake; gradually increase walking to 1-2 miles per day.    It is OK to go up and down stairs and to walk outside.    Do NOT smoke.  Smoking delays healing and increases the risk of infection.    Apply ice intermittently to incision area for the first two days.  Then you may use heat instead if desired.      Incision Care  Do not submerge your incision under water.  No tub baths, hot tubs or swimming until after your doctor’s approval.  (Usually two weeks)    Incisions normally have a small amount of clear or pink drainage. If necessary, cover with a gauze and tape dressing.  If you have a temperature of 101 degrees (unrelieved by Tylenol), or a large amount of drainage or foul smelling drainage, call your doctor right away.    You may remove the bandaid and shower after 24 hours.  Pat incision dry after shower.       SEDATION DISCHARGE INSTRUCTIONS   3/5/2024    Wear your seatbelt home.  You are under the influence of sedative medications. Do not drink alcohol, drive, operate machinery, or make any important decisions or sign any legal documents for 24 hours.  A responsible adult needs to be with you for 24 hours.  You may experience lightheadedness, dizziness, nausea, heightened emotions and/or sleepiness following surgery.  Rest at home today- increase activity as tolerated.  Progress slowly to a regular diet and drink plenty of fluids unless your physician has instructed you otherwise.  If nausea becomes a problem, call your physician.  Coughing, sore throat, and muscle aches are other side effects of anesthesia and should improve with time.  Do not drive or operate machinery while taking narcotics.  ATTENTION FEMALE PATIENTS: if you use an

## 2024-03-07 ENCOUNTER — OFFICE VISIT (OUTPATIENT)
Dept: PSYCHOLOGY | Age: 84
End: 2024-03-07

## 2024-03-07 DIAGNOSIS — F43.23 ADJUSTMENT REACTION WITH ANXIETY AND DEPRESSION: Primary | ICD-10-CM

## 2024-03-08 ENCOUNTER — POST-OP TELEPHONE (OUTPATIENT)
Dept: INTERVENTIONAL RADIOLOGY/VASCULAR | Age: 84
End: 2024-03-08

## 2024-03-08 NOTE — PROGRESS NOTES
Left patient a message post kyphoplasty procedure, and directed patient to contact Special Procedures if they had any further questions or concerns.

## 2024-03-18 NOTE — PROGRESS NOTES
[facility]   Cardiac f/up    Referring Provider:  Pankaj Yañez MD     Chief Complaint   Patient presents with    6 Month Follow-Up    Hyperlipidemia    Hypertension    Coronary Artery Disease     History of Present Illness:  Aileen Foster is 84 y.o. 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 She continues to see Dr. Norman for Parkinson's.      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     She has been taking midodrine twice daily for labile blood pressure, with breakfast and lunch.     5/15/23 she was evaluated in ER for hyperglycemia and some polydipsia.  Her initial glucose was 479 on fingerstick.  On blood work it is 520 with some ketones in her urine.  Has slightly elevated BUN at 28.  She received 1 L of IV fluid and 8 units IV insulin and glucose prior to d/c was in 200s.  She was not acidotic on VBG and has no anion gap and normal CO2.  Did not suspect DKA.  She was released home.    She was admitted 1/30/24-1/31/24. She presented with DKA due to malfunctioning insulin pump. Following discussion with patient's son, the pump needle came of the skin and insulin was not deliver for an extended period hence she tipped into DKA. She received DKA protocol with rapid resolution of acidosis, she was transitioned to diet and started on home insulin pump. She tolerated transition and oral intake.     Today, she is here for 6 mos follow up. Hector, is with nurse Mayra a medical liaison, who just started her own business called \"[employer].\"  She is with her .  Mayra has been caring for Hcetor ~1 mos. BP home log reviewed by me, cheryle.  Pt denies exertional chest pain, PRO/PND, palpitations, light-headedness, edema.      Past Medical History:   has a past medical history of Acquired hypothyroidism, CAD (coronary artery disease), Diabetes

## 2024-03-21 ENCOUNTER — HOSPITAL ENCOUNTER (OUTPATIENT)
Dept: MRI IMAGING | Age: 84
Discharge: HOME | End: 2024-03-21
Payer: MEDICARE

## 2024-03-21 DIAGNOSIS — R43.0 ANOSMIA: ICD-10-CM

## 2024-03-21 DIAGNOSIS — G47.52 DREAM ENACTMENT BEHAVIOR: ICD-10-CM

## 2024-03-21 DIAGNOSIS — R26.9 GAIT ABNORMALITY: ICD-10-CM

## 2024-03-21 DIAGNOSIS — G20.C PARKINSONISM, UNSPECIFIED PARKINSONISM TYPE: ICD-10-CM

## 2024-03-21 PROCEDURE — 70551 MRI BRAIN STEM W/O DYE: CPT

## 2024-03-27 NOTE — TELEPHONE ENCOUNTER
Received refill request for midodrine from [facility] pharmacy.    Last ov:10/24/2023 LES    Last labs:2024    Last EK2024    Last Refill:2023    Next appointment:2024 LES

## 2024-03-28 ENCOUNTER — OFFICE VISIT (OUTPATIENT)
Dept: PSYCHOLOGY | Age: 84
End: 2024-03-28
Payer: MEDICARE

## 2024-03-28 DIAGNOSIS — F43.23 ADJUSTMENT REACTION WITH ANXIETY AND DEPRESSION: Primary | ICD-10-CM

## 2024-03-28 PROCEDURE — 1036F TOBACCO NON-USER: CPT | Performed by: PSYCHOLOGIST

## 2024-03-28 PROCEDURE — 90837 PSYTX W PT 60 MINUTES: CPT | Performed by: PSYCHOLOGIST

## 2024-03-28 PROCEDURE — 1123F ACP DISCUSS/DSCN MKR DOCD: CPT | Performed by: PSYCHOLOGIST

## 2024-03-28 RX ORDER — MIDODRINE HYDROCHLORIDE 2.5 MG/1
TABLET ORAL
Qty: 120 TABLET | Refills: 3 | Status: SHIPPED | OUTPATIENT
Start: 2024-03-28

## 2024-03-29 NOTE — PROGRESS NOTES
Behavioral Health Psychotherapy  Kimberlee Huang, Ph.D.  Licensed Clinical Psychologist  Date:  3/28/24        Time spent with client:  60 minutes from 4:00 - 5:00 pm.  This is patient's 4th psychotherapy appointment with Dr. Huang             Chief Complaint   Patient presents with    Anxiety    Other       Grief      S:  Mood has been \"better most of the time\" - grief over losses   Difficulty with vision - waiting to see ophthalmologist, can't read much, can't drive  Younger son visiting from [place] - his wife and children will visit for eclipse  Decision-making with  re: which intermediate community they will live in   Question of what to do with their belongings when they downsize - basement full  Strong interest in gardening but fell backwards 2 years ago - hit head, cut leg     O:  MSE:  Appearance:  Alert, cooperative, mild distress   Appetite:  Decreased  Sleep disturbance:  Yes   Fatigue:  Yes   Loss of pleasure:  Yes (improving)  Impulsive behavior:  No  Speech:  Within normal limits  Mood:   Mildly depressed, mildly anxious  Affect:  Full range with depression, anxiety  Thought Content:  Intact   Thought Process:  Coherent  Associations:  Logical connections  Insight:  Good  Judgment:  Intact   Orientation:  Oriented to person, place, time, and general circumstances   Memory:  Recent and remote memory intact  Attention/Concentration:  Intact  Morbid ideation:  No   Threat Assessment:  No history of any suicidal ideation, suicide attempts, self-harm urges/behaviors, or homicidal ideation/behavior   Protective Factors against Suicide:  Adequate family/social support, children, contacts reliable for safety, future oriented/reason to live, Shinto beliefs/value system, and responsibilities     A:  \"Hector\" is addressing concerns related to grief over Parkinson's disease-related losses, health-related anxiety, and feelings of helplessness.  She is coping with her 's dementia and observing declines

## 2024-03-31 NOTE — PROGRESS NOTES
Aileen Foster is a 84 y.o. female.    HPI:here with  and medical liaison form [facility]  Had MRI, found a compression fx, had kyphoplasty L2 on 3-5 -24, very success  Seeing our psychologist regularly  Had MRI of brain, only normal aging changes found  Sees Dr. Ambreen Shepherd for her diabetes management and Dr. Melton for her Parkinson's managed  Drinking Glucerna once or twice a day  Meds, vitamins and allergies reviewed with pt    ROS: No TIA's or unusual headaches, no dysphagia.  No prolonged cough. No dyspnea or chest pain on exertion.  No abdominal pain, change in bowel habits, black or bloody stools.  No urinary tract symptoms.  No new or unusual musculoskeletal symptoms.       Prior to Visit Medications    Medication Sig Taking? Authorizing Provider   midodrine (PROAMATINE) 2.5 MG tablet TAKE ONE TABLET BY MOUTH EVERY MORNING MAY TAKE 2 TABLETS IF BLOOD PRESSURE DROPS Yes Presley Toro MD   levothyroxine (SYNTHROID) 100 MCG tablet TAKE ONE TABLET BY MOUTH DAILY Yes Pankaj Yañez MD   insulin aspart (NOVOLOG) 100 UNIT/ML injection vial INJECT 30 UNITS UNDER THE SKIN WITH INSULIN PUMP Yes Ambreen Shepherd MD   Heat Wraps (THERMACARE BACK PAIN THERAPY) MISC Alternate with salon pas patch Yes Richelle Piper MD   ACCU-CHEK GUIDE strip Test 7 times daily Yes Ambreen Shepherd MD   Continuous Blood Gluc Transmit (DEXCOM G6 TRANSMITTER) MISC Change transmitter every 3 months Yes Ambreen Shepherd MD   Glucose (TRUEPLUS) 15 GM/32ML GEL Take 32 mLs by mouth See Admin Instructions TAKE 1 PACKET PO FOR HYPOGLYCEMIC EPISODES Yes Ambreen Shepherd MD   simvastatin (ZOCOR) 20 MG tablet Take 1 tablet by mouth nightly Yes Pankaj Yañez MD   MYRBETRIQ 25 MG TB24 Take 1 tablet by mouth nightly Yes Lulu Rene MD   nitroGLYCERIN (NITROSTAT) 0.4 MG SL tablet Place 1 tablet under the tongue every 5 minutes as needed for Chest pain Yes Amanda Bee, APRN - CNP   pramipexole (MIRAPEX) 0.5 MG tablet

## 2024-04-01 ENCOUNTER — OFFICE VISIT (OUTPATIENT)
Dept: FAMILY MEDICINE CLINIC | Age: 84
End: 2024-04-01
Payer: MEDICARE

## 2024-04-01 VITALS — DIASTOLIC BLOOD PRESSURE: 52 MMHG | SYSTOLIC BLOOD PRESSURE: 108 MMHG | WEIGHT: 92.8 LBS | BODY MASS INDEX: 17.53 KG/M2

## 2024-04-01 DIAGNOSIS — E78.2 MIXED HYPERLIPIDEMIA: ICD-10-CM

## 2024-04-01 DIAGNOSIS — I25.10 CORONARY ARTERY DISEASE INVOLVING NATIVE CORONARY ARTERY OF NATIVE HEART WITHOUT ANGINA PECTORIS: ICD-10-CM

## 2024-04-01 DIAGNOSIS — M81.0 OSTEOPOROSIS, UNSPECIFIED OSTEOPOROSIS TYPE, UNSPECIFIED PATHOLOGICAL FRACTURE PRESENCE: ICD-10-CM

## 2024-04-01 DIAGNOSIS — E10.40 TYPE 1 DIABETES, CONTROLLED, WITH NEUROPATHY (HCC): Primary | ICD-10-CM

## 2024-04-01 DIAGNOSIS — I10 ESSENTIAL HYPERTENSION: ICD-10-CM

## 2024-04-01 DIAGNOSIS — G20.A2 PARKINSON'S DISEASE WITH FLUCTUATING MANIFESTATIONS, UNSPECIFIED WHETHER DYSKINESIA PRESENT (HCC): ICD-10-CM

## 2024-04-01 DIAGNOSIS — E03.9 ACQUIRED HYPOTHYROIDISM: ICD-10-CM

## 2024-04-01 PROCEDURE — 1123F ACP DISCUSS/DSCN MKR DOCD: CPT | Performed by: FAMILY MEDICINE

## 2024-04-01 PROCEDURE — 3044F HG A1C LEVEL LT 7.0%: CPT | Performed by: FAMILY MEDICINE

## 2024-04-01 PROCEDURE — 1036F TOBACCO NON-USER: CPT | Performed by: FAMILY MEDICINE

## 2024-04-01 PROCEDURE — G8399 PT W/DXA RESULTS DOCUMENT: HCPCS | Performed by: FAMILY MEDICINE

## 2024-04-01 PROCEDURE — G8419 CALC BMI OUT NRM PARAM NOF/U: HCPCS | Performed by: FAMILY MEDICINE

## 2024-04-01 PROCEDURE — G8427 DOCREV CUR MEDS BY ELIG CLIN: HCPCS | Performed by: FAMILY MEDICINE

## 2024-04-01 PROCEDURE — G2211 COMPLEX E/M VISIT ADD ON: HCPCS | Performed by: FAMILY MEDICINE

## 2024-04-01 PROCEDURE — 1090F PRES/ABSN URINE INCON ASSESS: CPT | Performed by: FAMILY MEDICINE

## 2024-04-01 PROCEDURE — 99214 OFFICE O/P EST MOD 30 MIN: CPT | Performed by: FAMILY MEDICINE

## 2024-04-01 PROCEDURE — 3078F DIAST BP <80 MM HG: CPT | Performed by: FAMILY MEDICINE

## 2024-04-01 PROCEDURE — 3074F SYST BP LT 130 MM HG: CPT | Performed by: FAMILY MEDICINE

## 2024-04-09 ENCOUNTER — OFFICE VISIT (OUTPATIENT)
Dept: CARDIOLOGY CLINIC | Age: 84
End: 2024-04-09
Payer: MEDICARE

## 2024-04-09 VITALS
HEIGHT: 61 IN | DIASTOLIC BLOOD PRESSURE: 56 MMHG | BODY MASS INDEX: 17.33 KG/M2 | OXYGEN SATURATION: 98 % | HEART RATE: 71 BPM | SYSTOLIC BLOOD PRESSURE: 118 MMHG | WEIGHT: 91.8 LBS

## 2024-04-09 DIAGNOSIS — E03.9 ACQUIRED HYPOTHYROIDISM: ICD-10-CM

## 2024-04-09 DIAGNOSIS — E10.65 POORLY CONTROLLED TYPE 1 DIABETES MELLITUS WITH NEUROPATHY (HCC): ICD-10-CM

## 2024-04-09 DIAGNOSIS — E78.2 MIXED HYPERLIPIDEMIA: ICD-10-CM

## 2024-04-09 DIAGNOSIS — E10.40 POORLY CONTROLLED TYPE 1 DIABETES MELLITUS WITH NEUROPATHY (HCC): ICD-10-CM

## 2024-04-09 DIAGNOSIS — I36.1 NONRHEUMATIC TRICUSPID VALVE REGURGITATION: ICD-10-CM

## 2024-04-09 DIAGNOSIS — G20.A1 PARKINSON'S DISEASE, UNSPECIFIED WHETHER DYSKINESIA PRESENT, UNSPECIFIED WHETHER MANIFESTATIONS FLUCTUATE (HCC): ICD-10-CM

## 2024-04-09 DIAGNOSIS — I25.10 CORONARY ARTERY DISEASE INVOLVING NATIVE CORONARY ARTERY OF NATIVE HEART WITHOUT ANGINA PECTORIS: Primary | ICD-10-CM

## 2024-04-09 PROCEDURE — G8427 DOCREV CUR MEDS BY ELIG CLIN: HCPCS | Performed by: INTERNAL MEDICINE

## 2024-04-09 PROCEDURE — 3078F DIAST BP <80 MM HG: CPT | Performed by: INTERNAL MEDICINE

## 2024-04-09 PROCEDURE — 3074F SYST BP LT 130 MM HG: CPT | Performed by: INTERNAL MEDICINE

## 2024-04-09 PROCEDURE — 1123F ACP DISCUSS/DSCN MKR DOCD: CPT | Performed by: INTERNAL MEDICINE

## 2024-04-09 PROCEDURE — G8419 CALC BMI OUT NRM PARAM NOF/U: HCPCS | Performed by: INTERNAL MEDICINE

## 2024-04-09 PROCEDURE — 1036F TOBACCO NON-USER: CPT | Performed by: INTERNAL MEDICINE

## 2024-04-09 PROCEDURE — 99214 OFFICE O/P EST MOD 30 MIN: CPT | Performed by: INTERNAL MEDICINE

## 2024-04-09 PROCEDURE — G8399 PT W/DXA RESULTS DOCUMENT: HCPCS | Performed by: INTERNAL MEDICINE

## 2024-04-09 PROCEDURE — 1090F PRES/ABSN URINE INCON ASSESS: CPT | Performed by: INTERNAL MEDICINE

## 2024-04-09 PROCEDURE — 3044F HG A1C LEVEL LT 7.0%: CPT | Performed by: INTERNAL MEDICINE

## 2024-04-09 RX ORDER — LINACLOTIDE 145 UG/1
CAPSULE, GELATIN COATED ORAL
COMMUNITY
Start: 2024-04-01

## 2024-05-01 DIAGNOSIS — E03.9 ACQUIRED HYPOTHYROIDISM: ICD-10-CM

## 2024-05-01 DIAGNOSIS — M81.0 AGE-RELATED OSTEOPOROSIS WITHOUT CURRENT PATHOLOGICAL FRACTURE: ICD-10-CM

## 2024-05-01 DIAGNOSIS — E10.40 TYPE 1 DIABETES, CONTROLLED, WITH NEUROPATHY (HCC): ICD-10-CM

## 2024-05-01 DIAGNOSIS — E06.3 HASHIMOTO'S THYROIDITIS: ICD-10-CM

## 2024-05-01 DIAGNOSIS — E10.319 CONTROLLED TYPE 1 DIABETES MELLITUS WITH RETINOPATHY, MACULAR EDEMA PRESENCE UNSPECIFIED, UNSPECIFIED LATERALITY, UNSPECIFIED RETINOPATHY SEVERITY (HCC): ICD-10-CM

## 2024-05-01 LAB
25(OH)D3 SERPL-MCNC: 47.6 NG/ML
T4 FREE SERPL-MCNC: 1.6 NG/DL (ref 0.9–1.8)
TSH SERPL DL<=0.005 MIU/L-ACNC: 2.85 UIU/ML (ref 0.27–4.2)

## 2024-05-02 LAB
ALBUMIN SERPL-MCNC: 4.7 G/DL (ref 3.4–5)
ALBUMIN/GLOB SERPL: 2 {RATIO} (ref 1.1–2.2)
ALP SERPL-CCNC: 67 U/L (ref 40–129)
ALT SERPL-CCNC: 11 U/L (ref 10–40)
ANION GAP SERPL CALCULATED.3IONS-SCNC: 14 MMOL/L (ref 3–16)
AST SERPL-CCNC: 29 U/L (ref 15–37)
BILIRUB SERPL-MCNC: 0.5 MG/DL (ref 0–1)
BUN SERPL-MCNC: 23 MG/DL (ref 7–20)
CALCIUM SERPL-MCNC: 9.4 MG/DL (ref 8.3–10.6)
CHLORIDE SERPL-SCNC: 98 MMOL/L (ref 99–110)
CHOLEST SERPL-MCNC: 158 MG/DL (ref 0–199)
CO2 SERPL-SCNC: 29 MMOL/L (ref 21–32)
CREAT SERPL-MCNC: 0.8 MG/DL (ref 0.6–1.2)
CREAT UR-MCNC: 198.2 MG/DL (ref 28–259)
EST. AVERAGE GLUCOSE BLD GHB EST-MCNC: 137 MG/DL
GFR SERPLBLD CREATININE-BSD FMLA CKD-EPI: 73 ML/MIN/{1.73_M2}
GLUCOSE SERPL-MCNC: 124 MG/DL (ref 70–99)
HBA1C MFR BLD: 6.4 %
HDLC SERPL-MCNC: 92 MG/DL (ref 40–60)
LDL CHOLESTEROL: 56 MG/DL
MICROALBUMIN UR DL<=1MG/L-MCNC: 6.2 MG/DL
MICROALBUMIN/CREAT UR: 31.3 MG/G (ref 0–30)
POTASSIUM SERPL-SCNC: 4.5 MMOL/L (ref 3.5–5.1)
PROT SERPL-MCNC: 7.1 G/DL (ref 6.4–8.2)
SODIUM SERPL-SCNC: 141 MMOL/L (ref 136–145)
TRIGL SERPL-MCNC: 52 MG/DL (ref 0–150)
VLDLC SERPL CALC-MCNC: 10 MG/DL

## 2024-05-06 ENCOUNTER — PATIENT MESSAGE (OUTPATIENT)
Dept: FAMILY MEDICINE CLINIC | Age: 84
End: 2024-05-06

## 2024-05-06 DIAGNOSIS — E78.2 MIXED HYPERLIPIDEMIA: ICD-10-CM

## 2024-05-06 DIAGNOSIS — E03.9 ACQUIRED HYPOTHYROIDISM: ICD-10-CM

## 2024-05-06 DIAGNOSIS — I25.10 CORONARY ARTERY DISEASE INVOLVING NATIVE CORONARY ARTERY OF NATIVE HEART WITHOUT ANGINA PECTORIS: ICD-10-CM

## 2024-05-06 RX ORDER — LEVOTHYROXINE SODIUM 0.1 MG/1
100 TABLET ORAL DAILY
Qty: 90 TABLET | Refills: 3 | Status: SHIPPED | OUTPATIENT
Start: 2024-05-06

## 2024-05-06 NOTE — TELEPHONE ENCOUNTER
Lov 4/1/24  Lrf 90 3 2/23/24Medication:   Requested Prescriptions     Pending Prescriptions Disp Refills    levothyroxine (SYNTHROID) 100 MCG tablet 90 tablet 3     Sig: Take 1 tablet by mouth daily       Last Filled:      Patient Phone Number: [phone] (home)     Last appt: 4/1/2024   Next appt: 10/2/2024    Last Thyroid:   Lab Results   Component Value Date/Time    TSH 2.85 05/01/2024 10:13 AM    TSH 1.56 11/19/2014 12:20 PM    FT3 2.0 11/06/2023 07:55 AM    T4FREE 1.6 05/01/2024 10:13 AM    C1PZLME 7.5 09/12/2013 08:57 AM

## 2024-05-06 NOTE — TELEPHONE ENCOUNTER
From: Aileen Foster  To: Dr. Pankaj Yañez  Sent: 5/6/2024 4:19 PM EDT  Subject: prescription    Dr. Yañez,   I need a prescription for Levothroxine and send the prescription to the [facility].  [id number] - [address] [phone] - F [phone]  [address] [facility] [address]  [phone]  Thanks.    Aileen Foster  [birth year]

## 2024-05-09 ENCOUNTER — OFFICE VISIT (OUTPATIENT)
Dept: PSYCHOLOGY | Age: 84
End: 2024-05-09
Payer: MEDICARE

## 2024-05-09 ENCOUNTER — OFFICE VISIT (OUTPATIENT)
Dept: ENDOCRINOLOGY | Age: 84
End: 2024-05-09
Payer: MEDICARE

## 2024-05-09 VITALS
DIASTOLIC BLOOD PRESSURE: 55 MMHG | OXYGEN SATURATION: 97 % | TEMPERATURE: 98 F | RESPIRATION RATE: 14 BRPM | WEIGHT: 91.6 LBS | SYSTOLIC BLOOD PRESSURE: 108 MMHG | HEIGHT: 61 IN | HEART RATE: 75 BPM | BODY MASS INDEX: 17.29 KG/M2

## 2024-05-09 DIAGNOSIS — N18.30 STAGE 3 CHRONIC KIDNEY DISEASE, UNSPECIFIED WHETHER STAGE 3A OR 3B CKD (HCC): ICD-10-CM

## 2024-05-09 DIAGNOSIS — E78.2 MIXED HYPERLIPIDEMIA: ICD-10-CM

## 2024-05-09 DIAGNOSIS — F43.23 ADJUSTMENT REACTION WITH ANXIETY AND DEPRESSION: Primary | ICD-10-CM

## 2024-05-09 DIAGNOSIS — E10.40 TYPE 1 DIABETES, CONTROLLED, WITH NEUROPATHY (HCC): Primary | ICD-10-CM

## 2024-05-09 DIAGNOSIS — E10.319 CONTROLLED TYPE 1 DIABETES MELLITUS WITH RETINOPATHY, MACULAR EDEMA PRESENCE UNSPECIFIED, UNSPECIFIED LATERALITY, UNSPECIFIED RETINOPATHY SEVERITY (HCC): ICD-10-CM

## 2024-05-09 DIAGNOSIS — E06.3 HASHIMOTO'S THYROIDITIS: ICD-10-CM

## 2024-05-09 DIAGNOSIS — M81.0 AGE-RELATED OSTEOPOROSIS WITHOUT CURRENT PATHOLOGICAL FRACTURE: ICD-10-CM

## 2024-05-09 DIAGNOSIS — I25.10 CORONARY ARTERY DISEASE INVOLVING NATIVE CORONARY ARTERY OF NATIVE HEART WITHOUT ANGINA PECTORIS: ICD-10-CM

## 2024-05-09 DIAGNOSIS — E03.9 ACQUIRED HYPOTHYROIDISM: ICD-10-CM

## 2024-05-09 DIAGNOSIS — R80.9 MICROALBUMINURIA: ICD-10-CM

## 2024-05-09 DIAGNOSIS — R63.6 UNDERWEIGHT: ICD-10-CM

## 2024-05-09 PROCEDURE — G8399 PT W/DXA RESULTS DOCUMENT: HCPCS | Performed by: INTERNAL MEDICINE

## 2024-05-09 PROCEDURE — 1036F TOBACCO NON-USER: CPT | Performed by: PSYCHOLOGIST

## 2024-05-09 PROCEDURE — G8427 DOCREV CUR MEDS BY ELIG CLIN: HCPCS | Performed by: INTERNAL MEDICINE

## 2024-05-09 PROCEDURE — 99214 OFFICE O/P EST MOD 30 MIN: CPT | Performed by: INTERNAL MEDICINE

## 2024-05-09 PROCEDURE — 1123F ACP DISCUSS/DSCN MKR DOCD: CPT | Performed by: INTERNAL MEDICINE

## 2024-05-09 PROCEDURE — 1090F PRES/ABSN URINE INCON ASSESS: CPT | Performed by: INTERNAL MEDICINE

## 2024-05-09 PROCEDURE — 1123F ACP DISCUSS/DSCN MKR DOCD: CPT | Performed by: PSYCHOLOGIST

## 2024-05-09 PROCEDURE — 95251 CONT GLUC MNTR ANALYSIS I&R: CPT | Performed by: INTERNAL MEDICINE

## 2024-05-09 PROCEDURE — G8419 CALC BMI OUT NRM PARAM NOF/U: HCPCS | Performed by: INTERNAL MEDICINE

## 2024-05-09 PROCEDURE — 3074F SYST BP LT 130 MM HG: CPT | Performed by: INTERNAL MEDICINE

## 2024-05-09 PROCEDURE — 90837 PSYTX W PT 60 MINUTES: CPT | Performed by: PSYCHOLOGIST

## 2024-05-09 PROCEDURE — 3044F HG A1C LEVEL LT 7.0%: CPT | Performed by: INTERNAL MEDICINE

## 2024-05-09 PROCEDURE — G2211 COMPLEX E/M VISIT ADD ON: HCPCS | Performed by: INTERNAL MEDICINE

## 2024-05-09 PROCEDURE — 1036F TOBACCO NON-USER: CPT | Performed by: INTERNAL MEDICINE

## 2024-05-09 PROCEDURE — 3078F DIAST BP <80 MM HG: CPT | Performed by: INTERNAL MEDICINE

## 2024-05-09 RX ORDER — ROSUVASTATIN CALCIUM 5 MG/1
5 TABLET, COATED ORAL DAILY
Qty: 90 TABLET | Refills: 1 | Status: SHIPPED | OUTPATIENT
Start: 2024-05-09

## 2024-05-09 NOTE — PROGRESS NOTES
daily 90 tablet 1    levothyroxine (SYNTHROID) 100 MCG tablet Take 1 tablet by mouth daily 90 tablet 3    LINZESS 145 MCG capsule       midodrine (PROAMATINE) 2.5 MG tablet TAKE ONE TABLET BY MOUTH EVERY MORNING MAY TAKE 2 TABLETS IF BLOOD PRESSURE DROPS 120 tablet 3    insulin aspart (NOVOLOG) 100 UNIT/ML injection vial INJECT 30 UNITS UNDER THE SKIN WITH INSULIN PUMP 30 mL 5    ACCU-CHEK GUIDE strip Test 7 times daily 600 each 3    Continuous Blood Gluc Transmit (DEXCOM G6 TRANSMITTER) MISC Change transmitter every 3 months 1 each 3    Glucose (TRUEPLUS) 15 GM/32ML GEL Take 32 mLs by mouth See Admin Instructions TAKE 1 PACKET PO FOR HYPOGLYCEMIC EPISODES 45 g 2    simvastatin (ZOCOR) 20 MG tablet Take 1 tablet by mouth nightly 90 tablet 3    MYRBETRIQ 25 MG TB24 Take 1 tablet by mouth nightly      nitroGLYCERIN (NITROSTAT) 0.4 MG SL tablet Place 1 tablet under the tongue every 5 minutes as needed for Chest pain 25 tablet 3    pramipexole (MIRAPEX) 0.5 MG tablet Take 1 tablet by mouth 2 times daily 2 tabs with dinner, 1 tab at HS (Patient taking differently: Take 1 tablet by mouth See Admin Instructions Take 2 tablets by mouth with dinner and 2 tablet at bed time.) 270 tablet 3    Acetone, Urine, Test (KETONE TEST) STRP Test ketones daily 100 strip 3    denosumab (PROLIA) 60 MG/ML SOSY SC injection Inject 1 mL into the skin every 6 months      carbidopa-levodopa (SINEMET CR)  MG per extended release tablet Take 1 tablet by mouth nightly      aspirin 81 MG EC tablet Take 1 tablet by mouth at bedtime      Multiple Vitamins-Minerals (CENTRUM SILVER) TABS Take 1 tablet by mouth daily      Cholecalciferol (VITAMIN D3) 2000 UNITS CAPS Take 1 capsule by mouth daily      Glucagon, rDNA, (GLUCAGON EMERGENCY) 1 MG KIT Infuse 1 mg intravenously once for 1 dose Use for low blood glucose emergencies.  May repeat in 15 minutes. 1 kit 3     No current facility-administered medications for this visit.     No Known

## 2024-05-18 NOTE — PROGRESS NOTES
feelings of helplessness.  She is coping with her 's dementia and observing declines in his thoughts and behavior (e.g., driving).  She is concerned about her 's overt irritability and need to address estate planning.     Diagnosis:  1. Adjustment reaction with anxiety and depression          Plan:  Pt interventions:  Focus on balancing autonomy/independence with prudence/cautiousness  Process grief over Parkinson's disease-related losses  Reduce health-related anxiety and feelings of helplessness  Scheduled another session for 5/30/24

## 2024-05-24 RX ORDER — MIDODRINE HYDROCHLORIDE 2.5 MG/1
TABLET ORAL
Qty: 120 TABLET | Refills: 3 | Status: SHIPPED | OUTPATIENT
Start: 2024-05-24

## 2024-05-30 ENCOUNTER — OFFICE VISIT (OUTPATIENT)
Dept: PSYCHOLOGY | Age: 84
End: 2024-05-30
Payer: MEDICARE

## 2024-05-30 DIAGNOSIS — F43.23 ADJUSTMENT REACTION WITH ANXIETY AND DEPRESSION: Primary | ICD-10-CM

## 2024-05-30 PROCEDURE — 1036F TOBACCO NON-USER: CPT | Performed by: PSYCHOLOGIST

## 2024-05-30 PROCEDURE — 90837 PSYTX W PT 60 MINUTES: CPT | Performed by: PSYCHOLOGIST

## 2024-05-30 PROCEDURE — 1123F ACP DISCUSS/DSCN MKR DOCD: CPT | Performed by: PSYCHOLOGIST

## 2024-06-03 NOTE — PROGRESS NOTES
Behavioral Health Psychotherapy  Kimberlee Huang, Ph.D.  Licensed Clinical Psychologist  Date:  5/30/24        Time spent with client:  60 minutes from 3:00 - 4:00 pm.  This is patient's 6th psychotherapy appointment with Dr. Huang             Chief Complaint   Patient presents with    Anxiety    Other       Grief      S:  Mood is moderately anxious  Concern about brother's grandson's surgery - get biopsy results today  Getting along better with  - disengaging when tension rises  Younger son and family are moving from [place] to [place] to help care for parents  Plan is for son and family to live in their house - need to find place to move  Aware she must be careful not to fall while working on flowers so laying down to tend     O:  MSE:  Appearance:  Alert, cooperative, moderate distress   Appetite:  Decreased  Sleep disturbance:  Yes   Fatigue:  Yes   Loss of pleasure:  Yes (improving)  Impulsive behavior:  No  Speech:  Within normal limits  Mood:   Mildly depressed, moderately anxious  Affect:  Full range with depression, anxiety  Thought Content:  Intact   Thought Process:  Coherent  Associations:  Logical connections  Insight:  Good  Judgment:  Intact   Orientation:  Oriented to person, place, time, and general circumstances   Memory:  Recent and remote memory intact  Attention/Concentration:  Intact  Morbid ideation:  No   Threat Assessment:  No history of any suicidal ideation, suicide attempts, self-harm urges/behaviors, or homicidal ideation/behavior   Protective Factors against Suicide:  Adequate family/social support, children, contacts reliable for safety, future oriented/reason to live, Caodaism beliefs/value system, and responsibilities     A:  \"Hector\" is addressing concerns related to grief over Parkinson's disease-related losses, health-related anxiety, and feelings of helplessness.  She is coping with her 's dementia and observing declines in his thoughts and behavior (e.g.,

## 2024-06-16 ENCOUNTER — HOSPITAL ENCOUNTER (EMERGENCY)
Age: 84
Discharge: HOME | End: 2024-06-17
Payer: MEDICARE

## 2024-06-16 ENCOUNTER — APPOINTMENT (OUTPATIENT)
Dept: CT IMAGING | Age: 84
End: 2024-06-16
Payer: MEDICARE

## 2024-06-16 VITALS
DIASTOLIC BLOOD PRESSURE: 99 MMHG | HEIGHT: 61 IN | HEART RATE: 74 BPM | RESPIRATION RATE: 16 BRPM | BODY MASS INDEX: 16.86 KG/M2 | OXYGEN SATURATION: 98 % | TEMPERATURE: 98 F | SYSTOLIC BLOOD PRESSURE: 130 MMHG | WEIGHT: 89.3 LBS

## 2024-06-16 DIAGNOSIS — S02.2XXA CLOSED FRACTURE OF NASAL BONE, INITIAL ENCOUNTER: ICD-10-CM
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
## 2024-06-16 DIAGNOSIS — S20.211A CONTUSION OF RIGHT CHEST WALL, INITIAL ENCOUNTER: ICD-10-CM

## 2024-06-16 PROCEDURE — 72125 CT NECK SPINE W/O DYE: CPT

## 2024-06-16 PROCEDURE — 6370000000 HC RX 637 (ALT 250 FOR IP): Performed by: PHYSICIAN ASSISTANT

## 2024-06-16 PROCEDURE — 99284 EMERGENCY DEPT VISIT MOD MDM: CPT

## 2024-06-16 PROCEDURE — 70486 CT MAXILLOFACIAL W/O DYE: CPT

## 2024-06-16 PROCEDURE — 71250 CT THORAX DX C-: CPT

## 2024-06-16 PROCEDURE — 70450 CT HEAD/BRAIN W/O DYE: CPT

## 2024-06-16 RX ORDER — ACETAMINOPHEN 500 MG
1000 TABLET ORAL ONCE
Status: COMPLETED | OUTPATIENT
Start: 2024-06-16 | End: 2024-06-16

## 2024-06-16 RX ADMIN — ACETAMINOPHEN 1000 MG: 500 TABLET ORAL at 23:00

## 2024-06-16 ASSESSMENT — LIFESTYLE VARIABLES
HOW MANY STANDARD DRINKS CONTAINING ALCOHOL DO YOU HAVE ON A TYPICAL DAY: PATIENT DOES NOT DRINK
HOW OFTEN DO YOU HAVE A DRINK CONTAINING ALCOHOL: NEVER

## 2024-06-16 ASSESSMENT — PAIN - FUNCTIONAL ASSESSMENT: PAIN_FUNCTIONAL_ASSESSMENT: 0-10

## 2024-06-16 ASSESSMENT — PAIN SCALES - GENERAL: PAINLEVEL_OUTOF10: 8

## 2024-06-17 ENCOUNTER — CARE COORDINATION (OUTPATIENT)
Dept: CARE COORDINATION | Age: 84
End: 2024-06-17

## 2024-06-17 NOTE — CARE COORDINATION
Ambulatory Care Coordination  ED Follow up Call    Reason for ED visit:  fall    Status:     improved    Did you call your PCP prior to going to the ED?  No      Did you receive a discharge instructions from the Emergency Room? Yes  Review of Instructions:     Understands what to report/when to return?:  Yes   Understands discharge instructions?:  Yes   Following discharge instructions?:  Yes   If not why?     Are there any new complaints of pain? No  New Pain Meds? No    Constipation prophylaxis needed?  N/A    If you have a wound is the dressing clean, dry, and intact? No  Understands wound care regimen? No    Are there any other complaints/concerns that you wish to tell your provider?   No    FU appts/Provider:    Future Appointments   Date Time Provider Department Center   6/18/2024  9:15 AM Yared Cummings DO  ENT [facility]   6/20/2024  3:00 PM Kimberlee Huang, PhD SPRD PSYCHOL [facility]   6/21/2024 11:00 AM Pankaj Yañez MD SDALE  [place] [facility]   7/16/2024 10:00 AM SCHEDULE, [place] [facility]   8/8/2024 10:10 AM Ambreen Shepherd MD [facility]   10/2/2024  9:00 AM Pankaj Yañez MD SDALE  [place] [facility] D[facility]   10/10/2024  9:00 AM Presley Toro MD [place] CARDIO [facility]   11/4/2024 11:30 AM Ambreen Shepherd MD [facility]   2/10/2025 10:10 AM Ambreen Shepherd MD [facility]   5/8/2025 10:10 AM Ambreen Shepherd MD [facility]     Patient went to the ED for a fall. Reported she is sore today. Scheduled PCP and ENT follow up appointments. AVS reviewed. No medication changes. Denied any other questions or concerns at this time. Provided with [facility] phone number for any future care coordination needs.     Unsteady, no cane or walker     New Medications?:   No      Medication Reconciliation by phone - No  Understands Medications?  Yes  Taking Medications? Yes  Can you swallow your pills?  Yes    Any further needs in the home i.e. Equipment?  No    Link to services in community?:  No   Which services:

## 2024-06-17 NOTE — ED PROVIDER NOTES
initial encounter    2. Closed fracture of nasal bone, initial encounter    3. Contusion of right chest wall, initial encounter          DISPOSITION/PLAN     DISPOSITION Decision To Discharge 06/17/2024 01:40:22 AM      PATIENT REFERRED TO:  Pankaj Yañez  [address]  [facility] [address]  [phone]    In 2 days      Yared Cummings DO  [address] [facility] [address]  [facility] [address]  [phone]    Schedule an appointment as soon as possible for a visit in 1 week      [facility] Emergency Department  [address] [facility] [address]  [phone]    As needed, If symptoms worsen      DISCHARGE MEDICATIONS:  Discharge Medication List as of 6/17/2024  1:17 AM          DISCONTINUED MEDICATIONS:  Discharge Medication List as of 6/17/2024  1:17 AM                 (Please note that portions of this note were completed with a voice recognition program.  Efforts were made to edit the dictations but occasionally words are mis-transcribed.)    Chelsea Hussein PA-C (electronically signed)         Chelsea Hussein PA-C  06/17/24 0201

## 2024-06-18 ENCOUNTER — OFFICE VISIT (OUTPATIENT)
Dept: ENT CLINIC | Age: 84
End: 2024-06-18
Payer: MEDICARE

## 2024-06-18 VITALS
SYSTOLIC BLOOD PRESSURE: 108 MMHG | TEMPERATURE: 97.7 F | HEART RATE: 71 BPM | BODY MASS INDEX: 17.56 KG/M2 | HEIGHT: 61 IN | WEIGHT: 93 LBS | DIASTOLIC BLOOD PRESSURE: 63 MMHG | OXYGEN SATURATION: 97 %

## 2024-06-18 DIAGNOSIS — S02.2XXA CLOSED FRACTURE OF NASAL BONE, INITIAL ENCOUNTER: Primary | ICD-10-CM

## 2024-06-18 DIAGNOSIS — S02.2XXA CLOSED FRACTURE OF NASAL SEPTUM, INITIAL ENCOUNTER: ICD-10-CM

## 2024-06-18 DIAGNOSIS — H61.23 BILATERAL IMPACTED CERUMEN: ICD-10-CM

## 2024-06-18 DIAGNOSIS — S00.31XA NASAL ABRASION, INITIAL ENCOUNTER: ICD-10-CM

## 2024-06-18 PROCEDURE — 1090F PRES/ABSN URINE INCON ASSESS: CPT | Performed by: STUDENT IN AN ORGANIZED HEALTH CARE EDUCATION/TRAINING PROGRAM

## 2024-06-18 PROCEDURE — 3078F DIAST BP <80 MM HG: CPT | Performed by: STUDENT IN AN ORGANIZED HEALTH CARE EDUCATION/TRAINING PROGRAM

## 2024-06-18 PROCEDURE — 3074F SYST BP LT 130 MM HG: CPT | Performed by: STUDENT IN AN ORGANIZED HEALTH CARE EDUCATION/TRAINING PROGRAM

## 2024-06-18 PROCEDURE — 1123F ACP DISCUSS/DSCN MKR DOCD: CPT | Performed by: STUDENT IN AN ORGANIZED HEALTH CARE EDUCATION/TRAINING PROGRAM

## 2024-06-18 PROCEDURE — G8419 CALC BMI OUT NRM PARAM NOF/U: HCPCS | Performed by: STUDENT IN AN ORGANIZED HEALTH CARE EDUCATION/TRAINING PROGRAM

## 2024-06-18 PROCEDURE — 1036F TOBACCO NON-USER: CPT | Performed by: STUDENT IN AN ORGANIZED HEALTH CARE EDUCATION/TRAINING PROGRAM

## 2024-06-18 PROCEDURE — G8399 PT W/DXA RESULTS DOCUMENT: HCPCS | Performed by: STUDENT IN AN ORGANIZED HEALTH CARE EDUCATION/TRAINING PROGRAM

## 2024-06-18 PROCEDURE — 69210 REMOVE IMPACTED EAR WAX UNI: CPT | Performed by: STUDENT IN AN ORGANIZED HEALTH CARE EDUCATION/TRAINING PROGRAM

## 2024-06-18 PROCEDURE — 99204 OFFICE O/P NEW MOD 45 MIN: CPT | Performed by: STUDENT IN AN ORGANIZED HEALTH CARE EDUCATION/TRAINING PROGRAM

## 2024-06-18 PROCEDURE — G8427 DOCREV CUR MEDS BY ELIG CLIN: HCPCS | Performed by: STUDENT IN AN ORGANIZED HEALTH CARE EDUCATION/TRAINING PROGRAM

## 2024-06-18 RX ORDER — SILICONES/ADHESIVE TAPE
COMBINATION PACKAGE (EA) TOPICAL
Qty: 1 EACH | Refills: 0 | Status: SHIPPED | OUTPATIENT
Start: 2024-06-18

## 2024-06-18 NOTE — PROGRESS NOTES
significant retractions or cholesteatoma, no middle ear effusions.  No hemotympanum.  -Left ear: External auditory canal with occluding cerumen limiting visualization of the tympanic membrane which was removed with use of #7 and #5 french suction, alligator forceps, and cerumen loop curet.  After successful removal of cerumen, the left tympanic membrane was visualized and without significant retractions or cholesteatoma, no middle ear effusions.  No hemotympanum.     * Patient tolerated the procedure well with no complications    Assessment and Plan     1. Closed fracture of nasal bone, initial encounter  2. Closed fracture of nasal septum, initial encounter  -Patient denies cosmetic or functional deficit.  Nonoperative management.    3. Nasal abrasion, initial encounter  - Bacitracin-Polymyxin B (POLYSPORIN) [id number] UNIT/GM OINT; Apply to nasal abrasion twice a day for the next 3 weeks  Dispense: 1 each; Refill: 0    4. Bilateral impacted cerumen  - Cerumen debrided in the office today  - Avoid Q-tip and self instrumentation of ears  - Hydrogen peroxide or Debrox (OTC) drops as needed or once every other week to help break up and soften wax  - Follow-up as needed for repeat debridement      Follow Up     Return if symptoms worsen or fail to improve.        Dr. Yared Cummings,   [facility]  Department of Otolaryngology/Head & Neck Surgery  6/18/24    Medical Decision Making:  The following items were considered in medical decision making:  Independent review of images  Review / order clinical lab tests  Review / order radiology tests  Decision to obtain old records    This note was generated completely or in part utilizing Dragon dictation speech recognition software.  Occasionally, words are mistranscribed and despite editing, the text may contain inaccuracies due to incorrect word recognition.  If further clarification is needed please contact the office at [phone].

## 2024-06-20 ENCOUNTER — OFFICE VISIT (OUTPATIENT)
Dept: PSYCHOLOGY | Age: 84
End: 2024-06-20
Payer: MEDICARE

## 2024-06-20 DIAGNOSIS — F43.23 ADJUSTMENT REACTION WITH ANXIETY AND DEPRESSION: Primary | ICD-10-CM

## 2024-06-20 PROCEDURE — 90837 PSYTX W PT 60 MINUTES: CPT | Performed by: PSYCHOLOGIST

## 2024-06-20 PROCEDURE — 1123F ACP DISCUSS/DSCN MKR DOCD: CPT | Performed by: PSYCHOLOGIST

## 2024-06-20 PROCEDURE — 1036F TOBACCO NON-USER: CPT | Performed by: PSYCHOLOGIST

## 2024-06-20 SDOH — ECONOMIC STABILITY: FOOD INSECURITY: WITHIN THE PAST 12 MONTHS, THE FOOD YOU BOUGHT JUST DIDN'T LAST AND YOU DIDN'T HAVE MONEY TO GET MORE.: NEVER TRUE

## 2024-06-20 SDOH — ECONOMIC STABILITY: FOOD INSECURITY: WITHIN THE PAST 12 MONTHS, YOU WORRIED THAT YOUR FOOD WOULD RUN OUT BEFORE YOU GOT MONEY TO BUY MORE.: NEVER TRUE

## 2024-06-20 SDOH — ECONOMIC STABILITY: INCOME INSECURITY: HOW HARD IS IT FOR YOU TO PAY FOR THE VERY BASICS LIKE FOOD, HOUSING, MEDICAL CARE, AND HEATING?: NOT HARD AT ALL

## 2024-06-20 SDOH — ECONOMIC STABILITY: TRANSPORTATION INSECURITY
IN THE PAST 12 MONTHS, HAS LACK OF TRANSPORTATION KEPT YOU FROM MEETINGS, WORK, OR FROM GETTING THINGS NEEDED FOR DAILY LIVING?: NO

## 2024-06-21 ENCOUNTER — OFFICE VISIT (OUTPATIENT)
Dept: FAMILY MEDICINE CLINIC | Age: 84
End: 2024-06-21

## 2024-06-21 VITALS
OXYGEN SATURATION: 98 % | WEIGHT: 96.6 LBS | SYSTOLIC BLOOD PRESSURE: 131 MMHG | DIASTOLIC BLOOD PRESSURE: 62 MMHG | BODY MASS INDEX: 18.25 KG/M2 | HEART RATE: 73 BPM

## 2024-06-21 DIAGNOSIS — Z09 HOSPITAL DISCHARGE FOLLOW-UP: Primary | ICD-10-CM

## 2024-06-21 RX ORDER — CEPHALEXIN 500 MG/1
500 CAPSULE ORAL 3 TIMES DAILY
Qty: 15 CAPSULE | Refills: 0 | Status: SHIPPED | OUTPATIENT
Start: 2024-06-21

## 2024-06-21 NOTE — PROGRESS NOTES
Post-Discharge Transitional Care Follow Up      Aileen Foster   YOB: 1940    Date of Office Visit:  6/21/2024  Date of Hospital Admission: 6/16/24  Date of Hospital Discharge: 6/17/24  Readmission Risk Score (high >=14%. Medium >=10%):Readmission Risk Score: 12.8      Care management risk score Rising risk (score 2-5) and Complex Care (Scores >=6): No Risk Score On File     Non face to face  following discharge, date last encounter closed (first attempt may have been earlier): *No documented post hospital discharge outreach found in the last 14 days     Call initiated 2 business days of discharge: *No response recorded in the last 14 days     Hospital discharge follow-up  -     LA DISCHARGE MEDS RECONCILED W/ CURRENT OUTPATIENT MED LIST    Medical Decision Making: moderate complexity  No follow-ups on file.    Hemoglobin A1C   Date Value Ref Range Status   05/01/2024 6.4 See comment % Final     Comment:     Comment:  Diagnosis of Diabetes: > or = 6.5%  Increased risk of diabetes (Prediabetes): 5.7-6.4%  Glycemic Control: Nonpregnant Adults: <7.0%                    Pregnant: <6.0%               Subjective:   HPI patient lost her balance while gardening in her on even landscape in her backyard and fell face first onto stone pavers  No loss of consciousness.  Blood all over the place.   over the hospital  Inpatient course: Discharge summary reviewed- see chart.  Wounds were dressed she underwent head CT chest CT neck CT facial bone CT which all revealed a nasal bone fracture and septal fracture  Interval history/Current status:  Since home her pain has been minimal she has not even taken Tylenol.  Her vision is intact.  No focal neurologic changes.  Widespread ecchymosis on her face.  She has seen the ENT who offered her surgery but it would only be cosmetic and she does not wish to proceed.  She has had no further nosebleeds no nasal obstruction.  He also remove earwax while ther  Patient

## 2024-06-26 DIAGNOSIS — E10.40 POORLY CONTROLLED TYPE 1 DIABETES MELLITUS WITH NEUROPATHY (HCC): ICD-10-CM

## 2024-06-26 DIAGNOSIS — E10.65 POORLY CONTROLLED TYPE 1 DIABETES MELLITUS WITH NEUROPATHY (HCC): ICD-10-CM

## 2024-06-26 RX ORDER — INSULIN ASPART 100 [IU]/ML
INJECTION, SOLUTION INTRAVENOUS; SUBCUTANEOUS
Qty: 30 ML | Refills: 5 | Status: SHIPPED | OUTPATIENT
Start: 2024-06-26

## 2024-06-26 NOTE — TELEPHONE ENCOUNTER
Pt sent email stating:    \"Nancy,     I missed my prioria injection as I fell onto our stone waterfall Sunday evening and was in emergency until 2 am, Monday.  I was scheduled for a follow up at an EMT doctor's office Tuesday.  Sorry but I'll tell you more when I have my next appointment.  When can we reschedule the injection?      Secondly about insulin:  Please send a prescription for insulin to   [facility]  [address] [place] [address]     Be sure to highlight that I have an Insulin Pump and am a CGM user.  The pharmacist  that I spoke with ( Rowdy) said they would then apply for insulin for B drug rather than a “D”.     Thanks and if you have any questions, please do not hesitate to call.     Hector Cristian  [phone]\"    Informed pt that her next prolia is not til 7/16/24. We do not have her having any missed appts recently. Sent rx to [facility] per pt request.

## 2024-06-27 ENCOUNTER — TELEPHONE (OUTPATIENT)
Dept: ENDOCRINOLOGY | Age: 84
End: 2024-06-27

## 2024-06-27 NOTE — TELEPHONE ENCOUNTER
It's not a form. They were requesting chart notes. Date of last MD visit, make/model of external infusion pump and pump  as well as month and year of purchase.     Faxed.

## 2024-07-11 ENCOUNTER — OFFICE VISIT (OUTPATIENT)
Dept: PSYCHOLOGY | Age: 84
End: 2024-07-11
Payer: MEDICARE

## 2024-07-11 DIAGNOSIS — F43.23 ADJUSTMENT REACTION WITH ANXIETY AND DEPRESSION: Primary | ICD-10-CM

## 2024-07-11 PROCEDURE — 1036F TOBACCO NON-USER: CPT | Performed by: PSYCHOLOGIST

## 2024-07-11 PROCEDURE — 90837 PSYTX W PT 60 MINUTES: CPT | Performed by: PSYCHOLOGIST

## 2024-07-11 PROCEDURE — 1123F ACP DISCUSS/DSCN MKR DOCD: CPT | Performed by: PSYCHOLOGIST

## 2024-07-16 ENCOUNTER — LAB (OUTPATIENT)
Dept: ENDOCRINOLOGY | Age: 84
End: 2024-07-16
Payer: MEDICARE

## 2024-07-16 DIAGNOSIS — M81.0 OSTEOPOROSIS, UNSPECIFIED OSTEOPOROSIS TYPE, UNSPECIFIED PATHOLOGICAL FRACTURE PRESENCE: Primary | ICD-10-CM

## 2024-07-16 PROCEDURE — 96372 THER/PROPH/DIAG INJ SC/IM: CPT | Performed by: INTERNAL MEDICINE

## 2024-07-25 DIAGNOSIS — E03.9 ACQUIRED HYPOTHYROIDISM: ICD-10-CM

## 2024-07-25 DIAGNOSIS — E10.319 CONTROLLED TYPE 1 DIABETES MELLITUS WITH RETINOPATHY, MACULAR EDEMA PRESENCE UNSPECIFIED, UNSPECIFIED LATERALITY, UNSPECIFIED RETINOPATHY SEVERITY (HCC): ICD-10-CM

## 2024-07-25 DIAGNOSIS — E10.40 TYPE 1 DIABETES, CONTROLLED, WITH NEUROPATHY (HCC): ICD-10-CM

## 2024-07-25 DIAGNOSIS — R80.9 MICROALBUMINURIA: ICD-10-CM

## 2024-07-25 DIAGNOSIS — E06.3 HASHIMOTO'S THYROIDITIS: ICD-10-CM

## 2024-07-25 DIAGNOSIS — E78.2 MIXED HYPERLIPIDEMIA: ICD-10-CM

## 2024-07-25 DIAGNOSIS — I25.10 CORONARY ARTERY DISEASE INVOLVING NATIVE CORONARY ARTERY OF NATIVE HEART WITHOUT ANGINA PECTORIS: ICD-10-CM

## 2024-07-25 DIAGNOSIS — M81.0 AGE-RELATED OSTEOPOROSIS WITHOUT CURRENT PATHOLOGICAL FRACTURE: ICD-10-CM

## 2024-07-25 LAB
25(OH)D3 SERPL-MCNC: 49.6 NG/ML
ALBUMIN SERPL-MCNC: 4.4 G/DL (ref 3.4–5)
ALBUMIN/GLOB SERPL: 1.8 {RATIO} (ref 1.1–2.2)
ALP SERPL-CCNC: 62 U/L (ref 40–129)
ALT SERPL-CCNC: <5 U/L (ref 10–40)
ANION GAP SERPL CALCULATED.3IONS-SCNC: 8 MMOL/L (ref 3–16)
AST SERPL-CCNC: 23 U/L (ref 15–37)
BILIRUB SERPL-MCNC: 0.5 MG/DL (ref 0–1)
BUN SERPL-MCNC: 26 MG/DL (ref 7–20)
CALCIUM SERPL-MCNC: 9.8 MG/DL (ref 8.3–10.6)
CHLORIDE SERPL-SCNC: 100 MMOL/L (ref 99–110)
CHOLEST SERPL-MCNC: 154 MG/DL (ref 0–199)
CO2 SERPL-SCNC: 33 MMOL/L (ref 21–32)
CREAT SERPL-MCNC: 0.9 MG/DL (ref 0.6–1.2)
CREAT UR-MCNC: 46.4 MG/DL (ref 28–259)
EST. AVERAGE GLUCOSE BLD GHB EST-MCNC: 151.3 MG/DL
GFR SERPLBLD CREATININE-BSD FMLA CKD-EPI: 63 ML/MIN/{1.73_M2}
GLUCOSE SERPL-MCNC: 144 MG/DL (ref 70–99)
HBA1C MFR BLD: 6.9 %
HDLC SERPL-MCNC: 91 MG/DL (ref 40–60)
LDL CHOLESTEROL: 49 MG/DL
MICROALBUMIN UR DL<=1MG/L-MCNC: <1.2 MG/DL
MICROALBUMIN/CREAT UR: NORMAL MG/G (ref 0–30)
POTASSIUM SERPL-SCNC: 4.4 MMOL/L (ref 3.5–5.1)
PROT SERPL-MCNC: 6.8 G/DL (ref 6.4–8.2)
SODIUM SERPL-SCNC: 141 MMOL/L (ref 136–145)
T4 FREE SERPL-MCNC: 1.7 NG/DL (ref 0.9–1.8)
TRIGL SERPL-MCNC: 69 MG/DL (ref 0–150)
TSH SERPL DL<=0.005 MIU/L-ACNC: 2.18 UIU/ML (ref 0.27–4.2)
VLDLC SERPL CALC-MCNC: 14 MG/DL

## 2024-08-08 ENCOUNTER — OFFICE VISIT (OUTPATIENT)
Dept: ENDOCRINOLOGY | Age: 84
End: 2024-08-08

## 2024-08-08 VITALS
BODY MASS INDEX: 17.97 KG/M2 | DIASTOLIC BLOOD PRESSURE: 63 MMHG | WEIGHT: 95.2 LBS | HEIGHT: 61 IN | TEMPERATURE: 98 F | HEART RATE: 71 BPM | OXYGEN SATURATION: 99 % | SYSTOLIC BLOOD PRESSURE: 130 MMHG | RESPIRATION RATE: 14 BRPM

## 2024-08-08 DIAGNOSIS — E03.9 ACQUIRED HYPOTHYROIDISM: ICD-10-CM

## 2024-08-08 DIAGNOSIS — R63.6 UNDERWEIGHT: ICD-10-CM

## 2024-08-08 DIAGNOSIS — N18.30 STAGE 3 CHRONIC KIDNEY DISEASE, UNSPECIFIED WHETHER STAGE 3A OR 3B CKD (HCC): ICD-10-CM

## 2024-08-08 DIAGNOSIS — I25.10 CORONARY ARTERY DISEASE INVOLVING NATIVE CORONARY ARTERY OF NATIVE HEART WITHOUT ANGINA PECTORIS: ICD-10-CM

## 2024-08-08 DIAGNOSIS — E10.319 CONTROLLED TYPE 1 DIABETES MELLITUS WITH RETINOPATHY, MACULAR EDEMA PRESENCE UNSPECIFIED, UNSPECIFIED LATERALITY, UNSPECIFIED RETINOPATHY SEVERITY (HCC): ICD-10-CM

## 2024-08-08 DIAGNOSIS — I10 ESSENTIAL HYPERTENSION: ICD-10-CM

## 2024-08-08 DIAGNOSIS — E78.2 MIXED HYPERLIPIDEMIA: ICD-10-CM

## 2024-08-08 DIAGNOSIS — E06.3 HASHIMOTO'S THYROIDITIS: ICD-10-CM

## 2024-08-08 DIAGNOSIS — E10.40 TYPE 1 DIABETES, CONTROLLED, WITH NEUROPATHY (HCC): Primary | ICD-10-CM

## 2024-08-08 DIAGNOSIS — M81.0 AGE-RELATED OSTEOPOROSIS WITHOUT CURRENT PATHOLOGICAL FRACTURE: ICD-10-CM

## 2024-08-08 NOTE — PROGRESS NOTES
(acquired)     Insomnia     Neuropathy     Osteoporosis 12/30/2014    Parkinson disease (HCC)     Parkinson disease (HCC)     Parkinson disease (HCC)     Renal insufficiency     Restless legs syndrome     Type I (juvenile type) diabetes mellitus without mention of complication, not stated as uncontrolled       Patient Active Problem List   Diagnosis    Diabetic polyneuropathy (HCC)    Mixed hyperlipidemia    Hashimoto's thyroiditis    Acquired hypothyroidism    Chest pain    CAD (coronary artery disease)    Rotator cuff tendonitis    Osteoporosis    Parkinson's disease with fluctuating manifestations, unspecified whether dyskinesia present (HCC)    Underweight    Labile hypertension    DKA, type 1, not at goal (HCC)    Hypothyroidism (acquired)    Poorly controlled type 1 diabetes mellitus with neuropathy (HCC)    Hypotension    Coronary artery disease involving native coronary artery of native heart without angina pectoris    Weight loss, abnormal    Poorly controlled type 1 diabetes mellitus with retinopathy (HCC)    Stage 3 chronic kidney disease (HCC)    Type 1 diabetes, controlled, with neuropathy (HCC)    Controlled type 1 diabetes mellitus with retinopathy ([facility])    Essential hypertension    Parkinson disease, symptomatic (HCC)    Parkinson's disease (HCC)    Microalbuminuria     Past Surgical History:   Procedure Laterality Date    CARDIAC CATHETERIZATION  01/24/2012    non obstructive CAD    COLONOSCOPY      at age 69    DENTAL SURGERY      EYE SURGERY Bilateral     with lens implants    IR KYPHOPLASTY LUMBAR FIRST LEVEL  03/05/2024    IR KYPHOPLASTY LUMBAR FIRST LEVEL [facility]    KYPHOSIS SURGERY  03/05/2024    SKIN BIOPSY Right     neg skin biopsy on right arm    TONSILLECTOMY       Social History     Socioeconomic History    Marital status:      Spouse name: Not on file    Number of children: Not on file    Years of education: Not on file    Highest education

## 2024-08-22 ENCOUNTER — OFFICE VISIT (OUTPATIENT)
Dept: PSYCHOLOGY | Age: 84
End: 2024-08-22
Payer: MEDICARE

## 2024-08-22 DIAGNOSIS — F43.23 ADJUSTMENT REACTION WITH ANXIETY AND DEPRESSION: Primary | ICD-10-CM

## 2024-08-22 PROCEDURE — 1123F ACP DISCUSS/DSCN MKR DOCD: CPT | Performed by: PSYCHOLOGIST

## 2024-08-22 PROCEDURE — 90834 PSYTX W PT 45 MINUTES: CPT | Performed by: PSYCHOLOGIST

## 2024-08-22 PROCEDURE — 1036F TOBACCO NON-USER: CPT | Performed by: PSYCHOLOGIST

## 2024-08-30 NOTE — PROGRESS NOTES
Behavioral Health Psychotherapy  Kimberlee Huang, Ph.D.  Licensed Clinical Psychologist  Date:  8/22/24        Time spent with client:  50 minutes from 3:04 - 3:54 pm.  This is patient's 9th psychotherapy appointment with Dr. Huang             Chief Complaint   Patient presents with    Anxiety    Other       Grief      S:  Mood is moderately anxious  Relationship with  - he needs her more and more  Seeking place to potentially move w/ continuum of care - obstacles, dilemmas  Learning that one spouse seemingly has to be in good health, independent  Feeling sense of time urgency and pressure  Relationships with sons     O:  MSE:  Appearance:  Alert, cooperative, moderate distress   Appetite:  Within normal limits  Sleep disturbance:  Yes   Fatigue:  Yes   Loss of pleasure:  Yes (improving)  Impulsive behavior:  No  Speech:  Within normal limits  Mood:   Moderately anxious  Affect:  Full range with anxiety  Thought Content:  Intact   Thought Process:  Coherent  Associations:  Logical connections  Insight:  Good  Judgment:  Intact   Orientation:  Oriented to person, place, time, and general circumstances   Memory:  Recent and remote memory intact  Attention/Concentration:  Intact  Morbid ideation:  No   Threat Assessment:  No history of any suicidal ideation, suicide attempts, self-harm urges/behaviors, or homicidal ideation/behavior   Protective Factors against Suicide:  Adequate family/social support, children, contacts reliable for safety, future oriented/reason to live, Confucianism beliefs/value system, and responsibilities     A:  \"Hector\" is addressing concerns related to grief over Parkinson's disease-related losses, health-related anxiety, and feelings of helplessness.  She is coping with her 's dementia and observing declines in his thoughts and behavior (e.g., driving).  She is grappling with the dilemma of whether to arrange for alternate housing, whether to sell their house, etc.        Diagnosis:  1. Adjustment reaction with anxiety and depression          Plan:  Pt interventions:  Focus on balancing autonomy/independence with prudence/cautiousness  Process grief over Parkinson's disease-related losses and 's Alzheimer's  Reduce health-related anxiety and feelings of helplessness  Scheduled another session for 9/24/24

## 2024-09-03 ENCOUNTER — TELEPHONE (OUTPATIENT)
Dept: ENDOCRINOLOGY | Age: 84
End: 2024-09-03

## 2024-09-24 ENCOUNTER — OFFICE VISIT (OUTPATIENT)
Dept: PSYCHOLOGY | Age: 84
End: 2024-09-24
Payer: MEDICARE

## 2024-09-24 DIAGNOSIS — F43.23 ADJUSTMENT REACTION WITH ANXIETY AND DEPRESSION: Primary | ICD-10-CM

## 2024-09-24 PROCEDURE — 1036F TOBACCO NON-USER: CPT | Performed by: PSYCHOLOGIST

## 2024-09-24 PROCEDURE — 1123F ACP DISCUSS/DSCN MKR DOCD: CPT | Performed by: PSYCHOLOGIST

## 2024-09-24 PROCEDURE — 90837 PSYTX W PT 60 MINUTES: CPT | Performed by: PSYCHOLOGIST

## 2024-09-30 NOTE — PROGRESS NOTES
Behavioral Health Psychotherapy  Kimberlee Huang, Ph.D.  Licensed Clinical Psychologist  Date:  9/24/24        Time spent with client:  60 minutes from 10:00 - 11:00 am.  This is patient's 10th psychotherapy appointment with Dr. Huang             Chief Complaint   Patient presents with    Anxiety    Other       Grief      S:  Mood is moderately depressed - feeling guilty when people help   is getting argumentative, blaming patient for things unfairly  Challenged by how to handle tension and conflict with , who has dementia  Family dynamics  Strong desire to visit best friend (90) who is in poor health - concern she is dying  Younger son is visiting very soon - many tasks for him to help with  Need for meals/food to be included in housing if they relocate to a facility    O:  MSE:  Appearance:  Alert, cooperative, moderate distress   Appetite:  Within normal limits  Sleep disturbance:  Yes   Fatigue:  Yes   Loss of pleasure:  Yes (improving)  Impulsive behavior:  No  Speech:  Within normal limits  Mood:   Moderately depressed  Affect:  Sad, anxious  Thought Content:  Intact   Thought Process:  Coherent  Associations:  Logical connections  Insight:  Good  Judgment:  Intact   Orientation:  Oriented to person, place, time, and general circumstances   Memory:  Recent and remote memory intact  Attention/Concentration:  Intact  Morbid ideation:  No   Threat Assessment:  No history of any suicidal ideation, suicide attempts, self-harm urges/behaviors, or homicidal ideation/behavior   Protective Factors against Suicide:  Adequate family/social support, children, contacts reliable for safety, future oriented/reason to live, Oriental orthodox beliefs/value system, and responsibilities     A:  \"Hector\" is addressing concerns related to grief over Parkinson's disease-related losses, health-related anxiety, and feelings of helplessness.  She is coping with her 's dementia and observing declines in his thoughts and

## 2024-09-30 NOTE — PROGRESS NOTES
[facility]   Cardiac Follow Up    Referring Provider:  Pankaj Yañez MD     Chief Complaint   Patient presents with    6 Month Follow-Up    Coronary Artery Disease     History of Present Illness:  Aileen Foster is 84 y.o. 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 She continues to see Dr. Norman for Parkinson's.      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         5/15/23 she was evaluated in ER for hyperglycemia and some polydipsia.  Her initial glucose was 479 on fingerstick.  On blood work it is 520 with some ketones in her urine.  Has slightly elevated BUN at 28.  She received 1 L of IV fluid and 8 units IV insulin and glucose prior to d/c was in 200s.  She was not acidotic on VBG and has no anion gap and normal CO2.  Did not suspect DKA.  She was released home.    She was admitted 1/30/24-1/31/24. She presented with DKA due to malfunctioning insulin pump. Following discussion with patient's son, the pump needle came of the skin and insulin was not deliver for an extended period hence she tipped into DKA. She received DKA protocol with rapid resolution of acidosis, she was transitioned to diet and started on home insulin pump. She tolerated transition and oral intake.     Today, Hector is here for a 6 month follow up.  She is accompanied by her  and Nurse from [facility].  States that her appetite is non-existent.  She does not get hungry except for a Irene's frosty.  She drinks Glucerna. She is supposed to be drinking 3 of those a day, in between her regular meals.  She is taking 5 mg of Midodrine three times a day with the last dose being between 4-5 PM.  Denies every passing out.        Past Medical History:   has a past medical history of Acquired hypothyroidism, CAD (coronary artery disease), Diabetes mellitus with neurological

## 2024-10-10 ENCOUNTER — OFFICE VISIT (OUTPATIENT)
Dept: CARDIOLOGY CLINIC | Age: 84
End: 2024-10-10
Payer: MEDICARE

## 2024-10-10 VITALS
WEIGHT: 96.4 LBS | HEIGHT: 60 IN | SYSTOLIC BLOOD PRESSURE: 110 MMHG | BODY MASS INDEX: 18.93 KG/M2 | DIASTOLIC BLOOD PRESSURE: 52 MMHG | OXYGEN SATURATION: 97 % | HEART RATE: 71 BPM

## 2024-10-10 DIAGNOSIS — E78.2 MIXED HYPERLIPIDEMIA: Primary | ICD-10-CM

## 2024-10-10 DIAGNOSIS — G20.A1 PARKINSON DISEASE, SYMPTOMATIC (HCC): ICD-10-CM

## 2024-10-10 DIAGNOSIS — E03.9 HYPOTHYROIDISM (ACQUIRED): ICD-10-CM

## 2024-10-10 DIAGNOSIS — I25.10 CORONARY ARTERY DISEASE INVOLVING NATIVE CORONARY ARTERY OF NATIVE HEART WITHOUT ANGINA PECTORIS: ICD-10-CM

## 2024-10-10 PROCEDURE — G8427 DOCREV CUR MEDS BY ELIG CLIN: HCPCS | Performed by: INTERNAL MEDICINE

## 2024-10-10 PROCEDURE — 1036F TOBACCO NON-USER: CPT | Performed by: INTERNAL MEDICINE

## 2024-10-10 PROCEDURE — 1090F PRES/ABSN URINE INCON ASSESS: CPT | Performed by: INTERNAL MEDICINE

## 2024-10-10 PROCEDURE — 1123F ACP DISCUSS/DSCN MKR DOCD: CPT | Performed by: INTERNAL MEDICINE

## 2024-10-10 PROCEDURE — G8399 PT W/DXA RESULTS DOCUMENT: HCPCS | Performed by: INTERNAL MEDICINE

## 2024-10-10 PROCEDURE — 3078F DIAST BP <80 MM HG: CPT | Performed by: INTERNAL MEDICINE

## 2024-10-10 PROCEDURE — 99214 OFFICE O/P EST MOD 30 MIN: CPT | Performed by: INTERNAL MEDICINE

## 2024-10-10 PROCEDURE — G8420 CALC BMI NORM PARAMETERS: HCPCS | Performed by: INTERNAL MEDICINE

## 2024-10-10 PROCEDURE — 3074F SYST BP LT 130 MM HG: CPT | Performed by: INTERNAL MEDICINE

## 2024-10-10 PROCEDURE — G8484 FLU IMMUNIZE NO ADMIN: HCPCS | Performed by: INTERNAL MEDICINE

## 2024-10-10 RX ORDER — MIDODRINE HYDROCHLORIDE 5 MG/1
5 TABLET ORAL 3 TIMES DAILY
Qty: 270 TABLET | Refills: 3 | Status: SHIPPED | OUTPATIENT
Start: 2024-10-10

## 2024-10-10 NOTE — PATIENT INSTRUCTIONS
[employer] : multivitamin for women    Northera to replace midodrine    Appt in four months Cued throat clear/cough initially clears subglottic material, however, residue is repeatedly ejected/aspirated post-swallow

## 2024-10-10 NOTE — PATIENT INSTRUCTIONS
No medication changes today - Continue taking Midodrine 5 mg three times a day with the last dose being between 4-5 PM    Monitor/limit sugar intake in diet - Try using sugar free ice cream and mixing it with Glucerna    Follow up with Dr. Toro in 6 months

## 2024-10-18 ENCOUNTER — TELEPHONE (OUTPATIENT)
Dept: UROGYNECOLOGY | Age: 84
End: 2024-10-18

## 2024-10-18 ENCOUNTER — CLINICAL DOCUMENTATION (OUTPATIENT)
Dept: UROGYNECOLOGY | Age: 84
End: 2024-10-18

## 2024-10-18 ENCOUNTER — OFFICE VISIT (OUTPATIENT)
Dept: FAMILY MEDICINE CLINIC | Age: 84
End: 2024-10-18

## 2024-10-18 ENCOUNTER — OFFICE VISIT (OUTPATIENT)
Dept: UROGYNECOLOGY | Age: 84
End: 2024-10-18

## 2024-10-18 VITALS
OXYGEN SATURATION: 99 % | WEIGHT: 94 LBS | BODY MASS INDEX: 18.36 KG/M2 | SYSTOLIC BLOOD PRESSURE: 139 MMHG | HEART RATE: 78 BPM | DIASTOLIC BLOOD PRESSURE: 82 MMHG

## 2024-10-18 VITALS
RESPIRATION RATE: 15 BRPM | OXYGEN SATURATION: 100 % | SYSTOLIC BLOOD PRESSURE: 127 MMHG | TEMPERATURE: 97.2 F | DIASTOLIC BLOOD PRESSURE: 74 MMHG | HEART RATE: 80 BPM

## 2024-10-18 DIAGNOSIS — E10.65 POORLY CONTROLLED TYPE 1 DIABETES MELLITUS WITH NEUROPATHY (HCC): ICD-10-CM

## 2024-10-18 DIAGNOSIS — I25.10 CORONARY ARTERY DISEASE INVOLVING NATIVE CORONARY ARTERY OF NATIVE HEART WITHOUT ANGINA PECTORIS: ICD-10-CM

## 2024-10-18 DIAGNOSIS — N32.81 OAB (OVERACTIVE BLADDER): ICD-10-CM

## 2024-10-18 DIAGNOSIS — E03.9 ACQUIRED HYPOTHYROIDISM: ICD-10-CM

## 2024-10-18 DIAGNOSIS — N39.41 URGE INCONTINENCE: ICD-10-CM

## 2024-10-18 DIAGNOSIS — R39.15 URINARY URGENCY: Primary | ICD-10-CM

## 2024-10-18 DIAGNOSIS — E10.40 POORLY CONTROLLED TYPE 1 DIABETES MELLITUS WITH NEUROPATHY (HCC): ICD-10-CM

## 2024-10-18 DIAGNOSIS — G20.A2 PARKINSON'S DISEASE WITH FLUCTUATING MANIFESTATIONS, UNSPECIFIED WHETHER DYSKINESIA PRESENT (HCC): Primary | ICD-10-CM

## 2024-10-18 DIAGNOSIS — E78.2 MIXED HYPERLIPIDEMIA: ICD-10-CM

## 2024-10-18 RX ORDER — MIRABEGRON 50 MG/1
50 TABLET, EXTENDED RELEASE ORAL DAILY
Qty: 30 TABLET | Refills: 5 | Status: SHIPPED | OUTPATIENT
Start: 2024-10-18

## 2024-10-18 NOTE — PROGRESS NOTES
Not Currently     Alcohol/week: 1.0 standard drink of alcohol     Types: 1 Glasses of wine per week     Comment: social    Drug use: No       Family History   Problem Relation Age of Onset    Cancer Father     Heart Disease Father     Heart Disease Mother     Cancer Other         sister's daughter - Melanoma    Heart Disease Brother     Arthritis Sister     Diabetes Neg Hx     Stroke Neg Hx     Osteoporosis Neg Hx     Thyroid Disease Neg Hx        No Known Allergies    OBJECTIVE:  /82   Pulse 78   Wt 42.6 kg (94 lb)   SpO2 99%   BMI 18.36 kg/m²   GEN:  in NAD thin frail but weight is up 5 pounds from 4 months ago  Flat affect.  Restless  NECK:  Supple without adenopathy.  No bruits or thyromegaly  CV:  Regular rate and rhythm, S1 and S2 normal, no murmurs, clicks  PULM:  Chest is clear, no wheezing ,  symmetric air entry throughout both lung fields.  ABD: Soft, NT, insulin pump intact  EXT: No rash or edema  NEURO: nonfocal  Hemoglobin A1C   Date Value Ref Range Status   07/25/2024 6.9 See comment % Final     Comment:     Comment:  Diagnosis of Diabetes: > or = 6.5%  Increased risk of diabetes (Prediabetes): 5.7-6.4%  Glycemic Control: Nonpregnant Adults: <7.0%                    Pregnant: <6.0%          Lab Results   Component Value Date    CHOL 159 01/24/2023    TRIG 60 01/24/2023    HDL 91 (H) 07/25/2024    LDL 49 07/25/2024    VLDL 14 07/25/2024    CHOLHDLRATIO 2.1 09/12/2013      Lab Results   Component Value Date    TSH 2.18 07/25/2024      ASSESSMENT/PLAN:  1. Parkinson's disease with fluctuating manifestations, unspecified whether dyskinesia present (HCC)  Appears stable, continue Mirapex and carbidopa levodopa  Follow-up with neurology    2.  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 Mixed hyperlipidemia  Stable, continue low-fat diet and low-dose Crestor    4. Acquired

## 2024-10-18 NOTE — TELEPHONE ENCOUNTER
There was a PA received VIA MYRBETRIQ 25 MG TB24 , but there is not a current RX on file.    If you want PA please submit new RX, and resend this PA request back to the pool    If this requires a response please respond to the pool ( P MHCX PSC MEDICATION PRE-AUTH).      Thank you please advise patient.

## 2024-10-18 NOTE — PROGRESS NOTES
Received from [facility], [facility]    Interpersonal Safety     Feel physically or emotionally unsafe where currently live: Not on file     Harm by anyone: Not on file     Emotionally Harmed: Not on file   Housing Stability: Unknown (1/20/2024)    [facility] from [facility], [facility]    Housing/Utilities     Worried about losing home: Not on file     Stayed outside house: Not on file     Unable to get utilities: Not on file     Family History:   Family History   Problem Relation Age of Onset    Cancer Father     Heart Disease Father     Heart Disease Mother     Cancer Other         sister's daughter - Melanoma    Heart Disease Brother     Arthritis Sister     Diabetes Neg Hx     Stroke Neg Hx     Osteoporosis Neg Hx     Thyroid Disease Neg Hx      Review of Systems:  Review of Systems        Objective:     Vital Signs  Vitals:    10/18/24 1331   BP: 127/74   Pulse: 80   Resp: 15   Temp: 97.2 °F (36.2 °C)   SpO2: 100%     Physical Exam  Physical Exam  Vitals and nursing note reviewed.   Constitutional:       Appearance: Normal appearance.   HENT:      Head: Normocephalic.   Eyes:      Conjunctiva/sclera: Conjunctivae normal.   Cardiovascular:      Rate and Rhythm: Normal rate.   Pulmonary:      Effort: Pulmonary effort is normal.   Musculoskeletal:         General: Normal range of motion.      Cervical back: Normal range of motion.   Skin:     General: Skin is warm and dry.   Neurological:      General: No focal deficit present.      Mental Status: She is alert.   Psychiatric:         Mood and Affect: Mood normal.         Behavior: Behavior normal.           POPQ and Pelvic Exam:     Denies concern for prolapse    Assessment/Plan:     Aileen Foster is a 84 y.o. female with   1. Urinary urgency    2. Urge incontinence    3. OAB (overactive bladder)    GIRMA signed to obtain her Urogyn

## 2024-10-18 NOTE — PROGRESS NOTES
PA request from patient's pharmacy received for Myrbetriq 50 mg. PA department notified at this time.

## 2024-10-18 NOTE — TELEPHONE ENCOUNTER
Prior authorization needed for Myrbetriq. PA request from pharmacy scanned into media.     Thank you

## 2024-10-18 NOTE — TELEPHONE ENCOUNTER
The current RX was prescribed to the patient today, 10/18/2024, by Noris Hsu NP for Mirabegron (Myrbetriq) 50 mg. The PA request is located in the patient chart under media.     Thank you

## 2024-10-21 NOTE — TELEPHONE ENCOUNTER
Submitted PA for Mirabegron ER 50MG er tablets  Via [facility]MM [id number] STATUS:         Looks like brand is preferred alternative. Has the pharmacy tried running as JOSEFINA?    If this requires a response please respond to the pool ( P MHCX PSC MEDICATION PRE-AUTH).      Thank you please advise patient.

## 2024-10-21 NOTE — TELEPHONE ENCOUNTER
Copay for this medication is a little over 100 with insurance., patient made aware. She does not want botox at this time. Patient verbalizes understanding of all of the above, and has no further questions or concerns at this time. Encouraged to call back the office should any questions/concerns arise. 10/21/2024 at 1:08 PM.

## 2024-10-22 ENCOUNTER — HOSPITAL ENCOUNTER (OUTPATIENT)
Dept: WOMENS IMAGING | Age: 84
Discharge: HOME | End: 2024-10-22
Payer: MEDICARE

## 2024-10-22 ENCOUNTER — OFFICE VISIT (OUTPATIENT)
Dept: PSYCHOLOGY | Age: 84
End: 2024-10-22
Payer: MEDICARE

## 2024-10-22 VITALS — WEIGHT: 95 LBS | BODY MASS INDEX: 17.94 KG/M2 | HEIGHT: 61 IN

## 2024-10-22 DIAGNOSIS — Z12.31 ENCOUNTER FOR SCREENING MAMMOGRAM FOR BREAST CANCER: ICD-10-CM

## 2024-10-22 DIAGNOSIS — F43.23 ADJUSTMENT REACTION WITH ANXIETY AND DEPRESSION: Primary | ICD-10-CM

## 2024-10-22 PROCEDURE — 77063 BREAST TOMOSYNTHESIS BI: CPT

## 2024-10-22 PROCEDURE — 1123F ACP DISCUSS/DSCN MKR DOCD: CPT | Performed by: PSYCHOLOGIST

## 2024-10-22 PROCEDURE — 90837 PSYTX W PT 60 MINUTES: CPT | Performed by: PSYCHOLOGIST

## 2024-10-22 PROCEDURE — 1036F TOBACCO NON-USER: CPT | Performed by: PSYCHOLOGIST

## 2024-10-23 NOTE — PROGRESS NOTES
Behavioral Health Psychotherapy  Kimberlee Huang, Ph.D.  Licensed Clinical Psychologist  Date:  10/22/24        Time spent with client:  60 minutes from 12:00 - 1:00 pm.  This is patient's 11th psychotherapy appointment with Dr. Huang             Chief Complaint   Patient presents with    Anxiety    Other       Grief      S:  Mood has improved - less anxious, less depressed  Made progress toward decision-making about housing - on wait list for [place]  Advantages of TL - 5 levels of care, can get into assisted living without long wait  Beginning 11/4 having in-home help 4 days per week, 3 hours per day (increase)  Arguments with  are frustrating - often occur about what to eat  Was supposed to have a family meeting w/ , both sons - elder son cancelled    O:  MSE:  Appearance:  Alert, cooperative, mild distress   Appetite:  Within normal limits  Sleep disturbance:  Within normal limits   Fatigue:  Yes   Loss of pleasure:  Yes (improving)  Impulsive behavior:  No  Speech:  Within normal limits  Mood:   Mildly depressed  Affect:  Full range with sadness, anxiety  Thought Content:  Intact   Thought Process:  Coherent  Associations:  Logical connections  Insight:  Good  Judgment:  Intact   Orientation:  Oriented to person, place, time, and general circumstances   Memory:  Recent and remote memory intact  Attention/Concentration:  Intact  Morbid ideation:  No   Threat Assessment:  No history of any suicidal ideation, suicide attempts, self-harm urges/behaviors, or homicidal ideation/behavior   Protective Factors against Suicide:  Adequate family/social support, children, contacts reliable for safety, future oriented/reason to live, Denominational beliefs/value system, and responsibilities     A:  \"Hector\" is addressing concerns related to grief over Parkinson's disease-related losses, health-related anxiety, and feelings of helplessness.  She is coping with her 's dementia and observing declines in

## 2024-10-29 DIAGNOSIS — M81.0 AGE-RELATED OSTEOPOROSIS WITHOUT CURRENT PATHOLOGICAL FRACTURE: ICD-10-CM

## 2024-10-29 DIAGNOSIS — E06.3 HASHIMOTO'S THYROIDITIS: ICD-10-CM

## 2024-10-29 DIAGNOSIS — R63.6 UNDERWEIGHT: ICD-10-CM

## 2024-10-29 DIAGNOSIS — E10.40 TYPE 1 DIABETES, CONTROLLED, WITH NEUROPATHY (HCC): ICD-10-CM

## 2024-10-29 DIAGNOSIS — E03.9 ACQUIRED HYPOTHYROIDISM: ICD-10-CM

## 2024-10-29 DIAGNOSIS — I25.10 CORONARY ARTERY DISEASE INVOLVING NATIVE CORONARY ARTERY OF NATIVE HEART WITHOUT ANGINA PECTORIS: ICD-10-CM

## 2024-10-29 DIAGNOSIS — E78.2 MIXED HYPERLIPIDEMIA: ICD-10-CM

## 2024-10-29 LAB
25(OH)D3 SERPL-MCNC: 44.9 NG/ML
ALBUMIN SERPL-MCNC: 4.1 G/DL (ref 3.4–5)
ALBUMIN/GLOB SERPL: 1.7 {RATIO} (ref 1.1–2.2)
ALP SERPL-CCNC: 57 U/L (ref 40–129)
ALT SERPL-CCNC: 7 U/L (ref 10–40)
ANION GAP SERPL CALCULATED.3IONS-SCNC: 12 MMOL/L (ref 3–16)
AST SERPL-CCNC: 29 U/L (ref 15–37)
BILIRUB SERPL-MCNC: 0.5 MG/DL (ref 0–1)
BUN SERPL-MCNC: 24 MG/DL (ref 7–20)
CALCIUM SERPL-MCNC: 9.9 MG/DL (ref 8.3–10.6)
CHLORIDE SERPL-SCNC: 100 MMOL/L (ref 99–110)
CHOLEST SERPL-MCNC: 163 MG/DL (ref 0–199)
CO2 SERPL-SCNC: 30 MMOL/L (ref 21–32)
CREAT SERPL-MCNC: 1 MG/DL (ref 0.6–1.2)
EST. AVERAGE GLUCOSE BLD GHB EST-MCNC: 151.3 MG/DL
GFR SERPLBLD CREATININE-BSD FMLA CKD-EPI: 55 ML/MIN/{1.73_M2}
GLUCOSE SERPL-MCNC: 126 MG/DL (ref 70–99)
HBA1C MFR BLD: 6.9 %
HDLC SERPL-MCNC: 86 MG/DL (ref 40–60)
LDL CHOLESTEROL: 65 MG/DL
POTASSIUM SERPL-SCNC: 4.4 MMOL/L (ref 3.5–5.1)
PROT SERPL-MCNC: 6.5 G/DL (ref 6.4–8.2)
SODIUM SERPL-SCNC: 142 MMOL/L (ref 136–145)
T4 FREE SERPL-MCNC: 1.4 NG/DL (ref 0.9–1.8)
TRIGL SERPL-MCNC: 58 MG/DL (ref 0–150)
TSH SERPL DL<=0.005 MIU/L-ACNC: 2.01 UIU/ML (ref 0.27–4.2)
VLDLC SERPL CALC-MCNC: 12 MG/DL

## 2024-11-01 ENCOUNTER — LAB (OUTPATIENT)
Dept: FAMILY MEDICINE CLINIC | Age: 84
End: 2024-11-01

## 2024-11-01 DIAGNOSIS — E03.9 ACQUIRED HYPOTHYROIDISM: ICD-10-CM

## 2024-11-01 DIAGNOSIS — I25.10 CORONARY ARTERY DISEASE INVOLVING NATIVE CORONARY ARTERY OF NATIVE HEART WITHOUT ANGINA PECTORIS: ICD-10-CM

## 2024-11-01 DIAGNOSIS — R63.6 UNDERWEIGHT: ICD-10-CM

## 2024-11-01 DIAGNOSIS — E06.3 HASHIMOTO'S THYROIDITIS: ICD-10-CM

## 2024-11-01 DIAGNOSIS — M81.0 AGE-RELATED OSTEOPOROSIS WITHOUT CURRENT PATHOLOGICAL FRACTURE: ICD-10-CM

## 2024-11-01 DIAGNOSIS — E78.2 MIXED HYPERLIPIDEMIA: ICD-10-CM

## 2024-11-01 DIAGNOSIS — E10.40 TYPE 1 DIABETES, CONTROLLED, WITH NEUROPATHY (HCC): ICD-10-CM

## 2024-11-02 LAB
CREAT UR-MCNC: 49.4 MG/DL (ref 28–259)
MICROALBUMIN UR DL<=1MG/L-MCNC: <1.2 MG/DL
MICROALBUMIN/CREAT UR: NORMAL MG/G (ref 0–30)

## 2024-11-04 ENCOUNTER — OFFICE VISIT (OUTPATIENT)
Dept: ENDOCRINOLOGY | Age: 84
End: 2024-11-04

## 2024-11-04 VITALS
HEIGHT: 60 IN | SYSTOLIC BLOOD PRESSURE: 144 MMHG | DIASTOLIC BLOOD PRESSURE: 68 MMHG | WEIGHT: 92.8 LBS | TEMPERATURE: 98 F | BODY MASS INDEX: 18.22 KG/M2 | HEART RATE: 72 BPM | RESPIRATION RATE: 14 BRPM | OXYGEN SATURATION: 100 %

## 2024-11-04 DIAGNOSIS — E06.3 HASHIMOTO'S THYROIDITIS: ICD-10-CM

## 2024-11-04 DIAGNOSIS — E78.2 MIXED HYPERLIPIDEMIA: ICD-10-CM

## 2024-11-04 DIAGNOSIS — E03.9 ACQUIRED HYPOTHYROIDISM: ICD-10-CM

## 2024-11-04 DIAGNOSIS — N18.30 STAGE 3 CHRONIC KIDNEY DISEASE, UNSPECIFIED WHETHER STAGE 3A OR 3B CKD (HCC): ICD-10-CM

## 2024-11-04 DIAGNOSIS — M81.0 AGE-RELATED OSTEOPOROSIS WITHOUT CURRENT PATHOLOGICAL FRACTURE: ICD-10-CM

## 2024-11-04 DIAGNOSIS — I25.10 CORONARY ARTERY DISEASE INVOLVING NATIVE CORONARY ARTERY OF NATIVE HEART WITHOUT ANGINA PECTORIS: ICD-10-CM

## 2024-11-04 DIAGNOSIS — R63.6 UNDERWEIGHT: ICD-10-CM

## 2024-11-04 DIAGNOSIS — E10.40 TYPE 1 DIABETES, CONTROLLED, WITH NEUROPATHY (HCC): Primary | ICD-10-CM

## 2024-11-04 RX ORDER — ROSUVASTATIN CALCIUM 10 MG/1
10 TABLET, COATED ORAL DAILY
Qty: 90 TABLET | Refills: 1 | Status: SHIPPED | OUTPATIENT
Start: 2024-11-04

## 2024-11-04 RX ORDER — BLOOD SUGAR DIAGNOSTIC
STRIP MISCELLANEOUS
Qty: 600 EACH | Refills: 3 | Status: SHIPPED | OUTPATIENT
Start: 2024-11-04

## 2024-11-04 RX ORDER — CARBIDOPA AND LEVODOPA 50; 200 MG/1; MG/1
1 TABLET, EXTENDED RELEASE ORAL NIGHTLY
COMMUNITY

## 2024-11-04 RX ORDER — INSULIN ASPART 100 [IU]/ML
INJECTION, SOLUTION INTRAVENOUS; SUBCUTANEOUS
Qty: 30 ML | Refills: 5 | Status: SHIPPED | OUTPATIENT
Start: 2024-11-04

## 2024-11-04 NOTE — PROGRESS NOTES
normal general cortical function    Visual inspection:  Deformity/amputation: absent  Skin lesions/pre-ulcerative calluses: absent  Edema: right- negative, left- negative     Sensory exam:  Monofilament sensation: normal  (minimum of 5 random plantar locations tested, avoiding callused areas - > 1 area with absence of sensation is + for neuropathy)     Plus at least one of the following:  Pulses: normal  Proprioception: Intact  Vibration (128 Hz): Impaired    ASSESSMENT/PLAN:  1. Type 1 diabetes, controlled, with neuropathy (HCC)   No Retinopathy per Ophthalmology report 12/7/2023  Discussed referral to dietician, patient will let us know.  Patient is underweight, would benefit from a dietary recommendations.  Patient changed to Tandem/Dexcom.  Doing well.  Medtronic MiniMed insulin pump 780G/Guardian4 had sensor problems  Target HbA1C <7.5  Advised adequate calorie and carbohydrate intake, maintaining healthy weight  Hemoglobin A1c 7.8-7.1-7.6-7.5-7.6-7.1-7.2-7.0-7.1-7.6-7.4-7.3-7.4-7.2-6.9-6.4-6.8-6.4-6.9-6.9  Continue close monitoring.  - Hemoglobin A1C; Future  - Comprehensive Metabolic Panel; Future  - Lipid Panel; Future  - Microalbumin / Creatinine Urine Ratio; Future    2.  Age-related osteoporosis without current pathological fracture  Obtain DEXA in 2024 or before next appointment.  T-score -2.9 in 2007  DXA 2007 Osteoporosis, had Reclast 5 years  No upset stomach  No GERD  Vitamin D 2000 IU to every other day for summer months.  3/2022 DEXA similar bone density, no direct comparison, did in different place  Continue Prolia   Will have dental extraction, will time 2 weeks before Prolia injection.  Started 6/2021.  25 hydroxy vitamin D 50-49-47-50.6-52.2-63.8-55.4-53.4-63.3-46.1-64.1-46.6-46.1-47.6-49.6-44.9  Calcium 9.5-9.4  - Vitamin D 25 Hydroxy; Future    3. Hashimoto's thyroiditis  TSH 2.0-2.28  - T4, Free; Future  - TSH without Reflex; Future    4. Acquired hypothyroidism  TSH

## 2024-11-21 ENCOUNTER — OFFICE VISIT (OUTPATIENT)
Dept: PSYCHOLOGY | Age: 84
End: 2024-11-21
Payer: MEDICARE

## 2024-11-21 DIAGNOSIS — F43.23 ADJUSTMENT REACTION WITH ANXIETY AND DEPRESSION: Primary | ICD-10-CM

## 2024-11-21 PROCEDURE — 90837 PSYTX W PT 60 MINUTES: CPT | Performed by: PSYCHOLOGIST

## 2024-11-21 PROCEDURE — 1036F TOBACCO NON-USER: CPT | Performed by: PSYCHOLOGIST

## 2024-11-21 PROCEDURE — 1123F ACP DISCUSS/DSCN MKR DOCD: CPT | Performed by: PSYCHOLOGIST
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
## 2024-12-09 ENCOUNTER — HOSPITAL ENCOUNTER (OUTPATIENT)
Dept: GENERAL RADIOLOGY | Age: 84
Discharge: HOME | End: 2024-12-09
Attending: INTERNAL MEDICINE
Payer: MEDICARE

## 2024-12-09 DIAGNOSIS — M81.0 AGE-RELATED OSTEOPOROSIS WITHOUT CURRENT PATHOLOGICAL FRACTURE: ICD-10-CM

## 2024-12-09 PROCEDURE — 77080 DXA BONE DENSITY AXIAL: CPT

## 2024-12-21 ENCOUNTER — TELEPHONE (OUTPATIENT)
Dept: ENDOCRINOLOGY | Age: 84
End: 2024-12-21

## 2024-12-21 NOTE — TELEPHONE ENCOUNTER
Please inform patient:  There is increase in bone density 9.4% in the lumbar spine.  Same bone density in left hip, increase of 5.7% and right total hip.  Overall good results.

## 2024-12-24 ENCOUNTER — OFFICE VISIT (OUTPATIENT)
Dept: FAMILY MEDICINE CLINIC | Age: 84
End: 2024-12-24

## 2024-12-24 VITALS
BODY MASS INDEX: 18.48 KG/M2 | SYSTOLIC BLOOD PRESSURE: 119 MMHG | DIASTOLIC BLOOD PRESSURE: 74 MMHG | HEART RATE: 66 BPM | OXYGEN SATURATION: 98 % | WEIGHT: 94.6 LBS

## 2024-12-24 DIAGNOSIS — M25.522 LEFT ELBOW PAIN: Primary | ICD-10-CM

## 2024-12-24 DIAGNOSIS — M54.50 ACUTE LEFT-SIDED LOW BACK PAIN WITHOUT SCIATICA: ICD-10-CM

## 2024-12-24 DIAGNOSIS — S70.01XA CONTUSION OF RIGHT HIP, INITIAL ENCOUNTER: ICD-10-CM
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
## 2024-12-24 NOTE — PROGRESS NOTES
Aileen Foster is a 84 y.o. female.    HPI:  Accompanied by  and son, Guevara from [facility]    Patient with multiple medical issues including diabetes, Parkinson's,, labile hypertension, hypothyroidism and coronary artery disease    Fell on Friday 1220 at home  Looks down when she walks outside but not in the house  Ran into an open lower drawer and fell over the lower drawer fell onto her right hip area, large bruise, has been icing, improving  Able to get up and walk okay  Left elbow is bothering her, no swelling, healing abrasion without evidence of infection    Labs end of October 2024 reviewed  A1c 6.9, creatinine 1.0    Recent DEXA, on Prolia, improved  Sees Endo Dr. Shepherd for her diabetes    History of compression fracture lumbar spine, states that area is starting to bother her again, will check x-ray spine    Meds, vitamins and allergies reviewed with pt    Wt Readings from Last 3 Encounters:   12/24/24 42.9 kg (94 lb 9.6 oz)   11/04/24 42.1 kg (92 lb 12.8 oz)   10/22/24 43.1 kg (95 lb)       REVIEW OF SYSTEMS:   CONSTITUTIONAL: See history of present illness,   Weight noted/stable  HEENT: No new vision difficulties or ringing in the ears.   RESPIRATORY: No new SOB, PND, orthopnea or cough.   CARDIOVASCULAR: no CP, palpitations or SOB with exertion  GI: No nausea, vomiting, diarrhea, constipation, abdominal pain or changes in bowel habits.   : No urinary frequency, urgency, incontinence hematuria or dysuria.   SKIN: No cyanosis or skin lesions.   MUSCULOSKELETAL: See HPI, periright hip pain with bruising/hematoma and left elbow discomfort  NEUROLOGICAL: No syncope or TIA-like symptoms.   PSYCHIATRIC: No anxiety, insomnia or depression     No Known Allergies    Prior to Visit Medications    Medication Sig Taking? Authorizing Provider   CALCIUM PO Take 300 mg by mouth in the morning and at bedtime Yes Provider, MD Lulu   carbidopa-levodopa (SINEMET CR)  MG per extended

## 2024-12-26 ENCOUNTER — HOSPITAL ENCOUNTER (OUTPATIENT)
Dept: GENERAL RADIOLOGY | Age: 84
Discharge: HOME | End: 2024-12-26
Payer: MEDICARE

## 2024-12-26 ENCOUNTER — HOSPITAL ENCOUNTER (OUTPATIENT)
Age: 84
Discharge: HOME | End: 2024-12-26
Payer: MEDICARE

## 2024-12-26 DIAGNOSIS — M54.50 ACUTE LEFT-SIDED LOW BACK PAIN WITHOUT SCIATICA: ICD-10-CM

## 2024-12-26 DIAGNOSIS — S70.01XA CONTUSION OF RIGHT HIP, INITIAL ENCOUNTER: ICD-10-CM

## 2024-12-26 DIAGNOSIS — M25.522 LEFT ELBOW PAIN: ICD-10-CM
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
## 2024-12-26 PROCEDURE — 73521 X-RAY EXAM HIPS BI 2 VIEWS: CPT

## 2024-12-26 PROCEDURE — 73080 X-RAY EXAM OF ELBOW: CPT

## 2024-12-26 PROCEDURE — 72100 X-RAY EXAM L-S SPINE 2/3 VWS: CPT

## 2024-12-27 DIAGNOSIS — M81.0 AGE RELATED OSTEOPOROSIS, UNSPECIFIED PATHOLOGICAL FRACTURE PRESENCE: Primary | ICD-10-CM

## 2024-12-27 DIAGNOSIS — Z87.81 HISTORY OF VERTEBRAL COMPRESSION FRACTURE: ICD-10-CM

## 2024-12-27 DIAGNOSIS — M54.50 ACUTE LOW BACK PAIN WITHOUT SCIATICA, UNSPECIFIED BACK PAIN LATERALITY: ICD-10-CM

## 2025-01-02 ENCOUNTER — OFFICE VISIT (OUTPATIENT)
Dept: PSYCHOLOGY | Age: 85
End: 2025-01-02
Payer: MEDICARE

## 2025-01-02 DIAGNOSIS — F43.23 ADJUSTMENT REACTION WITH ANXIETY AND DEPRESSION: Primary | ICD-10-CM

## 2025-01-02 PROCEDURE — 1036F TOBACCO NON-USER: CPT | Performed by: PSYCHOLOGIST

## 2025-01-02 PROCEDURE — 90837 PSYTX W PT 60 MINUTES: CPT | Performed by: PSYCHOLOGIST

## 2025-01-02 PROCEDURE — 1123F ACP DISCUSS/DSCN MKR DOCD: CPT | Performed by: PSYCHOLOGIST

## 2025-01-06 ASSESSMENT — PATIENT HEALTH QUESTIONNAIRE - PHQ9
2. FEELING DOWN, DEPRESSED OR HOPELESS: NOT AT ALL
SUM OF ALL RESPONSES TO PHQ QUESTIONS 1-9: 1
1. LITTLE INTEREST OR PLEASURE IN DOING THINGS: SEVERAL DAYS
SUM OF ALL RESPONSES TO PHQ9 QUESTIONS 1 & 2: 1
SUM OF ALL RESPONSES TO PHQ QUESTIONS 1-9: 1
2. FEELING DOWN, DEPRESSED OR HOPELESS: NOT AT ALL
SUM OF ALL RESPONSES TO PHQ QUESTIONS 1-9: 1
1. LITTLE INTEREST OR PLEASURE IN DOING THINGS: SEVERAL DAYS
SUM OF ALL RESPONSES TO PHQ9 QUESTIONS 1 & 2: 1
SUM OF ALL RESPONSES TO PHQ QUESTIONS 1-9: 1

## 2025-01-08 ENCOUNTER — OFFICE VISIT (OUTPATIENT)
Dept: FAMILY MEDICINE CLINIC | Age: 85
End: 2025-01-08

## 2025-01-08 VITALS
RESPIRATION RATE: 17 BRPM | OXYGEN SATURATION: 99 % | WEIGHT: 96.2 LBS | BODY MASS INDEX: 18.89 KG/M2 | SYSTOLIC BLOOD PRESSURE: 134 MMHG | DIASTOLIC BLOOD PRESSURE: 82 MMHG | HEIGHT: 60 IN | HEART RATE: 78 BPM

## 2025-01-08 DIAGNOSIS — N18.30 STAGE 3 CHRONIC KIDNEY DISEASE, UNSPECIFIED WHETHER STAGE 3A OR 3B CKD (HCC): ICD-10-CM

## 2025-01-08 DIAGNOSIS — E78.2 MIXED HYPERLIPIDEMIA: ICD-10-CM

## 2025-01-08 DIAGNOSIS — E10.40 TYPE 1 DIABETES, CONTROLLED, WITH NEUROPATHY (HCC): ICD-10-CM

## 2025-01-08 DIAGNOSIS — E03.9 ACQUIRED HYPOTHYROIDISM: ICD-10-CM

## 2025-01-08 DIAGNOSIS — G20.A1 PARKINSON DISEASE, SYMPTOMATIC (HCC): Primary | ICD-10-CM

## 2025-01-08 DIAGNOSIS — M81.0 OSTEOPOROSIS, UNSPECIFIED OSTEOPOROSIS TYPE, UNSPECIFIED PATHOLOGICAL FRACTURE PRESENCE: ICD-10-CM

## 2025-01-08 DIAGNOSIS — G20.A2 PARKINSON'S DISEASE WITH FLUCTUATING MANIFESTATIONS, UNSPECIFIED WHETHER DYSKINESIA PRESENT (HCC): ICD-10-CM

## 2025-01-08 SDOH — ECONOMIC STABILITY: FOOD INSECURITY: WITHIN THE PAST 12 MONTHS, YOU WORRIED THAT YOUR FOOD WOULD RUN OUT BEFORE YOU GOT MONEY TO BUY MORE.: NEVER TRUE

## 2025-01-08 SDOH — ECONOMIC STABILITY: FOOD INSECURITY: WITHIN THE PAST 12 MONTHS, THE FOOD YOU BOUGHT JUST DIDN'T LAST AND YOU DIDN'T HAVE MONEY TO GET MORE.: NEVER TRUE

## 2025-01-08 NOTE — PROGRESS NOTES
Aileen Foster is a 84 y.o. female.    HPI:here with spouse and dght  Tripped over a drawer 12-20, see 12-24  No pain  Here to reveiwed xrays - no fx  L2 compression fx site that was repaired with kyphoplasty appeared smaller  MRI LS spine set for 1-14-25  Right hip hematomais getting smaller slowly  Pain in low back if stands for more than 5 min  Has walker at home, not really using  Taking 3 cans of Glucerna a day and weight is maintaining  Meds, vitamins and allergies reviewed with pt    ROS: No TIA's or unusual headaches, no dysphagia.  No prolonged cough. No dyspnea or chest pain on exertion.  No abdominal pain, change in bowel habits, black or bloody stools.  No urinary tract symptoms.  No new or unusual musculoskeletal symptoms.       Prior to Visit Medications    Medication Sig Taking? Authorizing Provider   CALCIUM PO Take 300 mg by mouth in the morning and at bedtime Yes Lulu Rene MD   carbidopa-levodopa (SINEMET CR)  MG per extended release tablet Take 1 tablet by mouth nightly Yes Lulu Rene MD   insulin aspart (NOVOLOG) 100 UNIT/ML injection vial INJECT 30 UNITS UNDER THE SKIN WITH INSULIN PUMP Yes Ambreen Shepherd MD   ACCU-CHEK GUIDE strip Test 7 times daily Yes Ambreen Shepherd MD   rosuvastatin (CRESTOR) 10 MG tablet Take 1 tablet by mouth daily Yes Ambreen Shepherd MD   mirabegron (MYRBETRIQ) 50 MG TB24 Take 50 mg by mouth daily Yes oNris Hsu APRN - CNP   midodrine (PROAMATINE) 5 MG tablet Take 1 tablet by mouth 3 times daily Yes Presley Toro MD   Bacitracin-Polymyxin B (POLYSPORIN) 500-86151 UNIT/GM OINT Apply to nasal abrasion twice a day for the next 3 weeks Yes Yared Cummings DO   levothyroxine (SYNTHROID) 100 MCG tablet Take 1 tablet by mouth daily Yes Pankaj Yañez MD   LINZESS 145 MCG capsule  Yes Lulu Rene MD   Continuous Blood Gluc Transmit (DEXCOM G6 TRANSMITTER) MISC Change transmitter every 3 months Yes Ambreen Shepherd MD
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
## 2025-01-16 ENCOUNTER — TELEPHONE (OUTPATIENT)
Dept: UROGYNECOLOGY | Age: 85
End: 2025-01-16

## 2025-01-16 RX ORDER — MIRABEGRON 50 MG/1
50 TABLET, FILM COATED, EXTENDED RELEASE ORAL DAILY
Qty: 30 TABLET | Refills: 2 | Status: SHIPPED | OUTPATIENT
Start: 2025-01-16

## 2025-01-16 NOTE — TELEPHONE ENCOUNTER
Patient and family state that pt has been receiving brand formulation of medication rather than generic. Advised that generic formulation was ordered back in October. However, per Noris Hsu NP we will send new order with generic formulation requested in rx comments and see if this works. Patient/family aware to call office back if any issues arise. Order placed to preferred pharmacy at this time.

## 2025-01-16 NOTE — TELEPHONE ENCOUNTER
Looking to be prescribed the generic form or Myrbetriq, as her Rx will cost $577 for a 30 day supply.

## 2025-01-23 ENCOUNTER — OFFICE VISIT (OUTPATIENT)
Dept: PSYCHOLOGY | Age: 85
End: 2025-01-23
Payer: MEDICARE

## 2025-01-23 DIAGNOSIS — F43.23 ADJUSTMENT REACTION WITH ANXIETY AND DEPRESSION: Primary | ICD-10-CM

## 2025-01-23 PROCEDURE — 90837 PSYTX W PT 60 MINUTES: CPT | Performed by: PSYCHOLOGIST

## 2025-01-23 PROCEDURE — 1036F TOBACCO NON-USER: CPT | Performed by: PSYCHOLOGIST

## 2025-01-23 PROCEDURE — 1123F ACP DISCUSS/DSCN MKR DOCD: CPT | Performed by: PSYCHOLOGIST

## 2025-02-01 NOTE — PROGRESS NOTES
Behavioral Health Psychotherapy  Kimberlee Huang, Ph.D.  Licensed Clinical Psychologist  Date:  1/23/25        Time spent with client:  55 minutes from 2:05 - 3:00 pm.  This is patient's 14th psychotherapy appointment with Dr. Huang             Chief Complaint   Patient presents with    Anxiety    Family Problem              S:  Mood is moderately depressed and moderately anxious - coping adequately  Managing 's increasingly verbally aggressive behavior by disengaging  Frustration that she lost all her Excel budget spreadsheets on computer  Spending a lot of time assisting  with computer tasks  Adult sons are taking over their finances - relief she will no longer have to do taxes  's decision-making is starting to deteriorate noticeably - concern for future     O:  MSE:  Appearance:  Alert, cooperative, moderate distress   Appetite:  Decreased  Sleep disturbance:  Within normal limits   Fatigue:  Yes   Loss of pleasure:  Yes (improving)  Impulsive behavior:  No  Speech:  Within normal limits  Mood:   Moderately depressed, moderately anxious  Affect:  Depressed, anxious  Thought Content:  Intact   Thought Process:  Coherent  Associations:  Logical connections  Insight:  Fair (improving)  Judgment:  Good  Orientation:  Oriented to person, place, time, and general circumstances   Memory:  Recent and remote memory intact  Attention/Concentration:  Intact  Morbid ideation:  No   Threat Assessment:  No history of any suicidal ideation, suicide attempts, self-harm urges/behaviors, or homicidal ideation/behavior   Protective Factors against Suicide:  Adequate family/social support, children, contacts reliable for safety, future oriented/reason to live, Alevism beliefs/value system, and responsibilities     A:  \"Hector\" is addressing concerns related to grief over Parkinson's disease-related losses, health-related anxiety, her 's dementia, decision-making about housing, and feelings of

## 2025-02-05 DIAGNOSIS — E78.2 MIXED HYPERLIPIDEMIA: ICD-10-CM

## 2025-02-05 DIAGNOSIS — M81.0 AGE-RELATED OSTEOPOROSIS WITHOUT CURRENT PATHOLOGICAL FRACTURE: ICD-10-CM

## 2025-02-05 DIAGNOSIS — E10.40 TYPE 1 DIABETES, CONTROLLED, WITH NEUROPATHY (HCC): ICD-10-CM

## 2025-02-05 LAB
25(OH)D3 SERPL-MCNC: 56.1 NG/ML
ALBUMIN SERPL-MCNC: 4.4 G/DL (ref 3.4–5)
ALBUMIN/GLOB SERPL: 1.8 {RATIO} (ref 1.1–2.2)
ALP SERPL-CCNC: 58 U/L (ref 40–129)
ALT SERPL-CCNC: 12 U/L (ref 10–40)
ANION GAP SERPL CALCULATED.3IONS-SCNC: 8 MMOL/L (ref 3–16)
AST SERPL-CCNC: 30 U/L (ref 15–37)
BILIRUB SERPL-MCNC: 0.6 MG/DL (ref 0–1)
BUN SERPL-MCNC: 26 MG/DL (ref 7–20)
CALCIUM SERPL-MCNC: 10.4 MG/DL (ref 8.3–10.6)
CHLORIDE SERPL-SCNC: 98 MMOL/L (ref 99–110)
CHOLEST SERPL-MCNC: 163 MG/DL (ref 0–199)
CO2 SERPL-SCNC: 33 MMOL/L (ref 21–32)
CREAT SERPL-MCNC: 0.9 MG/DL (ref 0.6–1.2)
EST. AVERAGE GLUCOSE BLD GHB EST-MCNC: 145.6 MG/DL
GFR SERPLBLD CREATININE-BSD FMLA CKD-EPI: 63 ML/MIN/{1.73_M2}
GLUCOSE SERPL-MCNC: 110 MG/DL (ref 70–99)
HBA1C MFR BLD: 6.7 %
HDLC SERPL-MCNC: 90 MG/DL (ref 40–60)
LDLC SERPL CALC-MCNC: 62 MG/DL
POTASSIUM SERPL-SCNC: 4.2 MMOL/L (ref 3.5–5.1)
PROT SERPL-MCNC: 6.9 G/DL (ref 6.4–8.2)
SODIUM SERPL-SCNC: 139 MMOL/L (ref 136–145)
T4 FREE SERPL-MCNC: 1.9 NG/DL (ref 0.9–1.8)
TRIGL SERPL-MCNC: 53 MG/DL (ref 0–150)
TSH SERPL DL<=0.005 MIU/L-ACNC: 1.38 UIU/ML (ref 0.27–4.2)
VLDLC SERPL CALC-MCNC: 11 MG/DL

## 2025-02-10 ENCOUNTER — APPOINTMENT (OUTPATIENT)
Age: 85
End: 2025-02-10
Payer: MEDICARE

## 2025-02-10 ENCOUNTER — HOSPITAL ENCOUNTER (EMERGENCY)
Age: 85
Discharge: HOME | End: 2025-02-10
Attending: EMERGENCY MEDICINE
Payer: MEDICARE

## 2025-02-10 ENCOUNTER — OFFICE VISIT (OUTPATIENT)
Dept: ENDOCRINOLOGY | Age: 85
End: 2025-02-10
Payer: MEDICARE

## 2025-02-10 VITALS
TEMPERATURE: 98.2 F | HEART RATE: 75 BPM | SYSTOLIC BLOOD PRESSURE: 161 MMHG | OXYGEN SATURATION: 100 % | HEIGHT: 60 IN | DIASTOLIC BLOOD PRESSURE: 78 MMHG | BODY MASS INDEX: 17.67 KG/M2 | WEIGHT: 90 LBS | RESPIRATION RATE: 18 BRPM

## 2025-02-10 VITALS
HEART RATE: 70 BPM | WEIGHT: 94.8 LBS | TEMPERATURE: 98 F | OXYGEN SATURATION: 98 % | RESPIRATION RATE: 14 BRPM | BODY MASS INDEX: 18.61 KG/M2 | HEIGHT: 60 IN | DIASTOLIC BLOOD PRESSURE: 89 MMHG | SYSTOLIC BLOOD PRESSURE: 172 MMHG

## 2025-02-10 DIAGNOSIS — N18.30 STAGE 3 CHRONIC KIDNEY DISEASE, UNSPECIFIED WHETHER STAGE 3A OR 3B CKD (HCC): ICD-10-CM

## 2025-02-10 DIAGNOSIS — E03.9 ACQUIRED HYPOTHYROIDISM: ICD-10-CM

## 2025-02-10 DIAGNOSIS — M81.0 AGE-RELATED OSTEOPOROSIS WITHOUT CURRENT PATHOLOGICAL FRACTURE: ICD-10-CM

## 2025-02-10 DIAGNOSIS — R63.6 UNDERWEIGHT: ICD-10-CM

## 2025-02-10 DIAGNOSIS — S92.591A OTHER FRACTURE OF RIGHT LESSER TOE(S), INITIAL ENCOUNTER FOR CLOSED FRACTURE: Primary | ICD-10-CM

## 2025-02-10 DIAGNOSIS — E78.2 MIXED HYPERLIPIDEMIA: ICD-10-CM

## 2025-02-10 DIAGNOSIS — E10.40 TYPE 1 DIABETES, CONTROLLED, WITH NEUROPATHY (HCC): Primary | ICD-10-CM

## 2025-02-10 DIAGNOSIS — I25.10 CORONARY ARTERY DISEASE INVOLVING NATIVE CORONARY ARTERY OF NATIVE HEART WITHOUT ANGINA PECTORIS: ICD-10-CM

## 2025-02-10 DIAGNOSIS — E06.3 HASHIMOTO'S THYROIDITIS: ICD-10-CM

## 2025-02-10 PROCEDURE — 3044F HG A1C LEVEL LT 7.0%: CPT | Performed by: INTERNAL MEDICINE

## 2025-02-10 PROCEDURE — 1159F MED LIST DOCD IN RCRD: CPT | Performed by: INTERNAL MEDICINE

## 2025-02-10 PROCEDURE — 1090F PRES/ABSN URINE INCON ASSESS: CPT | Performed by: INTERNAL MEDICINE

## 2025-02-10 PROCEDURE — 1123F ACP DISCUSS/DSCN MKR DOCD: CPT | Performed by: INTERNAL MEDICINE

## 2025-02-10 PROCEDURE — 3079F DIAST BP 80-89 MM HG: CPT | Performed by: INTERNAL MEDICINE

## 2025-02-10 PROCEDURE — 3077F SYST BP >= 140 MM HG: CPT | Performed by: INTERNAL MEDICINE

## 2025-02-10 PROCEDURE — G8420 CALC BMI NORM PARAMETERS: HCPCS | Performed by: INTERNAL MEDICINE

## 2025-02-10 PROCEDURE — 1160F RVW MEDS BY RX/DR IN RCRD: CPT | Performed by: INTERNAL MEDICINE

## 2025-02-10 PROCEDURE — 99214 OFFICE O/P EST MOD 30 MIN: CPT | Performed by: INTERNAL MEDICINE

## 2025-02-10 PROCEDURE — 95251 CONT GLUC MNTR ANALYSIS I&R: CPT | Performed by: INTERNAL MEDICINE

## 2025-02-10 PROCEDURE — G8399 PT W/DXA RESULTS DOCUMENT: HCPCS | Performed by: INTERNAL MEDICINE

## 2025-02-10 PROCEDURE — 99283 EMERGENCY DEPT VISIT LOW MDM: CPT

## 2025-02-10 PROCEDURE — 96372 THER/PROPH/DIAG INJ SC/IM: CPT | Performed by: INTERNAL MEDICINE

## 2025-02-10 PROCEDURE — G8427 DOCREV CUR MEDS BY ELIG CLIN: HCPCS | Performed by: INTERNAL MEDICINE

## 2025-02-10 PROCEDURE — 1036F TOBACCO NON-USER: CPT | Performed by: INTERNAL MEDICINE

## 2025-02-10 PROCEDURE — 73630 X-RAY EXAM OF FOOT: CPT

## 2025-02-10 RX ORDER — INSULIN ASPART 100 [IU]/ML
INJECTION, SOLUTION INTRAVENOUS; SUBCUTANEOUS
Qty: 30 ML | Refills: 3 | Status: SHIPPED | OUTPATIENT
Start: 2025-02-10

## 2025-02-10 ASSESSMENT — LIFESTYLE VARIABLES
HOW OFTEN DO YOU HAVE A DRINK CONTAINING ALCOHOL: NEVER
HOW MANY STANDARD DRINKS CONTAINING ALCOHOL DO YOU HAVE ON A TYPICAL DAY: PATIENT DOES NOT DRINK

## 2025-02-10 ASSESSMENT — PAIN SCALES - GENERAL: PAINLEVEL_OUTOF10: 0

## 2025-02-10 ASSESSMENT — PAIN - FUNCTIONAL ASSESSMENT: PAIN_FUNCTIONAL_ASSESSMENT: 0-10

## 2025-02-10 NOTE — ED TRIAGE NOTES
Patient presents with concerns for right foot 4th digit injury. Reports that 2 days ago she hit it on furniture, is a diabetic.

## 2025-02-10 NOTE — PROGRESS NOTES
Informed patient if any signs of redness,rash,swelling or unusual symptoms occur, please contact the office. Prolia given per physician order.    Subq to [facility]    Office supplied.

## 2025-02-10 NOTE — ED PROVIDER NOTES
[id number]  [phone]    Call today        DISCHARGE MEDICATIONS:  Discharge Medication List as of 2/10/2025  5:49 PM        Controlled Substances Monitoring:          No data to display                (Please note that portions of this note were completed with a voice recognition program.  Efforts were made to edit the dictations but occasionally words are mis-transcribed.)    JULIO CÉSAR HARTLEY DO (electronically signed)  Attending Emergency Physician            Julio César Hartley DO  02/11/25 0613

## 2025-02-10 NOTE — PROGRESS NOTES
Aileen Foster is a 85 y.o. female who presents for Type 1 diabetes mellitus.     Current symptoms/problems include  fluctuating BG levels  and show no change.     1.  Type 1 diabetes, controlled, with neuropathy (HCC) [E10.40]    Diagnosed with Type 1 diabetes mellitus in 1999.    Comorbid conditions:  hyperlipidemia, Neuropathy, Retinopathy, Chronic Kidney Disease and Coronary Artery Disease    Current diabetic medications include: Novolog    Tandem/Dexcom7    Intolerance to diabetes medications: No     Weight trend: stable  Prior visit with dietician: yes  Current diet: on average, 3 meals per day  Current exercise: walks     Current monitoring regimen: home blood tests - 8 times daily  Has brought blood glucose log/meter:  Yes  Home blood sugar records: fasting range: 100-150 and postprandial range:   Any episodes of hypoglycemia? Yes  Hypoglycemia frequency and time(s):  daily  Does patient have Glucagon emergency kit? No  Does patient have rapid acting carbohydrate?  Yes  Does patient wear a medic alert bracelet or necklace?  No    2. Age-related osteoporosis without current pathological fracture  Reclast 5 years. Not on any meds now.  On vitamin D 2000 IU, calcium in food    3. Hashimoto's thyroiditis  Has fatigue.    4. Acquired hypothyroidism  On levothyroxine 0.1 mg qd.  Has fatigue.    5. Coronary artery disease involving native coronary artery of native heart without angina pectoris  No chest pain.    6.  Underweight  Lost 42 lbs not intentionally.   No nausea, vomiting.    7.  Hyperlipidemia  No muscle pain    8. Chronic kidney disease, stage 3  GFR 56-56-56  No urination problems        EXAMINATION:   BONE DENSITOMETRY       3/7/2022 10:07 am       TECHNIQUE:   A bone density DEXA scan was performed of the lumbar spine and bilateral hips   on a CAXA system.       COMPARISON:   August 28, 2019 study was performed on a separate workstation, limiting   direct comparison.       HISTORY:

## 2025-02-10 NOTE — ED NOTES
Pt ambulatory with foot pain, refused wheelchair to car. Stated \"I have to learn to walk so I might as well figure it out now.\"

## 2025-02-10 NOTE — DISCHARGE INSTRUCTIONS
Keep foot elevated.    Use ice to the affected area.  Avoid heat or heating pads.    Recommend Tylenol as directed for pain.    Wear postop shoe to avoid pressure on the toe.    Follow-up with orthopedics in 1 to 2 days for reexamination.    Follow-up with your primary care physician in 1 to 2 days as needed.    If condition worsens or new symptoms develop, return immediately to the emergency department.

## 2025-02-11 ENCOUNTER — PATIENT MESSAGE (OUTPATIENT)
Dept: ENDOCRINOLOGY | Age: 85
End: 2025-02-11

## 2025-02-12 ENCOUNTER — TELEPHONE (OUTPATIENT)
Dept: ENDOCRINOLOGY | Age: 85
End: 2025-02-12

## 2025-02-13 ENCOUNTER — OFFICE VISIT (OUTPATIENT)
Age: 85
End: 2025-02-13

## 2025-02-13 VITALS — WEIGHT: 90 LBS | BODY MASS INDEX: 17.67 KG/M2 | HEIGHT: 60 IN

## 2025-02-13 DIAGNOSIS — S92.514B OPEN NONDISPLACED FRACTURE OF PROXIMAL PHALANX OF LESSER TOE OF RIGHT FOOT, INITIAL ENCOUNTER: Primary | ICD-10-CM

## 2025-02-13 NOTE — TELEPHONE ENCOUNTER
Attempted to call [employer] 3 separate times, line just rings busy. Faxing this to them. They state pa approved. OK to rerun   [phone]

## 2025-02-13 NOTE — PROGRESS NOTES
Chief Complaint     Toe Injury (Right Little Toe fracture)      History of Present Illness:  Aileen Foster is a 85 y.o. female  History of Present Illness  The patient is here for a follow-up from the emergency department.    She injured her right small toe on 02/08/2025 after a fall, presenting to the ER on 02/10/2025 with continued pain in the right fifth toe after she cut it on the corner of a piece of furniture. She noticed some bruising, and her endocrinologist had suggested an x-ray in the ER. She is a type 1 diabetic, but her blood sugars are well controlled. Pain is currently 0/10. She reports no associated ankle pain. The most severe pain is localized to the fourth toe, which occasionally exhibits movement. She recalls that the radiologist at the hospital identified a minor fracture.  ED notes and x-rays reviewed.      Pain Assessment  Location of Pain: Toe  Location Modifiers: Right  Severity of Pain: 0  Quality of Pain: Dull  Date Pain First Started: 02/09/25  Limiting Behavior: No  Result of Injury: Yes  Work-Related Injury: No  Are there other pain locations you wish to document?: No    Medical History:  Current Outpatient Medications   Medication Sig Dispense Refill    insulin aspart (NOVOLOG) 100 UNIT/ML injection vial INJECT 30 UNITS UNDER THE SKIN WITH INSULIN PUMP 30 mL 3    mirabegron (MYRBETRIQ) 50 MG TB24 Take 50 mg by mouth daily 30 tablet 2    CALCIUM PO Take 300 mg by mouth in the morning and at bedtime      carbidopa-levodopa (SINEMET CR)  MG per extended release tablet Take 1 tablet by mouth nightly      ACCU-CHEK GUIDE strip Test 7 times daily 600 each 3    rosuvastatin (CRESTOR) 10 MG tablet Take 1 tablet by mouth daily 90 tablet 1    midodrine (PROAMATINE) 5 MG tablet Take 1 tablet by mouth 3 times daily 270 tablet 3    Bacitracin-Polymyxin B (POLYSPORIN) 500-10692 UNIT/GM OINT Apply to nasal abrasion twice a day for the next 3 weeks 1 each 0    levothyroxine (SYNTHROID) 100 MCG

## 2025-02-13 NOTE — TELEPHONE ENCOUNTER
Received PA request for Novolog. Pt has medicare and is on an insulin pump and the insulin, per medicare guidelines, needs to be billed through the pts medicare part B.  At this time, this dept does not handle these requests. Please reach out to pt's pharmacy and advise them to bill to part b. Thanks!    If this requires a response please respond to the pool ( P MHCX PSC MEDICATION PRE-AUTH).      Thank you please advise patient.

## 2025-02-17 ENCOUNTER — OFFICE VISIT (OUTPATIENT)
Dept: FAMILY MEDICINE CLINIC | Age: 85
End: 2025-02-17
Payer: MEDICARE

## 2025-02-17 ENCOUNTER — TELEPHONE (OUTPATIENT)
Dept: FAMILY MEDICINE CLINIC | Age: 85
End: 2025-02-17

## 2025-02-17 VITALS
HEART RATE: 82 BPM | DIASTOLIC BLOOD PRESSURE: 76 MMHG | WEIGHT: 92.6 LBS | SYSTOLIC BLOOD PRESSURE: 148 MMHG | OXYGEN SATURATION: 99 % | BODY MASS INDEX: 18.08 KG/M2

## 2025-02-17 DIAGNOSIS — G20.A2 PARKINSON'S DISEASE WITH FLUCTUATING MANIFESTATIONS, UNSPECIFIED WHETHER DYSKINESIA PRESENT (HCC): ICD-10-CM

## 2025-02-17 DIAGNOSIS — I10 ESSENTIAL HYPERTENSION: ICD-10-CM

## 2025-02-17 DIAGNOSIS — R26.81 UNSTEADY GAIT: ICD-10-CM

## 2025-02-17 DIAGNOSIS — E10.319 CONTROLLED TYPE 1 DIABETES MELLITUS WITH RETINOPATHY, MACULAR EDEMA PRESENCE UNSPECIFIED, UNSPECIFIED LATERALITY, UNSPECIFIED RETINOPATHY SEVERITY (HCC): Primary | ICD-10-CM

## 2025-02-17 DIAGNOSIS — E03.9 ACQUIRED HYPOTHYROIDISM: ICD-10-CM

## 2025-02-17 PROCEDURE — 1123F ACP DISCUSS/DSCN MKR DOCD: CPT | Performed by: FAMILY MEDICINE

## 2025-02-17 PROCEDURE — 1159F MED LIST DOCD IN RCRD: CPT | Performed by: FAMILY MEDICINE

## 2025-02-17 PROCEDURE — 99214 OFFICE O/P EST MOD 30 MIN: CPT | Performed by: FAMILY MEDICINE

## 2025-02-17 PROCEDURE — G2211 COMPLEX E/M VISIT ADD ON: HCPCS | Performed by: FAMILY MEDICINE

## 2025-02-17 PROCEDURE — G8419 CALC BMI OUT NRM PARAM NOF/U: HCPCS | Performed by: FAMILY MEDICINE

## 2025-02-17 PROCEDURE — G8399 PT W/DXA RESULTS DOCUMENT: HCPCS | Performed by: FAMILY MEDICINE

## 2025-02-17 PROCEDURE — G8427 DOCREV CUR MEDS BY ELIG CLIN: HCPCS | Performed by: FAMILY MEDICINE

## 2025-02-17 PROCEDURE — 1090F PRES/ABSN URINE INCON ASSESS: CPT | Performed by: FAMILY MEDICINE

## 2025-02-17 PROCEDURE — 3078F DIAST BP <80 MM HG: CPT | Performed by: FAMILY MEDICINE

## 2025-02-17 PROCEDURE — 3077F SYST BP >= 140 MM HG: CPT | Performed by: FAMILY MEDICINE

## 2025-02-17 PROCEDURE — 1036F TOBACCO NON-USER: CPT | Performed by: FAMILY MEDICINE

## 2025-02-17 PROCEDURE — 3044F HG A1C LEVEL LT 7.0%: CPT | Performed by: FAMILY MEDICINE

## 2025-02-17 NOTE — PROGRESS NOTES
Aileen Foster is a 85 y.o. female.    HPI: Arrives with , son, and daughter-in-law.  All are very concerned because she has fallen several times in the last 2 weeks.  Most the time she is getting up and changing positions in the bathroom or walking across the floor and she falls backwards.  1 fall led to a right fifth proximal phalanx fracture for which she saw orthopedics on February 13 is now in a flat shoe.  She is not to good about using cane or walker in her home and her  is becoming frustrated because she gets angry when he reminds her to do things she complains of a bump on her head sore right neck and sore mid back after her last fall but all 3 sites appear slightly tender no discoloration or bruising.  She denies any loss of consciousness palpitations wide fluctuations in her sugars.  Recent labs reviewed   Meds, vitamins and allergies reviewed with pt    ROS: No TIA's or unusual headaches, no dysphagia.  No prolonged cough. No dyspnea or chest pain on exertion.  No abdominal pain, change in bowel habits, black or bloody stools.  No urinary tract symptoms.  No new or unusual musculoskeletal symptoms.       Prior to Visit Medications    Medication Sig Taking? Authorizing Provider   insulin aspart (NOVOLOG) 100 UNIT/ML injection vial INJECT 30 UNITS UNDER THE SKIN WITH INSULIN PUMP Yes Ambreen Shepherd MD   mirabegron (MYRBETRIQ) 50 MG TB24 Take 50 mg by mouth daily Yes Noris Hsu APRN - CNP   CALCIUM PO Take 300 mg by mouth in the morning and at bedtime Yes Lulu Rene MD   carbidopa-levodopa (SINEMET CR)  MG per extended release tablet Take 1 tablet by mouth nightly Yes Lulu Rene MD   ACCU-CHEK GUIDE strip Test 7 times daily Yes Ambreen Shepherd MD   rosuvastatin (CRESTOR) 10 MG tablet Take 1 tablet by mouth daily Yes Ambreen Shepherd MD   midodrine (PROAMATINE) 5 MG tablet Take 1 tablet by mouth 3 times daily Yes Presley Toro MD   Bacitracin-Polymyxin B

## 2025-02-17 NOTE — TELEPHONE ENCOUNTER
Pt advised she fell Sunday and now having pain on right side of neck. Pt is able to move around but not great.    Pt claims she fell straight down and hit head on floor.     Pt would like to know if she needs to go to ER because of her previous fall and she was advised to contact primary care.     Please advise pt  [phone]

## 2025-02-18 ENCOUNTER — APPOINTMENT (OUTPATIENT)
Dept: PHYSICAL THERAPY | Age: 85
End: 2025-02-18
Payer: MEDICARE

## 2025-02-20 ENCOUNTER — OFFICE VISIT (OUTPATIENT)
Dept: PSYCHOLOGY | Age: 85
End: 2025-02-20
Payer: MEDICARE

## 2025-02-20 DIAGNOSIS — F43.23 ADJUSTMENT REACTION WITH ANXIETY AND DEPRESSION: Primary | ICD-10-CM

## 2025-02-20 PROCEDURE — 1036F TOBACCO NON-USER: CPT | Performed by: PSYCHOLOGIST

## 2025-02-20 PROCEDURE — 1123F ACP DISCUSS/DSCN MKR DOCD: CPT | Performed by: PSYCHOLOGIST

## 2025-02-20 PROCEDURE — 90837 PSYTX W PT 60 MINUTES: CPT | Performed by: PSYCHOLOGIST

## 2025-02-23 PROBLEM — S92.514B: Status: ACTIVE | Noted: 2025-02-23

## 2025-02-24 NOTE — PROGRESS NOTES
Behavioral Health Psychotherapy  Kimberlee Huang, Ph.D.  Licensed Clinical Psychologist  Date:  2/20/25        Time spent with client:  60 minutes from 3:00 - 4:00 pm.  This is patient's 15th psychotherapy appointment with Dr. Huang             Chief Complaint   Patient presents with    Depression    Health Concerns              S:  Mood is moderately depressed - coping adequately  Recently has fallen 4 - 5 times insight her house - was unable to tell falls were coming  Broke 2 toes - endocrinologist referred her to orthopedist, diabetes management  Misplaced insulin in fridge - daughter-in-law found it, son and she came over to help  Hurt and frustrated when  tells her she \"can't\" do something  Going to bed earlier to get extra rest is helping - sleeping about 12 hours per night     O:  MSE:  Appearance:  Alert, cooperative, moderate distress   Appetite:  Decreased  Sleep disturbance:  Sleeping more than usual (this is helpful)  Fatigue:  Yes   Loss of pleasure:  Yes (improving)  Impulsive behavior:  No  Speech:  Within normal limits  Mood:   Moderately depressed  Affect:  Full range with depression  Thought Content:  Intact   Thought Process:  Coherent  Associations:  Logical connections  Insight:  Fair (improving)  Judgment:  Good  Orientation:  Oriented to person, place, time, and general circumstances   Memory:  Recent and remote memory intact  Attention/Concentration:  Intact  Morbid ideation:  No   Threat Assessment:  No history of any suicidal ideation, suicide attempts, self-harm urges/behaviors, or homicidal ideation/behavior   Protective Factors against Suicide:  Adequate family/social support, children, contacts reliable for safety, future oriented/reason to live, Jehovah's witness beliefs/value system, and responsibilities     A:  \"Hector\" is addressing concerns related to grief over Parkinson's disease-related losses, health-related anxiety, her 's dementia, decision-making about housing, and

## 2025-02-25 ENCOUNTER — APPOINTMENT (OUTPATIENT)
Dept: PHYSICAL THERAPY | Age: 85
End: 2025-02-25
Payer: MEDICARE

## 2025-02-26 NOTE — PLAN OF CARE
[facility] - Outpatient Rehabilitation and Therapy: [address], [place] [address] office: [phone] fax: [phone]     Physical Therapy Initial Evaluation Certification      Dear Pankaj Yañez MD ,    We had the pleasure of evaluating the following patient for physical therapy services at [facility].  A summary of our findings can be found in the initial assessment below.  This includes our plan of care.  If you have any questions or concerns regarding these findings, please do not hesitate to contact me at the office phone number listed above.  Thank you for the referral.     Physician Signature:_______________________________Date:__________________  By signing above (or electronic signature), therapist’s plan is approved by physician       Physical Therapy: TREATMENT/PROGRESS NOTE   Patient: Aileen Foster (85 y.o. female)   Examination Date: 2025   :  1940 MRN: [id number]   Visit #: 1   Insurance Allowable Auth Needed   BMN []Yes    [x]No    Insurance: Payor: MEDICARE / Plan: MEDICARE PART A AND B / Product Type: *No Product type* /   Insurance ID: [id number] - (Medicare)  Secondary Insurance (if applicable): [employer]   Treatment Diagnosis:     ICD-10-CM    1. Decreased strength, endurance, and mobility  R53.1     Z74.09     R68.89       2. Unsteady gait  R26.81          Medical Diagnosis:  Unsteady gait [R26.81]   Referring Physician: Pankaj Yañez MD  PCP: Pankaj Yañez MD     Plan of care signed (Y/N):     Date of Patient follow up with Physician: March 3 Dr. 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

## 2025-02-27 ENCOUNTER — HOSPITAL ENCOUNTER (OUTPATIENT)
Dept: PHYSICAL THERAPY | Age: 85
Setting detail: THERAPIES SERIES
Discharge: HOME | End: 2025-02-27
Payer: MEDICARE

## 2025-02-27 DIAGNOSIS — R26.81 UNSTEADY GAIT: ICD-10-CM

## 2025-02-27 DIAGNOSIS — R68.89 DECREASED STRENGTH, ENDURANCE, AND MOBILITY: Primary | ICD-10-CM

## 2025-02-27 DIAGNOSIS — Z74.09 DECREASED STRENGTH, ENDURANCE, AND MOBILITY: Primary | ICD-10-CM

## 2025-02-27 DIAGNOSIS — R53.1 DECREASED STRENGTH, ENDURANCE, AND MOBILITY: Primary | ICD-10-CM

## 2025-02-27 PROCEDURE — 97110 THERAPEUTIC EXERCISES: CPT

## 2025-02-27 PROCEDURE — 97162 PT EVAL MOD COMPLEX 30 MIN: CPT

## 2025-03-03 ENCOUNTER — OFFICE VISIT (OUTPATIENT)
Age: 85
End: 2025-03-03

## 2025-03-03 VITALS — HEIGHT: 60 IN | WEIGHT: 92.59 LBS | BODY MASS INDEX: 18.18 KG/M2

## 2025-03-03 DIAGNOSIS — S92.514D CLOSED NONDISPLACED FRACTURE OF PROXIMAL PHALANX OF LESSER TOE OF RIGHT FOOT WITH ROUTINE HEALING, SUBSEQUENT ENCOUNTER: Primary | ICD-10-CM

## 2025-03-03 NOTE — PROGRESS NOTES
was 1 it is healing well.    Follow-up  -She will continue with her outpatient gait and balance training at [facility] ordered by her primary care physician.  If she has further issues with the toe she can follow back with me on an as-needed basis but she understands that should likely continue to improve in function and reduced pain over the next 8 to 12 weeks.      She understands and accepts this course of care.      Dayron Mackey MD, FAAOS  Board Certified Orthopaedic Surgeon  [facility]  [place] [facility] [place]    Phone:[phone]  Fax [phone]    The patient (or guardian, if applicable) and other individuals in attendance with the patient were advised that Artificial Intelligence will be utilized during this visit to record, process the conversation to generate a clinical note, and support improvement of the AI technology. The patient (or guardian, if applicable) and other individuals in attendance at the appointment consented to the use of AI, including the recording.

## 2025-03-04 ENCOUNTER — HOSPITAL ENCOUNTER (OUTPATIENT)
Dept: PHYSICAL THERAPY | Age: 85
Setting detail: THERAPIES SERIES
Discharge: HOME | End: 2025-03-04
Payer: MEDICARE

## 2025-03-04 PROCEDURE — 97110 THERAPEUTIC EXERCISES: CPT

## 2025-03-04 PROCEDURE — 97140 MANUAL THERAPY 1/> REGIONS: CPT

## 2025-03-04 NOTE — FLOWSHEET NOTE
Unattended (22802)     Ther. Act (78127)    Mech. Traction (83700)     Gait (61621)    Dry Needle 1-2 muscle (00463)     Aquatic Therex (91971)    Dry Needle 3+ muscle (20561)     Iontophoresis (40497)    VASO (67789)     Ultrasound (96081)    Group Therapy (31390)     Estim Attended (73764)    Canalith Repositioning (67526)     Physical Performance Test (56392)    Custom orthotic ()     Other:    Other:    Total Timed Code Tx Minutes 39 3       Total Treatment Minutes 39        Charge Justification:  (43418) THERAPEUTIC EXERCISE - Provided verbal/tactile cueing for HEP and/or activities related to strengthening, flexibility, endurance, ROM performed to prevent loss of range of motion, maintain or improve muscular strength or increase flexibility, following either an injury or surgery.   (71473) MANUAL THERAPY -  Manual therapy techniques, 1 or more regions, each 15 minutes (Mobilization/manipulation, manual lymphatic drainage, manual traction) for the purpose of modulating pain, promoting relaxation,  increasing ROM, reducing/eliminating soft tissue swelling/inflammation/restriction, improving soft tissue extensibility and allowing for proper ROM for normal function with self care, mobility, lifting and ambulation    GOALS     Patient stated goal: to lessen the pain  [] Progressing: [] Met: [] Not Met: [] Adjusted    Therapist goals for Patient:   Short Term Goals: To be achieved in: 2 weeks  1Independent in HEP and progression per patient tolerance, in order to prevent re-injury.   [] Progressing: [] Met: [] Not Met: [] Adjusted  Patient will have a decrease in pain to <2/10 to facilitate improvement in movement, function, and ADLs as indicated by Functional Deficits.  [] Progressing: [] Met: [] Not Met: [] Adjusted    IF APPLICABLE:  [] Patient to demonstrate independence in wear and care for custom orthotic device. (Only if applicable for orthotic eval)     Long Term Goals: To be achieved in: 4

## 2025-03-06 ENCOUNTER — HOSPITAL ENCOUNTER (OUTPATIENT)
Dept: PHYSICAL THERAPY | Age: 85
Setting detail: THERAPIES SERIES
Discharge: HOME | End: 2025-03-06
Payer: MEDICARE

## 2025-03-06 PROCEDURE — 97110 THERAPEUTIC EXERCISES: CPT

## 2025-03-06 PROCEDURE — 97530 THERAPEUTIC ACTIVITIES: CPT

## 2025-03-06 PROCEDURE — 97112 NEUROMUSCULAR REEDUCATION: CPT

## 2025-03-06 NOTE — FLOWSHEET NOTE
sec hold  - Seated Cervical Sidebending AROM  - 1 x daily - 7 x weekly - 1 sets - 10 reps - 5 sec hold  - Seated Cervical Rotation AROM  - 1 x daily - 7 x weekly - 1 sets - 10 reps - 5 sec hold    ASSESSMENT   Today's Assessment: During therapy this date, patient required visual cueing, verbal cueing, progression of exercises and program, and manual interventions for exercise progression, increasing ROM, improving soft tissue extensibility, allowing for proper ROM, static and dynamic balance, and improving postural awareness.Patient will continue to benefit from ongoing evaluation and advanced clinical decision from a Physical Therapist to address and improve neuromuscular control, normalization of gait, balance and proprioception, and functional mobility to safely return to ADLs/IADLs without symptoms or restrictions.    Medical Necessity Documentation:  I certify that this patient meets the below criteria necessary for medical necessity for care and/or justification of therapy services:  The patient has a complexity identified by an ICD-10 code that has a direct and significant impact on the need for therapy.  (Significantly impacts the rate of recovery and is associated with a primary condition.)   The patient has associated co-morbidities along with primary diagnosis which significantly impact the rate of recovery and contribute to complexities that require skilled therapeutic intervention    Return to Play: NA    Prognosis for POC: [x] Good [] Fair  [] Poor    Patient requires continued skilled intervention: [x] Yes  [] No      CHARGE CAPTURE     PT CHARGE GRID   CPT Code (TIMED) minutes # CPT Code (UNTIMED) #     Therex (96187)  15 1  EVAL:MODERATE (66958 - Typically 30 minutes face-to-face)     Neuromusc. Re-ed (29839) 15 1  Re-Eval (04099)     Manual (16875)    Estim Unattended (86321)     Ther. Act (89540) 10 1  [facility]. Traction (91513)     Gait (43797)    Dry Needle 1-2 muscle (63640)     Aquatic Therex

## 2025-03-11 ENCOUNTER — HOSPITAL ENCOUNTER (OUTPATIENT)
Dept: PHYSICAL THERAPY | Age: 85
Setting detail: THERAPIES SERIES
Discharge: HOME | End: 2025-03-11
Payer: MEDICARE

## 2025-03-11 PROCEDURE — 97112 NEUROMUSCULAR REEDUCATION: CPT

## 2025-03-11 NOTE — FLOWSHEET NOTE
[facility] - Outpatient Rehabilitation and Therapy: [address], [place] [address] office: [phone] fax: [phone]         Physical Therapy: TREATMENT/PROGRESS NOTE   Patient: Aileen Foster (85 y.o. female)  \" Hector\" Examination Date: 2025   :  1940 MRN: [id number]   Visit #: 4   Insurance Allowable Auth Needed   BMN []Yes    [x]No    Insurance: Payor: MEDICARE / Plan: MEDICARE PART A AND B / Product Type: *No Product type* /   Insurance ID: [id number] - (Medicare)  Secondary Insurance (if applicable): MED[employer] M[employer]UAL   Treatment Diagnosis:     ICD-10-CM    1. Decreased strength, endurance, and mobility  R53.1     Z74.09     R68.89       2. Unsteady gait  R26.81          Medical Diagnosis:  Unsteady gait [R26.81]   Referring Physician: Pankaj Yañez MD  PCP: Pankaj Yañez MD     Plan of care signed (Y/N): Y    Date of Patient follow up with Physician: Dr Yañez 25, Dr. 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    Preferred Language for Healthcare:   [x] English       [] other:    SUBJECTIVE EXAMINATION     Patient stated complaint: Pt reports she is doing her exercise at home when she can. Is not using the walker at home.     Eval:has just finish the big and

## 2025-03-11 NOTE — PROGRESS NOTES
[facility]   Cardiac Follow Up    Referring Provider:  Pankaj Yañez MD     Chief Complaint   Patient presents with    Coronary Artery Disease     No cardiac symptoms at this time.    Hyperlipidemia    Hypertension    6 Month Follow-Up     History of Present Illness:  Aileen Foster is 85 y.o. 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 She continues to see Dr. Norman for Parkinson's.      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       5/15/23 she was evaluated in ER for hyperglycemia and some polydipsia.  Her initial glucose was 479 on fingerstick.  On blood work it is 520 with some ketones in her urine.  Has slightly elevated BUN at 28.  She received 1 L of IV fluid and 8 units IV insulin and glucose prior to d/c was in 200s.  She was not acidotic on VBG and has no anion gap and normal CO2.  Did not suspect DKA.  She was released home.    She was admitted 1/30/24-1/31/24. She presented with DKA due to malfunctioning insulin pump. Following discussion with patient's son, the pump needle came of the skin and insulin was not deliver for an extended period hence she tipped into DKA. She received DKA protocol with rapid resolution of acidosis, she was transitioned to diet and started on home insulin pump. She tolerated transition and oral intake.     Today, Hector is here for a 6 month follow up.  She is with her  & Candy, her caregiver.  Candy is with Hector 4 days/week.  She has been less active this winter, this spring she is looking forward to her flowers blooming. Since last office visit, she has had a couple of falls resulting in broken toes and nose. She has not fallen in last couple mos. Her lowest bp was been under 90 while standing a couple of times over last mos.  Sitting her BP is fine.  She is taking midodrine regularly. Her

## 2025-03-13 ENCOUNTER — APPOINTMENT (OUTPATIENT)
Dept: PHYSICAL THERAPY | Age: 85
End: 2025-03-13
Payer: MEDICARE

## 2025-03-18 ENCOUNTER — HOSPITAL ENCOUNTER (OUTPATIENT)
Dept: PHYSICAL THERAPY | Age: 85
Setting detail: THERAPIES SERIES
Discharge: HOME | End: 2025-03-18
Payer: MEDICARE

## 2025-03-18 PROCEDURE — 97112 NEUROMUSCULAR REEDUCATION: CPT

## 2025-03-20 ENCOUNTER — OFFICE VISIT (OUTPATIENT)
Dept: PSYCHOLOGY | Age: 85
End: 2025-03-20
Payer: MEDICARE

## 2025-03-20 DIAGNOSIS — F43.23 ADJUSTMENT REACTION WITH ANXIETY AND DEPRESSION: Primary | ICD-10-CM

## 2025-03-20 PROCEDURE — 1036F TOBACCO NON-USER: CPT | Performed by: PSYCHOLOGIST

## 2025-03-20 PROCEDURE — 1123F ACP DISCUSS/DSCN MKR DOCD: CPT | Performed by: PSYCHOLOGIST

## 2025-03-20 PROCEDURE — 90837 PSYTX W PT 60 MINUTES: CPT | Performed by: PSYCHOLOGIST

## 2025-03-25 ENCOUNTER — HOSPITAL ENCOUNTER (OUTPATIENT)
Dept: PHYSICAL THERAPY | Age: 85
Setting detail: THERAPIES SERIES
Discharge: HOME | End: 2025-03-25
Payer: MEDICARE

## 2025-03-25 PROCEDURE — 97530 THERAPEUTIC ACTIVITIES: CPT

## 2025-03-25 PROCEDURE — 97112 NEUROMUSCULAR REEDUCATION: CPT

## 2025-03-25 PROCEDURE — 97110 THERAPEUTIC EXERCISES: CPT

## 2025-03-25 NOTE — FLOWSHEET NOTE
[facility] - Outpatient Rehabilitation and Therapy: [address], [place] [address] office: [phone] fax: [phone]         Physical Therapy: TREATMENT/PROGRESS NOTE   Patient: Aileen Foster (85 y.o. female)  \" Hector\" Examination Date: 2025   :  1940 MRN: [id number]   Visit #: 6   Insurance Allowable Auth Needed   BMN []Yes    [x]No    Insurance: Payor: MEDICARE / Plan: MEDICARE PART A AND B / Product Type: *No Product type* /   Insurance ID: [id number] - (Medicare)  Secondary Insurance (if applicable): MEDICAL M[employer]UAL   Treatment Diagnosis:     ICD-10-CM    1. Decreased strength, endurance, and mobility  R53.1     Z74.09     R68.89       2. Unsteady gait  R26.81          Medical Diagnosis:  Unsteady gait [R26.81]   Referring Physician: Pankaj Yañez MD  PCP: Pankaj Yañez MD     Plan of care signed (Y/N): Y    Date of Patient follow up with Physician: Dr Yañez 25, Dr. 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    Preferred Language for Healthcare:   [x] English       [] other:    SUBJECTIVE EXAMINATION     Patient stated complaint: Pt reports feeling much better than last time and has no pain in her back.    Eval:has just finish the big and loud program in dec

## 2025-03-26 NOTE — PROGRESS NOTES
Behavioral Health Psychotherapy  Kimberlee uHang, Ph.D.  Licensed Clinical Psychologist  Date:  3/20/25        Time spent with client:  55 minutes from 3:00 - 3:55 pm.  This is patient's 15th psychotherapy appointment with Dr. Huang             Chief Complaint   Patient presents with    Depression    Family Problem              S:  Mood is moderately depressed, severely anxious  On waiting list for 2-bedroom assisted living apartment at [facility]          Could take from 3 months to a year to get in          This includes 3 meals per day, laundry service; does not include help w/ insulin          One-bedroom assisted living apartment is available now          She,  agree this is not enough room for them    She reported tug-of-war with  with towel led to her breaking her toes           She had earlier reported she fell down and this caused broken toes           She reported she told her younger son the truth, he urged her to tell Dr. Huang   is deteriorating, tends to blame her for confusion - grabbed her arm recently   increasingly argumentative - he has been calling her names   is very stressed by back pain - not eligible to get spinal cord stimulator    Younger son visited last week for a week - he is reportedly definitely moving to [facility]  Feeling stressed by doing tax forms - aware she was unable to complete calculations  Feeling stressed by cooking tasks - hesitant to allow hired helpers to cook   Aware she has been losing weight and not eating enough  Sister is coming to visit this weekend  Disappointed by her health problems - her mother and grandmother were well longer     O:  MSE:  Appearance:  Alert, cooperative, severe distress   Appetite:  Decreased, eating less than usual, stomach growled during session  Sleep disturbance:  Sleeping more than usual (this is helpful)  Fatigue:  Yes   Loss of pleasure:  Yes  Impulsive behavior:  No  Speech:  Within normal limits  Mood:

## 2025-03-27 ENCOUNTER — HOSPITAL ENCOUNTER (OUTPATIENT)
Dept: PHYSICAL THERAPY | Age: 85
Setting detail: THERAPIES SERIES
Discharge: HOME | End: 2025-03-27
Payer: MEDICARE

## 2025-03-27 PROCEDURE — 97530 THERAPEUTIC ACTIVITIES: CPT

## 2025-03-27 PROCEDURE — 97112 NEUROMUSCULAR REEDUCATION: CPT

## 2025-03-27 PROCEDURE — 97110 THERAPEUTIC EXERCISES: CPT

## 2025-03-27 NOTE — PLAN OF CARE
increased thoracic kyphosis    Bandages/Dressings/Incisions:  Not Applicable    Dermatomes: Abnormal findings listed below  NT    Myotomes: Abnormal findings listed below  NT    Reflexes: Abnormal findings listed below  Not Tested    Specific Joint Mobility Testing/Accessory Motions:      N/A    Special Tests:  N/A    Gait:    Pattern: decreased kavitha, decreased step length, decreased trunk rotation, forward flexed posture, and shuffles  Assistive Device Used: Rollator    Balance:  [x] WNL      [] NT       [] Dysfunction noted  Comment:     Falls Risk Assessment (30 days):   Falls Risk assessed and no intervention required.  Falls Risk assessed and patient requires intervention due to being higher risk   Time Up and Go (TUG):   Not Assessed        Exercises/Interventions     Therapeutic Ex (10303)  resistance Sets/time Reps Notes/Cues/Progressions   nustep 5 mins    Threading the needle at bar    Standing at wall chin tucks Towel behind head   1/2 roll at wall scap squeeze  Sh flex     High/mid row  ER    Sh flex stretch in supine with cane  Chest press with cane     Wall push up    Seated scap sq   Cervical AROM stretches rot, sb, ext   3/6 progressed in dept, next appt do standing   Hamstring step stretch      See HEP below, performed and printed  15 min  3/27 progressed   Manual Intervention (39026)  TIME     Cervical man traction  Gentle man cervical stretches rot,SB                                    NMR re-education (24021) resistance Sets/time Reps CUES NEEDED  Gait belt for safety   //bars: VOR seated H and V    Eyes closed PT  Eyes closed airex    Trunk twist L/R  360 turns L/R    Forward folds EO/EC  SLS L/R  3/6 progressed, pt able to do seated but neck tired  CGA  CGA    SBA  SBA    SBA/CGA   Amb no AD  Amb stairs 6\" in HP with R rail, reciprocal  Amb ramp forward and sidestep L/R          Amb turf head nods  Amb turf head turns  Amb turf 360 turn L/R    Put 4 cones down and    100'x4,

## 2025-04-02 ENCOUNTER — OFFICE VISIT (OUTPATIENT)
Dept: CARDIOLOGY CLINIC | Age: 85
End: 2025-04-02
Payer: MEDICARE

## 2025-04-02 VITALS
HEIGHT: 64 IN | HEART RATE: 68 BPM | WEIGHT: 94.8 LBS | SYSTOLIC BLOOD PRESSURE: 110 MMHG | DIASTOLIC BLOOD PRESSURE: 48 MMHG | BODY MASS INDEX: 16.18 KG/M2 | OXYGEN SATURATION: 99 %

## 2025-04-02 DIAGNOSIS — E10.65 POORLY CONTROLLED TYPE 1 DIABETES MELLITUS WITH NEUROPATHY: ICD-10-CM

## 2025-04-02 DIAGNOSIS — E03.9 HYPOTHYROIDISM (ACQUIRED): ICD-10-CM

## 2025-04-02 DIAGNOSIS — I25.10 CORONARY ARTERY DISEASE INVOLVING NATIVE CORONARY ARTERY OF NATIVE HEART WITHOUT ANGINA PECTORIS: Primary | ICD-10-CM

## 2025-04-02 DIAGNOSIS — I36.1 NONRHEUMATIC TRICUSPID VALVE REGURGITATION: ICD-10-CM

## 2025-04-02 DIAGNOSIS — G20.A1 PARKINSON'S DISEASE, UNSPECIFIED WHETHER DYSKINESIA PRESENT, UNSPECIFIED WHETHER MANIFESTATIONS FLUCTUATE (HCC): ICD-10-CM

## 2025-04-02 DIAGNOSIS — E78.2 MIXED HYPERLIPIDEMIA: ICD-10-CM

## 2025-04-02 DIAGNOSIS — E10.40 POORLY CONTROLLED TYPE 1 DIABETES MELLITUS WITH NEUROPATHY: ICD-10-CM

## 2025-04-02 PROCEDURE — 1123F ACP DISCUSS/DSCN MKR DOCD: CPT | Performed by: INTERNAL MEDICINE

## 2025-04-02 PROCEDURE — 1159F MED LIST DOCD IN RCRD: CPT | Performed by: INTERNAL MEDICINE

## 2025-04-02 PROCEDURE — 3078F DIAST BP <80 MM HG: CPT | Performed by: INTERNAL MEDICINE

## 2025-04-02 PROCEDURE — G8427 DOCREV CUR MEDS BY ELIG CLIN: HCPCS | Performed by: INTERNAL MEDICINE

## 2025-04-02 PROCEDURE — 1090F PRES/ABSN URINE INCON ASSESS: CPT | Performed by: INTERNAL MEDICINE

## 2025-04-02 PROCEDURE — G8399 PT W/DXA RESULTS DOCUMENT: HCPCS | Performed by: INTERNAL MEDICINE

## 2025-04-02 PROCEDURE — G8419 CALC BMI OUT NRM PARAM NOF/U: HCPCS | Performed by: INTERNAL MEDICINE

## 2025-04-02 PROCEDURE — 3074F SYST BP LT 130 MM HG: CPT | Performed by: INTERNAL MEDICINE

## 2025-04-02 PROCEDURE — 3044F HG A1C LEVEL LT 7.0%: CPT | Performed by: INTERNAL MEDICINE

## 2025-04-02 PROCEDURE — 1036F TOBACCO NON-USER: CPT | Performed by: INTERNAL MEDICINE

## 2025-04-02 PROCEDURE — 99214 OFFICE O/P EST MOD 30 MIN: CPT | Performed by: INTERNAL MEDICINE

## 2025-04-02 NOTE — PATIENT INSTRUCTIONS
No medication changes  No restrictions on salt / sodium  Fluids encouraged  Continue risk factor modifications.   Call for any change in symptoms, call to report any changes in shortness of breath or development of chest pain with activity.    Follow up in 6 mos

## 2025-04-03 ENCOUNTER — HOSPITAL ENCOUNTER (OUTPATIENT)
Dept: PHYSICAL THERAPY | Age: 85
Setting detail: THERAPIES SERIES
Discharge: HOME | End: 2025-04-03
Payer: MEDICARE

## 2025-04-03 PROCEDURE — 97110 THERAPEUTIC EXERCISES: CPT

## 2025-04-03 PROCEDURE — 97112 NEUROMUSCULAR REEDUCATION: CPT

## 2025-04-03 NOTE — FLOWSHEET NOTE
[facility] - Outpatient Rehabilitation and Therapy: [address], [place] [address] office: [phone] fax: [phone]      Physical Therapy: TREATMENT/PROGRESS NOTE   Patient: Aileen Foster (85 y.o. female)  \" Hector\" Examination Date: 2025   :  1940 MRN: [id number]   Visit #:   Insurance Allowable Auth Needed   BMN []Yes    [x]No    Insurance: Payor: MEDICARE / Plan: MEDICARE PART A AND B / Product Type: *No Product type* /   Insurance ID: [id number] - (Medicare)  Secondary Insurance (if applicable): MED[employer]   Treatment Diagnosis:     ICD-10-CM    1. Decreased strength, endurance, and mobility  R53.1     Z74.09     R68.89       2. Unsteady gait  R26.81          Medical Diagnosis:  Unsteady gait [R26.81]   Referring Physician: Pankaj Yañez MD  PCP: Pankaj Yañez MD     Plan of care signed (Y/N): Y  3/27/25 POC cosign in epic  Date of Patient follow up with Physician: Dr Yañez 25, Dr. Toro 25     Plan of Care Report: NO  POC update due: (10 visits /OR AUTH LIMITS, whichever is less)  2025                                             Medical History:  Comorbidities:  Diabetes (Type I or II)  Osteoporosis/Osteopenia  Other: has no vision below waist in front of her body  Relevant Medical History: hypothyroid, neuropathy, Parkinsons, has a caregiver                                         Precautions/ Contra-indications:           Latex allergy:  NO  Pacemaker:    NO  Contraindications for Manipulation: None  Date of Surgery: NA  Other:    Red Flags:  None    Suicide Screening:   The patient did not verbalize a primary behavioral concern, suicidal ideation, suicidal intent, or demonstrate suicidal behaviors.    Preferred Language for Healthcare:   [x] English       [] other:    SUBJECTIVE EXAMINATION     Patient stated complaint: Pt reports doing pretty well at home. Some imbalance 0-3/10 but pt reports no falls since PT started.

## 2025-04-08 ENCOUNTER — OFFICE VISIT (OUTPATIENT)
Dept: PSYCHOLOGY | Age: 85
End: 2025-04-08
Payer: MEDICARE

## 2025-04-08 DIAGNOSIS — F43.23 ADJUSTMENT REACTION WITH ANXIETY AND DEPRESSION: Primary | ICD-10-CM

## 2025-04-08 PROCEDURE — 1036F TOBACCO NON-USER: CPT | Performed by: PSYCHOLOGIST

## 2025-04-08 PROCEDURE — 1123F ACP DISCUSS/DSCN MKR DOCD: CPT | Performed by: PSYCHOLOGIST

## 2025-04-08 PROCEDURE — 90837 PSYTX W PT 60 MINUTES: CPT | Performed by: PSYCHOLOGIST
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
## 2025-04-09 ENCOUNTER — HOSPITAL ENCOUNTER (OUTPATIENT)
Dept: PHYSICAL THERAPY | Age: 85
Setting detail: THERAPIES SERIES
Discharge: HOME | End: 2025-04-09
Payer: MEDICARE

## 2025-04-09 PROCEDURE — 97110 THERAPEUTIC EXERCISES: CPT

## 2025-04-09 PROCEDURE — 97112 NEUROMUSCULAR REEDUCATION: CPT

## 2025-04-09 NOTE — FLOWSHEET NOTE
[facility] - Outpatient Rehabilitation and Therapy: [address], [place] [address] office: [phone] fax: [phone]      Physical Therapy: TREATMENT/PROGRESS NOTE   Patient: Aileen Foster (85 y.o. female)  \" Hector\" Examination Date: 2025   :  1940 MRN: [id number]   Visit #:   Insurance Allowable Auth Needed   BMN []Yes    [x]No    Insurance: Payor: MEDICARE / Plan: MEDICARE PART A AND B / Product Type: *No Product type* /   Insurance ID: [id number] - (Medicare)  Secondary Insurance (if applicable): MEDICAL M[employer]UAL   Treatment Diagnosis:     ICD-10-CM    1. Decreased strength, endurance, and mobility  R53.1     Z74.09     R68.89       2. Unsteady gait  R26.81          Medical Diagnosis:  Unsteady gait [R26.81]   Referring Physician: Pankaj Yañez MD  PCP: Pankaj Yañez MD     Plan of care signed (Y/N): Y  3/27/25 POC cosign in epic  Date of Patient follow up with Physician: Dr Yañez 25, Dr. Toro 25     Plan of Care Report: NO  POC update due: (10 visits /OR AUTH LIMITS, whichever is less)  2025                                             Medical History:  Comorbidities:  Diabetes (Type I or II)  Osteoporosis/Osteopenia  Other: has no vision below waist in front of her body  Relevant Medical History: hypothyroid, neuropathy, Parkinsons, has a caregiver                                         Precautions/ Contra-indications:           Latex allergy:  NO  Pacemaker:    NO  Contraindications for Manipulation: None  Date of Surgery: NA  Other:    Red Flags:  None    Suicide Screening:   The patient did not verbalize a primary behavioral concern, suicidal ideation, suicidal intent, or demonstrate suicidal behaviors.    Preferred Language for Healthcare:   [x] English       [] other:    SUBJECTIVE EXAMINATION     Patient stated complaint: Pt 10 min late today, she forgot her help wasn't coming until later.   Pt reports she has been working

## 2025-04-17 ENCOUNTER — HOSPITAL ENCOUNTER (OUTPATIENT)
Dept: PHYSICAL THERAPY | Age: 85
Setting detail: THERAPIES SERIES
Discharge: HOME | End: 2025-04-17
Payer: MEDICARE

## 2025-04-17 PROCEDURE — 97110 THERAPEUTIC EXERCISES: CPT

## 2025-04-17 PROCEDURE — 97112 NEUROMUSCULAR REEDUCATION: CPT

## 2025-04-17 RX ORDER — MIRABEGRON 50 MG/1
50 TABLET, FILM COATED, EXTENDED RELEASE ORAL DAILY
Qty: 30 TABLET | Refills: 0 | Status: SHIPPED | OUTPATIENT
Start: 2025-04-17

## 2025-04-17 NOTE — FLOWSHEET NOTE
decreased dynamic stabilty which required ongoing skilled physical therapy and decision making.     Medical Necessity Documentation:  I certify that this patient meets the below criteria necessary for medical necessity for care and/or justification of therapy services:  The patient has a complexity identified by an ICD-10 code that has a direct and significant impact on the need for therapy.  (Significantly impacts the rate of recovery and is associated with a primary condition.)   The patient has associated co-morbidities along with primary diagnosis which significantly impact the rate of recovery and contribute to complexities that require skilled therapeutic intervention    Return to Play: NA    Prognosis for POC: [x] Good [] Fair  [] Poor    Patient requires continued skilled intervention: [x] Yes  [] No      CHARGE CAPTURE     PT CHARGE GRID   CPT Code (TIMED) minutes # CPT Code (UNTIMED) #     Therex (57947)  15 1  EVAL:MODERATE (25279 - Typically 30 minutes face-to-face)     Neuromusc. Re-ed (97546) 25 2  Re-Eval (55473)     Manual (29387)    Estim Unattended (19852)     Ther. Act (48873)    Mech. Traction (12188)     Gait (46847)    Dry Needle 1-2 muscle (34746)     Aquatic Therex (32893)    Dry Needle 3+ muscle (86697)     Iontophoresis (11211)    VASO (94185)     Ultrasound (85564)    Group Therapy (65340)     Estim Attended (77287)    Canalith Repositioning (87342)     Physical Performance Test (06725)    Custom orthotic ()     Other:    Other:    Total Timed Code Tx Minutes 40 3       Total Treatment Minutes 40        Charge Justification:  (68778) THERAPEUTIC EXERCISE - Provided verbal/tactile cueing for HEP and/or activities related to strengthening, flexibility, endurance, ROM performed to prevent loss of range of motion, maintain or improve muscular strength or increase flexibility, following either an injury or surgery.   (77469) NEUROMUSCULAR RE-EDUCATION - Provided therapeutic procedure on

## 2025-04-21 ENCOUNTER — OFFICE VISIT (OUTPATIENT)
Dept: UROGYNECOLOGY | Age: 85
End: 2025-04-21
Payer: MEDICARE

## 2025-04-21 ENCOUNTER — OFFICE VISIT (OUTPATIENT)
Dept: FAMILY MEDICINE CLINIC | Age: 85
End: 2025-04-21
Payer: MEDICARE

## 2025-04-21 VITALS
BODY MASS INDEX: 15.54 KG/M2 | HEART RATE: 65 BPM | OXYGEN SATURATION: 93 % | SYSTOLIC BLOOD PRESSURE: 130 MMHG | WEIGHT: 91 LBS | HEIGHT: 64 IN | DIASTOLIC BLOOD PRESSURE: 50 MMHG

## 2025-04-21 VITALS
TEMPERATURE: 97.5 F | OXYGEN SATURATION: 94 % | SYSTOLIC BLOOD PRESSURE: 160 MMHG | HEART RATE: 71 BPM | DIASTOLIC BLOOD PRESSURE: 72 MMHG

## 2025-04-21 DIAGNOSIS — N39.41 URGE INCONTINENCE: ICD-10-CM

## 2025-04-21 DIAGNOSIS — I10 ESSENTIAL HYPERTENSION: ICD-10-CM

## 2025-04-21 DIAGNOSIS — E03.9 ACQUIRED HYPOTHYROIDISM: ICD-10-CM

## 2025-04-21 DIAGNOSIS — M81.0 OSTEOPOROSIS, UNSPECIFIED OSTEOPOROSIS TYPE, UNSPECIFIED PATHOLOGICAL FRACTURE PRESENCE: ICD-10-CM

## 2025-04-21 DIAGNOSIS — R39.15 URINARY URGENCY: Primary | ICD-10-CM

## 2025-04-21 DIAGNOSIS — E03.9 HYPOTHYROIDISM (ACQUIRED): ICD-10-CM

## 2025-04-21 DIAGNOSIS — I25.10 CORONARY ARTERY DISEASE INVOLVING NATIVE CORONARY ARTERY OF NATIVE HEART WITHOUT ANGINA PECTORIS: ICD-10-CM

## 2025-04-21 DIAGNOSIS — E10.319 CONTROLLED TYPE 1 DIABETES MELLITUS WITH RETINOPATHY, MACULAR EDEMA PRESENCE UNSPECIFIED, UNSPECIFIED LATERALITY, UNSPECIFIED RETINOPATHY SEVERITY (HCC): Primary | ICD-10-CM

## 2025-04-21 DIAGNOSIS — E78.2 MIXED HYPERLIPIDEMIA: ICD-10-CM

## 2025-04-21 DIAGNOSIS — Z91.81 RISK FOR FALLS: ICD-10-CM

## 2025-04-21 DIAGNOSIS — G20.A1 PARKINSON DISEASE, SYMPTOMATIC (HCC): ICD-10-CM

## 2025-04-21 DIAGNOSIS — N32.81 OAB (OVERACTIVE BLADDER): ICD-10-CM

## 2025-04-21 PROCEDURE — 3044F HG A1C LEVEL LT 7.0%: CPT | Performed by: FAMILY MEDICINE

## 2025-04-21 PROCEDURE — 1159F MED LIST DOCD IN RCRD: CPT | Performed by: FAMILY MEDICINE

## 2025-04-21 PROCEDURE — 3077F SYST BP >= 140 MM HG: CPT | Performed by: NURSE PRACTITIONER

## 2025-04-21 PROCEDURE — G8399 PT W/DXA RESULTS DOCUMENT: HCPCS | Performed by: FAMILY MEDICINE

## 2025-04-21 PROCEDURE — 0509F URINE INCON PLAN DOCD: CPT | Performed by: NURSE PRACTITIONER

## 2025-04-21 PROCEDURE — 3075F SYST BP GE 130 - 139MM HG: CPT | Performed by: FAMILY MEDICINE

## 2025-04-21 PROCEDURE — 1159F MED LIST DOCD IN RCRD: CPT | Performed by: NURSE PRACTITIONER

## 2025-04-21 PROCEDURE — 1123F ACP DISCUSS/DSCN MKR DOCD: CPT | Performed by: NURSE PRACTITIONER

## 2025-04-21 PROCEDURE — 1090F PRES/ABSN URINE INCON ASSESS: CPT | Performed by: FAMILY MEDICINE

## 2025-04-21 PROCEDURE — 99214 OFFICE O/P EST MOD 30 MIN: CPT | Performed by: FAMILY MEDICINE

## 2025-04-21 PROCEDURE — 1160F RVW MEDS BY RX/DR IN RCRD: CPT | Performed by: NURSE PRACTITIONER

## 2025-04-21 PROCEDURE — 1123F ACP DISCUSS/DSCN MKR DOCD: CPT | Performed by: FAMILY MEDICINE

## 2025-04-21 PROCEDURE — 99213 OFFICE O/P EST LOW 20 MIN: CPT | Performed by: NURSE PRACTITIONER

## 2025-04-21 PROCEDURE — 3078F DIAST BP <80 MM HG: CPT | Performed by: NURSE PRACTITIONER

## 2025-04-21 PROCEDURE — 1036F TOBACCO NON-USER: CPT | Performed by: FAMILY MEDICINE

## 2025-04-21 PROCEDURE — 1036F TOBACCO NON-USER: CPT | Performed by: NURSE PRACTITIONER

## 2025-04-21 PROCEDURE — G8427 DOCREV CUR MEDS BY ELIG CLIN: HCPCS | Performed by: NURSE PRACTITIONER

## 2025-04-21 PROCEDURE — 3078F DIAST BP <80 MM HG: CPT | Performed by: FAMILY MEDICINE

## 2025-04-21 PROCEDURE — G8427 DOCREV CUR MEDS BY ELIG CLIN: HCPCS | Performed by: FAMILY MEDICINE

## 2025-04-21 PROCEDURE — G2211 COMPLEX E/M VISIT ADD ON: HCPCS | Performed by: FAMILY MEDICINE

## 2025-04-21 PROCEDURE — G8419 CALC BMI OUT NRM PARAM NOF/U: HCPCS | Performed by: FAMILY MEDICINE

## 2025-04-21 PROCEDURE — G8419 CALC BMI OUT NRM PARAM NOF/U: HCPCS | Performed by: NURSE PRACTITIONER

## 2025-04-21 PROCEDURE — 1090F PRES/ABSN URINE INCON ASSESS: CPT | Performed by: NURSE PRACTITIONER

## 2025-04-21 PROCEDURE — G8399 PT W/DXA RESULTS DOCUMENT: HCPCS | Performed by: NURSE PRACTITIONER

## 2025-04-21 NOTE — PROGRESS NOTES
2025       HPI:     Name: Aileen Foster  YOB: 1940    CC: Patient is a 85 y.o. presenting for evaluation of urge incontinence .       HPI: How long have you had this problem?    Please rate the severity of your problem: moderate  Anything make it better? Patient reports that Myrbetriq \"doesn't help at all\"    Prior to  6 months ago when we first met, Myrbetriq was helpful   She reports over time effectiveness declined.  She has a spasm with leakage daily at this point.  Urgency always      Ob/Gyn History:    OB History    Para Term  AB Living   2 2 2      SAB IAB Ectopic Molar Multiple Live Births              # Outcome Date GA Lbr Manolo/2nd Weight Sex Type Anes PTL Lv   2 Term            1 Term              Past Medical History:   Past Medical History:   Diagnosis Date    Acquired hypothyroidism 2010    CAD (coronary artery disease) 2012    non obstructive    Diabetes mellitus with neurological manifestation ([facility])     DKA, type 1, not at goal ([facility]) 2022    Hyperlipidemia     Hyperlipidemia     Hypothyroidism (acquired)     Insomnia     Neuropathy     Osteoporosis 2014    Parkinson disease ([facility])     Parkinson disease (HCC)     Parkinson disease (HCC)     Renal insufficiency     Restless legs syndrome     Type I (juvenile type) diabetes mellitus without mention of complication, not stated as uncontrolled      Past Surgical History:   Past Surgical History:   Procedure Laterality Date    CARDIAC CATHETERIZATION  2012    non obstructive CAD    COLONOSCOPY      at age 69    DENTAL SURGERY      EYE SURGERY Bilateral     with lens implants    IR KYPHOPLASTY LUMBAR 1 VERTEBRAL BODY  2024    IR KYPHOPLASTY LUMBAR FIRST LEVEL 3/5/2024 Skylar Meehan MD [facility] SPECIAL PROCEDURES    KYPHOSIS SURGERY  2024    SKIN BIOPSY Right     neg skin biopsy on right arm    TONSILLECTOMY       Allergies: No Known Allergies  Current Medications:  Current Outpatient Medications

## 2025-04-21 NOTE — PROGRESS NOTES
Aileen Foster is a 85 y.o. female.    HPI: Here  with [facility],for complex medical visit  Saw new urogyn yesterday, may try botox after trial off merbetriq for overactive bladder and urinary incontinence as her not sure it is working  Sees endo, neuro-on insulin pump and on medication for Parkinson's  Slightly stressed today because she has to make a decision about an assisted living facility that came up off the waiting list  Meds, vitamins and allergies reviewed with pt    ROS: No TIA's or unusual headaches, no dysphagia.  No prolonged cough. No dyspnea or chest pain on exertion.  No abdominal pain, change in bowel habits, black or bloody stools.  No urinary tract symptoms.  No new or unusual musculoskeletal symptoms.       Prior to Visit Medications    Medication Sig Taking? Authorizing Provider   insulin aspart (NOVOLOG) 100 UNIT/ML injection vial INJECT 30 UNITS UNDER THE SKIN WITH INSULIN PUMP Yes Ambreen Shepherd MD   CALCIUM PO Take 300 mg by mouth in the morning and at bedtime Yes Lulu Rene MD   carbidopa-levodopa (SINEMET CR)  MG per extended release tablet Take 1 tablet by mouth nightly Yes Lulu Rene MD   ACCU-CHEK GUIDE strip Test 7 times daily Yes Ambreen Shepherd MD   rosuvastatin (CRESTOR) 10 MG tablet Take 1 tablet by mouth daily Yes Ambreen Shepherd MD   midodrine (PROAMATINE) 5 MG tablet Take 1 tablet by mouth 3 times daily Yes Presley Toro MD   Bacitracin-Polymyxin B (POLYSPORIN) 500-33529 UNIT/GM OINT Apply to nasal abrasion twice a day for the next 3 weeks Yes Yared Cummings,    levothyroxine (SYNTHROID) 100 MCG tablet Take 1 tablet by mouth daily Yes Pankaj Yañez MD   LINZESS 145 MCG capsule  Yes Lulu Rene MD   Continuous Blood Gluc Transmit (DEXCOM G6 TRANSMITTER) MISC Change transmitter every 3 months Yes Ambreen Shepherd MD   Glucose (TRUEPLUS) 15 GM/32ML GEL Take 32 mLs by mouth See Admin Instructions TAKE 1 PACKET PO FOR HYPOGLYCEMIC

## 2025-04-23 ENCOUNTER — HOSPITAL ENCOUNTER (OUTPATIENT)
Dept: PHYSICAL THERAPY | Age: 85
Setting detail: THERAPIES SERIES
Discharge: HOME | End: 2025-04-23
Payer: MEDICARE

## 2025-04-23 PROCEDURE — 97110 THERAPEUTIC EXERCISES: CPT

## 2025-04-23 PROCEDURE — 97530 THERAPEUTIC ACTIVITIES: CPT

## 2025-04-23 PROCEDURE — 97112 NEUROMUSCULAR REEDUCATION: CPT

## 2025-04-23 NOTE — FLOWSHEET NOTE
[facility] - Outpatient Rehabilitation and Therapy: [address], [place] [address] office: [phone] fax: [phone]      Physical Therapy: TREATMENT/PROGRESS NOTE   Patient: Aileen Foster (85 y.o. female)  \" Hector\" Examination Date: 2025   :  1940 MRN: [id number]   Visit #:   Insurance Allowable Auth Needed   BMN []Yes    [x]No    Insurance: Payor: MEDICARE / Plan: MEDICARE PART A AND B / Product Type: *No Product type* /   Insurance ID: [id number] - (Medicare)  Secondary Insurance (if applicable): MEDICAL MUTUAL   Treatment Diagnosis:     ICD-10-CM    1. Decreased strength, endurance, and mobility  R53.1     Z74.09     R68.89       2. Unsteady gait  R26.81          Medical Diagnosis:  Unsteady gait [R26.81]   Referring Physician: Pankaj Yañez MD  PCP: Pankaj Yañez MD     Plan of care signed (Y/N): Y  3/27/25 POC cosign in epic  Date of Patient follow up with Physician: Dr Yañez 25, Dr. Toro 25     Plan of Care Report: NO  POC update due: (10 visits /OR AUTH LIMITS, whichever is less)  2025                                             Medical History:  Comorbidities:  Diabetes (Type I or II)  Osteoporosis/Osteopenia  Other: has no vision below waist in front of her body  Relevant Medical History: hypothyroid, neuropathy, Parkinsons, has a caregiver                                         Precautions/ Contra-indications:           Latex allergy:  NO  Pacemaker:    NO  Contraindications for Manipulation: None  Date of Surgery: NA  Other:    Red Flags:  None    Suicide Screening:   The patient did not verbalize a primary behavioral concern, suicidal ideation, suicidal intent, or demonstrate suicidal behaviors.    Preferred Language for Healthcare:   [x] English       [] other:    SUBJECTIVE EXAMINATION     Patient stated complaint: Pt states she ate banana bread for lunch and her blood sugars are running high 260. Pt with min

## 2025-04-30 ENCOUNTER — HOSPITAL ENCOUNTER (OUTPATIENT)
Dept: PHYSICAL THERAPY | Age: 85
Setting detail: THERAPIES SERIES
Discharge: HOME | End: 2025-04-30
Payer: MEDICARE

## 2025-04-30 PROCEDURE — 97530 THERAPEUTIC ACTIVITIES: CPT

## 2025-04-30 PROCEDURE — 97110 THERAPEUTIC EXERCISES: CPT

## 2025-04-30 PROCEDURE — 97140 MANUAL THERAPY 1/> REGIONS: CPT

## 2025-04-30 NOTE — PLAN OF CARE
[facility] - Outpatient Rehabilitation and Therapy: [address], [place] [address] office: [phone] fax: [phone]     Physical Therapy Discharge Summary    Dear Pankaj Yañez MD   ,    We had the pleasure of treating the following patient for physical therapy services at [facility] Outpatient Physical Therapy.  A summary of our findings can be found in the discharge summary below.  If you have any questions or concerns regarding these findings, please do not hesitate to contact me at the office phone number checked above.  Thank you for the referral.         Total Visits: 12     Recommendation:   [] Hold PT, pending MD visit   [x] Discharge to HEP. Follow up with PT or MD PRN.     Reason for Discharge:   Pt much improved, pt did have a fall in the last week but did not hurt herself and is ready for D/C.  Pt did not want to come for more PT at this time. She will do HEP and be careful and use AD prn.  4 of 6 goals met and 2 of 6 partially met. D/C PT with HEP.          Physical Therapy: TREATMENT/PROGRESS NOTE   Patient: Aileen Foster (85 y.o. female)  \" Hector\" Examination Date: 2025   :  1940 MRN: [id number]   Visit #:   Insurance Allowable Auth Needed   BMN []Yes    [x]No    Insurance: Payor: MEDICARE / Plan: MEDICARE PART A AND B / Product Type: *No Product type* /   Insurance ID: [id number] - (Medicare)  Secondary Insurance (if applicable): MEDICAL MUTUAL   Treatment Diagnosis:     ICD-10-CM    1. Decreased strength, endurance, and mobility  R53.1     Z74.09     R68.89       2. Unsteady gait  R26.81          Medical Diagnosis:  Unsteady gait [R26.81]   Referring Physician: Pankaj Yañez MD  PCP: Pankaj Yañez MD     Plan of care signed (Y/N): Y  3/27/25 POC cosign in epic  Date of Patient follow up with Physician: Dr Yañez 25, Dr. Toro 25     Plan of Care Report: YES, Date Range for this report: 25 to 25  POC

## 2025-05-02 DIAGNOSIS — E06.3 HASHIMOTO'S THYROIDITIS: ICD-10-CM

## 2025-05-02 DIAGNOSIS — M81.0 AGE-RELATED OSTEOPOROSIS WITHOUT CURRENT PATHOLOGICAL FRACTURE: ICD-10-CM

## 2025-05-02 DIAGNOSIS — E78.2 MIXED HYPERLIPIDEMIA: ICD-10-CM

## 2025-05-02 DIAGNOSIS — E03.9 ACQUIRED HYPOTHYROIDISM: ICD-10-CM

## 2025-05-02 DIAGNOSIS — E10.40 TYPE 1 DIABETES, CONTROLLED, WITH NEUROPATHY (HCC): ICD-10-CM

## 2025-05-02 LAB
25(OH)D3 SERPL-MCNC: 104 NG/ML
ALBUMIN SERPL-MCNC: 4.1 G/DL (ref 3.4–5)
ALBUMIN/GLOB SERPL: 1.8 {RATIO} (ref 1.1–2.2)
ALP SERPL-CCNC: 61 U/L (ref 40–129)
ALT SERPL-CCNC: 6 U/L (ref 10–40)
ANION GAP SERPL CALCULATED.3IONS-SCNC: 11 MMOL/L (ref 3–16)
AST SERPL-CCNC: 28 U/L (ref 15–37)
BILIRUB SERPL-MCNC: 0.6 MG/DL (ref 0–1)
BUN SERPL-MCNC: 26 MG/DL (ref 7–20)
CALCIUM SERPL-MCNC: 10.2 MG/DL (ref 8.3–10.6)
CHLORIDE SERPL-SCNC: 100 MMOL/L (ref 99–110)
CHOLEST SERPL-MCNC: 134 MG/DL (ref 0–199)
CO2 SERPL-SCNC: 30 MMOL/L (ref 21–32)
CREAT SERPL-MCNC: 0.9 MG/DL (ref 0.6–1.2)
CREAT UR-MCNC: 228 MG/DL (ref 28–259)
EST. AVERAGE GLUCOSE BLD GHB EST-MCNC: 145.6 MG/DL
GFR SERPLBLD CREATININE-BSD FMLA CKD-EPI: 63 ML/MIN/{1.73_M2}
GLUCOSE SERPL-MCNC: 136 MG/DL (ref 70–99)
HBA1C MFR BLD: 6.7 %
HDLC SERPL-MCNC: 82 MG/DL (ref 40–60)
LDL CHOLESTEROL: 40 MG/DL
MICROALBUMIN UR DL<=1MG/L-MCNC: 9.28 MG/DL
MICROALBUMIN/CREAT UR: 40.7 MG/G (ref 0–30)
POTASSIUM SERPL-SCNC: 4.4 MMOL/L (ref 3.5–5.1)
PROT SERPL-MCNC: 6.4 G/DL (ref 6.4–8.2)
SODIUM SERPL-SCNC: 141 MMOL/L (ref 136–145)
T4 FREE SERPL-MCNC: 1.8 NG/DL (ref 0.9–1.8)
TRIGL SERPL-MCNC: 58 MG/DL (ref 0–150)
TSH SERPL DL<=0.005 MIU/L-ACNC: 0.51 UIU/ML (ref 0.27–4.2)
VLDLC SERPL CALC-MCNC: 12 MG/DL

## 2025-05-06 DIAGNOSIS — E78.2 MIXED HYPERLIPIDEMIA: ICD-10-CM

## 2025-05-06 DIAGNOSIS — I25.10 CORONARY ARTERY DISEASE INVOLVING NATIVE CORONARY ARTERY OF NATIVE HEART WITHOUT ANGINA PECTORIS: ICD-10-CM

## 2025-05-06 DIAGNOSIS — E03.9 ACQUIRED HYPOTHYROIDISM: ICD-10-CM

## 2025-05-06 RX ORDER — LEVOTHYROXINE SODIUM 100 UG/1
100 TABLET ORAL DAILY
Qty: 90 TABLET | Refills: 3 | Status: SHIPPED | OUTPATIENT
Start: 2025-05-06

## 2025-05-06 NOTE — TELEPHONE ENCOUNTER
Medication:   Requested Prescriptions     Pending Prescriptions Disp Refills    levothyroxine (SYNTHROID) 100 MCG tablet [Pharmacy Med Name: LEVOTHYROXINE 0.100MG (100MCG) TAB] 90 tablet 3     Sig: TAKE 1 TABLET BY MOUTH DAILY       Last Filled:  5/6/2024, 90, 3    Patient Phone Number: [phone] (home)     Last appt: 4/21/2025   Next appt: 10/21/2025    Last Thyroid:   Lab Results   Component Value Date/Time    TSH 0.51 05/02/2025 08:00 AM    TSH 1.56 11/19/2014 12:20 PM    FT3 2.0 11/06/2023 07:55 AM    T4FREE 1.8 05/02/2025 08:00 AM

## 2025-05-08 ENCOUNTER — OFFICE VISIT (OUTPATIENT)
Dept: ENDOCRINOLOGY | Age: 85
End: 2025-05-08

## 2025-05-08 VITALS
HEIGHT: 64 IN | DIASTOLIC BLOOD PRESSURE: 57 MMHG | TEMPERATURE: 98 F | SYSTOLIC BLOOD PRESSURE: 120 MMHG | OXYGEN SATURATION: 100 % | RESPIRATION RATE: 14 BRPM | WEIGHT: 93.6 LBS | HEART RATE: 65 BPM | BODY MASS INDEX: 15.98 KG/M2

## 2025-05-08 DIAGNOSIS — M81.0 AGE-RELATED OSTEOPOROSIS WITHOUT CURRENT PATHOLOGICAL FRACTURE: ICD-10-CM

## 2025-05-08 DIAGNOSIS — E06.3 HASHIMOTO'S THYROIDITIS: ICD-10-CM

## 2025-05-08 DIAGNOSIS — N18.30 STAGE 3 CHRONIC KIDNEY DISEASE, UNSPECIFIED WHETHER STAGE 3A OR 3B CKD (HCC): ICD-10-CM

## 2025-05-08 DIAGNOSIS — I25.10 CORONARY ARTERY DISEASE INVOLVING NATIVE CORONARY ARTERY OF NATIVE HEART WITHOUT ANGINA PECTORIS: ICD-10-CM

## 2025-05-08 DIAGNOSIS — R63.6 UNDERWEIGHT: ICD-10-CM

## 2025-05-08 DIAGNOSIS — E78.2 MIXED HYPERLIPIDEMIA: ICD-10-CM

## 2025-05-08 DIAGNOSIS — E10.40 TYPE 1 DIABETES, CONTROLLED, WITH NEUROPATHY (HCC): Primary | ICD-10-CM

## 2025-05-08 DIAGNOSIS — E03.9 ACQUIRED HYPOTHYROIDISM: ICD-10-CM

## 2025-05-08 RX ORDER — GLUCAGON INJECTION, SOLUTION 1 MG/.2ML
1 INJECTION, SOLUTION SUBCUTANEOUS ONCE
Qty: 2 ML | Refills: 3 | Status: SHIPPED | OUTPATIENT
Start: 2025-05-08 | End: 2025-05-08

## 2025-05-08 NOTE — PROGRESS NOTES
treatment, labs, imaging and other studies and treatment targets and goals.  She understands instructions and counseling.    CGMS Download Review and Recommendations  See scanned document for blood glucose tracing documentation  [facility] personal CGMS data downloaded and reviewed.  This was separate service provided-CGM interpretation    Average glucose:160± 60 SD  Time in range: 68%  Time above 180: 28%  Time under 70: 3.6%  GMI 7.3%    Basal pattern review: Basal normoglycemia  Postprandial pattern review: Postprandial hyperglycemia  Hypoglycemia review: Hypoglycemia after hyperglycemia  Activity related review: Not reported      Based on the data, I recommend:    1.  Do insulin boluses 0 to 15 minutes before meals.    2.  Do not underestimate carbohydrates    3.  Hypoglycemia management    4.  Healthy food choices, balance macronutrients.    5.  Healthy snacks between meals    6.  Dietician for underweight      Return in about 3 months (around 8/8/2025) for diabetes.

## 2025-05-10 DIAGNOSIS — E78.2 MIXED HYPERLIPIDEMIA: ICD-10-CM

## 2025-05-12 ENCOUNTER — TELEPHONE (OUTPATIENT)
Dept: FAMILY MEDICINE CLINIC | Age: 85
End: 2025-05-12

## 2025-05-12 ENCOUNTER — TELEPHONE (OUTPATIENT)
Dept: ENDOCRINOLOGY | Age: 85
End: 2025-05-12

## 2025-05-12 DIAGNOSIS — R80.9 PROTEINURIA, UNSPECIFIED TYPE: Primary | ICD-10-CM

## 2025-05-12 DIAGNOSIS — E10.65 POORLY CONTROLLED TYPE 1 DIABETES MELLITUS WITH NEUROPATHY (HCC): Primary | ICD-10-CM

## 2025-05-12 DIAGNOSIS — E10.40 POORLY CONTROLLED TYPE 1 DIABETES MELLITUS WITH NEUROPATHY (HCC): Primary | ICD-10-CM

## 2025-05-12 RX ORDER — ROSUVASTATIN CALCIUM 10 MG/1
10 TABLET, COATED ORAL DAILY
Qty: 90 TABLET | Refills: 1 | Status: SHIPPED | OUTPATIENT
Start: 2025-05-12

## 2025-05-12 NOTE — TELEPHONE ENCOUNTER
Patient states that the dexcom 7 is costing over 700 dollars asking can something cheaper be called in please advise

## 2025-05-13 ENCOUNTER — RESULTS FOLLOW-UP (OUTPATIENT)
Dept: FAMILY MEDICINE CLINIC | Age: 85
End: 2025-05-13

## 2025-05-13 ENCOUNTER — LAB (OUTPATIENT)
Dept: FAMILY MEDICINE CLINIC | Age: 85
End: 2025-05-13
Payer: MEDICARE

## 2025-05-13 ENCOUNTER — OFFICE VISIT (OUTPATIENT)
Dept: PSYCHOLOGY | Age: 85
End: 2025-05-13
Payer: MEDICARE

## 2025-05-13 DIAGNOSIS — R39.9 UTI SYMPTOMS: Primary | ICD-10-CM

## 2025-05-13 DIAGNOSIS — F43.23 ADJUSTMENT REACTION WITH ANXIETY AND DEPRESSION: Primary | ICD-10-CM

## 2025-05-13 LAB
BILIRUBIN, POC: NORMAL
BLOOD URINE, POC: NORMAL
CLARITY, POC: CLEAR
COLOR, POC: YELLOW
GLUCOSE URINE, POC: NORMAL MG/DL
KETONES, POC: NORMAL MG/DL
LEUKOCYTE EST, POC: NEGATIVE
NITRITE, POC: NEGATIVE
PH, POC: 5.5
PROTEIN, POC: NORMAL MG/DL
SPECIFIC GRAVITY, POC: 1.02
UROBILINOGEN, POC: 1 MG/DL

## 2025-05-13 PROCEDURE — 81002 URINALYSIS NONAUTO W/O SCOPE: CPT | Performed by: FAMILY MEDICINE

## 2025-05-13 PROCEDURE — 1036F TOBACCO NON-USER: CPT | Performed by: PSYCHOLOGIST

## 2025-05-13 PROCEDURE — 90837 PSYTX W PT 60 MINUTES: CPT | Performed by: PSYCHOLOGIST

## 2025-05-13 PROCEDURE — 1123F ACP DISCUSS/DSCN MKR DOCD: CPT | Performed by: PSYCHOLOGIST

## 2025-05-14 LAB — BACTERIA UR CULT: NORMAL

## 2025-05-14 NOTE — TELEPHONE ENCOUNTER
Spoke w/ pt. It is NOT her dexcom g7. She stated that it was for the glucagon emergency kit. She stated it was to expensive.

## 2025-05-15 RX ORDER — MIRABEGRON 50 MG/1
50 TABLET, FILM COATED, EXTENDED RELEASE ORAL DAILY
Qty: 30 TABLET | Refills: 0 | OUTPATIENT
Start: 2025-05-15

## 2025-05-15 NOTE — TELEPHONE ENCOUNTER
Called patient, she states she is not taking Myrbetriq as it is not helpful - testing scheduled for patient to get on track for Botox.

## 2025-05-16 ENCOUNTER — TELEPHONE (OUTPATIENT)
Dept: ENDOCRINOLOGY | Age: 85
End: 2025-05-16

## 2025-05-16 NOTE — TELEPHONE ENCOUNTER
Per Nancy, Gvoke is preferable glucagon on patient's insurance plan.  However, patient stated that it was too expensive.  Therefore I left patient a message to call insurance company to clarify what is the cost and the preferences on her plan and I will send prescription.

## 2025-05-16 NOTE — TELEPHONE ENCOUNTER
Per plan, insurance prefers GVOKE, however, previously pt stated that it was to expensive. Please advise.

## 2025-05-16 NOTE — TELEPHONE ENCOUNTER
Left another message for patient to call her insurance to clarify.    Please call patient and explain.  Asked patient what she would like us to do.

## 2025-05-21 NOTE — PROGRESS NOTES
Behavioral Health Psychotherapy  Kimberlee Huang, Ph.D.  Licensed Clinical Psychologist  Date:  5/13/25        Time spent with client:  60 minutes from 2:00 - 3:00 pm.  This is patient's 17th psychotherapy appointment with Dr. Huang             Chief Complaint   Patient presents with    Depression    Family Problem              S:  Mood is much improved - feeling much more hopeful since older son is living w/ them  Older son set up computers in their basement for work - he works from home  He is going to stay with them indefinitely   Relationship with older son (56) is good - have been taking walks together  Mother's Day get-together went well - feeling supported, much more assistance  Older son has been cooking, helping around house  Relationship with grandson (21) - older son's child, he was diagnosed with Asperger's  Have not told younger son older son is living with them - he may decide not to move  's sister visited recently       O:  MSE:  Appearance:  Alert, cooperative, mild distress   Appetite:  Decreased  Sleep disturbance:  WNL  Fatigue:  Yes   Loss of pleasure:  Yes  Impulsive behavior:  No  Speech:  Within normal limits  Mood:   Mildly depressed - much improved, much more hopeful  Affect:  Full range with anxiety, sadness  Thought Content:  Intact   Thought Process:  Coherent  Associations:  Logical connections  Insight:  Good  Judgment:  Good  Orientation:  Oriented to person, place, time, and general circumstances   Memory:  Recent and remote memory intact   Attention/Concentration:  Intact  Morbid ideation:  No   Threat Assessment:  No history of any suicidal ideation, suicide attempts, self-harm urges/behaviors, or homicidal ideation/behavior   Protective Factors against Suicide:  Adequate family/social support, children, contacts reliable for safety, future oriented/reason to live, [employer] beliefs/value system, and responsibilities     A:  \"Hector\" is addressing concerns related to

## 2025-05-21 NOTE — TELEPHONE ENCOUNTER
Spoke w/ pt. Pt stated that she will forego any glucagon. She has gone 7 years or so without needing one & she does NOT want to pay $700. She will let us know if she decides to change her mind. She said OK to advise you.    AUDELIA

## 2025-05-28 ENCOUNTER — PROCEDURE VISIT (OUTPATIENT)
Dept: UROGYNECOLOGY | Age: 85
End: 2025-05-28
Payer: MEDICARE

## 2025-05-28 DIAGNOSIS — R39.15 URINARY URGENCY: Primary | ICD-10-CM

## 2025-05-28 DIAGNOSIS — N39.41 URGE INCONTINENCE: ICD-10-CM

## 2025-05-28 LAB
BILIRUBIN, POC: NORMAL
BLOOD URINE, POC: NORMAL
CLARITY, POC: CLEAR
COLOR, POC: YELLOW
GLUCOSE URINE, POC: NORMAL MG/DL
KETONES, POC: NORMAL MG/DL
LEUKOCYTE EST, POC: NORMAL
NITRITE, POC: NORMAL
PH, POC: 5.5
PROTEIN, POC: NORMAL MG/DL
SPECIFIC GRAVITY, POC: 1.01
UROBILINOGEN, POC: NORMAL MG/DL

## 2025-05-28 PROCEDURE — 51784 ANAL/URINARY MUSCLE STUDY: CPT | Performed by: NURSE PRACTITIONER

## 2025-05-28 PROCEDURE — 81002 URINALYSIS NONAUTO W/O SCOPE: CPT | Performed by: NURSE PRACTITIONER

## 2025-05-28 PROCEDURE — 51729 CYSTOMETROGRAM W/VP&UP: CPT | Performed by: NURSE PRACTITIONER

## 2025-05-28 PROCEDURE — 51741 ELECTRO-UROFLOWMETRY FIRST: CPT | Performed by: NURSE PRACTITIONER

## 2025-05-28 PROCEDURE — 51797 INTRAABDOMINAL PRESSURE TEST: CPT | Performed by: NURSE PRACTITIONER

## 2025-05-28 RX ORDER — SODIUM CHLORIDE 9 MG/ML
0-1000 INJECTION, SOLUTION INTRAVENOUS ONCE
Status: SHIPPED | OUTPATIENT
Start: 2025-05-28

## 2025-05-28 RX ADMIN — SODIUM CHLORIDE 32 ML: 9 INJECTION, SOLUTION INTRAVENOUS at 16:06

## 2025-05-28 NOTE — PROGRESS NOTES
Urodynamic Procedure Note    Aileen Foster  1940    Provider supervising testing in office: Noris Hsu NP  RN preforming test: FUAD Fraire    Equipment: Laborie    Brief History/Indication:    Patient is a 85 y.o. female with subjective complaints of urge incontinence for a urodynamic evaluation.    Cystometrogram   RBC, UA   Date Value Ref Range Status   05/21/2022 0 0 - 4 /HPF Final     Nitrite, Urine   Date Value Ref Range Status   01/31/2024 Negative Negative Final     Procedure:  The Patient was taken to the Urodynamics suite and a free urine flow was obtained followed by catheterization for residual urine. A double-lumen urodynamic catheter was introduced to the bladder for measuring bladder pressure, and for filling. EMG patch electrodes were placed perianally for recording activity of the anal sphincter. A vaginal catheter was inserted to record the abdominal pressure. The entire process was displayed and analyzed using the [employer] Urodynamic machine and software.    Findings:   Results for orders placed or performed in visit on 05/28/25   POCT Urinalysis no Micro   Result Value Ref Range    Color, UA yellow     Clarity, UA clear     Glucose, UA POC neg mg/dL    Bilirubin, UA neg     Ketones, UA neg mg/dL    Spec Grav, UA 1.015     Blood, UA POC neg     pH, UA 5.5     Protein, UA POC neg mg/dL    Urobilinogen, UA neg mg/dL    Leukocytes, UA neg     Nitrite, UA neg        Uroflow:  Patient was able to void for Uroflow    Voided Volume: 6.5ml  PVR: 3ml  Max Flow: 1.5ml/sec with an average flow rate of 0.7ml/sec    CMG:  First sensation: 17ml  First desire: ADINA  Strong desire: ADINA  Max capacity: ADINA  Uninhibited detrusor contraction: Yes with leakage at 32ml    Leak Point Pressures:  ADINA    Pressure voiding study:  ADINA    MUCP:  First Pull ADINA  Second Pull ADINA    EMG: does correlate    Assessment:  Clinical Diagnosis: OAB, UUI  Patient with immediate spasm at minimal filling of 32 ml  Continuous  I have contacted the pharmacy at [facility] on [facility]. I did let the Pharmacist know that Dr. Marcelino did send over a prescription for Lovenox 30 mg but after reviewing the prescription it showed only 1 syringe to be dispensed.  I did let him know the patient will need #5.  The pharmacist states he will need a new prescription sent and he will discontinue the prescription he has.  I have let Dr. Marcelino know and he will send another prescription.

## 2025-06-04 ENCOUNTER — OFFICE VISIT (OUTPATIENT)
Dept: PSYCHOLOGY | Age: 85
End: 2025-06-04
Payer: MEDICARE

## 2025-06-04 DIAGNOSIS — F43.23 ADJUSTMENT REACTION WITH ANXIETY AND DEPRESSION: Primary | ICD-10-CM

## 2025-06-04 PROCEDURE — 1123F ACP DISCUSS/DSCN MKR DOCD: CPT | Performed by: PSYCHOLOGIST

## 2025-06-04 PROCEDURE — 1036F TOBACCO NON-USER: CPT | Performed by: PSYCHOLOGIST

## 2025-06-04 PROCEDURE — 90837 PSYTX W PT 60 MINUTES: CPT | Performed by: PSYCHOLOGIST

## 2025-06-09 NOTE — PROGRESS NOTES
significantly helped her mood and outlook.  She learned that her younger son and his family are moving to [place] and thereby will be more available to help them too.           Diagnosis:  1. Adjustment reaction with anxiety and depression          Plan:  Pt interventions:  Discussed changes related to older son moving in with them  Focus on balancing autonomy/independence with prudence/cautiousness  Process grief over Parkinson's disease-related losses and 's Alzheimer's  Reduce health-related anxiety and feelings of helplessness  Scheduled another session for 7/1/25

## 2025-06-30 ENCOUNTER — OFFICE VISIT (OUTPATIENT)
Dept: ENDOCRINOLOGY | Age: 85
End: 2025-06-30
Payer: MEDICARE

## 2025-06-30 DIAGNOSIS — E10.40 TYPE 1 DIABETES, CONTROLLED, WITH NEUROPATHY (HCC): Primary | ICD-10-CM

## 2025-06-30 PROCEDURE — G8419 CALC BMI OUT NRM PARAM NOF/U: HCPCS

## 2025-06-30 PROCEDURE — 3044F HG A1C LEVEL LT 7.0%: CPT

## 2025-06-30 PROCEDURE — G8427 DOCREV CUR MEDS BY ELIG CLIN: HCPCS

## 2025-06-30 PROCEDURE — 97802 MEDICAL NUTRITION INDIV IN: CPT

## 2025-06-30 NOTE — PROGRESS NOTES
Medical Nutrition Therapy for Diabetes  [facility] Endocrinology    Aileen Foster  July 1, 2025    Patient Care Team:  Pankaj Yañez MD as PCP - General  Pankaj Yañez MD as PCP - Empaneled Provider  Amanda Bee APRN - CNP as Nurse Practitioner (Nurse Practitioner)    Reason for visit: 1. Type 1 diabetes, controlled, with neuropathy (HCC)     Initial     ASSESSMENT/PLAN:   NUTRITION DIAGNOSIS    #1 Problem: Altered Nutrition-Related Laboratory Values (NC-2.2)  Related to: Endocrine/Diabetes   As Evidenced by: Elevated Plasma glucose and/or HgbA1c levels         #2 Problem: Inadequate Fluid Intake (NI-3.1)  Related to: decreased thirst sensation/inability to access fluid independently  As evidences by: Fluid consumption reported under 40 oz per day.    #3 Problem: Inconsistent Carbohydrate Intake (NI 5.8.4)  Related to: Varied meal timing / incorrect carbohydrate counting  As Evidenced by: fluctuation in blood glucose levels / food recall    NUTRITION INTERVENTION  Nutrition Prescription: ADA plate method for pairing carbohydrate and non-carbohydrate foods  Increase ONS intake to 2 Ensure plus or Boost high protein per day in addition to meals.     Diabetes Education/Counseling included:  Pt present with  and care provider this date. Pt is underweight with BMI of 16%, family has concern for weight loss and nutrition status. Pt states \"everyone says I am underweight\". Pt verbalizes she is underweight but does not deem it a concern at this time. She is fearful of adding more food into her diet and feel it is a \"failure\" when she has to inject for higher about of carbohydrates. Disordered eating characteristics are a barrier for proper nutrition. Pt Is unable to smell or taste, this has led to lack of appetite and enjoyment of food.     Pt was recommended to drink 4 glucerna/day by pcp to assist with nutritional intake. Pt declined and she is drinking 2 per day maximum, she reports she

## 2025-07-02 ENCOUNTER — APPOINTMENT (OUTPATIENT)
Dept: GENERAL RADIOLOGY | Age: 85
End: 2025-07-02
Payer: MEDICARE

## 2025-07-02 ENCOUNTER — HOSPITAL ENCOUNTER (EMERGENCY)
Age: 85
Discharge: HOME | End: 2025-07-02
Payer: MEDICARE

## 2025-07-02 VITALS
HEART RATE: 72 BPM | WEIGHT: 98.7 LBS | RESPIRATION RATE: 16 BRPM | SYSTOLIC BLOOD PRESSURE: 136 MMHG | BODY MASS INDEX: 16.85 KG/M2 | HEIGHT: 64 IN | OXYGEN SATURATION: 94 % | TEMPERATURE: 97.9 F | DIASTOLIC BLOOD PRESSURE: 73 MMHG

## 2025-07-02 DIAGNOSIS — S49.91XA RIGHT SHOULDER INJURY, INITIAL ENCOUNTER: ICD-10-CM

## 2025-07-02 DIAGNOSIS — S61.216A LACERATION OF RIGHT LITTLE FINGER WITHOUT FOREIGN BODY WITHOUT DAMAGE TO NAIL, INITIAL ENCOUNTER: ICD-10-CM
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
## 2025-07-02 DIAGNOSIS — Z28.39 NOT UP TO DATE WITH TETANUS TOXOID IMMUNIZATION: ICD-10-CM

## 2025-07-02 LAB
ALBUMIN SERPL-MCNC: 3.8 G/DL (ref 3.4–5)
ALBUMIN/GLOB SERPL: 1.5 {RATIO} (ref 1.1–2.2)
ALP SERPL-CCNC: 50 U/L (ref 40–129)
ALT SERPL-CCNC: <5 U/L (ref 10–40)
ANION GAP SERPL CALCULATED.3IONS-SCNC: 10 MMOL/L (ref 3–16)
AST SERPL-CCNC: 28 U/L (ref 15–37)
BASOPHILS # BLD: 0.1 K/UL (ref 0–0.2)
BASOPHILS NFR BLD: 1.2 %
BILIRUB SERPL-MCNC: <0.2 MG/DL (ref 0–1)
BUN SERPL-MCNC: 37 MG/DL (ref 7–20)
CALCIUM SERPL-MCNC: 9.9 MG/DL (ref 8.3–10.6)
CHLORIDE SERPL-SCNC: 102 MMOL/L (ref 99–110)
CO2 SERPL-SCNC: 29 MMOL/L (ref 21–32)
CREAT SERPL-MCNC: 1.1 MG/DL (ref 0.6–1.2)
DEPRECATED RDW RBC AUTO: 14.3 % (ref 12.4–15.4)
EOSINOPHIL # BLD: 0.3 K/UL (ref 0–0.6)
EOSINOPHIL NFR BLD: 4.9 %
GFR SERPLBLD CREATININE-BSD FMLA CKD-EPI: 49 ML/MIN/{1.73_M2}
GLUCOSE SERPL-MCNC: 92 MG/DL (ref 70–99)
HCT VFR BLD AUTO: 33.3 % (ref 36–48)
HGB BLD-MCNC: 11.2 G/DL (ref 12–16)
LYMPHOCYTES # BLD: 1 K/UL (ref 1–5.1)
LYMPHOCYTES NFR BLD: 15.2 %
MCH RBC QN AUTO: 30.7 PG (ref 26–34)
MCHC RBC AUTO-ENTMCNC: 33.7 G/DL (ref 31–36)
MCV RBC AUTO: 90.9 FL (ref 80–100)
MONOCYTES # BLD: 0.8 K/UL (ref 0–1.3)
MONOCYTES NFR BLD: 12 %
NEUTROPHILS # BLD: 4.4 K/UL (ref 1.7–7.7)
NEUTROPHILS NFR BLD: 66.7 %
PLATELET # BLD AUTO: 197 K/UL (ref 135–450)
PMV BLD AUTO: 9 FL (ref 5–10.5)
POTASSIUM SERPL-SCNC: 5 MMOL/L (ref 3.5–5.1)
PROT SERPL-MCNC: 6.4 G/DL (ref 6.4–8.2)
RBC # BLD AUTO: 3.66 M/UL (ref 4–5.2)
SODIUM SERPL-SCNC: 141 MMOL/L (ref 136–145)
TROPONIN, HIGH SENSITIVITY: 19 NG/L (ref 0–14)
TROPONIN, HIGH SENSITIVITY: 19 NG/L (ref 0–14)
WBC # BLD AUTO: 6.7 K/UL (ref 4–11)

## 2025-07-02 PROCEDURE — 12001 RPR S/N/AX/GEN/TRNK 2.5CM/<: CPT

## 2025-07-02 PROCEDURE — 84484 ASSAY OF TROPONIN QUANT: CPT

## 2025-07-02 PROCEDURE — 6360000002 HC RX W HCPCS

## 2025-07-02 PROCEDURE — 73140 X-RAY EXAM OF FINGER(S): CPT

## 2025-07-02 PROCEDURE — 96374 THER/PROPH/DIAG INJ IV PUSH: CPT

## 2025-07-02 PROCEDURE — 99285 EMERGENCY DEPT VISIT HI MDM: CPT

## 2025-07-02 PROCEDURE — 85025 COMPLETE CBC W/AUTO DIFF WBC: CPT

## 2025-07-02 PROCEDURE — 90714 TD VACC NO PRESV 7 YRS+ IM: CPT

## 2025-07-02 PROCEDURE — 73030 X-RAY EXAM OF SHOULDER: CPT

## 2025-07-02 PROCEDURE — 72170 X-RAY EXAM OF PELVIS: CPT

## 2025-07-02 PROCEDURE — 6360000002 HC RX W HCPCS: Performed by: PHYSICIAN ASSISTANT

## 2025-07-02 PROCEDURE — 80053 COMPREHEN METABOLIC PANEL: CPT

## 2025-07-02 PROCEDURE — 71045 X-RAY EXAM CHEST 1 VIEW: CPT

## 2025-07-02 PROCEDURE — 90471 IMMUNIZATION ADMIN: CPT

## 2025-07-02 PROCEDURE — 93005 ELECTROCARDIOGRAM TRACING: CPT | Performed by: PHYSICIAN ASSISTANT

## 2025-07-02 RX ORDER — FENTANYL CITRATE 50 UG/ML
25 INJECTION, SOLUTION INTRAMUSCULAR; INTRAVENOUS ONCE
Refills: 0 | Status: COMPLETED | OUTPATIENT
Start: 2025-07-02 | End: 2025-07-02

## 2025-07-02 RX ADMIN — FENTANYL CITRATE 25 MCG: 50 INJECTION INTRAMUSCULAR; INTRAVENOUS at 20:54

## 2025-07-02 RX ADMIN — CLOSTRIDIUM TETANI TOXOID ANTIGEN (FORMALDEHYDE INACTIVATED) AND CORYNEBACTERIUM DIPHTHERIAE TOXOID ANTIGEN (FORMALDEHYDE INACTIVATED) 0.5 ML: 5; 2 INJECTION, SUSPENSION INTRAMUSCULAR at 23:23

## 2025-07-02 ASSESSMENT — PAIN SCALES - GENERAL
PAINLEVEL_OUTOF10: 7
PAINLEVEL_OUTOF10: 5

## 2025-07-02 ASSESSMENT — PAIN - FUNCTIONAL ASSESSMENT: PAIN_FUNCTIONAL_ASSESSMENT: 0-10

## 2025-07-03 ENCOUNTER — CARE COORDINATION (OUTPATIENT)
Dept: CARE COORDINATION | Age: 85
End: 2025-07-03

## 2025-07-03 ENCOUNTER — OFFICE VISIT (OUTPATIENT)
Dept: PSYCHOLOGY | Age: 85
End: 2025-07-03
Payer: MEDICARE

## 2025-07-03 DIAGNOSIS — F43.23 ADJUSTMENT REACTION WITH ANXIETY AND DEPRESSION: Primary | ICD-10-CM

## 2025-07-03 LAB
EKG ATRIAL RATE: 66 BPM
EKG DIAGNOSIS: NORMAL
EKG P AXIS: 84 DEGREES
EKG P-R INTERVAL: 188 MS
EKG Q-T INTERVAL: 414 MS
EKG QRS DURATION: 78 MS
EKG QTC CALCULATION (BAZETT): 434 MS
EKG R AXIS: 48 DEGREES
EKG T AXIS: 56 DEGREES
EKG VENTRICULAR RATE: 66 BPM

## 2025-07-03 PROCEDURE — 93010 ELECTROCARDIOGRAM REPORT: CPT | Performed by: INTERNAL MEDICINE

## 2025-07-03 PROCEDURE — 1123F ACP DISCUSS/DSCN MKR DOCD: CPT | Performed by: PSYCHOLOGIST

## 2025-07-03 PROCEDURE — 90837 PSYTX W PT 60 MINUTES: CPT | Performed by: PSYCHOLOGIST

## 2025-07-03 PROCEDURE — 1036F TOBACCO NON-USER: CPT | Performed by: PSYCHOLOGIST

## 2025-07-03 ASSESSMENT — ENCOUNTER SYMPTOMS
SORE THROAT: 0
COUGH: 0
STRIDOR: 0
VOMITING: 0
RHINORRHEA: 0
ABDOMINAL PAIN: 0
EYE DISCHARGE: 0
EYE REDNESS: 0
BACK PAIN: 0
SHORTNESS OF BREATH: 0
DIARRHEA: 0
EYE ITCHING: 0
NAUSEA: 0

## 2025-07-03 NOTE — CARE COORDINATION
Ambulatory Care Coordination Note     7/3/2025 9:22 AM     Patient Current Location:  Home: [address] [place] [id number]     This patient was received as a referral from ED.    ACM contacted the patient by telephone. Verified name and  with patient as identifiers. Provided introduction to self, and explanation of the ACM role.   Patient accepted care management services at this time.          ACM: Chelsea Badillo RN     Challenges to be reviewed by the provider   Additional needs identified to be addressed with provider No                 Method of communication with provider: chart routing.    Utilization: Initial Call - Discharge Date: 25   Discharge Facility: [facility]  Reason for ED Visit: fall  Visit Diagnosis: fall    Number of ED visits in the last 6 months: 2      Do you have any ongoing symptoms? Yes, there has been no change in my symptoms.   Current symptoms: sore from fall.    Did you call your PCP prior to going to the ED? No, did not call the PCP office.     Review of Discharge Instructions:   [x] AVS discharge instructions  [x] Right Care, Right Place, Right Time document  [x] Medication changes  [x] Follow up appointments  [] Referral follow up   []        Care Summary Note:     ACM made care coordination outreach related to ED follow up. Patient very pleasant on the phone while conversing with ACM. Patient reported that she went to the ED for a fall. Feels unsteady on her feet with ambulation. Does not use any devices. Has a cane and a walker. Discussed getting non-skid mat, grab bars and shower chair for bathroom. Reviewed having clear, well lit pathways and removing any clutter or rugs. Discussed monitoring signs and symptoms of infection. AVS reviewed. Scheduled to see PCP 7/10/25. Patient denied any other questions or concerns at this time. Discussed with patient signs and symptoms to monitor and importance of reporting concerns to appropriate site of care for

## 2025-07-03 NOTE — ED PROVIDER NOTES
[facility] EMERGENCY DEPARTMENT  EMERGENCY DEPARTMENT ENCOUNTER        Pt Name: Aileen Foster  MRN: [id number]  Birthdate 1940  Date of evaluation: 7/2/2025  Provider: HIRAM Otero  PCP: Pankaj Yañez MD  Note Started: 8:35 PM EDT 7/2/25      JAUN. I have evaluated this patient.        CHIEF COMPLAINT       Chief Complaint   Patient presents with    Fall     Patient c/o fall due to tripping over feet; c/o arm/ shoulder pain with laceration to hand fifth digit        HISTORY OF PRESENT ILLNESS: 1 or more Elements     History From: Patient, son  Limitations to history : None    Aileen Foster is a 85 y.o. female who presents to the emergency department for evaluation after mechanical fall.  Patient states she stumbled today and fell directly on her right shoulder.  She now has right shoulder pain as her chief complaint, but is also reporting some pain to her right fifth digit where she has a small laceration.  Unsure of tetanus status.  Patient denies hitting her head or losing consciousness.  She denies acute neck or back pain.  She denies any focal neurologic deficits.  She denies chest, abdominal or hip pain.    On further discussion, patient send does inform me that patient had a fall yesterday that was kind of unexplained.  It was witnessed.  She was with him and they had been on a walk but had just stopped.  She was pointing at something and then fell down.  I suspect she may have lost her balance from pointing.  She assures me that there is no syncope or loss of consciousness, nor any preceding or following symptoms.  She also assures me she did not hit her head or sustain any injuries.  Son is interested in obtaining some basic labs out of an abundance of caution today.    No other acute complaints at this time.      Nursing Notes were all reviewed and agreed with or any disagreements were addressed in the HPI.    REVIEW OF SYSTEMS :      Review of Systems   Constitutional:   [id number]  [phone]    Schedule an appointment as soon as possible for a visit in 1 week  For suture removal    [facility] Emergency Department  [address] [address]  [phone]    As needed, If symptoms worsen      DISCHARGE MEDICATIONS:  Discharge Medication List as of 7/2/2025 11:26 PM          DISCONTINUED MEDICATIONS:  Discharge Medication List as of 7/2/2025 11:26 PM                 (Please note that portions of this note were completed with a voice recognition program.  Efforts were made to edit the dictations but occasionally words are mis-transcribed.)    HIRAM Otero (electronically signed)        Ranjana Tiwari PA  07/03/25 0105

## 2025-07-07 NOTE — PROGRESS NOTES
Behavioral Health Psychotherapy  Kimberlee Huang, Ph.D.  Licensed Clinical Psychologist  Date:  7/3/25          Time spent with client:  60 minutes from 12:00 - 1:00 pm.  This is patient's 19th psychotherapy appointment with Dr. Huang                 Chief Complaint   Patient presents with    Anxiety    Family Problem                  S:  Mood is mildly anxious, generally hopeful  Fell outside on concrete while gardening - bruised right arm, went to hospital  Did not break any bones - has been advised it is not safe for her to garden due to fall risk  Younger son, wife, son visited - found a house to buy in [place], close 7/25  Generally older son and  are getting along well - 2 minor conflicts only  Older son has figured out how to manage her insulin pump - this is a big relief  Hired help during the day is going well       O:  MSE:  Appearance:  Alert, cooperative, mild distress   Appetite:  Decreased  Sleep disturbance:  WNL  Fatigue:  Yes   Loss of pleasure:  Yes  Impulsive behavior:  No  Speech:  Within normal limits  Mood:   Mild anxiety at times - much improved, much more hopeful  Affect:  Full range with anxiety  Thought Content:  Intact   Thought Process:  Coherent  Associations:  Logical connections  Insight:  Good  Judgment:  Good  Orientation:  Oriented to person, place, time, and general circumstances   Memory:  Recent and remote memory intact   Attention/Concentration:  Intact  Morbid ideation:  No   Threat Assessment:  No history of any suicidal ideation, suicide attempts, self-harm urges/behaviors, or homicidal ideation/behavior   Protective Factors against Suicide:  Adequate family/social support, children, contacts reliable for safety, future oriented/reason to live, Anglican beliefs/value system, and responsibilities     A:  \"Hector\" is addressing concerns related to caregiver fatigue, grief over Parkinson's disease-related losses, health-related anxiety, her 's dementia,

## 2025-07-10 ENCOUNTER — OFFICE VISIT (OUTPATIENT)
Dept: FAMILY MEDICINE CLINIC | Age: 85
End: 2025-07-10

## 2025-07-10 VITALS
DIASTOLIC BLOOD PRESSURE: 65 MMHG | RESPIRATION RATE: 16 BRPM | HEART RATE: 66 BPM | HEIGHT: 64 IN | OXYGEN SATURATION: 96 % | WEIGHT: 96 LBS | BODY MASS INDEX: 16.39 KG/M2 | TEMPERATURE: 97.8 F | SYSTOLIC BLOOD PRESSURE: 110 MMHG

## 2025-07-10 DIAGNOSIS — G20.A1 PARKINSON'S DISEASE, UNSPECIFIED WHETHER DYSKINESIA PRESENT, UNSPECIFIED WHETHER MANIFESTATIONS FLUCTUATE (HCC): Primary | Chronic | ICD-10-CM

## 2025-07-10 DIAGNOSIS — I10 ESSENTIAL HYPERTENSION: ICD-10-CM

## 2025-07-10 DIAGNOSIS — S61.216D LACERATION OF RIGHT LITTLE FINGER WITHOUT FOREIGN BODY WITHOUT DAMAGE TO NAIL, SUBSEQUENT ENCOUNTER: ICD-10-CM

## 2025-07-10 NOTE — PROGRESS NOTES
Aileen Foster is a 85 y.o. female.    HPI: Here with  for ear follow-up.  Seen 7/2/2025 after falling after tripping complaining of arm and shoulder pain in the right fifth digit laceration.  There is no head injury.  Her 1 cm flap laceration to the distal aspect of the right fifth digit was cleaned and repaired with 2 interrupted sutures x-ray of right fifth finger was negative for fracture but DIP and CMC arthritis was noted.  Right shoulder x-ray was negative for fracture.  Chest x-ray  Got tdap  Meds, vitamins and allergies reviewed with pt    ROS: No TIA's or unusual headaches, no dysphagia.  No prolonged cough. No dyspnea or chest pain on exertion.  No abdominal pain, change in bowel habits, black or bloody stools.  No urinary tract symptoms.  No new or unusual musculoskeletal symptoms.       Prior to Visit Medications    Medication Sig Taking? Authorizing Provider   glucagon 1 MG injection Inject 1 mg into the muscle See Admin Instructions Follow package directions for low blood sugar. Yes Ambreen Shepherd MD   rosuvastatin (CRESTOR) 10 MG tablet TAKE 1 TABLET BY MOUTH DAILY Yes Ambreen Shepherd MD   levothyroxine (SYNTHROID) 100 MCG tablet TAKE 1 TABLET BY MOUTH DAILY Yes Pankaj Yañez MD   mirabegron (MYRBETRIQ) 50 MG TB24 TAKE 1 TABLET BY MOUTH DAILY Yes Noris Hsu APRN - CNP   insulin aspart (NOVOLOG) 100 UNIT/ML injection vial INJECT 30 UNITS UNDER THE SKIN WITH INSULIN PUMP Yes Ambreen Shepherd MD   CALCIUM PO Take 300 mg by mouth in the morning and at bedtime Yes Lulu Rene MD   carbidopa-levodopa (SINEMET CR)  MG per extended release tablet Take 1 tablet by mouth nightly Yes Lulu Rene MD   ACCU-CHEK GUIDE strip Test 7 times daily Yes Ambreen Shepherd MD   midodrine (PROAMATINE) 5 MG tablet Take 1 tablet by mouth 3 times daily Yes Presley Toro MD   Bacitracin-Polymyxin B (POLYSPORIN) 500-41781 UNIT/GM OINT Apply to nasal abrasion twice a day for the  Attending to bill

## 2025-07-19 ENCOUNTER — HOSPITAL ENCOUNTER (EMERGENCY)
Age: 85
Discharge: HOME | End: 2025-07-19
Payer: MEDICARE

## 2025-07-19 VITALS
TEMPERATURE: 98.2 F | HEART RATE: 66 BPM | SYSTOLIC BLOOD PRESSURE: 180 MMHG | DIASTOLIC BLOOD PRESSURE: 67 MMHG | RESPIRATION RATE: 16 BRPM | BODY MASS INDEX: 18.83 KG/M2 | WEIGHT: 95.9 LBS | HEIGHT: 60 IN | OXYGEN SATURATION: 97 %

## 2025-07-19 DIAGNOSIS — Z51.89 VISIT FOR WOUND CARE: Primary | ICD-10-CM

## 2025-07-19 PROCEDURE — 99282 EMERGENCY DEPT VISIT SF MDM: CPT

## 2025-07-19 ASSESSMENT — PAIN SCALES - GENERAL: PAINLEVEL_OUTOF10: 0

## 2025-07-19 ASSESSMENT — PAIN - FUNCTIONAL ASSESSMENT: PAIN_FUNCTIONAL_ASSESSMENT: 0-10

## 2025-07-20 NOTE — ED PROVIDER NOTES
[facility] EMERGENCY DEPARTMENT  EMERGENCY DEPARTMENT ENCOUNTER        Pt Name: Aileen Foster  MRN: [id number]  Birthdate 1940  Date of evaluation: 7/19/2025  Provider: VON Penn - CNP  PCP: Pankaj Yañez MD  Note Started: 2:46 AM EDT 7/20/25      JAUN. I have evaluated this patient.        CHIEF COMPLAINT       Chief Complaint   Patient presents with    Fall     Pt had a mechnical fall, tripped over a concrete planter, injury to right leg and right elbow        HISTORY OF PRESENT ILLNESS: 1 or more Elements     History From: patient and son  Limitations to history : None    Aileen Foster is a 85 y.o. female who presents to the ER for wound care.  The patient did tripped today over a concrete planter at her home.  She landed in the mulch.  Family is concerned that she may have mold/dirt in her wounds versus coagulated blood.  Requesting that her wounds be evaluated and cleaned out.  She does have a small skin tear over the right lateral elbow, no pain with movement and small skin tear to right lower anterior shin.  No active bleeding.  Reports tetanus is up to date as of two weeks ago.  She is ambulatory.    She did not hit her head.    Denies any headache, fever, lightheadedness, dizziness, visual disturbances.  No chest pain or pressure.  No neck or back pain.  No shortness of breath, cough, or congestion.  No abdominal pain, nausea, vomiting, diarrhea, constipation, or dysuria.  No rash.    Nursing Notes were all reviewed and agreed with or any disagreements were addressed in the HPI.    REVIEW OF SYSTEMS :      Review of Systems   Constitutional:  Negative for activity change, chills and fever.   Respiratory:  Negative for chest tightness and shortness of breath.    Cardiovascular:  Negative for chest pain.   Gastrointestinal:  Negative for abdominal pain, diarrhea, nausea and vomiting.   Genitourinary:  Negative for dysuria.   Skin:  Positive for wound.   All other

## 2025-07-21 ENCOUNTER — CARE COORDINATION (OUTPATIENT)
Dept: CARE COORDINATION | Age: 85
End: 2025-07-21

## 2025-07-21 SDOH — ECONOMIC STABILITY: FOOD INSECURITY: WITHIN THE PAST 12 MONTHS, YOU WORRIED THAT YOUR FOOD WOULD RUN OUT BEFORE YOU GOT MONEY TO BUY MORE.: NEVER TRUE

## 2025-07-21 SDOH — HEALTH STABILITY: PHYSICAL HEALTH: ON AVERAGE, HOW MANY DAYS PER WEEK DO YOU ENGAGE IN MODERATE TO STRENUOUS EXERCISE (LIKE A BRISK WALK)?: 0 DAYS

## 2025-07-21 SDOH — HEALTH STABILITY: PHYSICAL HEALTH: ON AVERAGE, HOW MANY MINUTES DO YOU ENGAGE IN EXERCISE AT THIS LEVEL?: 0 MIN

## 2025-07-21 SDOH — ECONOMIC STABILITY: INCOME INSECURITY: IN THE LAST 12 MONTHS, WAS THERE A TIME WHEN YOU WERE NOT ABLE TO PAY THE MORTGAGE OR RENT ON TIME?: NO

## 2025-07-21 SDOH — ECONOMIC STABILITY: FOOD INSECURITY: WITHIN THE PAST 12 MONTHS, THE FOOD YOU BOUGHT JUST DIDN'T LAST AND YOU DIDN'T HAVE MONEY TO GET MORE.: NEVER TRUE

## 2025-07-21 SDOH — ECONOMIC STABILITY: TRANSPORTATION INSECURITY
IN THE PAST 12 MONTHS, HAS THE LACK OF TRANSPORTATION KEPT YOU FROM MEDICAL APPOINTMENTS OR FROM GETTING MEDICATIONS?: NO

## 2025-07-21 ASSESSMENT — SOCIAL DETERMINANTS OF HEALTH (SDOH): HOW HARD IS IT FOR YOU TO PAY FOR THE VERY BASICS LIKE FOOD, HOUSING, MEDICAL CARE, AND HEATING?: NOT HARD AT ALL

## 2025-07-21 NOTE — CARE COORDINATION
Ambulatory Care Coordination Note     2025 10:23 AM     Patient Current Location:  Home: [address] [place] [id number]     ACM contacted the patient by telephone. Verified name and  with patient as identifiers.         ACM: Chelsea Badillo RN     Challenges to be reviewed by the provider   Additional needs identified to be addressed with provider No                 Method of communication with provider: none.    Utilization: Has the patient been seen in the ED since your last call? Yes,   Discharge Date: 25   Discharge Facility: [facility]  Reason for ED Visit: fall  Visit Diagnosis: visit for wound care     Number of ED visits in the last 6 months: 3      Do you have any ongoing symptoms? No  Did you call your PCP prior to going to the ED? No, did not call the PCP office.     Review of Discharge Instructions:   [x] AVS discharge instructions  [x] Right Care, Right Place, Right Time document  [x] Medication changes  [x] Follow up appointments  [x] Referral follow up   []        Care Summary Note:     ACM made care coordination ED follow up outreach and spoke with patient. Patient very pleasant on the phone while conversing with ACM. Patient reported that she is feeling \"fine.\" Denied any pain. AVS reviewed. Patient reported that she has removed bandages and placed Band-Aids. Reviewed wound care and monitoring for signs and symptoms of infection. Denied need for PCP appointment at this time.   Patient lives in a private residence with . Has 2 nurses who comes out between 2pm-4pm on Monday, Tuesday, Thursday and Friday. They assist with medication management. Transportation to doctors appointments. Meal preparation and light home keeping. Wife expressed that her and her  have been considering living at an assisted living facility. They would live at [facility]. They have already toured. Unsure if they want to expand nursing hours or live in assisted facility as

## 2025-07-27 ASSESSMENT — PATIENT HEALTH QUESTIONNAIRE - PHQ9
3. TROUBLE FALLING OR STAYING ASLEEP: NOT AT ALL
SUM OF ALL RESPONSES TO PHQ QUESTIONS 1-9: 3
1. LITTLE INTEREST OR PLEASURE IN DOING THINGS: SEVERAL DAYS
2. FEELING DOWN, DEPRESSED OR HOPELESS: SEVERAL DAYS
SUM OF ALL RESPONSES TO PHQ QUESTIONS 1-9: 3
8. MOVING OR SPEAKING SO SLOWLY THAT OTHER PEOPLE COULD HAVE NOTICED. OR THE OPPOSITE, BEING SO FIGETY OR RESTLESS THAT YOU HAVE BEEN MOVING AROUND A LOT MORE THAN USUAL: NOT AT ALL
7. TROUBLE CONCENTRATING ON THINGS, SUCH AS READING THE NEWSPAPER OR WATCHING TELEVISION: NOT AT ALL
SUM OF ALL RESPONSES TO PHQ QUESTIONS 1-9: 3
2. FEELING DOWN, DEPRESSED OR HOPELESS: SEVERAL DAYS
9. THOUGHTS THAT YOU WOULD BE BETTER OFF DEAD, OR OF HURTING YOURSELF: NOT AT ALL
10. IF YOU CHECKED OFF ANY PROBLEMS, HOW DIFFICULT HAVE THESE PROBLEMS MADE IT FOR YOU TO DO YOUR WORK, TAKE CARE OF THINGS AT HOME, OR GET ALONG WITH OTHER PEOPLE: NOT DIFFICULT AT ALL
8. MOVING OR SPEAKING SO SLOWLY THAT OTHER PEOPLE COULD HAVE NOTICED. OR THE OPPOSITE - BEING SO FIDGETY OR RESTLESS THAT YOU HAVE BEEN MOVING AROUND A LOT MORE THAN USUAL: NOT AT ALL
4. FEELING TIRED OR HAVING LITTLE ENERGY: SEVERAL DAYS
SUM OF ALL RESPONSES TO PHQ QUESTIONS 1-9: 3
1. LITTLE INTEREST OR PLEASURE IN DOING THINGS: SEVERAL DAYS
SUM OF ALL RESPONSES TO PHQ QUESTIONS 1-9: 3
5. POOR APPETITE OR OVEREATING: NOT AT ALL
6. FEELING BAD ABOUT YOURSELF - OR THAT YOU ARE A FAILURE OR HAVE LET YOURSELF OR YOUR FAMILY DOWN: NOT AT ALL

## 2025-07-27 ASSESSMENT — ANXIETY QUESTIONNAIRES
IF YOU CHECKED OFF ANY PROBLEMS ON THIS QUESTIONNAIRE, HOW DIFFICULT HAVE THESE PROBLEMS MADE IT FOR YOU TO DO YOUR WORK, TAKE CARE OF THINGS AT HOME, OR GET ALONG WITH OTHER PEOPLE: NOT DIFFICULT AT ALL
1. FEELING NERVOUS, ANXIOUS, OR ON EDGE: NOT AT ALL
IF YOU CHECKED OFF ANY PROBLEMS ON THIS QUESTIONNAIRE, HOW DIFFICULT HAVE THESE PROBLEMS MADE IT FOR YOU TO DO YOUR WORK, TAKE CARE OF THINGS AT HOME, OR GET ALONG WITH OTHER PEOPLE: NOT DIFFICULT AT ALL
1. FEELING NERVOUS, ANXIOUS, OR ON EDGE: NOT AT ALL

## 2025-07-29 ENCOUNTER — PROCEDURE VISIT (OUTPATIENT)
Dept: UROGYNECOLOGY | Age: 85
End: 2025-07-29
Payer: MEDICARE

## 2025-07-29 VITALS
TEMPERATURE: 97.3 F | HEART RATE: 66 BPM | SYSTOLIC BLOOD PRESSURE: 150 MMHG | DIASTOLIC BLOOD PRESSURE: 69 MMHG | OXYGEN SATURATION: 93 % | RESPIRATION RATE: 18 BRPM

## 2025-07-29 DIAGNOSIS — N32.81 OAB (OVERACTIVE BLADDER): ICD-10-CM

## 2025-07-29 DIAGNOSIS — G20.A1 PARKINSON DISEASE, SYMPTOMATIC (HCC): ICD-10-CM

## 2025-07-29 DIAGNOSIS — R39.15 URINARY URGENCY: Primary | ICD-10-CM

## 2025-07-29 DIAGNOSIS — N39.41 URGE INCONTINENCE: ICD-10-CM

## 2025-07-29 PROCEDURE — 3078F DIAST BP <80 MM HG: CPT | Performed by: OBSTETRICS & GYNECOLOGY

## 2025-07-29 PROCEDURE — 52285 CYSTOSCOPY AND TREATMENT: CPT | Performed by: OBSTETRICS & GYNECOLOGY

## 2025-07-29 PROCEDURE — 3077F SYST BP >= 140 MM HG: CPT | Performed by: OBSTETRICS & GYNECOLOGY

## 2025-07-29 PROCEDURE — G8420 CALC BMI NORM PARAMETERS: HCPCS | Performed by: OBSTETRICS & GYNECOLOGY

## 2025-07-29 PROCEDURE — 1036F TOBACCO NON-USER: CPT | Performed by: OBSTETRICS & GYNECOLOGY

## 2025-07-29 PROCEDURE — 0509F URINE INCON PLAN DOCD: CPT | Performed by: OBSTETRICS & GYNECOLOGY

## 2025-07-29 PROCEDURE — G8399 PT W/DXA RESULTS DOCUMENT: HCPCS | Performed by: OBSTETRICS & GYNECOLOGY

## 2025-07-29 PROCEDURE — 99214 OFFICE O/P EST MOD 30 MIN: CPT | Performed by: OBSTETRICS & GYNECOLOGY

## 2025-07-29 PROCEDURE — 1159F MED LIST DOCD IN RCRD: CPT | Performed by: OBSTETRICS & GYNECOLOGY

## 2025-07-29 PROCEDURE — 1123F ACP DISCUSS/DSCN MKR DOCD: CPT | Performed by: OBSTETRICS & GYNECOLOGY

## 2025-07-29 PROCEDURE — 1090F PRES/ABSN URINE INCON ASSESS: CPT | Performed by: OBSTETRICS & GYNECOLOGY

## 2025-07-29 PROCEDURE — G8427 DOCREV CUR MEDS BY ELIG CLIN: HCPCS | Performed by: OBSTETRICS & GYNECOLOGY

## 2025-07-29 NOTE — PROGRESS NOTES
2025     HPI:     Name: Aileen Foster  YOB: 1940    CC: Aileen Foster is a 85 y.o. female presenting for an evaluation of urge incontinence .        HPI: How long have you had this problem?    Please rate the severity of your problem: moderate  Anything make it better? Patient has failed medication, myrbetriq    Ob/Gyn History:    OB History    Para Term  AB Living   2 2 2      SAB IAB Ectopic Molar Multiple Live Births              # Outcome Date GA Lbr Manolo/2nd Weight Sex Type Anes PTL Lv   2 Term            1 Term              Past Medical History:   Past Medical History:   Diagnosis Date    Acquired hypothyroidism 2010    CAD (coronary artery disease) 2012    non obstructive    Cataract     Had cataract surgery  and 2014    Diabetes mellitus with neurological manifestation ([facility])     DKA, type 1, not at goal ([facility]) 2022    Hyperlipidemia     Hyperlipidemia     Hypothyroidism (acquired)     Insomnia     Neuropathy     Osteoporosis 2014    Parkinson disease ([facility])     Parkinson disease ([facility])     Parkinson disease ([facility])     Renal insufficiency     Restless legs syndrome     Type I (juvenile type) diabetes mellitus without mention of complication, not stated as uncontrolled      Past Surgical History:   Past Surgical History:   Procedure Laterality Date    CARDIAC CATHETERIZATION  2012    non obstructive CAD    COLONOSCOPY      at age 69    DENTAL SURGERY      EYE SURGERY Bilateral     with lens implants    IR KYPHOPLASTY LUMBAR 1 VERTEBRAL BODY  2024    IR KYPHOPLASTY LUMBAR FIRST LEVEL 3/5/2024 Skylar Meehan MD [facility] SPECIAL PROCEDURES    KYPHOSIS SURGERY  2024    SKIN BIOPSY Right     neg skin biopsy on right arm    TONSILLECTOMY       Current Medications:  Current Outpatient Medications   Medication Sig Dispense Refill    glucagon 1 MG injection Inject 1 mg into the muscle See Admin Instructions Follow package directions for low blood

## 2025-07-30 ENCOUNTER — OFFICE VISIT (OUTPATIENT)
Dept: PSYCHOLOGY | Age: 85
End: 2025-07-30
Payer: MEDICARE

## 2025-07-30 DIAGNOSIS — F43.23 ADJUSTMENT REACTION WITH ANXIETY AND DEPRESSION: Primary | ICD-10-CM

## 2025-07-30 PROCEDURE — 90837 PSYTX W PT 60 MINUTES: CPT | Performed by: PSYCHOLOGIST

## 2025-07-30 PROCEDURE — 1036F TOBACCO NON-USER: CPT | Performed by: PSYCHOLOGIST

## 2025-07-30 PROCEDURE — 1123F ACP DISCUSS/DSCN MKR DOCD: CPT | Performed by: PSYCHOLOGIST

## 2025-08-02 NOTE — PROGRESS NOTES
Behavioral Health Psychotherapy  Kimberlee Huang, Ph.D.  Licensed Clinical Psychologist  Date:  7/30/25           Time spent with client:  60 minutes from 11:00 am - 12:00 pm.  This is patient's 20th psychotherapy appointment with Dr. Huang                   Chief Complaint   Patient presents with    Anxiety    Family Problem                    S:  Mood is mildly anxious, generally hopeful  Younger son moved her with wife, child - grandson is enrolled in school  Older son travels a lot for work   decided to stop driving except within 5 minutes of their house  Grief/loss related to best friend's recent death  Had 3 falls in 2-week span in her garden - bruises on leg, stitches in finger  Neurologist referred her for neuropsychological evaluation - will get feedback  Noticing word finding problems, balance problems        O:  MSE:  Appearance:  Alert, cooperative, mild distress   Appetite:  Decreased, slightly better, has gained ~3 lb  Sleep disturbance:  WNL  Fatigue:  Yes   Loss of pleasure:  WNL  Impulsive behavior:  No  Speech:  Within normal limits  Mood:   Mild anxiety at times - much improved, much more hopeful  Affect:  Full range with anxiety  Thought Content:  Intact   Thought Process:  Coherent  Associations:  Logical connections  Insight:  Good  Judgment:  Good  Orientation:  Oriented to person, place, time, and general circumstances   Memory:  Recent and remote memory intact   Attention/Concentration:  Intact  Morbid ideation:  No   Threat Assessment:  No history of any suicidal ideation, suicide attempts, self-harm urges/behaviors, or homicidal ideation/behavior   Protective Factors against Suicide:  Adequate family/social support, children, contacts reliable for safety, future oriented/reason to live, Evangelical beliefs/value system, and responsibilities     A:  \"Hector\" is addressing concerns related to caregiver fatigue, grief over Parkinson's disease-related losses, health-related anxiety, her

## 2025-08-04 DIAGNOSIS — E10.40 TYPE 1 DIABETES, CONTROLLED, WITH NEUROPATHY (HCC): ICD-10-CM

## 2025-08-04 DIAGNOSIS — M81.0 AGE-RELATED OSTEOPOROSIS WITHOUT CURRENT PATHOLOGICAL FRACTURE: ICD-10-CM

## 2025-08-04 DIAGNOSIS — E06.3 HASHIMOTO'S THYROIDITIS: ICD-10-CM

## 2025-08-04 DIAGNOSIS — E78.2 MIXED HYPERLIPIDEMIA: ICD-10-CM

## 2025-08-04 DIAGNOSIS — E03.9 ACQUIRED HYPOTHYROIDISM: ICD-10-CM

## 2025-08-04 LAB
25(OH)D3 SERPL-MCNC: 54.8 NG/ML
ALBUMIN SERPL-MCNC: 4.3 G/DL (ref 3.4–5)
ALBUMIN/GLOB SERPL: 1.7 {RATIO} (ref 1.1–2.2)
ALP SERPL-CCNC: 62 U/L (ref 40–129)
ALT SERPL-CCNC: <5 U/L (ref 10–40)
ANION GAP SERPL CALCULATED.3IONS-SCNC: 11 MMOL/L (ref 3–16)
AST SERPL-CCNC: 25 U/L (ref 15–37)
BILIRUB SERPL-MCNC: 0.6 MG/DL (ref 0–1)
BUN SERPL-MCNC: 26 MG/DL (ref 7–20)
CALCIUM SERPL-MCNC: 10 MG/DL (ref 8.3–10.6)
CHLORIDE SERPL-SCNC: 101 MMOL/L (ref 99–110)
CHOLEST SERPL-MCNC: 152 MG/DL (ref 0–199)
CO2 SERPL-SCNC: 30 MMOL/L (ref 21–32)
CREAT SERPL-MCNC: 0.8 MG/DL (ref 0.6–1.2)
CREAT UR-MCNC: 97 MG/DL (ref 28–259)
EST. AVERAGE GLUCOSE BLD GHB EST-MCNC: 151.3 MG/DL
GFR SERPLBLD CREATININE-BSD FMLA CKD-EPI: 72 ML/MIN/{1.73_M2}
GLUCOSE SERPL-MCNC: 145 MG/DL (ref 70–99)
HBA1C MFR BLD: 6.9 %
HDLC SERPL-MCNC: 83 MG/DL (ref 40–60)
LDLC SERPL CALC-MCNC: 58 MG/DL
MICROALBUMIN UR DL<=1MG/L-MCNC: 2.7 MG/DL
MICROALBUMIN/CREAT UR: 27.8 MG/G (ref 0–30)
POTASSIUM SERPL-SCNC: 4.7 MMOL/L (ref 3.5–5.1)
PROT SERPL-MCNC: 6.9 G/DL (ref 6.4–8.2)
SODIUM SERPL-SCNC: 142 MMOL/L (ref 136–145)
T4 FREE SERPL-MCNC: 1.5 NG/DL (ref 0.9–1.8)
TRIGL SERPL-MCNC: 55 MG/DL (ref 0–150)
TSH SERPL DL<=0.005 MIU/L-ACNC: 1.3 UIU/ML (ref 0.27–4.2)
VLDLC SERPL CALC-MCNC: 11 MG/DL

## 2025-08-06 ENCOUNTER — CARE COORDINATION (OUTPATIENT)
Dept: CARE COORDINATION | Age: 85
End: 2025-08-06

## 2025-08-08 ENCOUNTER — TELEPHONE (OUTPATIENT)
Dept: ENDOCRINOLOGY | Age: 85
End: 2025-08-08

## 2025-08-08 ENCOUNTER — OFFICE VISIT (OUTPATIENT)
Dept: ENDOCRINOLOGY | Age: 85
End: 2025-08-08

## 2025-08-08 VITALS
DIASTOLIC BLOOD PRESSURE: 80 MMHG | WEIGHT: 97.6 LBS | OXYGEN SATURATION: 100 % | TEMPERATURE: 98 F | SYSTOLIC BLOOD PRESSURE: 185 MMHG | BODY MASS INDEX: 19.16 KG/M2 | RESPIRATION RATE: 14 BRPM | HEART RATE: 65 BPM | HEIGHT: 60 IN

## 2025-08-08 DIAGNOSIS — R63.6 UNDERWEIGHT: ICD-10-CM

## 2025-08-08 DIAGNOSIS — M81.0 AGE-RELATED OSTEOPOROSIS WITHOUT CURRENT PATHOLOGICAL FRACTURE: ICD-10-CM

## 2025-08-08 DIAGNOSIS — E03.9 ACQUIRED HYPOTHYROIDISM: ICD-10-CM

## 2025-08-08 DIAGNOSIS — E06.3 HASHIMOTO'S THYROIDITIS: ICD-10-CM

## 2025-08-08 DIAGNOSIS — N18.30 STAGE 3 CHRONIC KIDNEY DISEASE, UNSPECIFIED WHETHER STAGE 3A OR 3B CKD (HCC): ICD-10-CM

## 2025-08-08 DIAGNOSIS — E78.2 MIXED HYPERLIPIDEMIA: ICD-10-CM

## 2025-08-08 DIAGNOSIS — I25.10 CORONARY ARTERY DISEASE INVOLVING NATIVE CORONARY ARTERY OF NATIVE HEART WITHOUT ANGINA PECTORIS: ICD-10-CM

## 2025-08-08 DIAGNOSIS — E10.40 TYPE 1 DIABETES, CONTROLLED, WITH NEUROPATHY (HCC): ICD-10-CM

## 2025-08-08 DIAGNOSIS — E10.40 TYPE 1 DIABETES, CONTROLLED, WITH NEUROPATHY (HCC): Primary | ICD-10-CM

## 2025-08-08 RX ORDER — INSULIN ASPART 100 [IU]/ML
INJECTION, SOLUTION INTRAVENOUS; SUBCUTANEOUS
Qty: 40 ML | Refills: 3 | Status: SHIPPED | OUTPATIENT
Start: 2025-08-08

## 2025-08-11 ENCOUNTER — CLINICAL SUPPORT (OUTPATIENT)
Dept: ENDOCRINOLOGY | Age: 85
End: 2025-08-11
Payer: MEDICARE

## 2025-08-11 ENCOUNTER — OFFICE VISIT (OUTPATIENT)
Dept: ENDOCRINOLOGY | Age: 85
End: 2025-08-11

## 2025-08-11 DIAGNOSIS — E10.40 TYPE 1 DIABETES, CONTROLLED, WITH NEUROPATHY (HCC): Primary | ICD-10-CM

## 2025-08-11 DIAGNOSIS — M81.0 OSTEOPOROSIS, UNSPECIFIED OSTEOPOROSIS TYPE, UNSPECIFIED PATHOLOGICAL FRACTURE PRESENCE: Primary | ICD-10-CM

## 2025-08-11 PROCEDURE — 96372 THER/PROPH/DIAG INJ SC/IM: CPT | Performed by: INTERNAL MEDICINE

## 2025-08-11 PROCEDURE — 3044F HG A1C LEVEL LT 7.0%: CPT

## 2025-08-11 PROCEDURE — 97803 MED NUTRITION INDIV SUBSEQ: CPT

## 2025-08-13 ENCOUNTER — HOSPITAL ENCOUNTER (INPATIENT)
Age: 85
LOS: 3 days | Discharge: HOME | DRG: 418 | End: 2025-08-17
Attending: EMERGENCY MEDICINE | Admitting: HOSPITALIST
Payer: MEDICARE

## 2025-08-13 ENCOUNTER — APPOINTMENT (OUTPATIENT)
Dept: CT IMAGING | Age: 85
DRG: 418 | End: 2025-08-13
Payer: MEDICARE

## 2025-08-13 DIAGNOSIS — G20.A1 PARKINSON DISEASE, SYMPTOMATIC (HCC): ICD-10-CM

## 2025-08-13 DIAGNOSIS — K81.0 ACUTE CHOLECYSTITIS: Primary | ICD-10-CM

## 2025-08-13 DIAGNOSIS — K59.00 CONSTIPATION, UNSPECIFIED CONSTIPATION TYPE: ICD-10-CM

## 2025-08-13 LAB
BASE EXCESS BLDV CALC-SCNC: 2.3 MMOL/L (ref -3–3)
BASOPHILS # BLD: 0 K/UL (ref 0–0.2)
BASOPHILS NFR BLD: 0.5 %
CO2 BLDV-SCNC: 72 MMOL/L
COHGB MFR BLDV: 2.3 % (ref 0–1.5)
DEPRECATED RDW RBC AUTO: 13.7 % (ref 12.4–15.4)
DO-HGB MFR BLDV: 32 %
EOSINOPHIL # BLD: 0.2 K/UL (ref 0–0.6)
EOSINOPHIL NFR BLD: 3.5 %
HCO3 BLDV-SCNC: 30.4 MMOL/L (ref 23–29)
HCT VFR BLD AUTO: 37.7 % (ref 36–48)
HGB BLD-MCNC: 12.6 G/DL (ref 12–16)
LYMPHOCYTES # BLD: 1.1 K/UL (ref 1–5.1)
LYMPHOCYTES NFR BLD: 16.4 %
MCH RBC QN AUTO: 30.7 PG (ref 26–34)
MCHC RBC AUTO-ENTMCNC: 33.6 G/DL (ref 31–36)
MCV RBC AUTO: 91.6 FL (ref 80–100)
METHGB MFR BLDV: 0.2 %
MONOCYTES # BLD: 0.3 K/UL (ref 0–1.3)
MONOCYTES NFR BLD: 4.7 %
NEUTROPHILS # BLD: 5.2 K/UL (ref 1.7–7.7)
NEUTROPHILS NFR BLD: 74.9 %
O2 CT VFR BLDV CALC: 12 VOL %
O2 THERAPY: ABNORMAL
PCO2 BLDV: 62.6 MMHG (ref 40–50)
PH BLDV: 7.29 [PH] (ref 7.35–7.45)
PLATELET # BLD AUTO: 297 K/UL (ref 135–450)
PMV BLD AUTO: 9.4 FL (ref 5–10.5)
PO2 BLDV: 41 MMHG (ref 25–40)
RBC # BLD AUTO: 4.11 M/UL (ref 4–5.2)
SAO2 % BLDV: 67 %
WBC # BLD AUTO: 6.9 K/UL (ref 4–11)

## 2025-08-13 PROCEDURE — 99285 EMERGENCY DEPT VISIT HI MDM: CPT

## 2025-08-13 PROCEDURE — 6360000004 HC RX CONTRAST MEDICATION: Performed by: PHYSICIAN ASSISTANT

## 2025-08-13 PROCEDURE — 93005 ELECTROCARDIOGRAM TRACING: CPT | Performed by: PHYSICIAN ASSISTANT

## 2025-08-13 PROCEDURE — 83690 ASSAY OF LIPASE: CPT

## 2025-08-13 PROCEDURE — 80053 COMPREHEN METABOLIC PANEL: CPT

## 2025-08-13 PROCEDURE — 82803 BLOOD GASES ANY COMBINATION: CPT

## 2025-08-13 PROCEDURE — 74177 CT ABD & PELVIS W/CONTRAST: CPT

## 2025-08-13 PROCEDURE — 83605 ASSAY OF LACTIC ACID: CPT

## 2025-08-13 PROCEDURE — 85025 COMPLETE CBC W/AUTO DIFF WBC: CPT

## 2025-08-13 RX ORDER — 0.9 % SODIUM CHLORIDE 0.9 %
1000 INTRAVENOUS SOLUTION INTRAVENOUS ONCE
Status: COMPLETED | OUTPATIENT
Start: 2025-08-13 | End: 2025-08-14

## 2025-08-13 RX ORDER — MORPHINE SULFATE 4 MG/ML
4 INJECTION, SOLUTION INTRAMUSCULAR; INTRAVENOUS EVERY 30 MIN PRN
Refills: 0 | Status: DISCONTINUED | OUTPATIENT
Start: 2025-08-13 | End: 2025-08-14 | Stop reason: ALTCHOICE

## 2025-08-13 RX ORDER — ONDANSETRON 2 MG/ML
4 INJECTION INTRAMUSCULAR; INTRAVENOUS EVERY 6 HOURS PRN
Status: DISCONTINUED | OUTPATIENT
Start: 2025-08-13 | End: 2025-08-14 | Stop reason: ALTCHOICE

## 2025-08-13 RX ORDER — IOPAMIDOL 755 MG/ML
75 INJECTION, SOLUTION INTRAVASCULAR
Status: COMPLETED | OUTPATIENT
Start: 2025-08-13 | End: 2025-08-13

## 2025-08-13 RX ORDER — INSULIN ASPART 100 [IU]/ML
INJECTION, SOLUTION INTRAVENOUS; SUBCUTANEOUS
Qty: 40 ML | Refills: 3 | Status: SHIPPED | OUTPATIENT
Start: 2025-08-13

## 2025-08-13 RX ADMIN — IOPAMIDOL 75 ML: 755 INJECTION, SOLUTION INTRAVENOUS at 23:32

## 2025-08-13 ASSESSMENT — PAIN DESCRIPTION - LOCATION: LOCATION: ABDOMEN

## 2025-08-13 ASSESSMENT — PAIN SCALES - GENERAL: PAINLEVEL_OUTOF10: 9

## 2025-08-13 ASSESSMENT — PAIN DESCRIPTION - ORIENTATION: ORIENTATION: LOWER

## 2025-08-13 ASSESSMENT — PAIN - FUNCTIONAL ASSESSMENT: PAIN_FUNCTIONAL_ASSESSMENT: 0-10

## 2025-08-13 ASSESSMENT — PAIN DESCRIPTION - DESCRIPTORS: DESCRIPTORS: SHARP

## 2025-08-14 ENCOUNTER — APPOINTMENT (OUTPATIENT)
Dept: ULTRASOUND IMAGING | Age: 85
DRG: 418 | End: 2025-08-14
Payer: MEDICARE

## 2025-08-14 ENCOUNTER — CARE COORDINATION (OUTPATIENT)
Dept: CARE COORDINATION | Age: 85
End: 2025-08-14

## 2025-08-14 PROBLEM — R10.84 DIFFUSE ABDOMINAL PAIN: Status: ACTIVE | Noted: 2025-08-14

## 2025-08-14 PROBLEM — K81.0 ACUTE CHOLECYSTITIS: Status: ACTIVE | Noted: 2025-08-14

## 2025-08-14 PROBLEM — E87.20 LACTIC ACIDOSIS: Status: ACTIVE | Noted: 2025-08-14

## 2025-08-14 PROBLEM — K59.00 CONSTIPATION: Status: ACTIVE | Noted: 2025-08-14

## 2025-08-14 LAB
ALBUMIN SERPL-MCNC: 3.5 G/DL (ref 3.4–5)
ALBUMIN SERPL-MCNC: 4.4 G/DL (ref 3.4–5)
ALBUMIN/GLOB SERPL: 1.2 {RATIO} (ref 1.1–2.2)
ALBUMIN/GLOB SERPL: 1.3 {RATIO} (ref 1.1–2.2)
ALP SERPL-CCNC: 108 U/L (ref 40–129)
ALP SERPL-CCNC: 68 U/L (ref 40–129)
ALT SERPL-CCNC: 10 U/L (ref 10–40)
ALT SERPL-CCNC: <5 U/L (ref 10–40)
ANION GAP SERPL CALCULATED.3IONS-SCNC: 12 MMOL/L (ref 3–16)
ANION GAP SERPL CALCULATED.3IONS-SCNC: 16 MMOL/L (ref 3–16)
AST SERPL-CCNC: 199 U/L (ref 15–37)
AST SERPL-CCNC: 31 U/L (ref 15–37)
BACTERIA URNS QL MICRO: ABNORMAL /HPF
BILIRUB SERPL-MCNC: 0.5 MG/DL (ref 0–1)
BILIRUB SERPL-MCNC: 1 MG/DL (ref 0–1)
BILIRUB UR QL STRIP.AUTO: NEGATIVE
BUN SERPL-MCNC: 22 MG/DL (ref 7–20)
BUN SERPL-MCNC: 27 MG/DL (ref 7–20)
CALCIUM SERPL-MCNC: 8.5 MG/DL (ref 8.3–10.6)
CALCIUM SERPL-MCNC: 9.7 MG/DL (ref 8.3–10.6)
CHLORIDE SERPL-SCNC: 101 MMOL/L (ref 99–110)
CHLORIDE SERPL-SCNC: 97 MMOL/L (ref 99–110)
CLARITY UR: CLEAR
CO2 SERPL-SCNC: 25 MMOL/L (ref 21–32)
CO2 SERPL-SCNC: 26 MMOL/L (ref 21–32)
COLOR UR: YELLOW
CREAT SERPL-MCNC: 0.9 MG/DL (ref 0.6–1.2)
CREAT SERPL-MCNC: 1.1 MG/DL (ref 0.6–1.2)
EKG ATRIAL RATE: 101 BPM
EKG DIAGNOSIS: NORMAL
EKG P AXIS: 81 DEGREES
EKG P-R INTERVAL: 156 MS
EKG Q-T INTERVAL: 326 MS
EKG QRS DURATION: 60 MS
EKG QTC CALCULATION (BAZETT): 422 MS
EKG R AXIS: 40 DEGREES
EKG T AXIS: 36 DEGREES
EKG VENTRICULAR RATE: 101 BPM
EPI CELLS #/AREA URNS AUTO: 0 /HPF (ref 0–5)
EST. AVERAGE GLUCOSE BLD GHB EST-MCNC: 151.3 MG/DL
GFR SERPLBLD CREATININE-BSD FMLA CKD-EPI: 49 ML/MIN/{1.73_M2}
GFR SERPLBLD CREATININE-BSD FMLA CKD-EPI: 62 ML/MIN/{1.73_M2}
GLUCOSE BLD-MCNC: 125 MG/DL (ref 70–99)
GLUCOSE BLD-MCNC: 142 MG/DL (ref 70–99)
GLUCOSE BLD-MCNC: 154 MG/DL (ref 70–99)
GLUCOSE SERPL-MCNC: 142 MG/DL (ref 70–99)
GLUCOSE SERPL-MCNC: 154 MG/DL (ref 70–99)
GLUCOSE UR STRIP.AUTO-MCNC: NEGATIVE MG/DL
HBA1C MFR BLD: 6.9 %
HGB UR QL STRIP.AUTO: NEGATIVE
HYALINE CASTS #/AREA URNS AUTO: 0 /LPF (ref 0–8)
KETONES UR STRIP.AUTO-MCNC: NEGATIVE MG/DL
LACTATE BLDV-SCNC: 1.6 MMOL/L (ref 0.4–1.9)
LACTATE BLDV-SCNC: 4.1 MMOL/L (ref 0.4–1.9)
LEUKOCYTE ESTERASE UR QL STRIP.AUTO: NEGATIVE
LIPASE SERPL-CCNC: 16 U/L (ref 13–60)
NITRITE UR QL STRIP.AUTO: NEGATIVE
PERFORMED ON: ABNORMAL
PH UR STRIP.AUTO: 7.5 [PH] (ref 5–8)
POTASSIUM SERPL-SCNC: 4.4 MMOL/L (ref 3.5–5.1)
POTASSIUM SERPL-SCNC: 5 MMOL/L (ref 3.5–5.1)
PROT SERPL-MCNC: 6.3 G/DL (ref 6.4–8.2)
PROT SERPL-MCNC: 8 G/DL (ref 6.4–8.2)
PROT UR STRIP.AUTO-MCNC: ABNORMAL MG/DL
RBC CLUMPS #/AREA URNS AUTO: 2 /HPF (ref 0–4)
SODIUM SERPL-SCNC: 138 MMOL/L (ref 136–145)
SODIUM SERPL-SCNC: 139 MMOL/L (ref 136–145)
SP GR UR STRIP.AUTO: >=1.03 (ref 1–1.03)
TSH SERPL DL<=0.005 MIU/L-ACNC: 0.73 UIU/ML (ref 0.27–4.2)
UA COMPLETE W REFLEX CULTURE PNL UR: ABNORMAL
UA DIPSTICK W REFLEX MICRO PNL UR: YES
URN SPEC COLLECT METH UR: ABNORMAL
UROBILINOGEN UR STRIP-ACNC: 1 E.U./DL
WBC #/AREA URNS AUTO: 4 /HPF (ref 0–5)

## 2025-08-14 PROCEDURE — 93010 ELECTROCARDIOGRAM REPORT: CPT | Performed by: INTERNAL MEDICINE

## 2025-08-14 PROCEDURE — 1200000000 HC SEMI PRIVATE

## 2025-08-14 PROCEDURE — 36415 COLL VENOUS BLD VENIPUNCTURE: CPT

## 2025-08-14 PROCEDURE — 6360000002 HC RX W HCPCS: Performed by: HOSPITALIST

## 2025-08-14 PROCEDURE — 2500000003 HC RX 250 WO HCPCS: Performed by: HOSPITALIST

## 2025-08-14 PROCEDURE — 6370000000 HC RX 637 (ALT 250 FOR IP): Performed by: NURSE PRACTITIONER

## 2025-08-14 PROCEDURE — 81001 URINALYSIS AUTO W/SCOPE: CPT

## 2025-08-14 PROCEDURE — 99223 1ST HOSP IP/OBS HIGH 75: CPT | Performed by: SURGERY

## 2025-08-14 PROCEDURE — 83036 HEMOGLOBIN GLYCOSYLATED A1C: CPT

## 2025-08-14 PROCEDURE — 6360000002 HC RX W HCPCS: Performed by: PHYSICIAN ASSISTANT

## 2025-08-14 PROCEDURE — 6370000000 HC RX 637 (ALT 250 FOR IP): Performed by: HOSPITALIST

## 2025-08-14 PROCEDURE — 83605 ASSAY OF LACTIC ACID: CPT

## 2025-08-14 PROCEDURE — 80053 COMPREHEN METABOLIC PANEL: CPT

## 2025-08-14 PROCEDURE — 2580000003 HC RX 258: Performed by: HOSPITALIST

## 2025-08-14 PROCEDURE — 2580000003 HC RX 258: Performed by: PHYSICIAN ASSISTANT

## 2025-08-14 PROCEDURE — 96375 TX/PRO/DX INJ NEW DRUG ADDON: CPT

## 2025-08-14 PROCEDURE — 76705 ECHO EXAM OF ABDOMEN: CPT

## 2025-08-14 PROCEDURE — 84443 ASSAY THYROID STIM HORMONE: CPT

## 2025-08-14 PROCEDURE — 94760 N-INVAS EAR/PLS OXIMETRY 1: CPT

## 2025-08-14 PROCEDURE — 87040 BLOOD CULTURE FOR BACTERIA: CPT

## 2025-08-14 PROCEDURE — 96365 THER/PROPH/DIAG IV INF INIT: CPT

## 2025-08-14 RX ORDER — ONDANSETRON 4 MG/1
4 TABLET, ORALLY DISINTEGRATING ORAL EVERY 8 HOURS PRN
Status: DISCONTINUED | OUTPATIENT
Start: 2025-08-14 | End: 2025-08-17 | Stop reason: HOSPADM

## 2025-08-14 RX ORDER — ACETAMINOPHEN 325 MG/1
650 TABLET ORAL EVERY 6 HOURS PRN
Status: DISCONTINUED | OUTPATIENT
Start: 2025-08-14 | End: 2025-08-17 | Stop reason: HOSPADM

## 2025-08-14 RX ORDER — SODIUM CHLORIDE 0.9 % (FLUSH) 0.9 %
5-40 SYRINGE (ML) INJECTION PRN
Status: DISCONTINUED | OUTPATIENT
Start: 2025-08-14 | End: 2025-08-17 | Stop reason: HOSPADM

## 2025-08-14 RX ORDER — CARBIDOPA AND LEVODOPA 50; 200 MG/1; MG/1
1 TABLET, EXTENDED RELEASE ORAL NIGHTLY
Status: DISCONTINUED | OUTPATIENT
Start: 2025-08-14 | End: 2025-08-17 | Stop reason: HOSPADM

## 2025-08-14 RX ORDER — MORPHINE SULFATE 4 MG/ML
4 INJECTION INTRAVENOUS
Refills: 0 | Status: DISCONTINUED | OUTPATIENT
Start: 2025-08-14 | End: 2025-08-16

## 2025-08-14 RX ORDER — CARBIDOPA AND LEVODOPA 25; 100 MG/1; MG/1
2 TABLET ORAL 4 TIMES DAILY
COMMUNITY

## 2025-08-14 RX ORDER — SODIUM CHLORIDE 9 MG/ML
INJECTION, SOLUTION INTRAVENOUS CONTINUOUS
Status: ACTIVE | OUTPATIENT
Start: 2025-08-14 | End: 2025-08-15

## 2025-08-14 RX ORDER — SODIUM CHLORIDE 0.9 % (FLUSH) 0.9 %
5-40 SYRINGE (ML) INJECTION EVERY 12 HOURS SCHEDULED
Status: DISCONTINUED | OUTPATIENT
Start: 2025-08-14 | End: 2025-08-17 | Stop reason: HOSPADM

## 2025-08-14 RX ORDER — ACETAMINOPHEN 650 MG/1
650 SUPPOSITORY RECTAL EVERY 6 HOURS PRN
Status: DISCONTINUED | OUTPATIENT
Start: 2025-08-14 | End: 2025-08-17 | Stop reason: HOSPADM

## 2025-08-14 RX ORDER — SODIUM CHLORIDE 9 MG/ML
INJECTION, SOLUTION INTRAVENOUS PRN
Status: DISCONTINUED | OUTPATIENT
Start: 2025-08-14 | End: 2025-08-17 | Stop reason: HOSPADM

## 2025-08-14 RX ORDER — LEVOTHYROXINE SODIUM 100 UG/1
100 TABLET ORAL DAILY
Status: DISCONTINUED | OUTPATIENT
Start: 2025-08-14 | End: 2025-08-17 | Stop reason: HOSPADM

## 2025-08-14 RX ORDER — SODIUM PHOSPHATE, DIBASIC AND SODIUM PHOSPHATE, MONOBASIC 7; 19 G/230ML; G/230ML
1 ENEMA RECTAL
Status: DISCONTINUED | OUTPATIENT
Start: 2025-08-14 | End: 2025-08-17 | Stop reason: HOSPADM

## 2025-08-14 RX ORDER — MORPHINE SULFATE 2 MG/ML
2 INJECTION, SOLUTION INTRAMUSCULAR; INTRAVENOUS
Refills: 0 | Status: DISCONTINUED | OUTPATIENT
Start: 2025-08-14 | End: 2025-08-16

## 2025-08-14 RX ORDER — CARBIDOPA AND LEVODOPA 25; 100 MG/1; MG/1
2 TABLET ORAL 4 TIMES DAILY
Status: DISCONTINUED | OUTPATIENT
Start: 2025-08-14 | End: 2025-08-17 | Stop reason: HOSPADM

## 2025-08-14 RX ORDER — CARBIDOPA AND LEVODOPA 25; 100 MG/1; MG/1
2 TABLET, EXTENDED RELEASE ORAL 4 TIMES DAILY
Status: DISCONTINUED | OUTPATIENT
Start: 2025-08-14 | End: 2025-08-14

## 2025-08-14 RX ORDER — POLYETHYLENE GLYCOL 3350 17 G/17G
17 POWDER, FOR SOLUTION ORAL DAILY PRN
Status: DISCONTINUED | OUTPATIENT
Start: 2025-08-14 | End: 2025-08-17 | Stop reason: HOSPADM

## 2025-08-14 RX ORDER — SODIUM CHLORIDE, SODIUM LACTATE, POTASSIUM CHLORIDE, CALCIUM CHLORIDE 600; 310; 30; 20 MG/100ML; MG/100ML; MG/100ML; MG/100ML
INJECTION, SOLUTION INTRAVENOUS CONTINUOUS
Status: DISCONTINUED | OUTPATIENT
Start: 2025-08-14 | End: 2025-08-16

## 2025-08-14 RX ORDER — ONDANSETRON 2 MG/ML
4 INJECTION INTRAMUSCULAR; INTRAVENOUS EVERY 6 HOURS PRN
Status: DISCONTINUED | OUTPATIENT
Start: 2025-08-14 | End: 2025-08-17 | Stop reason: HOSPADM

## 2025-08-14 RX ADMIN — SODIUM CHLORIDE: 0.9 INJECTION, SOLUTION INTRAVENOUS at 03:09

## 2025-08-14 RX ADMIN — PIPERACILLIN AND TAZOBACTAM 3375 MG: 3; .375 INJECTION, POWDER, FOR SOLUTION INTRAVENOUS at 23:40

## 2025-08-14 RX ADMIN — CARBIDOPA AND LEVODOPA 2 TABLET: 25; 100 TABLET ORAL at 11:48

## 2025-08-14 RX ADMIN — PIPERACILLIN AND TAZOBACTAM 3375 MG: 3; .375 INJECTION, POWDER, FOR SOLUTION INTRAVENOUS at 00:43

## 2025-08-14 RX ADMIN — SODIUM CHLORIDE, PRESERVATIVE FREE 10 ML: 5 INJECTION INTRAVENOUS at 21:03

## 2025-08-14 RX ADMIN — MORPHINE SULFATE 4 MG: 4 INJECTION, SOLUTION INTRAMUSCULAR; INTRAVENOUS at 00:40

## 2025-08-14 RX ADMIN — SODIUM CHLORIDE 1000 ML: 0.9 INJECTION, SOLUTION INTRAVENOUS at 00:39

## 2025-08-14 RX ADMIN — MORPHINE SULFATE 4 MG: 4 INJECTION, SOLUTION INTRAMUSCULAR; INTRAVENOUS at 09:11

## 2025-08-14 RX ADMIN — CARBIDOPA AND LEVODOPA 1 TABLET: 50; 200 TABLET, EXTENDED RELEASE ORAL at 21:01

## 2025-08-14 RX ADMIN — PIPERACILLIN AND TAZOBACTAM 3375 MG: 3; .375 INJECTION, POWDER, FOR SOLUTION INTRAVENOUS at 15:22

## 2025-08-14 RX ADMIN — ONDANSETRON 4 MG: 2 INJECTION, SOLUTION INTRAMUSCULAR; INTRAVENOUS at 00:40

## 2025-08-14 RX ADMIN — Medication 240 ML: at 02:11

## 2025-08-14 RX ADMIN — PIPERACILLIN AND TAZOBACTAM 3375 MG: 3; .375 INJECTION, POWDER, FOR SOLUTION INTRAVENOUS at 06:39

## 2025-08-14 RX ADMIN — CARBIDOPA AND LEVODOPA 2 TABLET: 25; 100 TABLET ORAL at 09:11

## 2025-08-14 RX ADMIN — CARBIDOPA AND LEVODOPA 2 TABLET: 25; 100 TABLET ORAL at 21:01

## 2025-08-14 RX ADMIN — SODIUM CHLORIDE, SODIUM LACTATE, POTASSIUM CHLORIDE, AND CALCIUM CHLORIDE: .6; .31; .03; .02 INJECTION, SOLUTION INTRAVENOUS at 11:46

## 2025-08-14 RX ADMIN — SODIUM CHLORIDE, SODIUM LACTATE, POTASSIUM CHLORIDE, AND CALCIUM CHLORIDE: .6; .31; .03; .02 INJECTION, SOLUTION INTRAVENOUS at 23:39

## 2025-08-14 RX ADMIN — MORPHINE SULFATE 2 MG: 2 INJECTION, SOLUTION INTRAMUSCULAR; INTRAVENOUS at 03:04

## 2025-08-14 RX ADMIN — LEVOTHYROXINE SODIUM 100 MCG: 0.1 TABLET ORAL at 11:48

## 2025-08-14 RX ADMIN — CARBIDOPA AND LEVODOPA 2 TABLET: 25; 100 TABLET ORAL at 17:17

## 2025-08-14 ASSESSMENT — PAIN DESCRIPTION - ORIENTATION
ORIENTATION: RIGHT;UPPER
ORIENTATION: MID
ORIENTATION: MID

## 2025-08-14 ASSESSMENT — PAIN DESCRIPTION - DESCRIPTORS
DESCRIPTORS: ACHING
DESCRIPTORS: ACHING;DULL

## 2025-08-14 ASSESSMENT — PAIN SCALES - GENERAL
PAINLEVEL_OUTOF10: 6
PAINLEVEL_OUTOF10: 2
PAINLEVEL_OUTOF10: 5
PAINLEVEL_OUTOF10: 7
PAINLEVEL_OUTOF10: 0

## 2025-08-14 ASSESSMENT — PAIN - FUNCTIONAL ASSESSMENT
PAIN_FUNCTIONAL_ASSESSMENT: 0-10

## 2025-08-14 ASSESSMENT — PAIN DESCRIPTION - LOCATION
LOCATION: ABDOMEN

## 2025-08-15 ENCOUNTER — ANESTHESIA (OUTPATIENT)
Dept: OPERATING ROOM | Age: 85
End: 2025-08-15
Payer: MEDICARE

## 2025-08-15 ENCOUNTER — ANESTHESIA EVENT (OUTPATIENT)
Dept: OPERATING ROOM | Age: 85
End: 2025-08-15
Payer: MEDICARE

## 2025-08-15 ENCOUNTER — APPOINTMENT (OUTPATIENT)
Dept: GENERAL RADIOLOGY | Age: 85
DRG: 418 | End: 2025-08-15
Payer: MEDICARE

## 2025-08-15 LAB
ABO/RH: NORMAL
ALBUMIN SERPL-MCNC: 3 G/DL (ref 3.4–5)
ALBUMIN/GLOB SERPL: 1.1 {RATIO} (ref 1.1–2.2)
ALP SERPL-CCNC: 132 U/L (ref 40–129)
ALT SERPL-CCNC: 10 U/L (ref 10–40)
ANION GAP SERPL CALCULATED.3IONS-SCNC: 10 MMOL/L (ref 3–16)
ANTIBODY SCREEN: NORMAL
AST SERPL-CCNC: 77 U/L (ref 15–37)
BASOPHILS # BLD: 0 K/UL (ref 0–0.2)
BASOPHILS NFR BLD: 0.3 %
BILIRUB SERPL-MCNC: 0.9 MG/DL (ref 0–1)
BLOOD BANK DISPENSE STATUS: NORMAL
BLOOD BANK DISPENSE STATUS: NORMAL
BLOOD BANK PRODUCT CODE: NORMAL
BLOOD BANK PRODUCT CODE: NORMAL
BPU ID: NORMAL
BPU ID: NORMAL
BUN SERPL-MCNC: 18 MG/DL (ref 7–20)
CALCIUM SERPL-MCNC: 8.2 MG/DL (ref 8.3–10.6)
CHLORIDE SERPL-SCNC: 96 MMOL/L (ref 99–110)
CO2 SERPL-SCNC: 26 MMOL/L (ref 21–32)
CREAT SERPL-MCNC: 1 MG/DL (ref 0.6–1.2)
DEPRECATED RDW RBC AUTO: 13.6 % (ref 12.4–15.4)
DEPRECATED RDW RBC AUTO: 13.6 % (ref 12.4–15.4)
DESCRIPTION BLOOD BANK: NORMAL
DESCRIPTION BLOOD BANK: NORMAL
EOSINOPHIL # BLD: 0.1 K/UL (ref 0–0.6)
EOSINOPHIL NFR BLD: 1.1 %
GFR SERPLBLD CREATININE-BSD FMLA CKD-EPI: 55 ML/MIN/{1.73_M2}
GLUCOSE BLD-MCNC: 124 MG/DL (ref 70–99)
GLUCOSE BLD-MCNC: 134 MG/DL (ref 70–99)
GLUCOSE BLD-MCNC: 162 MG/DL (ref 70–99)
GLUCOSE BLD-MCNC: 167 MG/DL (ref 70–99)
GLUCOSE BLD-MCNC: 185 MG/DL (ref 70–99)
GLUCOSE BLD-MCNC: 252 MG/DL (ref 70–99)
GLUCOSE SERPL-MCNC: 132 MG/DL (ref 70–99)
HCT VFR BLD AUTO: 31.2 % (ref 36–48)
HCT VFR BLD AUTO: 31.8 % (ref 36–48)
HCT VFR BLD AUTO: 34.3 % (ref 36–48)
HGB BLD-MCNC: 10.4 G/DL (ref 12–16)
HGB BLD-MCNC: 10.5 G/DL (ref 12–16)
HGB BLD-MCNC: 11.2 G/DL (ref 12–16)
LYMPHOCYTES # BLD: 0.7 K/UL (ref 1–5.1)
LYMPHOCYTES NFR BLD: 6 %
MCH RBC QN AUTO: 30.2 PG (ref 26–34)
MCH RBC QN AUTO: 30.4 PG (ref 26–34)
MCHC RBC AUTO-ENTMCNC: 33.1 G/DL (ref 31–36)
MCHC RBC AUTO-ENTMCNC: 33.4 G/DL (ref 31–36)
MCV RBC AUTO: 91 FL (ref 80–100)
MCV RBC AUTO: 91.2 FL (ref 80–100)
MONOCYTES # BLD: 0.9 K/UL (ref 0–1.3)
MONOCYTES NFR BLD: 7.2 %
NEUTROPHILS # BLD: 10.1 K/UL (ref 1.7–7.7)
NEUTROPHILS NFR BLD: 85.4 %
PERFORMED ON: ABNORMAL
PLATELET # BLD AUTO: 232 K/UL (ref 135–450)
PLATELET # BLD AUTO: 238 K/UL (ref 135–450)
PMV BLD AUTO: 8.7 FL (ref 5–10.5)
PMV BLD AUTO: 9.2 FL (ref 5–10.5)
POTASSIUM SERPL-SCNC: 4 MMOL/L (ref 3.5–5.1)
PROT SERPL-MCNC: 5.8 G/DL (ref 6.4–8.2)
RBC # BLD AUTO: 3.43 M/UL (ref 4–5.2)
RBC # BLD AUTO: 3.49 M/UL (ref 4–5.2)
SODIUM SERPL-SCNC: 132 MMOL/L (ref 136–145)
WBC # BLD AUTO: 11.9 K/UL (ref 4–11)
WBC # BLD AUTO: 14.4 K/UL (ref 4–11)

## 2025-08-15 PROCEDURE — 88304 TISSUE EXAM BY PATHOLOGIST: CPT

## 2025-08-15 PROCEDURE — 6360000002 HC RX W HCPCS: Performed by: REGISTERED NURSE

## 2025-08-15 PROCEDURE — 6360000002 HC RX W HCPCS: Performed by: SURGERY

## 2025-08-15 PROCEDURE — 85014 HEMATOCRIT: CPT

## 2025-08-15 PROCEDURE — 6360000002 HC RX W HCPCS: Performed by: ANESTHESIOLOGY

## 2025-08-15 PROCEDURE — 2500000003 HC RX 250 WO HCPCS: Performed by: SURGERY

## 2025-08-15 PROCEDURE — 85018 HEMOGLOBIN: CPT

## 2025-08-15 PROCEDURE — 3600000009 HC SURGERY ROBOT BASE: Performed by: SURGERY

## 2025-08-15 PROCEDURE — 1200000000 HC SEMI PRIVATE

## 2025-08-15 PROCEDURE — 0FT44ZZ RESECTION OF GALLBLADDER, PERCUTANEOUS ENDOSCOPIC APPROACH: ICD-10-PCS | Performed by: SURGERY

## 2025-08-15 PROCEDURE — 6370000000 HC RX 637 (ALT 250 FOR IP): Performed by: NURSE PRACTITIONER

## 2025-08-15 PROCEDURE — 85025 COMPLETE CBC W/AUTO DIFF WBC: CPT

## 2025-08-15 PROCEDURE — 2580000003 HC RX 258: Performed by: HOSPITALIST

## 2025-08-15 PROCEDURE — APPSS15 APP SPLIT SHARED TIME 0-15 MINUTES: Performed by: NURSE PRACTITIONER

## 2025-08-15 PROCEDURE — 36415 COLL VENOUS BLD VENIPUNCTURE: CPT

## 2025-08-15 PROCEDURE — 2580000003 HC RX 258: Performed by: SURGERY

## 2025-08-15 PROCEDURE — 97162 PT EVAL MOD COMPLEX 30 MIN: CPT

## 2025-08-15 PROCEDURE — 85027 COMPLETE CBC AUTOMATED: CPT

## 2025-08-15 PROCEDURE — 97535 SELF CARE MNGMENT TRAINING: CPT

## 2025-08-15 PROCEDURE — C1894 INTRO/SHEATH, NON-LASER: HCPCS | Performed by: SURGERY

## 2025-08-15 PROCEDURE — 8E0W4CZ ROBOTIC ASSISTED PROCEDURE OF TRUNK REGION, PERCUTANEOUS ENDOSCOPIC APPROACH: ICD-10-PCS | Performed by: SURGERY

## 2025-08-15 PROCEDURE — 97166 OT EVAL MOD COMPLEX 45 MIN: CPT

## 2025-08-15 PROCEDURE — 7100000001 HC PACU RECOVERY - ADDTL 15 MIN: Performed by: SURGERY

## 2025-08-15 PROCEDURE — P9047 ALBUMIN (HUMAN), 25%, 50ML: HCPCS | Performed by: NURSE ANESTHETIST, CERTIFIED REGISTERED

## 2025-08-15 PROCEDURE — APPNB30 APP NON BILLABLE TIME 0-30 MINS: Performed by: NURSE PRACTITIONER

## 2025-08-15 PROCEDURE — 2500000003 HC RX 250 WO HCPCS: Performed by: REGISTERED NURSE

## 2025-08-15 PROCEDURE — C1889 IMPLANT/INSERT DEVICE, NOC: HCPCS | Performed by: SURGERY

## 2025-08-15 PROCEDURE — 2580000003 HC RX 258: Performed by: REGISTERED NURSE

## 2025-08-15 PROCEDURE — 86850 RBC ANTIBODY SCREEN: CPT

## 2025-08-15 PROCEDURE — 7100000000 HC PACU RECOVERY - FIRST 15 MIN: Performed by: SURGERY

## 2025-08-15 PROCEDURE — 97116 GAIT TRAINING THERAPY: CPT

## 2025-08-15 PROCEDURE — 86901 BLOOD TYPING SEROLOGIC RH(D): CPT

## 2025-08-15 PROCEDURE — 3600000019 HC SURGERY ROBOT ADDTL 15MIN: Performed by: SURGERY

## 2025-08-15 PROCEDURE — BF53200 OTHER IMAGING OF GALLBLADDER AND BILE DUCTS USING FLUORESCING AGENT, INDOCYANINE GREEN DYE, INTRAOPERATIVE: ICD-10-PCS | Performed by: SURGERY

## 2025-08-15 PROCEDURE — 6360000002 HC RX W HCPCS: Performed by: HOSPITALIST

## 2025-08-15 PROCEDURE — 97530 THERAPEUTIC ACTIVITIES: CPT

## 2025-08-15 PROCEDURE — 47562 LAPAROSCOPIC CHOLECYSTECTOMY: CPT | Performed by: SURGERY

## 2025-08-15 PROCEDURE — 86923 COMPATIBILITY TEST ELECTRIC: CPT

## 2025-08-15 PROCEDURE — 6370000000 HC RX 637 (ALT 250 FOR IP): Performed by: HOSPITALIST

## 2025-08-15 PROCEDURE — 6370000000 HC RX 637 (ALT 250 FOR IP): Performed by: SURGERY

## 2025-08-15 PROCEDURE — 3700000001 HC ADD 15 MINUTES (ANESTHESIA): Performed by: SURGERY

## 2025-08-15 PROCEDURE — 6360000002 HC RX W HCPCS: Performed by: NURSE ANESTHETIST, CERTIFIED REGISTERED

## 2025-08-15 PROCEDURE — 3700000000 HC ANESTHESIA ATTENDED CARE: Performed by: SURGERY

## 2025-08-15 PROCEDURE — 2709999900 HC NON-CHARGEABLE SUPPLY: Performed by: SURGERY

## 2025-08-15 PROCEDURE — S2900 ROBOTIC SURGICAL SYSTEM: HCPCS | Performed by: SURGERY

## 2025-08-15 PROCEDURE — 86900 BLOOD TYPING SEROLOGIC ABO: CPT

## 2025-08-15 PROCEDURE — 2720000010 HC SURG SUPPLY STERILE: Performed by: SURGERY

## 2025-08-15 PROCEDURE — 80053 COMPREHEN METABOLIC PANEL: CPT

## 2025-08-15 DEVICE — CLIP INT L POLYMER LOK LIG HEM O LOK (6EA/PK): Type: IMPLANTABLE DEVICE | Site: ABDOMEN | Status: FUNCTIONAL

## 2025-08-15 DEVICE — CLIP INT M L POLYMER LOK LIG HEM O LOK: Type: IMPLANTABLE DEVICE | Site: ABDOMEN | Status: FUNCTIONAL

## 2025-08-15 RX ORDER — SODIUM CHLORIDE 9 MG/ML
INJECTION, SOLUTION INTRAVENOUS
Status: DISCONTINUED | OUTPATIENT
Start: 2025-08-15 | End: 2025-08-15 | Stop reason: SDUPTHER

## 2025-08-15 RX ORDER — PROPOFOL 10 MG/ML
INJECTION, EMULSION INTRAVENOUS
Status: DISCONTINUED | OUTPATIENT
Start: 2025-08-15 | End: 2025-08-15 | Stop reason: SDUPTHER

## 2025-08-15 RX ORDER — INDOCYANINE GREEN AND WATER 25 MG
KIT INJECTION
Status: DISCONTINUED | OUTPATIENT
Start: 2025-08-15 | End: 2025-08-15 | Stop reason: SDUPTHER

## 2025-08-15 RX ORDER — INSULIN GLARGINE 100 [IU]/ML
3 INJECTION, SOLUTION SUBCUTANEOUS 2 TIMES DAILY
Status: DISCONTINUED | OUTPATIENT
Start: 2025-08-15 | End: 2025-08-16

## 2025-08-15 RX ORDER — HYDROMORPHONE HYDROCHLORIDE 2 MG/ML
INJECTION, SOLUTION INTRAMUSCULAR; INTRAVENOUS; SUBCUTANEOUS
Status: DISCONTINUED | OUTPATIENT
Start: 2025-08-15 | End: 2025-08-15 | Stop reason: SDUPTHER

## 2025-08-15 RX ORDER — MORPHINE SULFATE 10 MG/ML
1 INJECTION, SOLUTION INTRAMUSCULAR; INTRAVENOUS EVERY 5 MIN PRN
Status: DISCONTINUED | OUTPATIENT
Start: 2025-08-15 | End: 2025-08-15 | Stop reason: HOSPADM

## 2025-08-15 RX ORDER — SODIUM CHLORIDE 9 MG/ML
INJECTION, SOLUTION INTRAVENOUS PRN
Status: DISCONTINUED | OUTPATIENT
Start: 2025-08-15 | End: 2025-08-15 | Stop reason: HOSPADM

## 2025-08-15 RX ORDER — SODIUM CHLORIDE 9 MG/ML
INJECTION, SOLUTION INTRAVENOUS PRN
Status: CANCELLED | OUTPATIENT
Start: 2025-08-15

## 2025-08-15 RX ORDER — DEXAMETHASONE SODIUM PHOSPHATE 4 MG/ML
INJECTION, SOLUTION INTRA-ARTICULAR; INTRALESIONAL; INTRAMUSCULAR; INTRAVENOUS; SOFT TISSUE
Status: DISCONTINUED | OUTPATIENT
Start: 2025-08-15 | End: 2025-08-15 | Stop reason: SDUPTHER

## 2025-08-15 RX ORDER — MAGNESIUM HYDROXIDE 1200 MG/15ML
LIQUID ORAL CONTINUOUS PRN
Status: COMPLETED | OUTPATIENT
Start: 2025-08-15 | End: 2025-08-15

## 2025-08-15 RX ORDER — LIDOCAINE HYDROCHLORIDE 20 MG/ML
INJECTION, SOLUTION EPIDURAL; INFILTRATION; INTRACAUDAL; PERINEURAL
Status: DISCONTINUED | OUTPATIENT
Start: 2025-08-15 | End: 2025-08-15 | Stop reason: SDUPTHER

## 2025-08-15 RX ORDER — DEXTROSE MONOHYDRATE 100 MG/ML
INJECTION, SOLUTION INTRAVENOUS CONTINUOUS PRN
Status: DISCONTINUED | OUTPATIENT
Start: 2025-08-15 | End: 2025-08-17 | Stop reason: HOSPADM

## 2025-08-15 RX ORDER — FENTANYL CITRATE 50 UG/ML
INJECTION, SOLUTION INTRAMUSCULAR; INTRAVENOUS
Status: DISCONTINUED | OUTPATIENT
Start: 2025-08-15 | End: 2025-08-15 | Stop reason: SDUPTHER

## 2025-08-15 RX ORDER — GLUCAGON 1 MG/ML
1 KIT INJECTION PRN
Status: DISCONTINUED | OUTPATIENT
Start: 2025-08-15 | End: 2025-08-17 | Stop reason: HOSPADM

## 2025-08-15 RX ORDER — ROCURONIUM BROMIDE 50 MG/5 ML
SYRINGE (ML) INTRAVENOUS
Status: DISCONTINUED | OUTPATIENT
Start: 2025-08-15 | End: 2025-08-15 | Stop reason: SDUPTHER

## 2025-08-15 RX ORDER — FENTANYL CITRATE 50 UG/ML
50 INJECTION, SOLUTION INTRAMUSCULAR; INTRAVENOUS EVERY 5 MIN PRN
Status: COMPLETED | OUTPATIENT
Start: 2025-08-15 | End: 2025-08-15

## 2025-08-15 RX ORDER — SODIUM CHLORIDE 0.9 % (FLUSH) 0.9 %
5-40 SYRINGE (ML) INJECTION PRN
Status: CANCELLED | OUTPATIENT
Start: 2025-08-15

## 2025-08-15 RX ORDER — SODIUM CHLORIDE 0.9 % (FLUSH) 0.9 %
5-40 SYRINGE (ML) INJECTION EVERY 12 HOURS SCHEDULED
Status: CANCELLED | OUTPATIENT
Start: 2025-08-15

## 2025-08-15 RX ORDER — SODIUM CHLORIDE 0.9 % (FLUSH) 0.9 %
5-40 SYRINGE (ML) INJECTION EVERY 12 HOURS SCHEDULED
Status: DISCONTINUED | OUTPATIENT
Start: 2025-08-15 | End: 2025-08-15 | Stop reason: HOSPADM

## 2025-08-15 RX ORDER — ONDANSETRON 2 MG/ML
INJECTION INTRAMUSCULAR; INTRAVENOUS
Status: DISCONTINUED | OUTPATIENT
Start: 2025-08-15 | End: 2025-08-15 | Stop reason: SDUPTHER

## 2025-08-15 RX ORDER — BUPIVACAINE HYDROCHLORIDE AND EPINEPHRINE 5; 5 MG/ML; UG/ML
INJECTION, SOLUTION EPIDURAL; INTRACAUDAL; PERINEURAL
Status: DISCONTINUED | OUTPATIENT
Start: 2025-08-15 | End: 2025-08-15 | Stop reason: ALTCHOICE

## 2025-08-15 RX ORDER — ONDANSETRON 2 MG/ML
4 INJECTION INTRAMUSCULAR; INTRAVENOUS
Status: DISCONTINUED | OUTPATIENT
Start: 2025-08-15 | End: 2025-08-15 | Stop reason: HOSPADM

## 2025-08-15 RX ORDER — SODIUM CHLORIDE, SODIUM LACTATE, POTASSIUM CHLORIDE, CALCIUM CHLORIDE 600; 310; 30; 20 MG/100ML; MG/100ML; MG/100ML; MG/100ML
INJECTION, SOLUTION INTRAVENOUS CONTINUOUS PRN
Status: COMPLETED | OUTPATIENT
Start: 2025-08-15 | End: 2025-08-15

## 2025-08-15 RX ORDER — SODIUM CHLORIDE 0.9 % (FLUSH) 0.9 %
5-40 SYRINGE (ML) INJECTION PRN
Status: DISCONTINUED | OUTPATIENT
Start: 2025-08-15 | End: 2025-08-15 | Stop reason: HOSPADM

## 2025-08-15 RX ORDER — INSULIN LISPRO 100 [IU]/ML
0-4 INJECTION, SOLUTION INTRAVENOUS; SUBCUTANEOUS
Status: DISCONTINUED | OUTPATIENT
Start: 2025-08-15 | End: 2025-08-16

## 2025-08-15 RX ORDER — DEXMEDETOMIDINE HYDROCHLORIDE 100 UG/ML
INJECTION, SOLUTION INTRAVENOUS
Status: DISCONTINUED | OUTPATIENT
Start: 2025-08-15 | End: 2025-08-15 | Stop reason: SDUPTHER

## 2025-08-15 RX ORDER — ALBUMIN (HUMAN) 12.5 G/50ML
SOLUTION INTRAVENOUS
Status: DISCONTINUED | OUTPATIENT
Start: 2025-08-15 | End: 2025-08-15 | Stop reason: SDUPTHER

## 2025-08-15 RX ORDER — ENOXAPARIN SODIUM 100 MG/ML
30 INJECTION SUBCUTANEOUS DAILY
Status: DISCONTINUED | OUTPATIENT
Start: 2025-08-16 | End: 2025-08-17 | Stop reason: HOSPADM

## 2025-08-15 RX ADMIN — CARBIDOPA AND LEVODOPA 2 TABLET: 25; 100 TABLET ORAL at 20:31

## 2025-08-15 RX ADMIN — LIDOCAINE HYDROCHLORIDE 100 MG: 20 INJECTION, SOLUTION EPIDURAL; INFILTRATION; INTRACAUDAL; PERINEURAL at 12:39

## 2025-08-15 RX ADMIN — CARBIDOPA AND LEVODOPA 2 TABLET: 25; 100 TABLET ORAL at 08:49

## 2025-08-15 RX ADMIN — DEXAMETHASONE SODIUM PHOSPHATE 4 MG: 4 INJECTION, SOLUTION INTRAMUSCULAR; INTRAVENOUS at 12:46

## 2025-08-15 RX ADMIN — PIPERACILLIN AND TAZOBACTAM 3375 MG: 3; .375 INJECTION, POWDER, FOR SOLUTION INTRAVENOUS at 17:57

## 2025-08-15 RX ADMIN — CARBIDOPA AND LEVODOPA 1 TABLET: 50; 200 TABLET, EXTENDED RELEASE ORAL at 20:31

## 2025-08-15 RX ADMIN — HYDROMORPHONE HYDROCHLORIDE 0.4 MG: 2 INJECTION, SOLUTION INTRAMUSCULAR; INTRAVENOUS; SUBCUTANEOUS at 13:00

## 2025-08-15 RX ADMIN — FENTANYL CITRATE 50 MCG: 50 INJECTION INTRAMUSCULAR; INTRAVENOUS at 16:07

## 2025-08-15 RX ADMIN — SODIUM CHLORIDE, SODIUM LACTATE, POTASSIUM CHLORIDE, AND CALCIUM CHLORIDE: .6; .31; .03; .02 INJECTION, SOLUTION INTRAVENOUS at 21:30

## 2025-08-15 RX ADMIN — Medication 10 MG: at 14:08

## 2025-08-15 RX ADMIN — SODIUM CHLORIDE, SODIUM LACTATE, POTASSIUM CHLORIDE, AND CALCIUM CHLORIDE: .6; .31; .03; .02 INJECTION, SOLUTION INTRAVENOUS at 17:56

## 2025-08-15 RX ADMIN — FENTANYL CITRATE 50 MCG: 50 INJECTION INTRAMUSCULAR; INTRAVENOUS at 15:46

## 2025-08-15 RX ADMIN — ALBUMIN (HUMAN) 12.5 G: 5 SOLUTION INTRAVENOUS at 14:04

## 2025-08-15 RX ADMIN — Medication 20 MG: at 13:27

## 2025-08-15 RX ADMIN — CARBIDOPA AND LEVODOPA 2 TABLET: 25; 100 TABLET ORAL at 17:57

## 2025-08-15 RX ADMIN — INSULIN GLARGINE 3 UNITS: 100 INJECTION, SOLUTION SUBCUTANEOUS at 20:36

## 2025-08-15 RX ADMIN — PIPERACILLIN AND TAZOBACTAM 3375 MG: 3; .375 INJECTION, POWDER, FOR SOLUTION INTRAVENOUS at 06:23

## 2025-08-15 RX ADMIN — SODIUM CHLORIDE: 9 INJECTION, SOLUTION INTRAVENOUS at 15:01

## 2025-08-15 RX ADMIN — ONDANSETRON 4 MG: 2 INJECTION, SOLUTION INTRAMUSCULAR; INTRAVENOUS at 12:46

## 2025-08-15 RX ADMIN — DEXMEDETOMIDINE HYDROCHLORIDE 4 MCG: 100 INJECTION, SOLUTION INTRAVENOUS at 12:50

## 2025-08-15 RX ADMIN — Medication 50 MG: at 12:39

## 2025-08-15 RX ADMIN — INSULIN LISPRO 2 UNITS: 100 INJECTION, SOLUTION INTRAVENOUS; SUBCUTANEOUS at 20:31

## 2025-08-15 RX ADMIN — INDOCYANINE GREEN AND WATER 5 MG: KIT at 13:31

## 2025-08-15 RX ADMIN — PHENYLEPHRINE HYDROCHLORIDE 100 MCG: 10 INJECTION INTRAVENOUS at 13:31

## 2025-08-15 RX ADMIN — FENTANYL CITRATE 50 MCG: 50 INJECTION, SOLUTION INTRAMUSCULAR; INTRAVENOUS at 12:39

## 2025-08-15 RX ADMIN — SODIUM CHLORIDE: 9 INJECTION, SOLUTION INTRAVENOUS at 12:31

## 2025-08-15 RX ADMIN — FENTANYL CITRATE 50 MCG: 50 INJECTION, SOLUTION INTRAMUSCULAR; INTRAVENOUS at 12:50

## 2025-08-15 RX ADMIN — PROPOFOL 60 MG: 10 INJECTION, EMULSION INTRAVENOUS at 12:39

## 2025-08-15 ASSESSMENT — PAIN DESCRIPTION - LOCATION
LOCATION: ABDOMEN
LOCATION: ABDOMEN

## 2025-08-15 ASSESSMENT — PAIN DESCRIPTION - DESCRIPTORS: DESCRIPTORS: DISCOMFORT

## 2025-08-15 ASSESSMENT — PAIN SCALES - PAIN ASSESSMENT IN ADVANCED DEMENTIA (PAINAD)
FACIALEXPRESSION: FACIAL GRIMACING
TOTALSCORE: 6
BODYLANGUAGE: TENSE, DISTRESSED PACING, FIDGETING
CONSOLABILITY: UNABLE TO CONSOLE, DISTRACT OR REASSURE
CONSOLABILITY: UNABLE TO CONSOLE, DISTRACT OR REASSURE
NEGVOCALIZATION: OCCASIONAL MOAN/GROAN, LOW SPEECH, NEGATIVE/DISAPPROVING QUALITY
TOTALSCORE: 6
TOTALSCORE: 6
BODYLANGUAGE: TENSE, DISTRESSED PACING, FIDGETING
NEGVOCALIZATION: OCCASIONAL MOAN/GROAN, LOW SPEECH, NEGATIVE/DISAPPROVING QUALITY
BREATHING: NORMAL
BODYLANGUAGE: TENSE, DISTRESSED PACING, FIDGETING
FACIALEXPRESSION: FACIAL GRIMACING
TOTALSCORE: 6
FACIALEXPRESSION: FACIAL GRIMACING
CONSOLABILITY: UNABLE TO CONSOLE, DISTRACT OR REASSURE
BODYLANGUAGE: TENSE, DISTRESSED PACING, FIDGETING
BREATHING: NORMAL
BREATHING: NORMAL
NEGVOCALIZATION: OCCASIONAL MOAN/GROAN, LOW SPEECH, NEGATIVE/DISAPPROVING QUALITY
CONSOLABILITY: UNABLE TO CONSOLE, DISTRACT OR REASSURE
BODYLANGUAGE: TENSE, DISTRESSED PACING, FIDGETING
BODYLANGUAGE: TENSE, DISTRESSED PACING, FIDGETING
TOTALSCORE: 6
CONSOLABILITY: UNABLE TO CONSOLE, DISTRACT OR REASSURE
CONSOLABILITY: UNABLE TO CONSOLE, DISTRACT OR REASSURE
FACIALEXPRESSION: FACIAL GRIMACING
TOTALSCORE: 6
BODYLANGUAGE: TENSE, DISTRESSED PACING, FIDGETING
BREATHING: NORMAL
CONSOLABILITY: UNABLE TO CONSOLE, DISTRACT OR REASSURE
CONSOLABILITY: UNABLE TO CONSOLE, DISTRACT OR REASSURE
FACIALEXPRESSION: FACIAL GRIMACING
FACIALEXPRESSION: FACIAL GRIMACING
BREATHING: NORMAL
FACIALEXPRESSION: FACIAL GRIMACING
NEGVOCALIZATION: OCCASIONAL MOAN/GROAN, LOW SPEECH, NEGATIVE/DISAPPROVING QUALITY
BREATHING: NORMAL
BREATHING: NORMAL
NEGVOCALIZATION: OCCASIONAL MOAN/GROAN, LOW SPEECH, NEGATIVE/DISAPPROVING QUALITY
TOTALSCORE: 6
BREATHING: NORMAL
NEGVOCALIZATION: OCCASIONAL MOAN/GROAN, LOW SPEECH, NEGATIVE/DISAPPROVING QUALITY
FACIALEXPRESSION: FACIAL GRIMACING
BODYLANGUAGE: TENSE, DISTRESSED PACING, FIDGETING
TOTALSCORE: 6

## 2025-08-15 ASSESSMENT — PAIN - FUNCTIONAL ASSESSMENT
PAIN_FUNCTIONAL_ASSESSMENT: 0-10
PAIN_FUNCTIONAL_ASSESSMENT: 0-10
PAIN_FUNCTIONAL_ASSESSMENT: PREVENTS OR INTERFERES SOME ACTIVE ACTIVITIES AND ADLS
PAIN_FUNCTIONAL_ASSESSMENT: 0-10

## 2025-08-15 ASSESSMENT — PAIN SCALES - GENERAL
PAINLEVEL_OUTOF10: 0
PAINLEVEL_OUTOF10: 7

## 2025-08-16 LAB
ALBUMIN SERPL-MCNC: 3.3 G/DL (ref 3.4–5)
ALBUMIN/GLOB SERPL: 1.1 {RATIO} (ref 1.1–2.2)
ALP SERPL-CCNC: 104 U/L (ref 40–129)
ALT SERPL-CCNC: 9 U/L (ref 10–40)
ANION GAP SERPL CALCULATED.3IONS-SCNC: 19 MMOL/L (ref 3–16)
AST SERPL-CCNC: 92 U/L (ref 15–37)
BASOPHILS # BLD: 0 K/UL (ref 0–0.2)
BASOPHILS NFR BLD: 0.1 %
BILIRUB SERPL-MCNC: 0.7 MG/DL (ref 0–1)
BUN SERPL-MCNC: 25 MG/DL (ref 7–20)
CALCIUM SERPL-MCNC: 8.1 MG/DL (ref 8.3–10.6)
CHLORIDE SERPL-SCNC: 97 MMOL/L (ref 99–110)
CO2 SERPL-SCNC: 19 MMOL/L (ref 21–32)
CREAT SERPL-MCNC: 1.1 MG/DL (ref 0.6–1.2)
DEPRECATED RDW RBC AUTO: 13.7 % (ref 12.4–15.4)
EOSINOPHIL # BLD: 0 K/UL (ref 0–0.6)
EOSINOPHIL NFR BLD: 0 %
GFR SERPLBLD CREATININE-BSD FMLA CKD-EPI: 49 ML/MIN/{1.73_M2}
GLUCOSE BLD-MCNC: 197 MG/DL (ref 70–99)
GLUCOSE BLD-MCNC: 220 MG/DL (ref 70–99)
GLUCOSE BLD-MCNC: 226 MG/DL (ref 70–99)
GLUCOSE BLD-MCNC: 263 MG/DL (ref 70–99)
GLUCOSE BLD-MCNC: 307 MG/DL (ref 70–99)
GLUCOSE SERPL-MCNC: 289 MG/DL (ref 70–99)
HCT VFR BLD AUTO: 30.9 % (ref 36–48)
HGB BLD-MCNC: 10.3 G/DL (ref 12–16)
LYMPHOCYTES # BLD: 0.4 K/UL (ref 1–5.1)
LYMPHOCYTES NFR BLD: 2 %
MCH RBC QN AUTO: 30.4 PG (ref 26–34)
MCHC RBC AUTO-ENTMCNC: 33.3 G/DL (ref 31–36)
MCV RBC AUTO: 91.3 FL (ref 80–100)
MONOCYTES # BLD: 1.1 K/UL (ref 0–1.3)
MONOCYTES NFR BLD: 6.2 %
NEUTROPHILS # BLD: 16.2 K/UL (ref 1.7–7.7)
NEUTROPHILS NFR BLD: 91.7 %
PERFORMED ON: ABNORMAL
PLATELET # BLD AUTO: 282 K/UL (ref 135–450)
PMV BLD AUTO: 9 FL (ref 5–10.5)
POTASSIUM SERPL-SCNC: 4.5 MMOL/L (ref 3.5–5.1)
PROT SERPL-MCNC: 6.2 G/DL (ref 6.4–8.2)
RBC # BLD AUTO: 3.38 M/UL (ref 4–5.2)
SODIUM SERPL-SCNC: 135 MMOL/L (ref 136–145)
WBC # BLD AUTO: 17.7 K/UL (ref 4–11)

## 2025-08-16 PROCEDURE — 6360000002 HC RX W HCPCS: Performed by: SURGERY

## 2025-08-16 PROCEDURE — 97535 SELF CARE MNGMENT TRAINING: CPT

## 2025-08-16 PROCEDURE — 6370000000 HC RX 637 (ALT 250 FOR IP): Performed by: SURGERY

## 2025-08-16 PROCEDURE — APPNB30 APP NON BILLABLE TIME 0-30 MINS: Performed by: NURSE PRACTITIONER

## 2025-08-16 PROCEDURE — 97530 THERAPEUTIC ACTIVITIES: CPT

## 2025-08-16 PROCEDURE — 85025 COMPLETE CBC W/AUTO DIFF WBC: CPT

## 2025-08-16 PROCEDURE — 80053 COMPREHEN METABOLIC PANEL: CPT

## 2025-08-16 PROCEDURE — 1200000000 HC SEMI PRIVATE

## 2025-08-16 PROCEDURE — 94761 N-INVAS EAR/PLS OXIMETRY MLT: CPT

## 2025-08-16 PROCEDURE — APPSS15 APP SPLIT SHARED TIME 0-15 MINUTES: Performed by: NURSE PRACTITIONER

## 2025-08-16 PROCEDURE — 6370000000 HC RX 637 (ALT 250 FOR IP): Performed by: NURSE PRACTITIONER

## 2025-08-16 PROCEDURE — 2580000003 HC RX 258: Performed by: SURGERY

## 2025-08-16 PROCEDURE — 36415 COLL VENOUS BLD VENIPUNCTURE: CPT

## 2025-08-16 PROCEDURE — 2700000000 HC OXYGEN THERAPY PER DAY

## 2025-08-16 PROCEDURE — 99024 POSTOP FOLLOW-UP VISIT: CPT | Performed by: SURGERY

## 2025-08-16 RX ORDER — INSULIN LISPRO 100 [IU]/ML
2 INJECTION, SOLUTION INTRAVENOUS; SUBCUTANEOUS
Status: DISCONTINUED | OUTPATIENT
Start: 2025-08-16 | End: 2025-08-16

## 2025-08-16 RX ORDER — ASPIRIN 81 MG/1
81 TABLET ORAL NIGHTLY
Status: DISCONTINUED | OUTPATIENT
Start: 2025-08-16 | End: 2025-08-16

## 2025-08-16 RX ORDER — CALCIUM CARBONATE 500 MG/1
250 TABLET, CHEWABLE ORAL 2 TIMES DAILY
Status: DISCONTINUED | OUTPATIENT
Start: 2025-08-16 | End: 2025-08-17 | Stop reason: HOSPADM

## 2025-08-16 RX ORDER — MORPHINE SULFATE 2 MG/ML
2 INJECTION, SOLUTION INTRAMUSCULAR; INTRAVENOUS
Status: DISCONTINUED | OUTPATIENT
Start: 2025-08-16 | End: 2025-08-16

## 2025-08-16 RX ORDER — ALPRAZOLAM 0.25 MG
0.25 TABLET ORAL 2 TIMES DAILY PRN
Status: DISCONTINUED | OUTPATIENT
Start: 2025-08-16 | End: 2025-08-17 | Stop reason: HOSPADM

## 2025-08-16 RX ORDER — INSULIN GLARGINE 100 [IU]/ML
4 INJECTION, SOLUTION SUBCUTANEOUS 2 TIMES DAILY
Status: DISCONTINUED | OUTPATIENT
Start: 2025-08-16 | End: 2025-08-16

## 2025-08-16 RX ORDER — PRAMIPEXOLE DIHYDROCHLORIDE 0.25 MG/1
1 TABLET ORAL 2 TIMES DAILY
Status: DISCONTINUED | OUTPATIENT
Start: 2025-08-16 | End: 2025-08-17 | Stop reason: HOSPADM

## 2025-08-16 RX ORDER — VITAMIN B COMPLEX
2000 TABLET ORAL DAILY
Status: DISCONTINUED | OUTPATIENT
Start: 2025-08-16 | End: 2025-08-17 | Stop reason: HOSPADM

## 2025-08-16 RX ORDER — M-VIT,TX,IRON,MINS/CALC/FOLIC 27MG-0.4MG
1 TABLET ORAL DAILY
Status: DISCONTINUED | OUTPATIENT
Start: 2025-08-16 | End: 2025-08-17 | Stop reason: HOSPADM

## 2025-08-16 RX ORDER — ROSUVASTATIN CALCIUM 10 MG/1
10 TABLET, COATED ORAL DAILY
Status: DISCONTINUED | OUTPATIENT
Start: 2025-08-16 | End: 2025-08-17 | Stop reason: HOSPADM

## 2025-08-16 RX ORDER — ASPIRIN 81 MG/1
81 TABLET ORAL NIGHTLY
Status: DISCONTINUED | OUTPATIENT
Start: 2025-08-16 | End: 2025-08-17 | Stop reason: HOSPADM

## 2025-08-16 RX ORDER — MORPHINE SULFATE 4 MG/ML
4 INJECTION, SOLUTION INTRAMUSCULAR; INTRAVENOUS EVERY 8 HOURS PRN
Status: DISCONTINUED | OUTPATIENT
Start: 2025-08-16 | End: 2025-08-16

## 2025-08-16 RX ORDER — HYDROCODONE BITARTRATE AND ACETAMINOPHEN 5; 325 MG/1; MG/1
1 TABLET ORAL EVERY 6 HOURS PRN
Status: DISCONTINUED | OUTPATIENT
Start: 2025-08-16 | End: 2025-08-17

## 2025-08-16 RX ADMIN — CARBIDOPA AND LEVODOPA 2 TABLET: 25; 100 TABLET ORAL at 12:28

## 2025-08-16 RX ADMIN — ROSUVASTATIN CALCIUM 10 MG: 10 TABLET, FILM COATED ORAL at 15:18

## 2025-08-16 RX ADMIN — PIPERACILLIN AND TAZOBACTAM 3375 MG: 3; .375 INJECTION, POWDER, FOR SOLUTION INTRAVENOUS at 01:19

## 2025-08-16 RX ADMIN — Medication 2000 UNITS: at 15:17

## 2025-08-16 RX ADMIN — CARBIDOPA AND LEVODOPA 2 TABLET: 25; 100 TABLET ORAL at 20:25

## 2025-08-16 RX ADMIN — INSULIN GLARGINE 4 UNITS: 100 INJECTION, SOLUTION SUBCUTANEOUS at 08:36

## 2025-08-16 RX ADMIN — LEVOTHYROXINE SODIUM 100 MCG: 0.1 TABLET ORAL at 05:57

## 2025-08-16 RX ADMIN — ENOXAPARIN SODIUM 30 MG: 100 INJECTION SUBCUTANEOUS at 08:36

## 2025-08-16 RX ADMIN — CARBIDOPA AND LEVODOPA 2 TABLET: 25; 100 TABLET ORAL at 17:16

## 2025-08-16 RX ADMIN — CARBIDOPA AND LEVODOPA 1 TABLET: 50; 200 TABLET, EXTENDED RELEASE ORAL at 20:26

## 2025-08-16 RX ADMIN — PRAMIPEXOLE DIHYDROCHLORIDE 1 MG: 0.25 TABLET ORAL at 15:17

## 2025-08-16 RX ADMIN — PIPERACILLIN AND TAZOBACTAM 3375 MG: 3; .375 INJECTION, POWDER, FOR SOLUTION INTRAVENOUS at 10:50

## 2025-08-16 RX ADMIN — PRAMIPEXOLE DIHYDROCHLORIDE 1 MG: 0.25 TABLET ORAL at 20:51

## 2025-08-16 RX ADMIN — Medication 1 TABLET: at 15:18

## 2025-08-16 RX ADMIN — INSULIN LISPRO 2 UNITS: 100 INJECTION, SOLUTION INTRAVENOUS; SUBCUTANEOUS at 12:28

## 2025-08-16 RX ADMIN — INSULIN LISPRO 2 UNITS: 100 INJECTION, SOLUTION INTRAVENOUS; SUBCUTANEOUS at 08:35

## 2025-08-16 RX ADMIN — PIPERACILLIN AND TAZOBACTAM 3375 MG: 3; .375 INJECTION, POWDER, FOR SOLUTION INTRAVENOUS at 17:16

## 2025-08-16 RX ADMIN — INSULIN LISPRO 3 UNITS: 100 INJECTION, SOLUTION INTRAVENOUS; SUBCUTANEOUS at 05:56

## 2025-08-16 RX ADMIN — ASPIRIN 81 MG: 81 TABLET, COATED ORAL at 20:26

## 2025-08-16 RX ADMIN — CARBIDOPA AND LEVODOPA 2 TABLET: 25; 100 TABLET ORAL at 08:35

## 2025-08-16 RX ADMIN — ACETAMINOPHEN 650 MG: 325 TABLET ORAL at 20:24

## 2025-08-16 RX ADMIN — INSULIN LISPRO 1 UNITS: 100 INJECTION, SOLUTION INTRAVENOUS; SUBCUTANEOUS at 12:29

## 2025-08-16 RX ADMIN — ANTACID TABLETS 250 MG: 500 TABLET, CHEWABLE ORAL at 15:18

## 2025-08-16 RX ADMIN — ANTACID TABLETS 250 MG: 500 TABLET, CHEWABLE ORAL at 20:26

## 2025-08-16 ASSESSMENT — PAIN DESCRIPTION - LOCATION: LOCATION: ABDOMEN;BACK

## 2025-08-16 ASSESSMENT — PAIN SCALES - GENERAL
PAINLEVEL_OUTOF10: 0
PAINLEVEL_OUTOF10: 8
PAINLEVEL_OUTOF10: 4

## 2025-08-16 ASSESSMENT — PAIN DESCRIPTION - PAIN TYPE: TYPE: SURGICAL PAIN;CHRONIC PAIN

## 2025-08-16 ASSESSMENT — PAIN - FUNCTIONAL ASSESSMENT
PAIN_FUNCTIONAL_ASSESSMENT: 0-10
PAIN_FUNCTIONAL_ASSESSMENT: 0-10
PAIN_FUNCTIONAL_ASSESSMENT: PREVENTS OR INTERFERES SOME ACTIVE ACTIVITIES AND ADLS

## 2025-08-16 ASSESSMENT — PAIN DESCRIPTION - DESCRIPTORS: DESCRIPTORS: ACHING

## 2025-08-16 ASSESSMENT — PAIN DESCRIPTION - ONSET: ONSET: ON-GOING

## 2025-08-16 ASSESSMENT — PAIN DESCRIPTION - FREQUENCY: FREQUENCY: CONTINUOUS

## 2025-08-16 ASSESSMENT — PAIN DESCRIPTION - ORIENTATION: ORIENTATION: RIGHT;LEFT

## 2025-08-17 VITALS
RESPIRATION RATE: 16 BRPM | BODY MASS INDEX: 18.65 KG/M2 | SYSTOLIC BLOOD PRESSURE: 151 MMHG | WEIGHT: 95 LBS | HEART RATE: 75 BPM | HEIGHT: 60 IN | TEMPERATURE: 98.8 F | DIASTOLIC BLOOD PRESSURE: 73 MMHG | OXYGEN SATURATION: 95 %

## 2025-08-17 LAB
ALBUMIN SERPL-MCNC: 3 G/DL (ref 3.4–5)
ALBUMIN/GLOB SERPL: 1.3 {RATIO} (ref 1.1–2.2)
ALP SERPL-CCNC: 96 U/L (ref 40–129)
ALT SERPL-CCNC: 7 U/L (ref 10–40)
ANION GAP SERPL CALCULATED.3IONS-SCNC: 10 MMOL/L (ref 3–16)
AST SERPL-CCNC: 52 U/L (ref 15–37)
BASOPHILS # BLD: 0 K/UL (ref 0–0.2)
BASOPHILS NFR BLD: 0.2 %
BILIRUB SERPL-MCNC: 0.5 MG/DL (ref 0–1)
BUN SERPL-MCNC: 23 MG/DL (ref 7–20)
CALCIUM SERPL-MCNC: 8.1 MG/DL (ref 8.3–10.6)
CHLORIDE SERPL-SCNC: 101 MMOL/L (ref 99–110)
CO2 SERPL-SCNC: 26 MMOL/L (ref 21–32)
CREAT SERPL-MCNC: 0.9 MG/DL (ref 0.6–1.2)
DEPRECATED RDW RBC AUTO: 14 % (ref 12.4–15.4)
EOSINOPHIL # BLD: 0.1 K/UL (ref 0–0.6)
EOSINOPHIL NFR BLD: 1.1 %
GFR SERPLBLD CREATININE-BSD FMLA CKD-EPI: 62 ML/MIN/{1.73_M2}
GLUCOSE BLD-MCNC: 138 MG/DL (ref 70–99)
GLUCOSE BLD-MCNC: 97 MG/DL (ref 70–99)
GLUCOSE SERPL-MCNC: 105 MG/DL (ref 70–99)
HCT VFR BLD AUTO: 27.1 % (ref 36–48)
HGB BLD-MCNC: 9.4 G/DL (ref 12–16)
LIPASE SERPL-CCNC: 10 U/L (ref 13–60)
LYMPHOCYTES # BLD: 0.8 K/UL (ref 1–5.1)
LYMPHOCYTES NFR BLD: 6.4 %
MCH RBC QN AUTO: 31.1 PG (ref 26–34)
MCHC RBC AUTO-ENTMCNC: 34.8 G/DL (ref 31–36)
MCV RBC AUTO: 89.3 FL (ref 80–100)
MONOCYTES # BLD: 0.9 K/UL (ref 0–1.3)
MONOCYTES NFR BLD: 6.8 %
NEUTROPHILS # BLD: 11.1 K/UL (ref 1.7–7.7)
NEUTROPHILS NFR BLD: 85.5 %
PERFORMED ON: ABNORMAL
PERFORMED ON: NORMAL
PLATELET # BLD AUTO: 281 K/UL (ref 135–450)
PMV BLD AUTO: 8.9 FL (ref 5–10.5)
POTASSIUM SERPL-SCNC: 3.6 MMOL/L (ref 3.5–5.1)
PROT SERPL-MCNC: 5.4 G/DL (ref 6.4–8.2)
RBC # BLD AUTO: 3.03 M/UL (ref 4–5.2)
SODIUM SERPL-SCNC: 137 MMOL/L (ref 136–145)
WBC # BLD AUTO: 13 K/UL (ref 4–11)

## 2025-08-17 PROCEDURE — 2500000003 HC RX 250 WO HCPCS: Performed by: SURGERY

## 2025-08-17 PROCEDURE — 6370000000 HC RX 637 (ALT 250 FOR IP): Performed by: SURGERY

## 2025-08-17 PROCEDURE — APPSS15 APP SPLIT SHARED TIME 0-15 MINUTES: Performed by: NURSE PRACTITIONER

## 2025-08-17 PROCEDURE — 36415 COLL VENOUS BLD VENIPUNCTURE: CPT

## 2025-08-17 PROCEDURE — 6360000002 HC RX W HCPCS: Performed by: SURGERY

## 2025-08-17 PROCEDURE — 83690 ASSAY OF LIPASE: CPT

## 2025-08-17 PROCEDURE — 85025 COMPLETE CBC W/AUTO DIFF WBC: CPT

## 2025-08-17 PROCEDURE — 80053 COMPREHEN METABOLIC PANEL: CPT

## 2025-08-17 PROCEDURE — 2580000003 HC RX 258: Performed by: SURGERY

## 2025-08-17 PROCEDURE — 99024 POSTOP FOLLOW-UP VISIT: CPT | Performed by: SURGERY

## 2025-08-17 PROCEDURE — 6370000000 HC RX 637 (ALT 250 FOR IP): Performed by: NURSE PRACTITIONER

## 2025-08-17 PROCEDURE — APPNB30 APP NON BILLABLE TIME 0-30 MINS: Performed by: NURSE PRACTITIONER

## 2025-08-17 PROCEDURE — 94760 N-INVAS EAR/PLS OXIMETRY 1: CPT

## 2025-08-17 RX ORDER — LACTOBACILLUS RHAMNOSUS GG 10B CELL
1 CAPSULE ORAL
Status: DISCONTINUED | OUTPATIENT
Start: 2025-08-17 | End: 2025-08-17 | Stop reason: HOSPADM

## 2025-08-17 RX ADMIN — CARBIDOPA AND LEVODOPA 2 TABLET: 25; 100 TABLET ORAL at 12:45

## 2025-08-17 RX ADMIN — Medication 1 TABLET: at 08:17

## 2025-08-17 RX ADMIN — ENOXAPARIN SODIUM 30 MG: 100 INJECTION SUBCUTANEOUS at 08:18

## 2025-08-17 RX ADMIN — ROSUVASTATIN CALCIUM 10 MG: 10 TABLET, FILM COATED ORAL at 08:17

## 2025-08-17 RX ADMIN — PIPERACILLIN AND TAZOBACTAM 3375 MG: 3; .375 INJECTION, POWDER, FOR SOLUTION INTRAVENOUS at 00:50

## 2025-08-17 RX ADMIN — PIPERACILLIN AND TAZOBACTAM 3375 MG: 3; .375 INJECTION, POWDER, FOR SOLUTION INTRAVENOUS at 10:16

## 2025-08-17 RX ADMIN — Medication 2000 UNITS: at 08:17

## 2025-08-17 RX ADMIN — ANTACID TABLETS 250 MG: 500 TABLET, CHEWABLE ORAL at 08:17

## 2025-08-17 RX ADMIN — SODIUM CHLORIDE, PRESERVATIVE FREE 10 ML: 5 INJECTION INTRAVENOUS at 08:18

## 2025-08-17 RX ADMIN — Medication 1 CAPSULE: at 12:45

## 2025-08-17 RX ADMIN — LEVOTHYROXINE SODIUM 100 MCG: 0.1 TABLET ORAL at 05:35

## 2025-08-17 RX ADMIN — CARBIDOPA AND LEVODOPA 2 TABLET: 25; 100 TABLET ORAL at 08:17

## 2025-08-17 ASSESSMENT — PAIN SCALES - GENERAL: PAINLEVEL_OUTOF10: 0

## 2025-08-18 ENCOUNTER — CARE COORDINATION (OUTPATIENT)
Dept: CARE COORDINATION | Age: 85
End: 2025-08-18

## 2025-08-18 ENCOUNTER — TELEPHONE (OUTPATIENT)
Dept: FAMILY MEDICINE CLINIC | Age: 85
End: 2025-08-18

## 2025-08-18 LAB
BACTERIA BLD CULT ORG #2: NORMAL
BACTERIA BLD CULT: NORMAL

## 2025-08-21 ENCOUNTER — OFFICE VISIT (OUTPATIENT)
Dept: SURGERY | Age: 85
End: 2025-08-21

## 2025-08-21 VITALS — DIASTOLIC BLOOD PRESSURE: 70 MMHG | WEIGHT: 104.2 LBS | SYSTOLIC BLOOD PRESSURE: 122 MMHG | BODY MASS INDEX: 20.35 KG/M2

## 2025-08-21 DIAGNOSIS — R10.30 LOWER ABDOMINAL PAIN: ICD-10-CM

## 2025-08-21 DIAGNOSIS — R10.30 LOWER ABDOMINAL PAIN: Primary | ICD-10-CM

## 2025-08-21 PROCEDURE — 99024 POSTOP FOLLOW-UP VISIT: CPT | Performed by: SURGERY

## 2025-08-22 ENCOUNTER — OFFICE VISIT (OUTPATIENT)
Dept: FAMILY MEDICINE CLINIC | Age: 85
End: 2025-08-22

## 2025-08-22 DIAGNOSIS — Z09 HOSPITAL DISCHARGE FOLLOW-UP: ICD-10-CM

## 2025-08-22 DIAGNOSIS — F41.9 ANXIETY: ICD-10-CM

## 2025-08-22 DIAGNOSIS — K59.04 CHRONIC IDIOPATHIC CONSTIPATION: Primary | ICD-10-CM

## 2025-08-22 LAB
ALBUMIN SERPL-MCNC: 3.8 G/DL (ref 3.4–5)
ALBUMIN/GLOB SERPL: 1.5 {RATIO} (ref 1.1–2.2)
ALP SERPL-CCNC: 103 U/L (ref 40–129)
ALT SERPL-CCNC: 17 U/L (ref 10–40)
ANION GAP SERPL CALCULATED.3IONS-SCNC: 11 MMOL/L (ref 3–16)
AST SERPL-CCNC: 27 U/L (ref 15–37)
BACTERIA URNS QL MICRO: NORMAL /HPF
BASOPHILS # BLD: 0.1 K/UL (ref 0–0.2)
BASOPHILS NFR BLD: 0.7 %
BILIRUB SERPL-MCNC: 0.3 MG/DL (ref 0–1)
BILIRUB UR QL STRIP.AUTO: NEGATIVE
BUN SERPL-MCNC: 27 MG/DL (ref 7–20)
CALCIUM SERPL-MCNC: 9.3 MG/DL (ref 8.3–10.6)
CHLORIDE SERPL-SCNC: 96 MMOL/L (ref 99–110)
CLARITY UR: CLEAR
CO2 SERPL-SCNC: 29 MMOL/L (ref 21–32)
COLOR UR: YELLOW
CREAT SERPL-MCNC: 1.2 MG/DL (ref 0.6–1.2)
DEPRECATED RDW RBC AUTO: 13.8 % (ref 12.4–15.4)
EOSINOPHIL # BLD: 0.5 K/UL (ref 0–0.6)
EOSINOPHIL NFR BLD: 4.3 %
EPI CELLS #/AREA URNS AUTO: 3 /HPF (ref 0–5)
GFR SERPLBLD CREATININE-BSD FMLA CKD-EPI: 44 ML/MIN/{1.73_M2}
GLUCOSE SERPL-MCNC: 169 MG/DL (ref 70–99)
GLUCOSE UR STRIP.AUTO-MCNC: NEGATIVE MG/DL
HCT VFR BLD AUTO: 30.1 % (ref 36–48)
HGB BLD-MCNC: 10.5 G/DL (ref 12–16)
HGB UR QL STRIP.AUTO: NEGATIVE
HYALINE CASTS #/AREA URNS AUTO: 0 /LPF (ref 0–8)
KETONES UR STRIP.AUTO-MCNC: ABNORMAL MG/DL
LEUKOCYTE ESTERASE UR QL STRIP.AUTO: ABNORMAL
LYMPHOCYTES # BLD: 1 K/UL (ref 1–5.1)
LYMPHOCYTES NFR BLD: 9.5 %
MCH RBC QN AUTO: 31.3 PG (ref 26–34)
MCHC RBC AUTO-ENTMCNC: 34.8 G/DL (ref 31–36)
MCV RBC AUTO: 90 FL (ref 80–100)
MONOCYTES # BLD: 1 K/UL (ref 0–1.3)
MONOCYTES NFR BLD: 8.8 %
NEUTROPHILS # BLD: 8.4 K/UL (ref 1.7–7.7)
NEUTROPHILS NFR BLD: 76.7 %
NITRITE UR QL STRIP.AUTO: NEGATIVE
PH UR STRIP.AUTO: 5.5 [PH] (ref 5–8)
PLATELET # BLD AUTO: 442 K/UL (ref 135–450)
PMV BLD AUTO: 8.7 FL (ref 5–10.5)
POTASSIUM SERPL-SCNC: 4.8 MMOL/L (ref 3.5–5.1)
PROT SERPL-MCNC: 6.3 G/DL (ref 6.4–8.2)
PROT UR STRIP.AUTO-MCNC: NEGATIVE MG/DL
RBC # BLD AUTO: 3.34 M/UL (ref 4–5.2)
RBC CLUMPS #/AREA URNS AUTO: 1 /HPF (ref 0–4)
SODIUM SERPL-SCNC: 136 MMOL/L (ref 136–145)
SP GR UR STRIP.AUTO: 1.02 (ref 1–1.03)
UA DIPSTICK W REFLEX MICRO PNL UR: YES
URN SPEC COLLECT METH UR: ABNORMAL
UROBILINOGEN UR STRIP-ACNC: 1 E.U./DL
WBC # BLD AUTO: 10.9 K/UL (ref 4–11)
WBC #/AREA URNS AUTO: 5 /HPF (ref 0–5)

## 2025-08-22 RX ORDER — SENNOSIDES 8.6 MG/1
1 TABLET ORAL 2 TIMES DAILY
Qty: 60 TABLET | Refills: 0 | Status: SHIPPED | OUTPATIENT
Start: 2025-08-22 | End: 2025-09-21

## 2025-08-22 RX ORDER — HYDROXYZINE HYDROCHLORIDE 25 MG/1
25 TABLET, FILM COATED ORAL EVERY 8 HOURS PRN
Qty: 30 TABLET | Refills: 0 | Status: SHIPPED | OUTPATIENT
Start: 2025-08-22 | End: 2025-09-01

## 2025-08-22 RX ORDER — DOCUSATE SODIUM 100 MG/1
100 CAPSULE, LIQUID FILLED ORAL 2 TIMES DAILY
Qty: 60 CAPSULE | Refills: 0 | Status: SHIPPED | OUTPATIENT
Start: 2025-08-22 | End: 2025-09-21

## 2025-08-22 RX ORDER — PRAMIPEXOLE DIHYDROCHLORIDE 0.5 MG/1
1 TABLET ORAL 2 TIMES DAILY
Qty: 270 TABLET | Refills: 3 | Status: SHIPPED
Start: 2025-08-22

## 2025-08-22 RX ORDER — PRAMIPEXOLE DIHYDROCHLORIDE 0.5 MG/1
1 TABLET ORAL 2 TIMES DAILY
Qty: 270 TABLET | Refills: 3 | Status: SHIPPED
Start: 2025-08-22 | End: 2025-08-22 | Stop reason: DRUGHIGH

## 2025-08-22 ASSESSMENT — PATIENT HEALTH QUESTIONNAIRE - PHQ9
7. TROUBLE CONCENTRATING ON THINGS, SUCH AS READING THE NEWSPAPER OR WATCHING TELEVISION: NOT AT ALL
6. FEELING BAD ABOUT YOURSELF - OR THAT YOU ARE A FAILURE OR HAVE LET YOURSELF OR YOUR FAMILY DOWN: NOT AT ALL
SUM OF ALL RESPONSES TO PHQ QUESTIONS 1-9: 12
8. MOVING OR SPEAKING SO SLOWLY THAT OTHER PEOPLE COULD HAVE NOTICED. OR THE OPPOSITE, BEING SO FIGETY OR RESTLESS THAT YOU HAVE BEEN MOVING AROUND A LOT MORE THAN USUAL: NEARLY EVERY DAY
1. LITTLE INTEREST OR PLEASURE IN DOING THINGS: NEARLY EVERY DAY
SUM OF ALL RESPONSES TO PHQ QUESTIONS 1-9: 12
9. THOUGHTS THAT YOU WOULD BE BETTER OFF DEAD, OR OF HURTING YOURSELF: NOT AT ALL
5. POOR APPETITE OR OVEREATING: NOT AT ALL
4. FEELING TIRED OR HAVING LITTLE ENERGY: NEARLY EVERY DAY
SUM OF ALL RESPONSES TO PHQ QUESTIONS 1-9: 12
3. TROUBLE FALLING OR STAYING ASLEEP: NOT AT ALL
SUM OF ALL RESPONSES TO PHQ QUESTIONS 1-9: 12
2. FEELING DOWN, DEPRESSED OR HOPELESS: NEARLY EVERY DAY

## 2025-08-23 VITALS
DIASTOLIC BLOOD PRESSURE: 82 MMHG | TEMPERATURE: 97.3 F | HEIGHT: 60 IN | HEART RATE: 73 BPM | BODY MASS INDEX: 20.22 KG/M2 | SYSTOLIC BLOOD PRESSURE: 146 MMHG | OXYGEN SATURATION: 96 % | WEIGHT: 103 LBS

## 2025-08-26 ENCOUNTER — CARE COORDINATION (OUTPATIENT)
Dept: CARE COORDINATION | Age: 85
End: 2025-08-26

## 2025-08-28 ENCOUNTER — TELEPHONE (OUTPATIENT)
Dept: ADMINISTRATIVE | Age: 85
End: 2025-08-28

## 2025-08-28 ENCOUNTER — OFFICE VISIT (OUTPATIENT)
Dept: SURGERY | Age: 85
End: 2025-08-28

## 2025-08-28 VITALS — DIASTOLIC BLOOD PRESSURE: 60 MMHG | SYSTOLIC BLOOD PRESSURE: 106 MMHG | WEIGHT: 99.2 LBS | BODY MASS INDEX: 19.37 KG/M2

## 2025-08-28 DIAGNOSIS — K81.0 ACUTE CHOLECYSTITIS: Primary | ICD-10-CM

## 2025-08-28 PROCEDURE — 99024 POSTOP FOLLOW-UP VISIT: CPT | Performed by: SURGERY

## (undated) DEVICE — SOLUTION IRRIG 1000ML 09% SOD CHL USP PIC PLAS CONTAINER

## (undated) DEVICE — SUTURE MONOCRYL + SZ 4-0 L27IN ABSRB UD L19MM PS-2 3/8 CIR MCP426H

## (undated) DEVICE — DRAPE ARM W21XH19XL10.5IN DA VINCI XI INTUITIVE SURGICAL

## (undated) DEVICE — SUTURE VICRYL + SZ 0 L18IN ABSRB CLR VCP112G

## (undated) DEVICE — SET TB TRILUMEN FLTR

## (undated) DEVICE — NEEDLE INSUF L150MM OD13GA DISP VERES

## (undated) DEVICE — DRAPE C ARM W/ POLY STRP W42XL72IN FOR MOB XR

## (undated) DEVICE — BAG RETRIEVAL SPECIMEN SUPERBAG 10 MEDIUM NYLON ITRODUCER

## (undated) DEVICE — SUTURE VICRYL + SZ 3-0 L27IN ABSRB UD L26MM SH 1/2 CIR VCP416H

## (undated) DEVICE — TROCAR LAP L100MM OD12MM ABD ADV FIX SL NONBLADED KII

## (undated) DEVICE — SUTURE ETHILON SZ 3-0 L18IN NONABSORBABLE BLK PS-2 L19MM 3/8 1669H

## (undated) DEVICE — Device

## (undated) DEVICE — SEAL UNIVERSAL 5-12MM

## (undated) DEVICE — OBTURATOR ROBOTIC DIA8MM STD OPT BLDELSS

## (undated) DEVICE — CATHETER CHOLANGIOGRAM 4.5 FRX3 IN W/ 20018M55 TAUT INTRO

## (undated) DEVICE — SUTURE VICRYL + SZ 0 L27IN ABSRB VLT L26MM UR-6 5/8 CIR VCP603H

## (undated) DEVICE — BLANKET WARMING L 2010 X W 760 MM UPPER BODY 2 HOSE INLET

## (undated) DEVICE — PENCIL SMK EVAC TELSCP 3 M TBNG

## (undated) DEVICE — ADHESIVE SKIN CLSR 0.7ML TOP DERMBND ADV

## (undated) DEVICE — IRRIGATOR SURG DIA8MM SUCT ENDOWRIST

## (undated) DEVICE — SPONGE GZ W4XL4IN 8 PLY 100% COTTON

## (undated) DEVICE — BAG RETRIEVAL SPECIMEN SUPERBAG 5 SMALL NYLON ITRODUCER

## (undated) DEVICE — SYRINGE MED 10ML LUERLOCK TIP W/O SFTY DISP

## (undated) DEVICE — DRAPE SURG W77XL90IN REINF OVRHD TBL CVR

## (undated) DEVICE — CLOSURE PUNCTURE/SUTURE 14G DEV

## (undated) DEVICE — BLADE ES ELASTOMERIC COAT INSUL DURABLE BEND UPTO 90DEG

## (undated) DEVICE — SYSTEM POS SZ 36 X 20 X 1 IN INTEGR ADJ ARM PROTECTOR LIFT

## (undated) DEVICE — NEEDLE HYPO 22GA L1.5IN ULT SHRP TRIBEVELED INTUITIVE 1 HND

## (undated) DEVICE — KIT DETRGNT ENZYMATIC SOLUTION 100 ML SOAK SHLD 5 VI LG HUMD

## (undated) DEVICE — DEVICE ANTIFOG FOR SHRP SCP SYS 30978

## (undated) DEVICE — KIT EVAC 100CC W10MMXL20CM SIL FULL PERF HUBLESS FLAT DRN

## (undated) DEVICE — CATHETER CHOLGM 4.5FR L18IN W/ MTL SUPP TB

## (undated) DEVICE — SUTURE PDS + SZ 0 L36IN ABSRB VLT CT 1 L36MM 1 2 CIR PDP346H

## (undated) DEVICE — STOPCOCK IV 3 W 3 GANG EXT M LUERLOCK FIX MLL HI-FLO

## (undated) DEVICE — SYSTEM ACCS W/ 120MM OBT 5MM PRT LO PROF BLDELSS OPT TIP